# Patient Record
Sex: FEMALE | Race: WHITE | HISPANIC OR LATINO | Employment: UNEMPLOYED | URBAN - METROPOLITAN AREA
[De-identification: names, ages, dates, MRNs, and addresses within clinical notes are randomized per-mention and may not be internally consistent; named-entity substitution may affect disease eponyms.]

---

## 2017-01-04 ENCOUNTER — ALLSCRIPTS OFFICE VISIT (OUTPATIENT)
Dept: OTHER | Facility: OTHER | Age: 52
End: 2017-01-04

## 2017-01-04 DIAGNOSIS — M25.572 PAIN IN LEFT ANKLE: ICD-10-CM

## 2017-01-04 DIAGNOSIS — S52.601A CLOSED FRACTURE OF LOWER END OF RIGHT ULNA: ICD-10-CM

## 2017-01-04 DIAGNOSIS — S52.501A CLOSED FRACTURE OF LOWER END OF RIGHT RADIUS: ICD-10-CM

## 2017-01-09 ENCOUNTER — APPOINTMENT (OUTPATIENT)
Dept: OCCUPATIONAL THERAPY | Age: 52
End: 2017-01-09
Payer: OTHER MISCELLANEOUS

## 2017-01-09 ENCOUNTER — GENERIC CONVERSION - ENCOUNTER (OUTPATIENT)
Dept: OTHER | Facility: OTHER | Age: 52
End: 2017-01-09

## 2017-01-09 PROCEDURE — 97165 OT EVAL LOW COMPLEX 30 MIN: CPT

## 2017-01-12 ENCOUNTER — APPOINTMENT (OUTPATIENT)
Dept: OCCUPATIONAL THERAPY | Age: 52
End: 2017-01-12
Payer: OTHER MISCELLANEOUS

## 2017-01-12 PROCEDURE — 97110 THERAPEUTIC EXERCISES: CPT

## 2017-01-12 PROCEDURE — 97010 HOT OR COLD PACKS THERAPY: CPT

## 2017-01-12 PROCEDURE — 97140 MANUAL THERAPY 1/> REGIONS: CPT

## 2017-01-17 ENCOUNTER — APPOINTMENT (OUTPATIENT)
Dept: OCCUPATIONAL THERAPY | Age: 52
End: 2017-01-17
Payer: OTHER MISCELLANEOUS

## 2017-01-17 PROCEDURE — 97010 HOT OR COLD PACKS THERAPY: CPT

## 2017-01-17 PROCEDURE — 97140 MANUAL THERAPY 1/> REGIONS: CPT

## 2017-01-17 PROCEDURE — 97110 THERAPEUTIC EXERCISES: CPT

## 2017-01-20 ENCOUNTER — APPOINTMENT (OUTPATIENT)
Dept: OCCUPATIONAL THERAPY | Age: 52
End: 2017-01-20
Payer: OTHER MISCELLANEOUS

## 2017-01-20 PROCEDURE — 97018 PARAFFIN BATH THERAPY: CPT

## 2017-01-20 PROCEDURE — 97140 MANUAL THERAPY 1/> REGIONS: CPT

## 2017-01-24 ENCOUNTER — APPOINTMENT (OUTPATIENT)
Dept: OCCUPATIONAL THERAPY | Age: 52
End: 2017-01-24
Payer: OTHER MISCELLANEOUS

## 2017-01-24 PROCEDURE — 97010 HOT OR COLD PACKS THERAPY: CPT

## 2017-01-24 PROCEDURE — 97110 THERAPEUTIC EXERCISES: CPT

## 2017-01-26 ENCOUNTER — ALLSCRIPTS OFFICE VISIT (OUTPATIENT)
Dept: OTHER | Facility: OTHER | Age: 52
End: 2017-01-26

## 2017-01-27 ENCOUNTER — APPOINTMENT (OUTPATIENT)
Dept: OCCUPATIONAL THERAPY | Age: 52
End: 2017-01-27
Payer: OTHER MISCELLANEOUS

## 2017-01-31 ENCOUNTER — ALLSCRIPTS OFFICE VISIT (OUTPATIENT)
Dept: OTHER | Facility: OTHER | Age: 52
End: 2017-01-31

## 2017-01-31 ENCOUNTER — APPOINTMENT (OUTPATIENT)
Dept: OCCUPATIONAL THERAPY | Age: 52
End: 2017-01-31
Payer: OTHER MISCELLANEOUS

## 2017-01-31 ENCOUNTER — HOSPITAL ENCOUNTER (OUTPATIENT)
Dept: RADIOLOGY | Facility: CLINIC | Age: 52
Discharge: HOME | End: 2017-01-31
Payer: COMMERCIAL

## 2017-01-31 DIAGNOSIS — M25.572 PAIN IN LEFT ANKLE: ICD-10-CM

## 2017-01-31 PROCEDURE — 97110 THERAPEUTIC EXERCISES: CPT

## 2017-01-31 PROCEDURE — 73610 X-RAY EXAM OF ANKLE: CPT

## 2017-01-31 PROCEDURE — 97010 HOT OR COLD PACKS THERAPY: CPT

## 2017-01-31 PROCEDURE — 97140 MANUAL THERAPY 1/> REGIONS: CPT

## 2017-02-01 ENCOUNTER — GENERIC CONVERSION - ENCOUNTER (OUTPATIENT)
Dept: OTHER | Facility: OTHER | Age: 52
End: 2017-02-01

## 2017-02-07 ENCOUNTER — ALLSCRIPTS OFFICE VISIT (OUTPATIENT)
Dept: OTHER | Facility: OTHER | Age: 52
End: 2017-02-07

## 2017-02-08 ENCOUNTER — LAB CONVERSION - ENCOUNTER (OUTPATIENT)
Dept: OTHER | Facility: OTHER | Age: 52
End: 2017-02-08

## 2017-02-08 LAB
A/G RATIO (HISTORICAL): 1.5 (CALC) (ref 1–2.5)
ALBUMIN SERPL BCP-MCNC: 4.3 G/DL (ref 3.6–5.1)
ALP SERPL-CCNC: 97 U/L (ref 33–130)
ALT SERPL W P-5'-P-CCNC: 13 U/L (ref 6–29)
AST SERPL W P-5'-P-CCNC: 18 U/L (ref 10–35)
BASOPHILS # BLD AUTO: 0.6 %
BASOPHILS # BLD AUTO: 44 CELLS/UL (ref 0–200)
BILIRUB SERPL-MCNC: 0.2 MG/DL (ref 0.2–1.2)
BUN SERPL-MCNC: 13 MG/DL (ref 7–25)
BUN/CREA RATIO (HISTORICAL): NORMAL (CALC) (ref 6–22)
C-REACTIVE PROTEIN (HISTORICAL): 0.24 MG/DL
CALCIUM SERPL-MCNC: 9.7 MG/DL (ref 8.6–10.4)
CHLORIDE SERPL-SCNC: 102 MMOL/L (ref 98–110)
CO2 SERPL-SCNC: 29 MMOL/L (ref 20–31)
CREAT SERPL-MCNC: 0.58 MG/DL (ref 0.5–1.05)
DEPRECATED RDW RBC AUTO: 14.9 % (ref 11–15)
EGFR AFRICAN AMERICAN (HISTORICAL): 122 ML/MIN/1.73M2
EGFR-AMERICAN CALC (HISTORICAL): 105 ML/MIN/1.73M2
EOSINOPHIL # BLD AUTO: 175 CELLS/UL (ref 15–500)
EOSINOPHIL # BLD AUTO: 2.4 %
ERYTHROCYTE SEDIMENTATION RATE (HISTORICAL): 6 MM/H
GAMMA GLOBULIN (HISTORICAL): 2.9 G/DL (CALC) (ref 1.9–3.7)
GLUCOSE (HISTORICAL): 91 MG/DL (ref 65–99)
HCT VFR BLD AUTO: 35.7 % (ref 35–45)
HGB BLD-MCNC: 11.4 G/DL (ref 11.7–15.5)
LYMPHOCYTES # BLD AUTO: 2978 CELLS/UL (ref 850–3900)
LYMPHOCYTES # BLD AUTO: 40.8 %
MCH RBC QN AUTO: 24.8 PG (ref 27–33)
MCHC RBC AUTO-ENTMCNC: 32.1 G/DL (ref 32–36)
MCV RBC AUTO: 77.4 FL (ref 80–100)
MONOCYTES # BLD AUTO: 540 CELLS/UL (ref 200–950)
MONOCYTES (HISTORICAL): 7.4 %
NEUTROPHILS # BLD AUTO: 3562 CELLS/UL (ref 1500–7800)
NEUTROPHILS # BLD AUTO: 48.8 %
PLATELET # BLD AUTO: 323 THOUSAND/UL (ref 140–400)
PMV BLD AUTO: 8.4 FL (ref 7.5–12.5)
POTASSIUM SERPL-SCNC: 4.4 MMOL/L (ref 3.5–5.3)
RBC # BLD AUTO: 4.61 MILLION/UL (ref 3.8–5.1)
SODIUM SERPL-SCNC: 138 MMOL/L (ref 135–146)
TOTAL PROTEIN (HISTORICAL): 7.2 G/DL (ref 6.1–8.1)
WBC # BLD AUTO: 7.3 THOUSAND/UL (ref 3.8–10.8)

## 2017-03-08 ENCOUNTER — ALLSCRIPTS OFFICE VISIT (OUTPATIENT)
Dept: OTHER | Facility: OTHER | Age: 52
End: 2017-03-08

## 2017-03-09 ENCOUNTER — GENERIC CONVERSION - ENCOUNTER (OUTPATIENT)
Dept: OTHER | Facility: OTHER | Age: 52
End: 2017-03-09

## 2017-04-12 ENCOUNTER — ALLSCRIPTS OFFICE VISIT (OUTPATIENT)
Dept: OTHER | Facility: OTHER | Age: 52
End: 2017-04-12

## 2017-04-12 DIAGNOSIS — M06.09 RHEUMATOID ARTHRITIS OF MULTIPLE SITES WITHOUT RHEUMATOID FACTOR (HCC): ICD-10-CM

## 2017-04-12 DIAGNOSIS — Z79.899 OTHER LONG TERM (CURRENT) DRUG THERAPY: ICD-10-CM

## 2017-04-12 DIAGNOSIS — M79.7 FIBROMYALGIA: ICD-10-CM

## 2017-05-02 ENCOUNTER — ALLSCRIPTS OFFICE VISIT (OUTPATIENT)
Dept: OTHER | Facility: OTHER | Age: 52
End: 2017-05-02

## 2017-06-07 ENCOUNTER — ALLSCRIPTS OFFICE VISIT (OUTPATIENT)
Dept: OTHER | Facility: OTHER | Age: 52
End: 2017-06-07

## 2017-06-08 ENCOUNTER — ALLSCRIPTS OFFICE VISIT (OUTPATIENT)
Dept: OTHER | Facility: OTHER | Age: 52
End: 2017-06-08

## 2017-06-08 DIAGNOSIS — Z79.899 OTHER LONG TERM (CURRENT) DRUG THERAPY: ICD-10-CM

## 2017-06-08 DIAGNOSIS — M06.09 RHEUMATOID ARTHRITIS OF MULTIPLE SITES WITHOUT RHEUMATOID FACTOR (HCC): ICD-10-CM

## 2017-06-14 ENCOUNTER — TRANSCRIBE ORDERS (OUTPATIENT)
Dept: LAB | Facility: HOSPITAL | Age: 52
End: 2017-06-14

## 2017-06-14 ENCOUNTER — APPOINTMENT (OUTPATIENT)
Dept: LAB | Facility: HOSPITAL | Age: 52
End: 2017-06-14
Attending: INTERNAL MEDICINE
Payer: COMMERCIAL

## 2017-06-14 DIAGNOSIS — E78.5 HYPERLIPIDEMIA, UNSPECIFIED HYPERLIPIDEMIA TYPE: ICD-10-CM

## 2017-06-14 DIAGNOSIS — Z79.899 OTHER LONG TERM (CURRENT) DRUG THERAPY: ICD-10-CM

## 2017-06-14 DIAGNOSIS — E78.5 HYPERLIPIDEMIA, UNSPECIFIED HYPERLIPIDEMIA TYPE: Primary | ICD-10-CM

## 2017-06-14 DIAGNOSIS — M06.09 RHEUMATOID ARTHRITIS OF MULTIPLE SITES WITHOUT RHEUMATOID FACTOR (HCC): ICD-10-CM

## 2017-06-14 LAB
25(OH)D3 SERPL-MCNC: 19.8 NG/ML (ref 30–100)
ALBUMIN SERPL BCP-MCNC: 3.8 G/DL (ref 3.5–5)
ALP SERPL-CCNC: 89 U/L (ref 46–116)
ALT SERPL W P-5'-P-CCNC: 24 U/L (ref 12–78)
ANION GAP SERPL CALCULATED.3IONS-SCNC: 5 MMOL/L (ref 4–13)
AST SERPL W P-5'-P-CCNC: 21 U/L (ref 5–45)
BASOPHILS # BLD AUTO: 0.04 THOUSANDS/ΜL (ref 0–0.1)
BASOPHILS NFR BLD AUTO: 1 % (ref 0–1)
BILIRUB SERPL-MCNC: 0.4 MG/DL (ref 0.2–1)
BUN SERPL-MCNC: 11 MG/DL (ref 5–25)
CALCIUM SERPL-MCNC: 8.9 MG/DL (ref 8.3–10.1)
CHLORIDE SERPL-SCNC: 101 MMOL/L (ref 100–108)
CHOLEST SERPL-MCNC: 288 MG/DL (ref 50–200)
CO2 SERPL-SCNC: 30 MMOL/L (ref 21–32)
CREAT SERPL-MCNC: 0.55 MG/DL (ref 0.6–1.3)
CRP SERPL QL: <3 MG/L
EOSINOPHIL # BLD AUTO: 0.12 THOUSAND/ΜL (ref 0–0.61)
EOSINOPHIL NFR BLD AUTO: 2 % (ref 0–6)
ERYTHROCYTE [DISTWIDTH] IN BLOOD BY AUTOMATED COUNT: 14.3 % (ref 11.6–15.1)
ERYTHROCYTE [SEDIMENTATION RATE] IN BLOOD: 11 MM/HOUR (ref 0–20)
GFR SERPL CREATININE-BSD FRML MDRD: >60 ML/MIN/1.73SQ M
GLUCOSE P FAST SERPL-MCNC: 88 MG/DL (ref 65–99)
HCT VFR BLD AUTO: 38.1 % (ref 34.8–46.1)
HDLC SERPL-MCNC: 91 MG/DL (ref 40–60)
HGB BLD-MCNC: 12.2 G/DL (ref 11.5–15.4)
LDLC SERPL CALC-MCNC: 172 MG/DL (ref 0–100)
LYMPHOCYTES # BLD AUTO: 2 THOUSANDS/ΜL (ref 0.6–4.47)
LYMPHOCYTES NFR BLD AUTO: 41 % (ref 14–44)
MCH RBC QN AUTO: 25.2 PG (ref 26.8–34.3)
MCHC RBC AUTO-ENTMCNC: 32 G/DL (ref 31.4–37.4)
MCV RBC AUTO: 79 FL (ref 82–98)
MONOCYTES # BLD AUTO: 0.36 THOUSAND/ΜL (ref 0.17–1.22)
MONOCYTES NFR BLD AUTO: 7 % (ref 4–12)
NEUTROPHILS # BLD AUTO: 2.39 THOUSANDS/ΜL (ref 1.85–7.62)
NEUTS SEG NFR BLD AUTO: 49 % (ref 43–75)
NRBC BLD AUTO-RTO: 0 /100 WBCS
PLATELET # BLD AUTO: 292 THOUSANDS/UL (ref 149–390)
PMV BLD AUTO: 9.3 FL (ref 8.9–12.7)
POTASSIUM SERPL-SCNC: 4.1 MMOL/L (ref 3.5–5.3)
PROT SERPL-MCNC: 7.3 G/DL (ref 6.4–8.2)
RBC # BLD AUTO: 4.84 MILLION/UL (ref 3.81–5.12)
SODIUM SERPL-SCNC: 136 MMOL/L (ref 136–145)
TRIGL SERPL-MCNC: 127 MG/DL
TSH SERPL DL<=0.05 MIU/L-ACNC: 1.25 UIU/ML (ref 0.36–3.74)
WBC # BLD AUTO: 4.93 THOUSAND/UL (ref 4.31–10.16)

## 2017-06-14 PROCEDURE — 84443 ASSAY THYROID STIM HORMONE: CPT

## 2017-06-14 PROCEDURE — 80061 LIPID PANEL: CPT

## 2017-06-14 PROCEDURE — 86140 C-REACTIVE PROTEIN: CPT

## 2017-06-14 PROCEDURE — 85025 COMPLETE CBC W/AUTO DIFF WBC: CPT

## 2017-06-14 PROCEDURE — 36415 COLL VENOUS BLD VENIPUNCTURE: CPT

## 2017-06-14 PROCEDURE — 82306 VITAMIN D 25 HYDROXY: CPT

## 2017-06-14 PROCEDURE — 85652 RBC SED RATE AUTOMATED: CPT

## 2017-06-14 PROCEDURE — 80053 COMPREHEN METABOLIC PANEL: CPT

## 2017-06-16 ENCOUNTER — GENERIC CONVERSION - ENCOUNTER (OUTPATIENT)
Dept: OTHER | Facility: OTHER | Age: 52
End: 2017-06-16

## 2017-08-09 ENCOUNTER — GENERIC CONVERSION - ENCOUNTER (OUTPATIENT)
Dept: OTHER | Facility: OTHER | Age: 52
End: 2017-08-09

## 2017-08-09 ENCOUNTER — ALLSCRIPTS OFFICE VISIT (OUTPATIENT)
Dept: OTHER | Facility: OTHER | Age: 52
End: 2017-08-09

## 2017-08-09 DIAGNOSIS — Z12.31 ENCOUNTER FOR SCREENING MAMMOGRAM FOR MALIGNANT NEOPLASM OF BREAST: ICD-10-CM

## 2017-08-09 DIAGNOSIS — R92.2 INCONCLUSIVE MAMMOGRAM: ICD-10-CM

## 2017-10-01 DIAGNOSIS — E78.5 HYPERLIPIDEMIA: ICD-10-CM

## 2017-10-16 ENCOUNTER — ALLSCRIPTS OFFICE VISIT (OUTPATIENT)
Dept: OTHER | Facility: OTHER | Age: 52
End: 2017-10-16

## 2017-10-17 NOTE — PROGRESS NOTES
Assessment  1  Chronic constipation (564 00) (K59 09)   2  Fibromyalgia (729 1) (M79 7)   3  Hyperlipidemia (272 4) (E78 5)   4  Rheumatoid arthritis of multiple sites with negative rheumatoid factor (714 0) (M06 09)    Plan  Chronic constipation    · Senna 8 6 MG Oral Capsule; 2 caps daily  Hyperlipidemia    · (1) LIPID PANEL FASTING W DIRECT LDL REFLEX; Status:Active; Requested for:16Oct2017;   PMH: Acute left ankle pain    · Naproxen 500 MG Oral Tablet; TAKE 1 TABLET EVERY 12 HOURS AS NEEDED FOR PAIN  WITH FOOD    Discussion/Summary  Discussion Summary:   Try Sennacholesterol dietstatin therapypanel with next set of labswith [id number]  Counseling Documentation With Imm: The patient was counseled regarding diagnostic results,-- impressions  Medication SE Review and Pt Understands Tx: Possible side effects of new medications were reviewed with the patient/guardian today  The treatment plan was reviewed with the patient/guardian  The patient/guardian understands and agrees with the treatment plan      Chief Complaint  Chief Complaint Free Text Note Form: Patient presents to office today for a follow-up   Chief Complaint Chronic Condition [facility]: Patient is here today for follow up of chronic conditions described in HPI  History of Present Illness  HPI: since last visit, she has seen GI for chronic constipationdid not helpis eating a high fiber diethas also seen ortho for the right hand stiffnessdid not cover Pennsaid      Review of Systems  Complete-Female:   Constitutional: recent weight gain, but-- no fever-- and-- no chills  Cardiovascular: no chest pain-- and-- no palpitations  Respiratory: no shortness of breath-- and-- no cough  Gastrointestinal: constipation-- and-- occ heartburn  Musculoskeletal: arthralgias-- and-- joint swelling, but-- as noted in HPI  The patient presents with complaints of occasional episodes of headache  Active Problems  1   Acid reflux disease (530 81) (K21 9)   2  Candidal vulvovaginitis (112 1) (B37 3)   3  Chronic constipation (564 00) (K59 09)   4  Closed fracture of distal ends of right radius and ulna (813 44) (S52 501A,S52 601A)   5  Dense breasts (793 82) (R92 2)   6  Depression (311) (F32 9)   7  Encounter for screening mammogram for malignant neoplasm of breast (V76 12) (Z12 31)   8  Fibromyalgia (729 1) (M79 7)   9  Flu vaccine need (V04 81) (Z23)   10  History of rheumatoid arthritis (V13 4) (Z87 39)   11  Hyperlipidemia (272 4) (E78 5)   12  Long-term use of hydroxychloroquine (V58 69) (Z79 899)   13  Need for influenza vaccination (V04 81) (Z23)   14  Pain of left knee after injury (719 46) (M25 562)   15  Rheumatoid arthritis of multiple sites with negative rheumatoid factor (714 0) (M06 09)   16  Screening, lipid (V77 91) (Z13 220)   17  Stiffness of finger joint of right hand (719 54) (M25 641)   18  Well female exam with routine gynecological exam (V72 31) (Z01 419)    Past Medical History  1  History of Acute left ankle pain (719 47) (M25 572)   2  History of Acute upper respiratory infection (465 9) (J06 9)   3  History of Acute upper respiratory infection (465 9) (J06 9)   4  History of Cough (786 2) (R05)   5  Depression (311) (F32 9)   6  Fibromyalgia (729 1) (M79 7)   7  History of anemia (V12 3) (Z86 2)   8  History of headache (V13 89) (Z87 898)   9  History of rheumatoid arthritis (V13 4) (Z87 39)   10  History of Need for tetanus booster (V03 7) (Z23)   11  History of Need for vaccination with 13-polyvalent pneumococcal conjugate vaccine (V03 82) (Z23)   12  History of Pap smear for cervical cancer screening (V76 2) (Z12 4)   13  History of Screen for colon cancer (V76 51) (Z12 11)   14  History of Screening for hyperlipidemia (V77 91) (Z13 220)  Active Problems And Past Medical History Reviewed: The active problems and past medical history were reviewed and updated today  Surgical History  1  History of Hysterectomy   2   History of Hysterectomy   3  History of Tonsillectomy   4  History of Wrist Surgery Right  Surgical History Reviewed: The surgical history was reviewed and updated today  Family History  Mother    1  Denied: Family history of Colon cancer   2  Denied: Family history of Crohn's disease   3  Denied: Family history of liver disease  Father    4  Denied: Family history of Colon cancer   5  Denied: Family history of Crohn's disease   6  Denied: Family history of liver disease  Family History    7  Denied: Family history of Crohn's disease   8  Denied: Family history of psoriasis   9  Denied: Family history of systemic lupus erythematosus   10  Denied: Family history of ulcerative colitis  Family History Reviewed: The family history was reviewed and updated today  Social History   · Employed   · Never a smoker   · History of Never a smoker   · No alcohol use   · History of No alcohol use   · No drug use  Social History Reviewed: The social history was reviewed and updated today  Current Meds   1  DULoxetine HCl - 60 MG Oral Capsule Delayed Release Particles; TAKE 2 CAPSULE Bedtime; Therapy: 61SCF1001 to (Zoraida Puckett)  Requested for: 19Kuu1306; Last Rx:05Sep2017   Ordered   2  Hydroxychloroquine Sulfate 200 MG Oral Tablet; TAKE 2 TABLET Daily With Food; Therapy: 27Apr2015 to (Evaluate:15Cdt8318)  Requested for: 21Zqh9462; Last Rx:05Sep2017   Ordered   3  Lidocaine-Prilocaine 2 5-2 5 % External Cream; Apply to affected areas up to 3 times a day as   needed; Therapy: 40VVR6025 to (Evaluate:27Mar2017)  Requested for: 61POZ9476; Last Rx:26Jan2017   Ordered   4  Naproxen 500 MG Oral Tablet; take 1 tablet every 12 hours as needed for pain; Therapy: 00WBW4498 to (Evaluate:11Jun2017)  Requested for: 12Apr2017; Last Rx:12Apr2017   Ordered   5  Nystatin-Triamcinolone 046551-6 1 UNIT/GM-% External Ointment; APPLY SPARINGLY TO AFFECTED   AREA TWICE A DAY FOR 7 TO 14 DAYS;    Therapy: 57HLN4786 to (Last Susan Hammad)  Requested for: 78Zrk4685 Ordered   6  Vitamin C 500 MG Oral Capsule; TAKE 1 CAPSULE DAILY; Therapy: (Recorded:56Epl7326) to Recorded   7  Vitamin D CAPS; TAKE 1 CAPSULE ONCE DAILY; Therapy: (Recorded:90Jlm0983) to Recorded  Medication List Reviewed: The medication list was reviewed and updated today  Allergies  1  No Known Drug Allergies  2  Seasonal    Vitals  Vital Signs    Recorded: 63ARW3346 03:12PM   Heart Rate 82   Systolic 320   Diastolic 72   Height 5 ft 5 in   Weight 158 lb    BMI Calculated 26 29   BSA Calculated 1 79   O2 Saturation 98     Physical Exam    Constitutional   General appearance: No acute distress, well appearing and well nourished  Eyes   Conjunctiva and lids: No swelling, erythema or discharge  Ears, Nose, Mouth, and Throat   Otoscopic examination: Tympanic membranes translucent with normal light reflex  Canals patent without erythema  Oropharynx: Normal with no erythema, edema, exudate or lesions  Pulmonary   Respiratory effort: No increased work of breathing or signs of respiratory distress  Auscultation of lungs: Clear to auscultation  Cardiovascular   Auscultation of heart: Normal rate and rhythm, normal S1 and S2, without murmurs  Abdomen   Abdomen: Non-tender, no masses  Liver and spleen: No hepatomegaly or splenomegaly  Lymphatic   Palpation of lymph nodes in neck: No lymphadenopathy      Musculoskeletal   Gait and station: Normal     Psychiatric   Orientation to person, place, and time: Normal     Mood and affect: Normal          Future Appointments    Date/Time Provider Specialty Site   11/29/2017 08:20 AM Livia Dewey DO Gastroenterology Adult [facility] [address] [place] [address]   11/28/2017 03:00 PM [facility] Rhe[facility]ology [facility]     Signatures   Electronically signed by : CLEMENTINE Chamorro ; Oct 16 2017  3:42PM EST                       (Author)

## 2017-11-28 ENCOUNTER — ALLSCRIPTS OFFICE VISIT (OUTPATIENT)
Dept: OTHER | Facility: OTHER | Age: 52
End: 2017-11-28

## 2017-11-28 DIAGNOSIS — M06.09 RHEUMATOID ARTHRITIS OF MULTIPLE SITES WITHOUT RHEUMATOID FACTOR (HCC): ICD-10-CM

## 2017-11-28 DIAGNOSIS — M79.7 FIBROMYALGIA: ICD-10-CM

## 2017-11-29 ENCOUNTER — ALLSCRIPTS OFFICE VISIT (OUTPATIENT)
Dept: OTHER | Facility: OTHER | Age: 52
End: 2017-11-29

## 2017-11-30 NOTE — PROGRESS NOTES
Assessment  1  Chronic constipation (564 00) (K59 09)    Plan  Chronic constipation    · Senna 8 6 MG Oral Capsule; TAKE AS DIRECTED   Rx By: Georgette Nova; Dispense: 90 Days ; #:270 Capsule; Refill: 3;Chronic constipation; RIKI = N; Verified Transmission to [place] [id number]; Msg to Pharmacy: take 3 tablets daily; Last Updated By: [employer]; 11/29/2017 8:54:08 AM   · Follow-up PRN Evaluation and Treatment  Follow-up  Status: Complete  Done:29Nov2017   Ordered;Chronic constipation; Ordered By: Georgette Nova Performed:  Due: 35RNS0635    Discussion/Summary  Discussion Summary:   45 year old with chronic idiopathic constipation, recent colonoscopy was normal  TSH was normal  She is currently on senna and is happy on this regimen  colonoscopy in 10 years  can follow up as needed in the future  Chief Complaint  Chief Complaint Free Text Note Form: Patient is here for followup for constipation  History of Present Illness  HPI: 57-year-old female here for follow-up of chronic constipation  She is currently on senna and is happy on this regimen  has no family history of colon cancer  Review of Systems  Complete-Female GI Adult:  Constitutional: No fever, no chills, feels well, no tiredness, no recent weight gain or weight loss  Eyes: No complaints of eye pain, no red eyes, no eyesight problems, no discharge, no dry eyes, no itching of eyes  ENT: no complaints of earache, no loss of hearing, no nose bleeds, no nasal discharge, no sore throat, no hoarseness  Cardiovascular: No complaints of slow heart rate, no fast heart rate, no chest pain, no palpitations, no leg claudication, no lower extremity edema  Respiratory: No complaints of shortness of breath, no wheezing, no cough, no SOB on exertion, no orthopnea, no PND  Gastrointestinal: constipation, but-- as noted in HPI    Genitourinary: No complaints of dysuria, no incontinence, no pelvic pain, no dysmenorrhea, no vaginal discharge or bleeding  Musculoskeletal: No complaints of arthralgias, no myalgias, no joint swelling or stiffness, no limb pain or swelling  Integumentary: No complaints of skin rash or lesions, no itching, no skin wounds, no breast pain or lump  Neurological: No complaints of headache, no confusion, no convulsions, no numbness, no dizziness or fainting, no tingling, no limb weakness, no difficulty walking  Psychiatric: Not suicidal, no sleep disturbance, no anxiety or depression, no change in personality, no emotional problems  Endocrine: No complaints of proptosis, no hot flashes, no muscle weakness, no deepening of the voice, no feelings of weakness  Hematologic/Lymphatic: No complaints of swollen glands, no swollen glands in the neck, does not bleed easily, does not bruise easily  ROS Reviewed:   ROS reviewed  Active Problems    1  Acid reflux disease (530 81) (K21 9)   2  Candidal vulvovaginitis (112 1) (B37 3)   3  Chronic constipation (564 00) (K59 09)   4  Closed fracture of distal ends of right radius and ulna (813 44) (S52 501A,S52 601A)   5  Dense breasts (793 82) (R92 2)   6  Depression (311) (F32 9)   7  Encounter for screening mammogram for malignant neoplasm of breast (V76 12) (Z12 31)   8  Fibromyalgia (729 1) (M79 7)   9  Flu vaccine need (V04 81) (Z23)   10  History of rheumatoid arthritis (V13 4) (Z87 39)   11  Hyperlipidemia (272 4) (E78 5)   12  Long-term use of hydroxychloroquine (V58 69) (Z79 899)   13  Need for influenza vaccination (V04 81) (Z23)   14  Pain of left knee after injury (719 46) (M25 562)   15  Rheumatoid arthritis of multiple sites with negative rheumatoid factor (714 0) (M06 09)   16  Screening, lipid (V77 91) (Z13 220)   17  Stiffness of finger joint of right hand (719 54) (M25 641)   18  Well female exam with routine gynecological exam (V72 31) (Z01 419)    Past Medical History    1  History of Acute left ankle pain (719 47) (M25 572)   2   History of Acute upper respiratory infection (465 9) (J06 9)   3  History of Acute upper respiratory infection (465 9) (J06 9)   4  History of Cough (786 2) (R05)   5  Depression (311) (F32 9)   6  Fibromyalgia (729 1) (M79 7)   7  History of anemia (V12 3) (Z86 2)   8  History of headache (V13 89) (Z87 898)   9  History of rheumatoid arthritis (V13 4) (Z87 39)   10  History of Need for tetanus booster (V03 7) (Z23)   11  History of Need for vaccination with 13-polyvalent pneumococcal conjugate vaccine  (V03 82) (Z23)   12  History of Pap smear for cervical cancer screening (V76 2) (Z12 4)   13  History of Screen for colon cancer (V76 51) (Z12 11)   14  History of Screening for hyperlipidemia (V77 91) (Z13 220)  Active Problems And Past Medical History Reviewed: The active problems and past medical history were reviewed and updated today  Surgical History  1  History of Hysterectomy   2  History of Hysterectomy   3  History of Tonsillectomy   4  History of Wrist Surgery Right  Surgical History Reviewed: The surgical history was reviewed and updated today  Family History  Mother    1  Denied: Family history of Colon cancer   2  Denied: Family history of Crohn's disease   3  Denied: Family history of liver disease  Father    4  Denied: Family history of Colon cancer   5  Denied: Family history of Crohn's disease   6  Denied: Family history of liver disease  Family History    7  Denied: Family history of Crohn's disease   8  Denied: Family history of psoriasis   9  Denied: Family history of systemic lupus erythematosus   10  Denied: Family history of ulcerative colitis  Family History Reviewed: The family history was reviewed and updated today  Social History     · Employed   · Never a smoker   · History of Never a smoker   · No alcohol use   · History of No alcohol use   · No drug use  Social History Reviewed: The social history was reviewed and updated today  Current Meds   1   DULoxetine HCl - 60 MG Oral Capsule Delayed Release Particles; TAKE 2 CAPSULE Bedtime; Therapy: 09MRN5932 to (Loly Bustillo)  Requested for: 41WTB5698; Last Rx:28Nov2017 Ordered   2  Hydroxychloroquine Sulfate 200 MG Oral Tablet; TAKE 2 TABLET Daily With Food; Therapy: 75Xoy8877 to (Sarahy Erick)  Requested for: 00TSI8901; Last Rx:28Nov2017 Ordered   3  Lidocaine-Prilocaine 2 5-2 5 % External Cream; Apply to affected areas up to 3 times a day as needed; Therapy: 99YHA5388 to (Evaluate:27Mar2017)  Requested for: 60OPD6234; Last Rx:26Jan2017 Ordered   4  Naproxen 500 MG Oral Tablet; TAKE 1 TABLET BY MOUTH EVERY 12 HOURS WITH FOOD AS NEEDED FOR PAIN; Therapy: 04EMD8991 to (Evaluate:39Fcq3022)  Requested for: 74XZC4547; Last Rx:28Nov2017 Ordered   5  Nystatin-Triamcinolone 237777-6 1 UNIT/GM-% External Ointment; APPLY SPARINGLY TO AFFECTED AREA TWICE A DAY FOR 7 TO 14 DAYS; Therapy: 82MVD1293 to (Last Rx:00Wsa3978)  Requested for: 72Cmj2945 Ordered   6  Senna 8 6 MG Oral Capsule; 2 caps daily; Therapy: 98GEM8975 to (Last Rx:16Oct2017)  Requested for: 45OYS5323 Ordered   7  Vitamin C 500 MG Oral Capsule; TAKE 1 CAPSULE DAILY; Therapy: (Recorded:08Jun2015) to Recorded   8  Vitamin D CAPS; TAKE 1 CAPSULE ONCE DAILY; Therapy: (Recorded:08Jun2015) to Recorded  Medication List Reviewed: The medication list was reviewed and updated today  Allergies  1  No Known Drug Allergies  2  Seasonal    Vitals  Vital Signs    Recorded: 62AEG9609 08:36AM   Temperature 98 1 F, Tympanic   Heart Rate 91   Respiration 18   Systolic 938, LUE, Sitting   Diastolic 71, LUE, Sitting   Height 5 ft 4 in   Weight 159 lb    BMI Calculated 27 29   BSA Calculated 1 77       Physical Exam   Constitutional  General appearance: No acute distress, well appearing and well nourished  Eyes  Conjunctiva and lids: No swelling, erythema or discharge  Pupils and irises: Equal, round and reactive to light     Ears, Nose, Mouth, and Throat  External inspection of ears and nose: Normal    Nasal mucosa, septum, and turbinates: Normal without edema or erythema  Oropharynx: Normal with no erythema, edema, exudate or lesions  Pulmonary  Respiratory effort: No increased work of breathing or signs of respiratory distress  Auscultation of lungs: Clear to auscultation  Cardiovascular  Auscultation of heart: Normal rate and rhythm, normal S1 and S2, without murmurs  Examination of extremities for edema and/or varicosities: Normal    Abdomen  Abdomen: Non-tender, no masses  Liver and spleen: No hepatomegaly or splenomegaly  Lymphatic  Palpation of lymph nodes in neck: No lymphadenopathy  Musculoskeletal  Gait and station: Normal    Digits and nails: Normal without clubbing or cyanosis  Inspection/palpation of joints, bones, and muscles: Normal    Skin  Skin and subcutaneous tissue: Normal without rashes or lesions  Psychiatric  Orientation to person, place, and time: Normal    Mood and affect: Normal          Future Appointments    Date/Time Provider Specialty Site   04/18/2018 03:00 PM CLEMENTINE Brar   Internal Medicine [facility]       Signatures   Electronically signed by : Sandra Orozco DO; Nov 29 2017  8:55AM EST                       (Author)

## 2017-12-22 ENCOUNTER — GENERIC CONVERSION - ENCOUNTER (OUTPATIENT)
Dept: OTHER | Facility: OTHER | Age: 52
End: 2017-12-22

## 2017-12-22 ENCOUNTER — TRANSCRIBE ORDERS (OUTPATIENT)
Dept: LAB | Facility: HOSPITAL | Age: 52
End: 2017-12-22

## 2017-12-22 ENCOUNTER — APPOINTMENT (OUTPATIENT)
Dept: LAB | Facility: HOSPITAL | Age: 52
End: 2017-12-22
Payer: COMMERCIAL

## 2017-12-22 DIAGNOSIS — E78.5 HYPERLIPIDEMIA: ICD-10-CM

## 2017-12-22 DIAGNOSIS — M79.7 FIBROMYALGIA: ICD-10-CM

## 2017-12-22 DIAGNOSIS — M06.09 RHEUMATOID ARTHRITIS OF MULTIPLE SITES WITHOUT RHEUMATOID FACTOR (HCC): ICD-10-CM

## 2017-12-22 LAB
ALBUMIN SERPL BCP-MCNC: 3.8 G/DL (ref 3.5–5)
ALP SERPL-CCNC: 81 U/L (ref 46–116)
ALT SERPL W P-5'-P-CCNC: 22 U/L (ref 12–78)
ANION GAP SERPL CALCULATED.3IONS-SCNC: 6 MMOL/L (ref 4–13)
AST SERPL W P-5'-P-CCNC: 21 U/L (ref 5–45)
BASOPHILS # BLD AUTO: 0.05 THOUSANDS/ΜL (ref 0–0.1)
BASOPHILS NFR BLD AUTO: 1 % (ref 0–1)
BILIRUB SERPL-MCNC: 0.41 MG/DL (ref 0.2–1)
BUN SERPL-MCNC: 14 MG/DL (ref 5–25)
CALCIUM SERPL-MCNC: 9 MG/DL (ref 8.3–10.1)
CHLORIDE SERPL-SCNC: 104 MMOL/L (ref 100–108)
CHOLEST SERPL-MCNC: 246 MG/DL (ref 50–200)
CO2 SERPL-SCNC: 29 MMOL/L (ref 21–32)
CREAT SERPL-MCNC: 0.51 MG/DL (ref 0.6–1.3)
CRP SERPL QL: <3 MG/L
EOSINOPHIL # BLD AUTO: 0.12 THOUSAND/ΜL (ref 0–0.61)
EOSINOPHIL NFR BLD AUTO: 3 % (ref 0–6)
ERYTHROCYTE [DISTWIDTH] IN BLOOD BY AUTOMATED COUNT: 14.7 % (ref 11.6–15.1)
ERYTHROCYTE [SEDIMENTATION RATE] IN BLOOD: 8 MM/HOUR (ref 0–20)
GFR SERPL CREATININE-BSD FRML MDRD: 109 ML/MIN/1.73SQ M
GLUCOSE P FAST SERPL-MCNC: 92 MG/DL (ref 65–99)
HCT VFR BLD AUTO: 36.7 % (ref 34.8–46.1)
HDLC SERPL-MCNC: 93 MG/DL (ref 40–60)
HGB BLD-MCNC: 11.5 G/DL (ref 11.5–15.4)
LDLC SERPL CALC-MCNC: 128 MG/DL (ref 0–100)
LYMPHOCYTES # BLD AUTO: 1.96 THOUSANDS/ΜL (ref 0.6–4.47)
LYMPHOCYTES NFR BLD AUTO: 46 % (ref 14–44)
MCH RBC QN AUTO: 24.5 PG (ref 26.8–34.3)
MCHC RBC AUTO-ENTMCNC: 31.3 G/DL (ref 31.4–37.4)
MCV RBC AUTO: 78 FL (ref 82–98)
MONOCYTES # BLD AUTO: 0.31 THOUSAND/ΜL (ref 0.17–1.22)
MONOCYTES NFR BLD AUTO: 7 % (ref 4–12)
NEUTROPHILS # BLD AUTO: 1.88 THOUSANDS/ΜL (ref 1.85–7.62)
NEUTS SEG NFR BLD AUTO: 43 % (ref 43–75)
NRBC BLD AUTO-RTO: 0 /100 WBCS
PLATELET # BLD AUTO: 336 THOUSANDS/UL (ref 149–390)
PMV BLD AUTO: 9.4 FL (ref 8.9–12.7)
POTASSIUM SERPL-SCNC: 4.1 MMOL/L (ref 3.5–5.3)
PROT SERPL-MCNC: 7.3 G/DL (ref 6.4–8.2)
RBC # BLD AUTO: 4.69 MILLION/UL (ref 3.81–5.12)
SODIUM SERPL-SCNC: 139 MMOL/L (ref 136–145)
TRIGL SERPL-MCNC: 125 MG/DL
WBC # BLD AUTO: 4.33 THOUSAND/UL (ref 4.31–10.16)

## 2017-12-22 PROCEDURE — 86140 C-REACTIVE PROTEIN: CPT

## 2017-12-22 PROCEDURE — 36415 COLL VENOUS BLD VENIPUNCTURE: CPT

## 2017-12-22 PROCEDURE — 85025 COMPLETE CBC W/AUTO DIFF WBC: CPT

## 2017-12-22 PROCEDURE — 85652 RBC SED RATE AUTOMATED: CPT

## 2017-12-22 PROCEDURE — 80061 LIPID PANEL: CPT

## 2017-12-22 PROCEDURE — 80053 COMPREHEN METABOLIC PANEL: CPT

## 2017-12-29 ENCOUNTER — ALLSCRIPTS OFFICE VISIT (OUTPATIENT)
Dept: OTHER | Facility: OTHER | Age: 52
End: 2017-12-29

## 2017-12-29 ENCOUNTER — GENERIC CONVERSION - ENCOUNTER (OUTPATIENT)
Dept: OTHER | Facility: OTHER | Age: 52
End: 2017-12-29

## 2017-12-29 ENCOUNTER — TRANSCRIBE ORDERS (OUTPATIENT)
Dept: RADIOLOGY | Facility: CLINIC | Age: 52
End: 2017-12-29

## 2017-12-29 ENCOUNTER — APPOINTMENT (OUTPATIENT)
Dept: RADIOLOGY | Facility: CLINIC | Age: 52
End: 2017-12-29
Payer: COMMERCIAL

## 2017-12-29 DIAGNOSIS — M25.532 PAIN IN LEFT WRIST: ICD-10-CM

## 2017-12-29 PROCEDURE — 73110 X-RAY EXAM OF WRIST: CPT

## 2017-12-30 NOTE — PROGRESS NOTES
Assessment   1  Left wrist pain (719 43) (M25 532)   2  Rheumatoid arthritis of multiple sites with negative rheumatoid factor (714 0) (M06 09)    Plan   Left wrist pain    · * XR WRIST 3+ VIEW LEFT; Status:Active; Requested for:93Wpr3178;   Rheumatoid arthritis of multiple sites with negative rheumatoid factor    · Naproxen 500 MG Oral Tablet; TAKE 1 TABLET BY MOUTH EVERY 12 HOURS    WITH FOOD AS NEEDED FOR PAIN    Discussion/Summary      Ice, NSAID, ACE wrap r/o fracture will go to [facility]      The patient was counseled regarding impressions  Possible side effects of new medications were reviewed with the patient/guardian today  The treatment plan was reviewed with the patient/guardian  The patient/guardian understands and agrees with the treatment plan      Chief Complaint   The patient presents today for fall yesterday, left wrist swollen, applied ice for swelling  History of Present Illness   HPI: Milagro Gunning last night at home, lost her balance fixing her closet landed on her left hand is swollen and she has been icing it pain with pronation and supination of the wrist          Review of Systems        Musculoskeletal: as noted in HPI  Active Problems   1  Acid reflux disease (530 81) (K21 9)   2  Candidal vulvovaginitis (112 1) (B37 3)   3  Chronic constipation (564 00) (K59 09)   4  Closed fracture of distal ends of right radius and ulna (813 44) (S52 501A,S52 601A)   5  Dense breasts (793 82) (R92 2)   6  Depression (311) (F32 9)   7  Encounter for screening mammogram for malignant neoplasm of breast (V76 12)     (Z12 31)   8  Fibromyalgia (729 1) (M79 7)   9  Flu vaccine need (V04 81) (Z23)   10  History of rheumatoid arthritis (V13 4) (Z87 39)   11  Hyperlipidemia (272 4) (E78 5)   12  Long-term use of hydroxychloroquine (V58 69) (Z79 899)   13  Need for influenza vaccination (V04 81) (Z23)   14  Pain of left knee after injury (719 46) (M25 562)   15   Rheumatoid arthritis of multiple sites with negative rheumatoid factor (714 0) (M06 09)   16  Screening, lipid (V77 91) (Z13 220)   17  Stiffness of finger joint of right hand (719 54) (M25 641)   18  Well female exam with routine gynecological exam (V72 31) (Z01 419)    Past Medical History   Active Problems And Past Medical History Reviewed: The active problems and past medical history were reviewed and updated today  Surgical History   Surgical History Reviewed: The surgical history was reviewed and updated today  Social History    · Employed   · Janitorial services   · Never a smoker   · History of Never a smoker   · No alcohol use   · History of No alcohol use   · No drug use  The social history was reviewed and updated today  Family History   Family History Reviewed: The family history was reviewed and updated today  Current Meds    1  DULoxetine HCl - 60 MG Oral Capsule Delayed Release Particles; TAKE 2 CAPSULE     Bedtime; Therapy: 92OKT3686 to (Loly Bustillo)  Requested for: 39ZEA5240; Last     Rx:28Nov2017 Ordered   2  Hydroxychloroquine Sulfate 200 MG Oral Tablet; TAKE 2 TABLET Daily With Food; Therapy: 27Apr2015 to (Sarahy Suarez)  Requested for: 37QCV5409; Last     Rx:28Nov2017 Ordered   3  Lidocaine-Prilocaine 2 5-2 5 % External Cream; Apply to affected areas up to 3 times a     day as needed; Therapy: 84FYU8247 to (Evaluate:27Mar2017)  Requested for: 39SGF3307; Last     Rx:26Jan2017 Ordered   4  Naproxen 500 MG Oral Tablet; TAKE 1 TABLET BY MOUTH EVERY 12 HOURS WITH     FOOD AS NEEDED FOR PAIN;     Therapy: 89JAO7675 to (Evaluate:90Shf6957)  Requested for: 70SAH1466; Last     Rx:28Nov2017 Ordered   5  Nystatin-Triamcinolone 711625-9 1 UNIT/GM-% External Ointment; APPLY SPARINGLY     TO AFFECTED AREA TWICE A DAY FOR 7     TO 14 DAYS; Therapy: 35OAV4249 to (Last Rx:17Xer9512)  Requested for: 10Pxu7925 Ordered   6  Senna 8 6 MG Oral Capsule; TAKE AS DIRECTED;      Therapy: 64GGV2247 to (Manolo Clemens)  Requested for: 61JKU6276; Last     Rx:29Nov2017 Ordered   7  Vitamin C 500 MG Oral Capsule; TAKE 1 CAPSULE DAILY; Therapy: (Recorded:08Jun2015) to Recorded   8  Vitamin D CAPS; TAKE 1 CAPSULE ONCE DAILY; Therapy: (Recorded:08Jun2015) to Recorded     The medication list was reviewed and updated today  Allergies   1  No Known Drug Allergies  2  Seasonal    Vitals    Recorded: 68QJE3248 12:26PM   Temperature 98 F, Oral   Heart Rate 99   Respiration 18   Systolic 98, Sitting   Diastolic 60, Sitting   Height 5 ft 4 in   Weight 158 lb    BMI Calculated 27 12   BSA Calculated 1 77   O2 Saturation 98     Physical Exam        Constitutional      General appearance: No acute distress, well appearing and well nourished  Pulmonary      Respiratory effort: No increased work of breathing or signs of respiratory distress  Musculoskeletal      Gait and station: Normal  -- swelling and tenderness of the left wristl good radial pulse; good hand   Future Appointments      Date/Time Provider Specialty Site   01/24/2018 08:15 AM CLEMENTINE Joel  Orthopedic Surgery [facility] [facility]   04/18/2018 03:00 PM CLEMENTINE Ramires   Internal Medicine [facility]     Signatures    Electronically signed by : CLEMENTINE Layton ; Dec 29 2017 12:55PM EST                       (Author)

## 2018-01-10 NOTE — RESULT NOTES
Message   Her cholesterol is higher than what it was last year  She has to watch her diet, eat a low fat diet  Verified Results  (1) LIPID PANEL, FASTING 26Zsw3888 10:12AM Juan Jay   REPORT COMMENT:  YOUR OFFICE HAS PT NAME AS Christos Lomeli  FASTING:YES     Test Name Result Flag Reference   CHOLESTEROL, TOTAL 252 mg/dL H 125-200   HDL CHOLESTEROL 78 mg/dL  > OR = 46   TRIGLICERIDES 636 mg/dL  <150   LDL-CHOLESTEROL 151 mg/dL (calc) H <130   Desirable range <100 mg/dL for patients with CHD or  diabetes and <70 mg/dL for diabetic patients with  known heart disease  CHOL/HDLC RATIO 3 2 (calc)  < OR = 5 0   NON HDL CHOLESTEROL 174 mg/dL (calc) H    Target for non-HDL cholesterol is 30 mg/dL higher than   LDL cholesterol target         Signatures   Electronically signed by : CLEMENTINE Castillo ; Sep 26 2016 10:23PM EST                       (Author)

## 2018-01-10 NOTE — RESULT NOTES
Message   Call pt   Xray is normal, no fractures   Dr Lynn Farmer suggested giving the Plaquenil more time to work since she just started it   Continue the Naproxen I prescribed for her        Verified Results  * XR ANKLE 3+ VIEW LEFT 31Jan2017 03:49PM Stanley Taylor Order Number: [id number]     Test Name Result Flag Reference   XR ANKLE 3+ VW LEFT (Report)     LEFT ANKLE     INDICATION: Pain and swelling for 5 days     COMPARISON: None     VIEWS: 3; 3 images     FINDINGS:     There is no acute fracture or dislocation  Tibiotalar joint is unremarkable  Small calcaneal heel spur is seen     No lytic or blastic lesions are seen  Soft tissues are unremarkable  IMPRESSION:     No acute osseous abnormality         Workstation performed: [id number]     Signed by:   Sridevi Clarke MD   2/1/17       Signatures   Electronically signed by : CLEMENTINE Castillo ; Feb 1 2017  5:14PM EST                       (Author)

## 2018-01-10 NOTE — PROGRESS NOTES
Assessment    1  Rheumatoid arthritis of multiple sites with negative rheumatoid factor (714 0) (M06 09)   2  Fibromyalgia (729 1) (M79 7)   3  Long-term use of hydroxychloroquine (V58 69) (Z79 899)   4  Acid reflux disease (530 81) (K21 9)   5  Depression (311) (F32 9)    Plan    1  DULoxetine HCl - 60 MG Oral Capsule Delayed Release Particles; TAKE 2   CAPSULE Bedtime    2  (1) CBC/PLT/DIFF; Status:Active; Requested for:28Nov2017;    3  (1) COMPREHENSIVE METABOLIC PANEL; Status:Active; Requested for:28Nov2017;    4  (1) C-REACTIVE PROTEIN; Status:Active; Requested for:28Nov2017;    5  (1) SED RATE; Status:Active; Requested for:28Nov2017;    6  Follow-up visit in 4 Months Evaluation and Treatment  Follow-up  Status: Hold For -   Scheduling  Requested for: 00YWE2582   7  Call [phone] if: The pain seems worse ; Status:Complete;   Done: 30GSX5468   8  Call [phone] if: The symptoms seem worse ; Status:Complete;   Done:   65RVE6364   9  Call [phone] if: You have questions or concerns about your problem ;   Status:Complete;   Done: 08WFY3576    10  Hydroxychloroquine Sulfate 200 MG Oral Tablet; TAKE 2 TABLET Daily With Food   11  Naproxen 500 MG Oral Tablet; TAKE 1 TABLET BY MOUTH EVERY 12 HOURS    WITH FOOD AS NEEDED FOR PAIN    Discussion/Summary    This is a 13-year-old female presenting today for follow-up for rheumatoid arthritis as well as fibromyalgia  Patient states that she's had increased left elbow pain as well as bilateral foot pain  She does note swelling in the legs as well as the knees in the hands  She denies any further obvious joint swelling  She has not found relief with naproxen however states that she is out of the medication at this time  She does have morning stiffness as well as stiffness with prolonged sitting lasting a few minutes  She describes nonrestorative sleep as well as fatigue  She is no difficulty with sleep   She currently utilizes hydroxychloroquine 400 mg daily and her last eye exam was June 2017  She also utilizes Cymbalta  On physical examination, there is no active synovitis  She has tenderness of the MCPs, PIPs, and DIPs bilaterally as well as the risks and the elbows  She has multiple myofascial tender points of the spine as well as tenderness of the hips, knees, ankles, and feet  There is crepitus of the knees  At this time patient's history and physical examination is most consistent with rheumatoid arthritis which appears to be stable at this time with the use of hydroxychloroquine  She is up-to-date on her eye exams area in addition it does appear that the majority patient's current symptoms are result of her fibromyalgia  We will continue with duloxetine and she was provided with a refill of naproxen as this has provided relief of her elbow pain  Patient will plan to return to the office in 4 months time however we will obtain a CBC, CMP, CRP, and ESR at this time  She'll contact the office in the interim she has any further questions or concerns  The patient has the current Goals: Improve joint pain  The patent has the current Barriers: None at this time  Patient is able to Self-Care  Counseling  Rheumatology Counseling Documentation: The patient was counseled regarding instructions for management, prognosis, patient and family education, risks and benefits of treatment options and importance of compliance with treatment  Chief Complaint  F/U RA/FMS   Patient is here today for follow up of chronic conditions described in HPI  History of Present Illness  Patient is in the office today for follow up for RA/FMS  Pain in the left elbow and feet  +Swelling in the legs as well  Taking Naprosyn with some relief, but out of medication  +Swelling in the knees and hands  No obvious joint swelling  +Am stiffness x and with prolonged sitting x few minutes  No difficulty with sleep  +non restorative sleep  +fatigue  Taking HCQ  Last eye exam: June, 2017  Review of Systems    Constitutional: fatigue, but no fever, no recent weight gain, no chills, no recent weight loss and no anorexia  HEENT: no blurred vision, no double vision, no dryness of the eyes, no dryness mouth, not feeling congested and no sore throat  Cardiovascular: swelling in the arms or legs, but no chest pain or pressure and no dyspnea on exertion  Respiratory: no unusual or persistent cough, no shortness of breath and no pleurisy  Gastrointestinal: abdominal pain and constipation, but no nausea, no vomiting, no diarrhea, no melena and no BRBPR  Genitourinary: no dysuria and no hematuria  Musculoskeletal: as noted in HPI  Integumentary nail changes, but no rash and no alopecia  Endocrine no polyuria and no polydipsia  Hematologic/Lymphatic: no unusual bleeding and no tendency for easy bruising  Neurological: headache, but no vertigo or dizziness, no tingling and no weakness  Psychiatric: insomnia and non-restorative sleep  Active Problems    1  Acid reflux disease (530 81) (K21 9)   2  Candidal vulvovaginitis (112 1) (B37 3)   3  Chronic constipation (564 00) (K59 09)   4  Closed fracture of distal ends of right radius and ulna (813 44) (S52 501A,S52 601A)   5  Dense breasts (793 82) (R92 2)   6  Depression (311) (F32 9)   7  Encounter for screening mammogram for malignant neoplasm of breast (V76 12)   (Z12 31)   8  Fibromyalgia (729 1) (M79 7)   9  Flu vaccine need (V04 81) (Z23)   10  History of rheumatoid arthritis (V13 4) (Z87 39)   11  Hyperlipidemia (272 4) (E78 5)   12  Long-term use of hydroxychloroquine (V58 69) (Z79 899)   13  Need for influenza vaccination (V04 81) (Z23)   14  Pain of left knee after injury (719 46) (M25 562)   15  Rheumatoid arthritis of multiple sites with negative rheumatoid factor (714 0) (M06 09)   16  Screening, lipid (V77 91) (Z13 220)   17  Stiffness of finger joint of right hand (719 54) (M25 641)   18   Well female exam with routine gynecological exam (V72 31) (Z01 419)    Past Medical History    1  History of Acute left ankle pain (719 47) (M25 572)   2  History of Acute upper respiratory infection (465 9) (J06 9)   3  History of Acute upper respiratory infection (465 9) (J06 9)   4  History of Cough (786 2) (R05)   5  Depression (311) (F32 9)   6  Fibromyalgia (729 1) (M79 7)   7  History of anemia (V12 3) (Z86 2)   8  History of headache (V13 89) (Z87 898)   9  History of rheumatoid arthritis (V13 4) (Z87 39)   10  History of Need for tetanus booster (V03 7) (Z23)   11  History of Need for vaccination with 13-polyvalent pneumococcal conjugate vaccine    (V03 82) (Z23)   12  History of Pap smear for cervical cancer screening (V76 2) (Z12 4)   13  History of Screen for colon cancer (V76 51) (Z12 11)   14  History of Screening for hyperlipidemia (V77 91) (Z13 220)    The active problems and past medical history were reviewed and updated today  Surgical History    1  History of Hysterectomy   2  History of Hysterectomy   3  History of Tonsillectomy   4  History of Wrist Surgery Right    The surgical history was reviewed and updated today  Family History  Mother    1  Denied: Family history of Colon cancer   2  Denied: Family history of Crohn's disease   3  Denied: Family history of liver disease  Father    4  Denied: Family history of Colon cancer   5  Denied: Family history of Crohn's disease   6  Denied: Family history of liver disease  Family History    7  Denied: Family history of Crohn's disease   8  Denied: Family history of psoriasis   9  Denied: Family history of systemic lupus erythematosus   10  Denied: Family history of ulcerative colitis    The family history was reviewed and updated today  Social History    · Employed   · Never a smoker   · History of Never a smoker   · No alcohol use   · History of No alcohol use   · No drug use  The social history was reviewed and updated today   The social history was reviewed and is unchanged  Current Meds   1  DULoxetine HCl - 60 MG Oral Capsule Delayed Release Particles; TAKE 2 CAPSULE   Bedtime; Therapy: 64XPT9817 to [phone])  Requested for: 43Ser6175; Last   Rx:10Vbc2684 Ordered   2  Hydroxychloroquine Sulfate 200 MG Oral Tablet; TAKE 2 TABLET Daily With Food; Therapy: 94Pkf5311 to (Evaluate:21Gpm8479)  Requested for: 90Ogq8712; Last   Rx:78Cmf6787 Ordered   3  Lidocaine-Prilocaine 2 5-2 5 % External Cream; Apply to affected areas up to 3 times a   day as needed; Therapy: 91PXB0831 to (Evaluate:27Mar2017)  Requested for: 77DDM3636; Last   Rx:26Jan2017 Ordered   4  Naproxen 500 MG Oral Tablet; TAKE 1 TABLET BY MOUTH EVERY 12 HOURS WITH   FOOD AS NEEDED FOR PAIN;   Therapy: 84RDU1816 to (Evaluate:81Ldb2727)  Requested for: 12TVH4202; Last   Rx:23Nov2017 Ordered   5  Nystatin-Triamcinolone 806916-8 1 UNIT/GM-% External Ointment; APPLY SPARINGLY   TO AFFECTED AREA TWICE A DAY FOR 7   TO 14 DAYS; Therapy: 27QLX8122 to (Last Rx:24Rtf0519)  Requested for: 72Ctc8763 Ordered   6  Senna 8 6 MG Oral Capsule; 2 caps daily; Therapy: 38SPN3325 to (Last Rx:16Oct2017)  Requested for: 33NYM0948 Ordered   7  Vitamin C 500 MG Oral Capsule; TAKE 1 CAPSULE DAILY; Therapy: (Recorded:08Jun2015) to Recorded   8  Vitamin D CAPS; TAKE 1 CAPSULE ONCE DAILY; Therapy: (Recorded:08Jun2015) to Recorded    The medication list was reviewed and updated today  Immunizations  Influenza --- Lizette Pickett: [id number] [place]: 23-Sep-2016; Maria Luisa Allen: 16-Oct-2017   PPSV --- Daryna Fries: 08-Jun-2015   Tdap --- Series1: 08-Jun-2015     Allergies    1  No Known Drug Allergies    2   Seasonal    Vitals  Signs   Recorded: 18BOG2568 03:17PM   Temperature: 97 7 F  Heart Rate: 80  Systolic: 632  Diastolic: 74  Height: 5 ft 4 in  Weight: 158 lb 4 62 oz  BMI Calculated: 27 17  BSA Calculated: 1 77    Physical Exam    Constitutional   General appearance: No acute distress, well appearing and well nourished  Eyes   Conjunctiva and lids: No swelling, erythema or discharge  Pupils and irises: Equal, round and reactive to light  Ears, Nose, Mouth, and Throat   External inspection of ears and nose: Normal     Oropharynx: Normal with no erythema, edema, exudate lesions, or ulcers  Pulmonary   Respiratory effort: No increased work of breathing or signs of respiratory distress  Auscultation of lungs: Clear to auscultation  Cardiovascular   Auscultation of heart: Normal rate and rhythm, normal S1 and S2, without murmurs  Examination of extremities for edema and/or varicosities: Normal     Lymphatic   Palpation of lymph nodes in neck: No lymphadenopathy  Psychiatric   Orientation to person, place, and time: Normal     Mood and affect: Normal         Right wrist tenderness  Right elbow tenderness  Left wrist tenderness  Left Elbow tenderness  Right ankle tenderness  Right knee tenderness  Right hip tenderness  Left ankle tenderness  Left knee tenderness  Left hip tenderness  Musculoskeletal - Joints, bones, and muscles: Abnormal  Palpation - tenderness (+multiple myofascial tender points ) and bilateral knee crepitus  The patient has tenderness in all MCP, PIP and DIP joints of the right hand  The patient has tenderness in all MCP, PIP and DIP joints of the left hand  The patient has tenderness in all MTP joints of the right foot  The patient has tenderness in all MTP joints of the left foot  Future Appointments    Date/Time Provider Specialty Site   11/29/2017 08:20 AM Matthew Grande DO Gastroenterology Adult [facility] [place]   04/18/2018 03:00 PM CLEMENTINE Flaherty  Internal Medicine [facility] [place]     Signatures   Electronically signed by : [place], [facility]; Nov 28 2017  3:43PM EST                       (Author)    Electronically signed by :  CLEMENTINE Henao ; Nov 28 2017  4:00PM EST (Author)

## 2018-01-11 NOTE — PROGRESS NOTES
Assessment    1  Rheumatoid arthritis of multiple sites with negative rheumatoid factor (714 0) (M06 09)   2  Fibromyalgia (729 1) (M79 7)   3  Long-term use of hydroxychloroquine (V58 69) (Z79 899)   4  Acid reflux disease (530 81) (K21 9)   5  Chronic constipation (564 00) (K59 09)   6  Depression (311) (F32 9)    Plan    1  Gabapentin 300 MG Oral Capsule; TAKE 1 CAPSULE Bedtime   2  (1) CBC/PLT/DIFF; Status:Active; Requested for:52Upz4781;    3  (1) COMPREHENSIVE METABOLIC PANEL; Status:Active; Requested for:92Oip9204;    4  (1) C-REACTIVE PROTEIN; Status:Active; Requested for:56Zrx2756;    5  (1) SED RATE; Status:Active; Requested for:62Mha8472;    6  (1) TSH WITH FT4 REFLEX; Status:Active; Requested for:71Sgw9779;     7  DULoxetine HCl - 60 MG Oral Capsule Delayed Release Particles; TAKE 1   CAPSULE DAILY   8  Hydroxychloroquine Sulfate 200 MG Oral Tablet; take 1 tablet by mouth daily   9  Call [phone] if: The pain seems worse ; Status:Complete;   Done: 75Sbd7385   10  Call [phone] if: The symptoms seem worse ; Status:Complete;   Done:    81Nga9327   11  Call [phone] if: You have questions or concerns about your problem ;    Status:Complete;   Done: 79KYB9901    Discussion/Summary    Ms Serafin Lubin continues to have pain in her right thumb, wrist, and forearm  She also reports pain in her bilateral hands and feet especially at night  She is also had some lower back pain  She has noted swelling in her legs  She was unable to obtain the lidocaine ointment because of insurance issues  She has noted swelling in her hands as well  She does report morning stiffness, but she is unable to quantify the time  She will occasionally take some Aleve for her discomfort with good relief  She has also been wearing a copper bracelet to see if this will help with her discomfort  She does report difficulty sleeping because of pain, nonrestorative sleep, and fatigue  On exam, there is no active synovitis   She does have multiple myofascial tender points throughout the spine, as well as the bilateral upper and lower extremities  No recent rheumatologic labs were available for review today  At this time, her seronegative rheumatoid arthritis does appear well controlled with the use of Plaquenil, and she is up-to-date with her eye exam  I believe the majority of her symptoms at this time are due to her fibromyalgia  We did discuss several treatment options, and we have opted to add gabapentin 300 mg at bedtime  I did explain to her that this may have some drowsiness  We will continue the Cymbalta and the Plaquenil at their current doses  I would like to recheck a CBC, CMP, ESR, CRP, TSH, and vitamin D before her follow-up  I will reevaluate her in 3 months  She will call in interim if there are any questions or concerns  Counseling  Rheumatology Counseling Documentation: The patient was counseled regarding instructions for management, impressions and risks and benefits of treatment options  Chief Complaint  F/U RA and FMS   Patient is here today for follow up of chronic conditions described in HPI  History of Present Illness  Pt  returns for F/U for RA and FMS  Still with pain in R thumb, wrist, and forearm  Also with pain in hands and feet especially at night  c/o low back pain as well  + swelling in legs  Insurance would not cover lidocaine ointment  + swelling in hands  + AM stiffness -> unable to quantify time  Occasionally takes Aleve for pain with some relief  Wearing copper bracelet to help with pain  + difficulty sleeping due to pain  + non-restorative sleep  + fatigue  Last eye exam in 3/2016  RAPID3: 17 5/30      Review of Systems    Constitutional: fatigue, but no fever, no recent weight gain, no chills, no recent weight loss and no anorexia     HEENT: dryness of the eyes, dryness mouth and feeling congested, but no blurred vision, no double vision, no amaurosis fugax, no eye pain, no erythema eye(s), no mouth sores and no sore throat  Cardiovascular: swelling in the arms or legs, but no chest pain or pressure and no dyspnea on exertion  Respiratory: no unusual or persistent cough, no shortness of breath and no pleurisy  Gastrointestinal: heartburn and constipation, but no abdominal pain, no nausea, no vomiting, no diarrhea, no melena and no BRBPR  Genitourinary: no dysuria and no hematuria  Musculoskeletal: as noted in HPI  Integumentary rash and nail changes, but no Raynaud's, no alopecia and no photosensitivity  Endocrine no polyuria and no polydipsia  Hematologic/Lymphatic: no unusual bleeding and no tendency for easy bruising  Neurological: headache, but no vertigo or dizziness, no tingling and no weakness  Psychiatric: insomnia and non-restorative sleep  Active Problems    1  Acid reflux disease (530 81) (K21 9)   2  Anemia (285 9) (D64 9)   3  Chronic constipation (564 00) (K59 09)   4  Dense breasts (793 82) (R92 2)   5  Depression (311) (F32 9)   6  Fibromyalgia (729 1) (M79 7)   7  Headache (784 0) (R51)   8  History of rheumatoid arthritis (V13 4) (Z87 39)   9  Long-term use of hydroxychloroquine (V58 69) (Z79 899)   10  Rheumatoid arthritis of multiple sites with negative rheumatoid factor (714 0) (M06 09)    Past Medical History    1  History of Acute upper respiratory infection (465 9) (J06 9)   2  Depression (311) (F32 9)   3  History of Encounter for screening mammogram for malignant neoplasm of breast   (V76 12) (Z12 31)   4  Fibromyalgia (729 1) (M79 7)   5  History of Flu vaccine need (V04 81) (Z23)   6  History of rheumatoid arthritis (V13 4) (Z87 39)   7  History of Need for tetanus booster (V03 7) (Z23)   8  History of Need for vaccination with 13-polyvalent pneumococcal conjugate vaccine   (V03 82) (Z23)   9  History of Pap smear for cervical cancer screening (V76 2) (Z12 4)   10  History of Screen for colon cancer (V76 51) (Z12 11)   11   History of Screening for hyperlipidemia (V77 91) (Z13 546)   12  History of Well female exam with routine gynecological exam (V72 31) (Z01 419)    The active problems and past medical history were reviewed and updated today  Surgical History    1  History of Hysterectomy   2  History of Hysterectomy   3  History of Tonsillectomy    The surgical history was reviewed and updated today  Family History    1  Denied: Family history of Colon cancer   2  Denied: Family history of Crohn's disease   3  Denied: Family history of liver disease    4  Denied: Family history of Colon cancer   5  Denied: Family history of Crohn's disease   6  Denied: Family history of liver disease    7  Denied: Family history of Crohn's disease   8  Denied: Family history of psoriasis   9  Denied: Family history of systemic lupus erythematosus   10  Denied: Family history of ulcerative colitis    The family history was reviewed and updated today  Social History    · Employed   · Never a smoker   · Never a smoker   · No alcohol use   · No alcohol use   · No drug use  The social history was reviewed and updated today  The social history was reviewed and is unchanged  Current Meds   1  Daily Multiple Vitamins Oral Tablet; TAKE 1 TABLET DAILY; Therapy: (Recorded:08Jun2015) to Recorded   2  DULoxetine HCl - 60 MG Oral Capsule Delayed Release Particles; TAKE 1 CAPSULE   DAILY; Therapy: 90QXW3372 to (Lizette Corbett)  Requested for: 03Apr2016; Last   Rx:03Apr2016 Ordered   3  Hydroxychloroquine Sulfate 200 MG Oral Tablet; take 1 tablet by mouth daily; Therapy: 22UYW5715 to (Evaluate:01Mfd7734)  Requested for: 32MOJ1383; Last   Rx:37Lpi2349 Ordered   4  Polyethylene Glycol 3350 Oral Powder; Take 17 grams of powder mixed in 8 ounces of   water daily as needed; Therapy: 14NQB1019 to (Evaluate:01Vue9404)  Requested for: 46EID4941; Last   Rx:23Mar2016 Ordered   5  Vitamin C 500 MG Oral Capsule; TAKE 1 CAPSULE DAILY;    Therapy: (Recorded:08Jun2015) to Recorded   6  Vitamin D CAPS; TAKE 1 CAPSULE ONCE DAILY; Therapy: (Recorded:08Jun2015) to Recorded    The medication list was reviewed and updated today  Immunizations  Influenza --- Margarita Gerri: [id number]   Pneumococcal --- Margarita Gerri: 34KFY5348   Tdap --- Series1: 93UZD4465     Allergies    1  No Known Drug Allergies    2  Seasonal    Vitals  Signs [Data Includes: Current Encounter]   Recorded: 18Apr2016 03:26PM   Heart Rate: 80  Systolic: 92  Diastolic: 62  Weight: 707 lb   BMI Calculated: 25 06  BSA Calculated: 1 71    Physical Exam    Constitutional   General appearance: No acute distress, well appearing and well nourished  Eyes   Conjunctiva and lids: No swelling, erythema or discharge  Pupils and irises: Equal, round and reactive to light  Ears, Nose, Mouth, and Throat   External inspection of ears and nose: Normal     Oropharynx: Normal with no erythema, edema, exudate lesions, or ulcers  Pulmonary   Respiratory effort: No increased work of breathing or signs of respiratory distress  Auscultation of lungs: Clear to auscultation  Cardiovascular   Auscultation of heart: Normal rate and rhythm, normal S1 and S2, without murmurs  Examination of extremities for edema and/or varicosities: Normal     Lymphatic   Palpation of lymph nodes in neck: No lymphadenopathy  Psychiatric   Orientation to person, place, and time: Normal     Mood and affect: Normal       Right thumb IP tenderness  Right thumb MCP tenderness  Right thumb CMC tenderness  Right elbow tenderness  Right glenohumeral joint tenderness  Left thumb IP tenderness  Left thumb MCP tenderness  Left thumb CMC tenderness  Left Elbow tenderness  Left glenohumeral joint tenderness  Right ankle tenderness  Right knee tenderness  Right hip tenderness  Left ankle tenderness  Left knee tenderness  Left hip tenderness     Musculoskeletal - Joints, bones, and muscles: Abnormal  Palpation - bilateral knee crepitus  Skin - Skin and subcutaneous tissue: Normal    Neurologic - Sensation: Normal    Additional Findings - + multiple myofascial tender points  The patient has tenderness in all MCP, PIP and DIP joints of the right hand  The patient has tenderness in all MCP, PIP and DIP joints of the left hand  The patient has tenderness in all MTP joints of the right foot  The patient has tenderness in all MTP joints of the left foot  Future Appointments    Date/Time Provider Specialty Site   05/02/2016 03:00 PM Sonali Garcia DO Gastroenterology Adult [place] [facility]   07/18/2016 08:20 AM Reymundo Mccoy DO [facility]umatology [facility]   06/02/2016 01:00 PM CLEMENTINE Reyna  Internal Medicine [facility]     Signatures   Electronically signed by : Manda Jackson DO;  Apr 18 2016  5:51PM EST                       (Author)

## 2018-01-11 NOTE — RESULT NOTES
Message   Please attach slip for lipid panel to be done in October        Verified Results  (1) LIPID PANEL FASTING W DIRECT LDL REFLEX [id number] 10:14AM Keisha Angulo     Test Name Result Flag Reference   CHOLESTEROL 288 mg/dL H    LDL CHOLESTEROL CALCULATED 172 mg/dL H 0-100   This is a fasting blood test  Water,black tea or black  coffee only after 9:00pm the night before test  Drink 2 glasses of water the morning of test         Triglyceride:         Normal              <150 mg/dl       Borderline High    150-199 mg/dl       High               200-499 mg/dl       Very High          >499 mg/dl  Cholesterol:         Desirable        <200 mg/dl      Borderline High  200-239 mg/dl      High             >239 mg/dl  HDL Cholesterol:        High    >59 mg/dL      Low     <41 mg/dL  LDL Cholesterol:        Optimal          <100 mg/dl        Near Optimal     100-129 mg/dl        Above Optimal          Borderline High   130-159 mg/dl          High              160-189 mg/dl          Very High        >189 mg/dl  LDL CALCULATED:    This screening LDL is a calculated result  It does not have the accuracy of the Direct Measured LDL in the monitoring of patients with hyperlipidemia and/or statin therapy  Direct Measure LDL ([id number]) must be ordered separately in these patients  TRIGLYCERIDES 127 mg/dL  <=150   Specimen collection should occur prior to N-Acetylcysteine or Metamizole administration due to the potential for falsely depressed results  HDL,DIRECT 91 mg/dL H 40-60   Specimen collection should occur prior to Metamizole administration due to the potential for falsely depressed results  Plan  Hyperlipidemia    · (1) LIPID PANEL FASTING W DIRECT LDL REFLEX; Status:Active; Requested  for:01Oct2017;     Discussion/Summary   Mary Beth, Your cholesterol continues to go up  Let's check it again right before I see you in October       Signatures   Electronically signed by : CLEMENTINE Abraham ; Jun 16 2017 12:42PM EST                       (Author)

## 2018-01-12 VITALS
DIASTOLIC BLOOD PRESSURE: 72 MMHG | HEIGHT: 64 IN | WEIGHT: 154 LBS | HEART RATE: 87 BPM | BODY MASS INDEX: 26.29 KG/M2 | SYSTOLIC BLOOD PRESSURE: 112 MMHG

## 2018-01-12 VITALS
BODY MASS INDEX: 25.43 KG/M2 | WEIGHT: 148.13 LBS | HEART RATE: 97 BPM | SYSTOLIC BLOOD PRESSURE: 95 MMHG | DIASTOLIC BLOOD PRESSURE: 64 MMHG

## 2018-01-12 NOTE — MISCELLANEOUS
Message  GI Reminder Recall [address] [place] [address] [address]:   Date: 08/14/2017   Dear Vicente Brown:     Review of our records shows you are due for the following: Follow Up Visit  Please call the following office to schedule your appointment:   [address], [address], [place], [place], [address] [facility] [phone]  We look forward to hearing from you!      Sincerely,     [facility]      Signatures   Electronically signed by : Nataliya Lobo, ; Aug 14 2017 10:54AM EST                       (Author)

## 2018-01-13 VITALS
HEIGHT: 65 IN | OXYGEN SATURATION: 99 % | DIASTOLIC BLOOD PRESSURE: 64 MMHG | WEIGHT: 153.5 LBS | BODY MASS INDEX: 25.58 KG/M2 | HEART RATE: 117 BPM | SYSTOLIC BLOOD PRESSURE: 108 MMHG

## 2018-01-13 VITALS
WEIGHT: 150.13 LBS | TEMPERATURE: 97.9 F | HEIGHT: 64 IN | OXYGEN SATURATION: 98 % | DIASTOLIC BLOOD PRESSURE: 80 MMHG | BODY MASS INDEX: 25.63 KG/M2 | HEART RATE: 75 BPM | SYSTOLIC BLOOD PRESSURE: 116 MMHG

## 2018-01-13 VITALS
TEMPERATURE: 98.2 F | WEIGHT: 148.13 LBS | SYSTOLIC BLOOD PRESSURE: 112 MMHG | HEART RATE: 86 BPM | DIASTOLIC BLOOD PRESSURE: 64 MMHG | BODY MASS INDEX: 25.29 KG/M2 | HEIGHT: 64 IN

## 2018-01-13 NOTE — PROGRESS NOTES
Assessment    1  Rheumatoid arthritis of multiple sites with negative rheumatoid factor (714 0) (M06 09)   2  Fibromyalgia (729 1) (M79 7)   3  Long-term use of hydroxychloroquine (V58 69) (Z79 899)   4  Closed fracture of distal ends of right radius and ulna (813 44) (S52 501A,S52 601A)   5  Depression (311) (F32 9)   6  Acid reflux disease (530 81) (K21 9)   7  Chronic constipation (564 00) (K59 09)    Plan    1  (1) CBC/PLT/DIFF; Status:Active; Requested for:12Oct2016;    2  (1) COMPREHENSIVE METABOLIC PANEL; Status:Active; Requested for:12Oct2016;    3  (1) C-REACTIVE PROTEIN; Status:Active; Requested for:12Oct2016;    4  (1) SED RATE; Status:Active; Requested for:12Oct2016;    5  Follow-up visit in 3 months Evaluation and Treatment  Follow-up  Status: Hold For -   Scheduling  Requested for: 12Oct2016    6  TraMADol HCl - 50 MG Oral Tablet; TAKE 1 TABLET 3 TIMES DAILY AS   NEEDED   7  Gabapentin 300 MG Oral Capsule; TAKE 1 CAPSULE Bedtime    8  DULoxetine HCl - 60 MG Oral Capsule Delayed Release Particles; TAKE 1   CAPSULE DAILY   9  Hydroxychloroquine Sulfate 200 MG Oral Tablet; take 1 tablet by mouth every day   10  Call [phone] if: The pain seems worse ; Status:Complete;   Done: 12Oct2016   11  Call [phone] if: The symptoms seem worse ; Status:Complete;   Done:    12Oct2016   12  Call [phone] if: You have questions or concerns about your problem ;    Status:Complete;   Done: 78MLZ8248    Discussion/Summary    This is a 26-year-old female presenting today for follow-up for rheumatoid arthritis  Patient states that she has not been doing well since her last appointment as she did have surgery performed with Dr Sina Ordaz on the right wrist and she has had increased pain since the surgery  She states that she is following up with Dr Rudi Roberts for further evaluation and has completed physical therapy at this time   She states she does have widespread joint pain as well but denies any obvious joint swelling  She states that she has noticed increased warmth of her right wrist and hand  She does have morning stiffness that lasts throughout the day as well as difficulty with sleep  She is currently utilizing tramadol with gabapentin with some relief  She also utilizes hydroxychloroquine 200 mg daily  On physical examination, patient does have some swelling of the right wrist and right hand as well as increased warmth  Patient has no synovitis at this time however does have tenderness of the MCPs, PIPs, and DIPs bilaterally as well as bilateral wrists, elbows, shoulders, hips, and knees  She also has tenderness of the cervical, thoracic, and lumbar spine  Patient has crepitus of bilateral knees  She has not had lab work since her last appointment  At this time patient's history and physical examination most consistent with rheumatoid arthritis which appears to be mildly active at this time  Patient would not like to make any changes with her medications and she would like to continue with hydroxychloroquine 200 mg daily  In addition she is requesting refills of tramadol and gabapentin as well as Cymbalta  We will plan to see patient back in 3 months time and we will have patient complete a CBC, CMP, CRP, and ESR at this time  She will call in the interim if she has any further questions or concerns however she will follow-up with Dr Geoff Jameson on October 26  We will consider increasing patient's dose of hydroxychloroquine if she continues to have increased joint pain  The patient was counseled regarding instructions for management, prognosis, patient and family education, risks and benefits of treatment options, importance of compliance with treatment  Chief Complaint  F/U RA   Patient is here today for follow up of chronic conditions described in HPI  History of Present Illness  Patient is in the office today for follow up for RA  Feeling bad at this time   Pain in the right wrist and hand since surgery  Had a fracture of the right arm  Surgery with Dr Mario Eric and currently seeing Dr Ileana Tello for evaluation  October 26 next follow up for PT  No other joint pain  No obvious joint swelling however the right arm and hand is warm to the touch  +Am stiffness x never goes away  +Difficulty with sleep  Taking Tramadol with some relief  Also taking Plaquenil with Gabapentin  RAPID3: 17 7 /30      Review of Systems    Constitutional: no fever, no recent weight gain, fatigue, no chills, no recent weight loss and no anorexia  HEENT: dryness of the eyes, eye pain and erythema eye(s), but no blurred vision, no double vision, no amaurosis fugax, no dryness mouth, not feeling congested and no sore throat  Cardiovascular: no chest pain or pressure, no dyspnea on exertion and no swelling in the arms or legs  Respiratory: no unusual or persistent cough, no shortness of breath and no pleurisy  Gastrointestinal: heartburn and constipation, but no abdominal pain, no nausea, no vomiting, no diarrhea, no melena and no BRBPR  Genitourinary: No foamy urine and increased frequency, but no dysuria and no hematuria  Musculoskeletal: as noted in HPI  Integumentary alopecia and nail changes, but no rash, no Raynaud's and no photosensitivity  Endocrine polydipsia, but no polyuria  Hematologic/Lymphatic: no unusual bleeding and no tendency for easy bruising  Neurological: headache, tingling and weakness, but no vertigo or dizziness  Psychiatric: insomnia and non-restorative sleep  Active Problems    1  Acid reflux disease (530 81) (K21 9)   2  Anemia (285 9) (D64 9)   3  Chronic constipation (564 00) (K59 09)   4  Closed fracture of distal ends of right radius and ulna (813 44) (S52 501A,S52 601A)   5  Dense breasts (793 82) (R92 2)   6  Depression (311) (F32 9)   7  Encounter for screening mammogram for malignant neoplasm of breast (V76 12)   (Z12 31)   8  Fibromyalgia (729 1) (M79 7)   9   Flu vaccine need (V04 81) (Z23)   10  Headache (784 0) (R51)   11  History of rheumatoid arthritis (V13 4) (Z87 39)   12  Long-term use of hydroxychloroquine (V58 69) (Z79 899)   13  Need for influenza vaccination (V04 81) (Z23)   14  Pain of left knee after injury (719 46) (M25 562)   15  Rheumatoid arthritis of multiple sites with negative rheumatoid factor (714 0) (M06 09)   16  Screening, lipid (V77 91) (Z13 220)   17  Stiffness of finger joint of right hand (719 54) (M25 641)   18  Well female exam with routine gynecological exam (V72 31) (Z01 419)    Past Medical History    1  History of Acute upper respiratory infection (465 9) (J06 9)   2  History of Cough (786 2) (R05)   3  Depression (311) (F32 9)   4  Fibromyalgia (729 1) (M79 7)   5  History of rheumatoid arthritis (V13 4) (Z87 39)   6  History of Need for tetanus booster (V03 7) (Z23)   7  History of Need for vaccination with 13-polyvalent pneumococcal conjugate vaccine   (V03 82) (Z23)   8  History of Pap smear for cervical cancer screening (V76 2) (Z12 4)   9  History of Screen for colon cancer (V76 51) (Z12 11)   10  History of Screening for hyperlipidemia (V77 91) (Z13 220)    The active problems and past medical history were reviewed and updated today  Surgical History    1  History of Hysterectomy   2  History of Hysterectomy   3  History of Tonsillectomy   4  History of Wrist Surgery Right    The surgical history was reviewed and updated today  Family History  Mother    1  Denied: Family history of Colon cancer   2  Denied: Family history of Crohn's disease   3  Denied: Family history of liver disease  Father    4  Denied: Family history of Colon cancer   5  Denied: Family history of Crohn's disease   6  Denied: Family history of liver disease  Family History    7  Denied: Family history of Crohn's disease   8  Denied: Family history of psoriasis   9  Denied: Family history of systemic lupus erythematosus   10   Denied: Family history of ulcerative colitis    The family history was reviewed and updated today  Social History    · Employed   · Never a smoker   · History of Never a smoker   · No alcohol use   · History of No alcohol use   · No drug use  The social history was reviewed and updated today  The social history was reviewed and is unchanged  Current Meds   1  Daily Multiple Vitamins Oral Tablet; TAKE 1 TABLET DAILY; Therapy: (Recorded:08Jun2015) to Recorded   2  DULoxetine HCl - 60 MG Oral Capsule Delayed Release Particles; TAKE 1 CAPSULE   DAILY; Therapy: 26UOB9952 to (Evaluate:14Jan2017)  Requested for: 92ADD7650; Last   Rx:[id number] Ordered   3  Gabapentin 300 MG Oral Capsule; TAKE 1 CAPSULE Bedtime; Therapy: 27Ufa0735 to (Evaluate:14Jan2017)  Requested for: 02EIK9654; Last   Rx:[id number] Ordered   4  Hydroxychloroquine Sulfate 200 MG Oral Tablet; take 1 tablet by mouth every day; Therapy: 17Kva6653 to (Evaluate:03Amw5149)  Requested for: 43SLB4329; Last   Rx:[id number] Ordered   5  TraMADol HCl - 50 MG Oral Tablet; TAKE 1 TABLET 3 TIMES DAILY AS NEEDED; Therapy: 18IPE2988 to (Evaluate:15Nov2016); Last Rx:[id number] Ordered   6  Vitamin C 500 MG Oral Capsule; TAKE 1 CAPSULE DAILY; Therapy: (Recorded:08Jun2015) to Recorded   7  Vitamin D CAPS; TAKE 1 CAPSULE ONCE DAILY; Therapy: (Recorded:08Jun2015) to Recorded    The medication list was reviewed and updated today  Immunizations  Influenza --- [place] Lower: [id number] Yennifer: 23-Sep-2016   PPSV --- [place] Lower: 08-Jun-2015   Tdap --- Series1: 08-Jun-2015     Allergies    1  No Known Drug Allergies    2  Seasonal    Vitals  Signs   Recorded: 61IXH4831 01:34KW   Systolic: 96  Diastolic: 60  Heart Rate: 98  Weight: 150 lb   BMI Calculated: 25 75  BSA Calculated: 1 73    Physical Exam    Constitutional   General appearance: No acute distress, well appearing and well nourished  Eyes   Conjunctiva and lids: No swelling, erythema or discharge      Pupils and irises: Equal, round and reactive to light  Ears, Nose, Mouth, and Throat   External inspection of ears and nose: Normal     Oropharynx: Normal with no erythema, edema, exudate lesions, or ulcers  Pulmonary   Respiratory effort: No increased work of breathing or signs of respiratory distress  Auscultation of lungs: Clear to auscultation  Cardiovascular   Auscultation of heart: Normal rate and rhythm, normal S1 and S2, without murmurs  Examination of extremities for edema and/or varicosities: Normal     Lymphatic   Palpation of lymph nodes in neck: No lymphadenopathy  Psychiatric   Orientation to person, place, and time: Normal     Mood and affect: Normal         Right wrist tenderness  Right elbow tenderness  Right glenohumeral joint tenderness  Left wrist tenderness  Left Elbow tenderness  Left glenohumeral joint tenderness  Right ankle tenderness  Right knee tenderness  Right hip tenderness  Left ankle tenderness  Left knee tenderness  Left hip tenderness  Musculoskeletal - Joints, bones, and muscles: Abnormal  Palpation - mid-thoracic, lower mid-lumbar, right lower lumbar, left lower lumbar, C6 and C7 tenderness, right wrist increased warmth and bilateral knee crepitus  The patient has tenderness in all MCP, PIP and DIP joints of the right hand  The patient has tenderness in all MCP, PIP and DIP joints of the left hand  The patient has tenderness in all MTP joints of the right foot  The patient has tenderness in all MTP joints of the left foot  Attending Note  Collaborating Physician: I did not interview and examine the patient, I discussed the case with the Advanced Practitioner and reviewed the note and I agree with the Advanced Practitioner note  Future Appointments    Date/Time Provider Specialty Site   10/26/2016 09:45 AM CLEMENTINE Bonilla   Orthopedic Surgery [facility]   01/11/2017 11:00 AM Jesika Bae DO Rheumatology [facility]   03/14/2017 10:30 AM CLEMENTINE Godinez   Internal Medicine [facility]     Signatures   Electronically signed by : YAMILE Smith; Oct 12 2016 11:26AM EST                       (Author)    Electronically signed by : Jason Grimm DO; Oct 12 2016 11:29AM EST                       (Author)

## 2018-01-13 NOTE — PROGRESS NOTES
Assessment    1  Rheumatoid arthritis of multiple sites with negative rheumatoid factor (714 0) (M06 09)   2  Fibromyalgia (729 1) (M79 7)   3  Long-term use of hydroxychloroquine (V58 69) (Z79 899)   4  Acid reflux disease (530 81) (K21 9)   5  Depression (311) (F32 9)    Plan    1  Follow-up visit in 4 Months Evaluation and Treatment  Follow-up  Status: Hold For -   Scheduling  Requested for: 45DJO7489    2  DULoxetine HCl - 60 MG Oral Capsule Delayed Release Particles; TAKE 2   CAPSULE Bedtime    3  (1) TSH; Status:Active; Requested PCW:04YNT9209;    4  (1) VITAMIN D 25-HYDROXY; Status:Active; Requested CCC:04GGS4194;     5  Hydroxychloroquine Sulfate 200 MG Oral Tablet; TAKE 2 TABLET Daily With Food   6  (1) CBC/PLT/DIFF; Status:Active; Requested NGP:33BDU3299;    7  (1) COMPREHENSIVE METABOLIC PANEL; Status:Active; Requested KOZ:73GOD3932;    8  (1) C-REACTIVE PROTEIN; Status:Active; Requested EDJ:60XCB2372;    9  (1) SED RATE; Status:Active; Requested DND:67OLT2892;     Discussion/Summary    1  fibromyalgia: Doing well overall she does have symptoms but they are tolerated well and not disrupting her life  She is currently on 60mg of Duloxetine and was planned to titrate to 120mg daily but will need a prior auth   -Cont Duloxetine with increase to 120mg daily rx refilled may need prior auth  -cont regular physical activity    2  Seronegative RA: has been on HCQ for many years and was titrated up to 200mg BID now  She is doing much better with this dose  Labs are unremarkable negative inflammatory markers    -cont HCQ 200mg 2 tabs (400mg total)  -repeat labs: CBC, CMP, TSH, VIT D, SED, CRP  -RTC for f/u in 4 months  The patient was counseled regarding diagnostic results, instructions for management, risk factor reductions, prognosis, risks and benefits of treatment options, importance of compliance with treatment  Patient is able to Self-Care     Possible side effects of new medications were reviewed with the patient/guardian today  The treatment plan was reviewed with the patient/guardian  The patient/guardian understands and agrees with the treatment plan      Counseling  Rheumatology Counseling Documentation: The patient was counseled regarding instructions for management, impressions and risks and benefits of treatment options  Chief Complaint  F/U RA and FMS   Patient is here today for follow up of chronic conditions described in HPI  History of Present Illness  Pt presents for f/u for presumed seronegative RA, and FM  She was titrated up on Plaquenil and is doing much better  Her main areas of pain include right foot, knees, LBP, shoulders, left arm, b/l wrist, she never has more than 10 mins of morning stiffness  pain is worse in the evening always vs the morning  N/T hands, (+) depression, no /GI sx's, has swelling in her legs, hot flashes since menopause  Remainder of ROS negative  She is tolerating meds well  She has seen the Ophth recently without changes  Review of Systems    Constitutional: no fever, no chills, no recent weight gain, no recent weight loss, no complaints of feeling tired, no anorexia  HEENT:  no blurred vision, no double vision, no amaurosis fugax, no dryness of eyes, no eye pain, no eye erythema, no hearing loss, no mouth dryness, no mouth sores, no nasal congestion, no epistaxis, no sore throat, no hoarseness  Cardiovascular:  no chest pain or pressure, no FABIAN, no palpitations, no syncope, no orthopnea, no edema  Respiratory:  no cough, no sputum, no shortness of breath, no pleurisy, no hemoptysis, no wheezing  Gastrointestinal:  no abdominal pain, no nausea, no vomiting,no hematemesis, no dysphagia, no odynophagia, no heartburn, no diarrhea, no constipation, no melena, no BRBPR     Genitourinary:  no vaginal discharge, no dysuria, no hematuria, no genital ulcers, no urinary frequency, no urinary urgency, no urinary incontinence, no irregular menses, no menopausal symptoms  Musculoskeletal: arthralgias, joint swelling, myalgias, pain while walking and head/neck/back pain  Integumentary no rash, no Raynaud's, no nodules, no alopecia, no nail changes, no skin thickening and no photosensitivity  Endocrine  no heat or cold intolerance, no excessive sweating, no polyuria, no polydipsia, no polyphagia  Hematologic/Lymphatic:  no unusual bleeding, no tendency for easy bruising, no clotting skin or lumps  Neurological: tingling, but no headache and no weakness  Psychiatric: insomnia, non-restorative sleep and depression, but no anxiety  ROS reviewed  Active Problems    1  Acid reflux disease (530 81) (K21 9)   2  Anemia (285 9) (D64 9)   3  Chronic constipation (564 00) (K59 09)   4  Closed fracture of distal ends of right radius and ulna (813 44) (S52 501A,S52 601A)   5  Dense breasts (793 82) (R92 2)   6  Depression (311) (F32 9)   7  Encounter for screening mammogram for malignant neoplasm of breast (V76 12)   (Z12 31)   8  Fibromyalgia (729 1) (M79 7)   9  Flu vaccine need (V04 81) (Z23)   10  Headache (784 0) (R51)   11  History of rheumatoid arthritis (V13 4) (Z87 39)   12  Hyperlipidemia (272 4) (E78 5)   13  Long-term use of hydroxychloroquine (V58 69) (Z79 899)   14  Need for influenza vaccination (V04 81) (Z23)   15  Pain of left knee after injury (719 46) (M25 562)   16  Rheumatoid arthritis of multiple sites with negative rheumatoid factor (714 0) (M06 09)   17  Screening, lipid (V77 91) (Z13 220)   18  Stiffness of finger joint of right hand (719 54) (M25 641)   19  Well female exam with routine gynecological exam (V72 31) (Z01 419)    Past Medical History    1  History of Acute left ankle pain (719 47) (M25 572)   2  History of Acute upper respiratory infection (465 9) (J06 9)   3  History of Acute upper respiratory infection (465 9) (J06 9)   4  History of Cough (786 2) (R05)   5  Depression (311) (F32 9)   6   Fibromyalgia (729 1) (M79 7)   7  History of rheumatoid arthritis (V13 4) (Z87 39)   8  History of Need for tetanus booster (V03 7) (Z23)   9  History of Need for vaccination with 13-polyvalent pneumococcal conjugate vaccine   (V03 82) (Z23)   10  History of Pap smear for cervical cancer screening (V76 2) (Z12 4)   11  History of Screen for colon cancer (V76 51) (Z12 11)   12  History of Screening for hyperlipidemia (V77 91) (Z13 220)    The active problems and past medical history were reviewed and updated today  Surgical History    1  History of Hysterectomy   2  History of Hysterectomy   3  History of Tonsillectomy   4  History of Wrist Surgery Right    The surgical history was reviewed and updated today  Family History  Mother    1  Denied: Family history of Colon cancer   2  Denied: Family history of Crohn's disease   3  Denied: Family history of liver disease  Father    4  Denied: Family history of Colon cancer   5  Denied: Family history of Crohn's disease   6  Denied: Family history of liver disease  Family History    7  Denied: Family history of Crohn's disease   8  Denied: Family history of psoriasis   9  Denied: Family history of systemic lupus erythematosus   10  Denied: Family history of ulcerative colitis    The family history was reviewed and updated today  Social History    · Employed   · Never a smoker   · History of Never a smoker   · No alcohol use   · History of No alcohol use   · No drug use  The social history was reviewed and updated today  Current Meds   1  Colace 100 MG Oral Capsule; TAKE 1 CAPSULE TWICE DAILY AS NEEDED; Therapy: 67HWQ3398 to (Evaluate:27Apr2018)  Requested for: 45WZF7523; Last   Rx:45Cmi1005 Ordered   2  Daily Multiple Vitamins Oral Tablet; TAKE 1 TABLET DAILY; Therapy: (Recorded:86Gkf0742) to Recorded   3  DULoxetine HCl - 60 MG Oral Capsule Delayed Release Particles; TAKE 2 CAPSULE   Bedtime;    Therapy: 81CZP0705 to ([facility])  Requested for: 26Jan2017; Last   Rx:26Jan2017 Ordered   4  Hydroxychloroquine Sulfate 200 MG Oral Tablet; TAKE 2 TABLET Daily With Food; Therapy: 52AQA4470 to (YXKBTPCY:34PEL6809)  Requested for: 87NMH2548; Last   Rx:26Jan2017 Ordered   5  Lidocaine-Prilocaine 2 5-2 5 % External Cream; Apply to affected areas up to 3 times a   day as needed; Therapy: 29ZFN2737 to [phone])  Requested for: 04LXS6798; Last   Rx:26Jan2017 Ordered   6  Naproxen 500 MG Oral Tablet; take 1 tablet every 12 hours as needed for pain; Therapy: 10APD1658 to (Evaluate:11Jun2017)  Requested for: 12Apr2017; Last   Rx:12Apr2017 Ordered   7  Pennsaid 2 % Transdermal Solution; Applied to finger 2-3 times per day as needed for   pain; Therapy: 70SRB5062 to (Last Rx:07Jun2017)  Requested for: 07Jun2017 Ordered   8  Vitamin C 500 MG Oral Capsule; TAKE 1 CAPSULE DAILY; Therapy: (Recorded:08Jun2015) to Recorded   9  Vitamin D CAPS; TAKE 1 CAPSULE ONCE DAILY; Therapy: (Recorded:08Jun2015) to Recorded    The medication list was reviewed and updated today  Immunizations  Influenza --- Cornelius Shown: [id number] Jacobsen: 23-Sep-2016   PPSV --- Cornelius Shown: 08-Jun-2015   Tdap --- Series1: 08-Jun-2015     Allergies    1  No Known Drug Allergies    2  Seasonal    Vitals  Signs   Recorded: 03ZKJ1035 03:12PM   Temperature: 97 9 F  Heart Rate: 88  Systolic: 98  Diastolic: 60  Height: 5 ft 5 in  Weight: 155 lb   BMI Calculated: 25 79  BSA Calculated: 1 78    Physical Exam    Constitutional   General appearance: No acute distress, well appearing and well nourished  Eyes   Conjunctiva and lids: No swelling, erythema or discharge  Pupils and irises: Equal, round and reactive to light  Ears, Nose, Mouth, and Throat   External inspection of ears and nose: Normal     Otoscopic examination: Tympanic membranes translucent with normal light reflex  Canals patent without erythema  Oropharynx: Normal with no erythema, edema, exudate lesions, or ulcers      Pulmonary Respiratory effort: No increased work of breathing or signs of respiratory distress  Auscultation of lungs: Clear to auscultation  Cardiovascular   Auscultation of heart: Normal rate and rhythm, normal S1 and S2, without murmurs  Examination of extremities for edema and/or varicosities: Normal     Lymphatic   Palpation of lymph nodes in neck: No lymphadenopathy  Psychiatric   Orientation to person, place, and time: Normal     Mood and affect: Normal     Additional Exam:  MSK/neuro exam: neuro grossly intact  She has TTP in all major muscle groups in her extremities, her traps, and her back  No synovitis in her joints  Her ROM is preserved throughout  Right thumb IP tenderness  Right thumb MCP tenderness  Right thumb CMC tenderness  Right elbow tenderness  Right glenohumeral joint tenderness  Left thumb IP tenderness  Left thumb MCP tenderness  Left thumb CMC tenderness  Left Elbow tenderness  Left glenohumeral joint tenderness  Right ankle tenderness  Right knee tenderness  Right hip tenderness  Left ankle tenderness  Left knee tenderness  Left hip tenderness  Musculoskeletal - Joints, bones, and muscles: Abnormal  Palpation - bilateral knee crepitus  Skin - Skin and subcutaneous tissue: Normal    Neurologic - Sensation: Normal    Additional Findings - + multiple myofascial tender points  The patient has tenderness in all MCP, PIP and DIP joints of the right hand  The patient has tenderness in all MCP, PIP and DIP joints of the left hand  The patient has tenderness in all MTP joints of the right foot  The patient has tenderness in all MTP joints of the left foot        Results/Data  (1) CBC/PLT/DIFF 70QTE0926 05:06PM Brandon Cotto     Test Name Result Flag Reference   WHITE BLOOD CELL COUNT 7 3 Thousand/uL  3 8-10 8   RED BLOOD CELL COUNT 4 61 Million/uL  3 80-5 10   HEMOGLOBIN 11 4 g/dL L 11 7-15 5   HEMATOCRIT 35 7 %  35 0-45 0   MCV 77 4 fL L 80 0-100 0 MCH 24 8 pg L 27 0-33 0   MCHC 32 1 g/dL  32 0-36 0   RDW 14 9 %  11 0-15 0   PLATELET COUNT 711 Thousand/uL  140-400   ABSOLUTE NEUTROPHILS 3562 cells/uL  4846-4736   ABSOLUTE LYMPHOCYTES 2978 cells/uL  850-3900   ABSOLUTE MONOCYTES 540 cells/uL  200-950   ABSOLUTE EOSINOPHILS 175 cells/uL     ABSOLUTE BASOPHILS 44 cells/uL  0-200   NEUTROPHILS 48 8 %     LYMPHOCYTES 40 8 %     MONOCYTES 7 4 %     EOSINOPHILS 2 4 %     BASOPHILS 0 6 %     MPV 8 4 fL  7 5-12 5     (1) COMPREHENSIVE METABOLIC PANEL 30NMD7202 00:86VW [facility]     Test Name Result Flag Reference   GLUCOSE 91 mg/dL  65-99   Fasting reference interval   UREA NITROGEN (BUN) 13 mg/dL  7-25   CREATININE 0 58 mg/dL  0 50-1 05   For patients >52years of age, the reference limit  for Creatinine is approximately 13% higher for people  identified as -American  eGFR NON-AFR  AMERICAN 105 mL/min/1 73m2  > OR = 60   eGFR AFRICAN AMERICAN 122 mL/min/1 73m2  > OR = 60   BUN/CREATININE RATIO   6-35   NOT APPLICABLE (calc)   SODIUM 138 mmol/L  135-146   POTASSIUM 4 4 mmol/L  3 5-5 3   CHLORIDE 102 mmol/L     CARBON DIOXIDE 29 mmol/L  20-31   CALCIUM 9 7 mg/dL  8 6-10 4   PROTEIN, TOTAL 7 2 g/dL  6 1-8 1   ALBUMIN 4 3 g/dL  3 6-5 1   GLOBULIN 2 9 g/dL (calc)  1 9-3 7   ALBUMIN/GLOBULIN RATIO 1 5 (calc)  1 0-2 5   BILIRUBIN, TOTAL 0 2 mg/dL  0 2-1 2   ALKALINE PHOSPHATASE 97 U/L     AST 18 U/L  10-35   ALT 13 U/L  6-29     (1) C-REACTIVE PROTEIN 03YZW5136 05:06PM [facility]   REPORT COMMENT:  PATIENT LASTS NAME APPEARS ON INS AS 'STEPHANIE'  FASTING:NO     Test Name Result Flag Reference   C-REACTIVE PROTEIN 0 24 mg/dL  <0 80   Please be advised that patients taking Carboxypenicillins  may exhibit falsely decreased C-Reactive Protein levels  due to an analytical interference in this assay       (Q) SED RATE BY ANDRA Maddox [id number] 05:06PM [facility]     Test Name Result Flag Reference   SED RATE BY MODIFIED$WESTERGREN 6 mm/h  < OR = 30     * XR ANKLE 3+ VIEW LEFT 31Jan2017 03:49PM Cornelio Conway Order Number: [id number]     Test Name Result Flag Reference   XR ANKLE 3+ VW LEFT (Report)     LEFT ANKLE     INDICATION: Pain and swelling for 5 days     COMPARISON: None     VIEWS: 3; 3 images     FINDINGS:     There is no acute fracture or dislocation  Tibiotalar joint is unremarkable  Small calcaneal heel spur is seen     No lytic or blastic lesions are seen  Soft tissues are unremarkable  IMPRESSION:     No acute osseous abnormality  Workstation performed: [id number]     Signed by:   Grant Angeles MD   2/1/17       Attending Note  Attending Note: I interviewed and examined the patient, I discussed the case with the Resident and reviewed the Resident's note, I supervised the Resident and I agree with the Resident management plan as it was presented to me  Level of Participation: I was present in clinic and examined the patient  Patient's History: Ms Santos Serrato has been feeling much better since increasing her Plaquenil at her last evaluation  She continues to have pain in her right foot, knees, lower back, shoulders, left arm, and wrists  She does report morning stiffness typically only lasting 10 minutes before improvement  She does state that her pain is worse in the evening, as opposed to the morning  She continues to have some depressive symptoms  She does note some swelling in her legs  She did see ophthalmology in January for evaluation for her Plaquenil use, and was told that her exam was normal  Key Parts of the Exam: On exam, there is no active synovitis  She does have osteoarthritic changes of the hands, as well as crepitus of the bilateral knees  There are multiple myofascial tender points throughout the spine, as well as the bilateral upper and lower extremities  There is decreased range of motion of all the fingers on the right hand, secondary to her right wrist fracture  Review of laboratory studies revealed an essentially normal CBC, CMP, ESR, and CRP  Diagnosis and Plan: At this time, her rheumatoid arthritis does appear well controlled with the use of Plaquenil 400 mg daily  I believe the majority of her symptoms at this time are due to osteoarthritis and fibromyalgia  She is currently only utilizing Cymbalta 60 mg daily, as she was unable to increase the dose, as it does require a prior authorization  We will pursue this for her  I would like to recheck a CBC, CMP, ESR, CRP, TSH, and vitamin D before her follow-up  I will reevaluate her in 4 months  She will call in interim if there are any questions or concerns  I agree with the Resident's note  Future Appointments    Date/Time Provider Specialty Site   10/26/2017 03:00 PM Telma Monaco, [facility] Rheumatology ST [address] [place] [address]   10/16/2017 03:00 PM CLEMENTINE Pretty   Internal [facility]   76/09/5135 51:44 PM Destiny Maxwell DO Obstetrics/Gynecology  [facility]     Signatures   Electronically signed by : Michael Sharma DO; Jun 8 2017  4:05PM EST                       (Author)    Electronically signed by : Hubert Robison DO; Jun 8 2017  4:10PM EST                       (Author)

## 2018-01-14 VITALS
TEMPERATURE: 98.1 F | HEART RATE: 91 BPM | DIASTOLIC BLOOD PRESSURE: 71 MMHG | BODY MASS INDEX: 27.14 KG/M2 | WEIGHT: 159 LBS | SYSTOLIC BLOOD PRESSURE: 101 MMHG | HEIGHT: 64 IN | RESPIRATION RATE: 18 BRPM

## 2018-01-14 VITALS
DIASTOLIC BLOOD PRESSURE: 64 MMHG | WEIGHT: 149.25 LBS | OXYGEN SATURATION: 97 % | SYSTOLIC BLOOD PRESSURE: 104 MMHG | HEART RATE: 66 BPM | HEIGHT: 64 IN | BODY MASS INDEX: 25.48 KG/M2

## 2018-01-14 VITALS
BODY MASS INDEX: 25.48 KG/M2 | OXYGEN SATURATION: 98 % | HEART RATE: 87 BPM | WEIGHT: 149.25 LBS | DIASTOLIC BLOOD PRESSURE: 69 MMHG | HEIGHT: 64 IN | SYSTOLIC BLOOD PRESSURE: 104 MMHG | TEMPERATURE: 98.2 F

## 2018-01-14 VITALS
DIASTOLIC BLOOD PRESSURE: 74 MMHG | TEMPERATURE: 97.7 F | BODY MASS INDEX: 27.02 KG/M2 | SYSTOLIC BLOOD PRESSURE: 100 MMHG | HEART RATE: 80 BPM | HEIGHT: 64 IN | WEIGHT: 158.29 LBS

## 2018-01-14 VITALS
SYSTOLIC BLOOD PRESSURE: 110 MMHG | DIASTOLIC BLOOD PRESSURE: 72 MMHG | HEART RATE: 82 BPM | HEIGHT: 65 IN | BODY MASS INDEX: 26.33 KG/M2 | WEIGHT: 158 LBS | OXYGEN SATURATION: 98 %

## 2018-01-14 NOTE — PROGRESS NOTES
Assessment    1  Closed fracture of distal ends of right radius and ulna (333 44) (S47 501A,C28 601A)    Plan  Closed fracture of distal ends of right radius and ulna    · Follow-up visit in 3 months Evaluation and Treatment  Follow-up  Status: Hold For -  Scheduling  Requested for: 47KTO1946    Discussion/Summary    Patient is 10 months status post a right distal radius ORIF in May 2016  Patient has rheumatoid arthritis and is seeing Dr buck  She complains of chronic stiffness in her right hand  The patient has reached a plateau and occupational therapy  She states she is trying to do home exercises  She is able to warm her joints up with home exercises, however they later become stiff  Dr Francisca Garner recommends to continue with home exercises  Explained that there is not much else to be done besides continuation of home exercises  We can check on her again in 3 months  Chief Complaint    1  Wrist Pain    History of Present Illness  HPI: 23-year-old female is now 10 months status post a right distal radius ORIF  History is positive for rheumatoid arthritis  She continues to complain of finger stiffness and difficulty making a fist as well as using her hand for lifting objects and opening bottles  The patient has been in occupational therapy for an extended time  She reaches a plateau in therapy, and her therapist discharged her about a month ago  Patient has [facility] exercises at home, but is frustrated as she still has stiffness  Once she gets warmed up with therapy, her motion improved however he does become stiff again  Review of Systems    Constitutional: No fever, no chills, feels well, no tiredness, no recent weight gain or loss  Eyes: No complaints of eyesight problems, no red eyes  ENT: no loss of hearing, no nosebleeds, no sore throat  Cardiovascular: No complaints of chest pain, no palpitations, no leg claudication or lower extremity edema     Respiratory: no compliants of shortness of breath, no wheezing, no cough  Gastrointestinal: no complaints of abdominal pain, no constipation, no nausea or diarrhea, no vomiting, no bloody stools  Genitourinary: no complaints of dysuria, no incontinence  Musculoskeletal: as noted in HPI  Integumentary: no complaints of skin rash or lesion, no itching or dry skin, no skin wounds  Neurological: no complaints of headache, no confusion, no numbness or tingling, no dizziness  Endocrine: No complaints of muscle weakness, no feelings of weakness, no frequent urination, no excessive thirst    Psychiatric: No suicidal thoughts, no anxiety, no feelings of depression  ROS reviewed  Active Problems    1  Acid reflux disease (530 81) (K21 9)   2  Acute left ankle pain (719 47) (M25 572)   3  Acute upper respiratory infection (465 9) (J06 9)   4  Anemia (285 9) (D64 9)   5  Chronic constipation (564 00) (K59 09)   6  Closed fracture of distal ends of right radius and ulna (813 44) (S52 501A,S52 601A)   7  Dense breasts (793 82) (R92 2)   8  Depression (311) (F32 9)   9  Encounter for screening mammogram for malignant neoplasm of breast (V76 12)   (Z12 31)   10  Fibromyalgia (729 1) (M79 7)   11  Flu vaccine need (V04 81) (Z23)   12  Headache (784 0) (R51)   13  History of rheumatoid arthritis (V13 4) (Z87 39)   14  Long-term use of hydroxychloroquine (V58 69) (Z79 899)   15  Need for influenza vaccination (V04 81) (Z23)   16  Pain of left knee after injury (719 46) (M25 562)   17  Rheumatoid arthritis of multiple sites with negative rheumatoid factor (714 0) (M06 09)   18  Screening, lipid (V77 91) (Z13 220)   19  Stiffness of finger joint of right hand (719 54) (M25 641)   20   Well female exam with routine gynecological exam (V72 31) (Z01 419)    Past Medical History    · History of Acute upper respiratory infection (465 9) (J06 9)   · History of Cough (786 2) (R05)   · Depression (311) (F32 9)   · Fibromyalgia (729 1) (M79 7)   · History of rheumatoid arthritis (V13 4) (Z87 39)   · History of Need for tetanus booster (V03 7) (Z23)   · History of Need for vaccination with 13-polyvalent pneumococcal conjugate vaccine  (V03 82) (Z23)   · History of Pap smear for cervical cancer screening (V76 2) (Z12 4)   · History of Screen for colon cancer (V76 51) (Z12 11)   · History of Screening for hyperlipidemia (V77 91) (Z13 220)    The active problems and past medical history were reviewed and updated today  Surgical History    · History of Hysterectomy   · History of Hysterectomy   · History of Tonsillectomy   · History of Wrist Surgery Right    The surgical history was reviewed and updated today  Family History  Mother    · Denied: Family history of Colon cancer   · Denied: Family history of Crohn's disease   · Denied: Family history of liver disease  Father    · Denied: Family history of Colon cancer   · Denied: Family history of Crohn's disease   · Denied: Family history of liver disease  Family History    · Denied: Family history of Crohn's disease   · Denied: Family history of psoriasis   · Denied: Family history of systemic lupus erythematosus   · Denied: Family history of ulcerative colitis    The family history was reviewed and updated today  Social History    · Employed   · [employer]   · Never a smoker   · History of Never a smoker   · No alcohol use   · History of No alcohol use   · No drug use  The social history was reviewed and updated today  The social history was reviewed and is unchanged  Current Meds   1  Daily Multiple Vitamins Oral Tablet; TAKE 1 TABLET DAILY; Therapy: (Recorded:18Vqk0146) to Recorded   2  DULoxetine HCl - 60 MG Oral Capsule Delayed Release Particles; TAKE 2 CAPSULE   Bedtime; Therapy: 76OKD6977 to (Shy Ryder)  Requested for: 26Jan2017; Last   Rx:26Jan2017 Ordered   3  Hydroxychloroquine Sulfate 200 MG Oral Tablet; TAKE 2 TABLET Daily With Food;    Therapy: 15HPM0401 to (FOOWJPLV:56ZUF4528)  Requested for: 52ORD1890; Last   Rx:26Jan2017 Ordered   4  Lidocaine-Prilocaine 2 5-2 5 % External Cream; Apply to affected areas up to 3 times a   day as needed; Therapy: 04WEZ2981 to (Evaluate:27Mar2017)  Requested for: 09NYS3095; Last   Rx:26Jan2017 Ordered   5  Naproxen 500 MG Oral Tablet; take 1 tablet every 12 hours as needed for pain; Therapy: 25HUF5238 to ([phone])  Requested for: 95BRV9596; Last   Rx:57Wrl9505 Ordered   6  Vitamin C 500 MG Oral Capsule; TAKE 1 CAPSULE DAILY; Therapy: (Recorded:08Jun2015) to Recorded   7  Vitamin D CAPS; TAKE 1 CAPSULE ONCE DAILY; Therapy: (Recorded:08Jun2015) to Recorded    The medication list was reviewed and updated today  Allergies    1  No Known Drug Allergies    2  Seasonal    Vitals   Recorded: 83FBF1386 08:23AM   Heart Rate 91   Systolic 390, Sitting   Diastolic 96, Sitting   Weight 148 lb 6 oz   BMI Calculated 25 47   BSA Calculated 1 72     Physical Exam  Left hand: Normal inspection  There is no swelling or erythema  Patient is able to extend over fingers, but she has decreased full composite fist  Passively, we are able to achieve more flexion of her fingers  The patient is grossly intact light touch along the radial, ulnar, median nerve distributions  Constitutional - General appearance: Normal    Musculoskeletal - Gait and station: Normal  Digits and nails: Normal  Muscle strength/tone: Normal    Cardiovascular - Pulses: Normal  Examination of extremities for edema and/or varicosities: Normal    Skin - Skin and subcutaneous tissue: Normal    Neurologic - Sensation: Normal    Psychiatric - Orientation to person, place, and time: Normal  Mood and affect: Normal    Eyes   Conjunctiva and lids: Normal     Pupils and irises: Normal        Future Appointments    Date/Time Provider Specialty Site   06/07/2017 08:10 AM CLEMENTINE Ratliff   Orthopedic Surgery [facility]   06/08/2017 03:20 PM Tianna Delcid DO [facility] Rheumatology [facility]   04/12/2017 03:00 PM CLEMENTINE Sinha   Internal Medicine [facility]     Signatures   Electronically signed by : Jim Smalls [facility]; Mar  8 2017  8:53AM EST                       (Author)    Electronically signed by : CLEMENTINE Muñoz ; Mar  8 2017  3:24PM EST                       (Author)

## 2018-01-15 VITALS
BODY MASS INDEX: 25.47 KG/M2 | WEIGHT: 148.38 LBS | SYSTOLIC BLOOD PRESSURE: 128 MMHG | HEART RATE: 91 BPM | DIASTOLIC BLOOD PRESSURE: 96 MMHG

## 2018-01-16 NOTE — PROGRESS NOTES
Assessment    1  Rheumatoid arthritis of multiple sites with negative rheumatoid factor (714 0) (M06 09)   2  Fibromyalgia (729 1) (M79 7)   3  Long-term use of hydroxychloroquine (V58 69) (Z79 899)   4  Closed fracture of distal ends of right radius and ulna (813 44) (S52 501A,S52 601A)   5  Acid reflux disease (530 81) (K21 9)   6  Depression (311) (F32 9)    Plan    1  Call [phone] if: The pain seems worse ; Status:Complete;   Done: 14GXU0889   2  Call [phone] if: You have questions or concerns about your problem ;   Status:Complete;   Done: 51LBQ5259    3  DULoxetine HCl - 60 MG Oral Capsule Delayed Release Particles; TAKE 2   CAPSULE Bedtime   4  Lidocaine-Prilocaine 2 5-2 5 % External Cream; Apply to affected areas up to 3   times a day as needed   5  (1) CBC/PLT/DIFF; Status:Active; Requested for:12Apr2017;    6  (1) COMPREHENSIVE METABOLIC PANEL; Status:Active; Requested for:12Apr2017;    7  (1) C-REACTIVE PROTEIN; Status:Active; Requested for:12Apr2017;    8  (1) SED RATE; Status:Active; Requested for:12Apr2017;    9  Follow-up visit in 3 months Evaluation and Treatment  Follow-up  Status: Hold For -   Scheduling  Requested for: 26Jan2017    10  Hydroxychloroquine Sulfate 200 MG Oral Tablet; TAKE 2 TABLET Daily With Food    Discussion/Summary    Ms Sb Reyes continues to have widespread pain since her last evaluation  She complains of pain in her back, neck, hands, and feet  She does state that her back pain seems to last all day  She is interested in trying a topical lidocaine for her hand pain  She does continue to have issues with hand stiffness on the right following her wrist fracture  She is currently undergoing therapy which is not improving her symptoms dramatically  She does occasionally take Advil as needed for her pain with some relief  She reports swelling in her hands and feet  She denies any morning stiffness   She does report occasional difficulty sleeping due to pain as well as nonrestorative sleep and fatigue  On exam, there is synovitis of the MCPs and PIPs of the right hand  She has tenderness to palpation of the bilateral MCPs and PIPs of the hands, the bilateral elbows and shoulders, the bilateral knees, as well as the bilateral ankles and MTPs  There are multiple myofascial tender points throughout the spine  She does have mild crepitus of the bilateral knees  Review of laboratory studies from October 2016 revealed an essentially normal CBC, CMP, ESR, and CRP  At this time, her rheumatoid arthritis does appear mildly active despite Plaquenil 200 mg daily  Given that she has ongoing synovitis, I will increase the dose of the Plaquenil to 400 mg daily  She is due for an updated eye exam with visual fields  I will also increase her Cymbalta to 120 mg daily at bedtime to see if this will help with her pain  I would like to recheck a CBC, CMP, ESR, CRP, TSH, and vitamin D before her follow-up  I will reevaluate her in 3 months  She will call in the interim if there are any questions or concerns  Patient is able to Self-Care  Counseling  Rheumatology Counseling Documentation: The patient was counseled regarding diagnostic results, instructions for management, impressions and risks and benefits of treatment options  Chief Complaint  F/U RA/FMS   Patient is here today for follow up of chronic conditions described in HPI  History of Present Illness  Pt  returns for F/U for RA/FMS  Feeling OK since last visit  c/o pain in back, neck, hands, and feet  Back pain can last all day  Interested in trying topical lidocaine for hand pain  Occasionally takes Advil as needed for pain with some relief  + swelling in hands and feet  No AM stiffness  + occasional difficulty sleeping due to pain  + non-restorative sleep  + fatigue  Last eye exam about 1 year ago  Pain Assessment   the patient states they have pain  The pain is located in the mid back and neck   The patient describes the pain as aching and burning  (on a scale of 0 to 10, the patient rates the pain at 5 )    Prefered [facility]  [facility] [place]  Primary Language is  Japanese  Review of Systems    Constitutional: fatigue, but no fever, no recent weight gain, no chills, no recent weight loss and no anorexia  HEENT: feeling congested, but no blurred vision, no double vision, no amaurosis fugax, no eye pain, no erythema eye(s), no dryness mouth, no mouth sores and no sore throat    The patient presents with complaints of occasional episodes of dryness of the eyes  Symptoms are improved by prescription eye drops  Cardiovascular: swelling in the arms or legs, but no chest pain or pressure and no dyspnea on exertion  Respiratory: no unusual or persistent cough, no shortness of breath and no pleurisy  Gastrointestinal: heartburn and constipation, but no abdominal pain, no nausea, no vomiting, no diarrhea, no melena and no BRBPR  Genitourinary: no dysuria and no hematuria  Musculoskeletal: as noted in HPI  Integumentary no rash, no Raynaud's, no alopecia and no photosensitivity  Endocrine no polyuria and no polydipsia  Hematologic/Lymphatic: no unusual bleeding and no tendency for easy bruising  Neurological: headache and weakness, but no vertigo or dizziness and no tingling  Psychiatric: insomnia and non-restorative sleep  Active Problems    1  Acid reflux disease (530 81) (K21 9)   2  Anemia (285 9) (D64 9)   3  Chronic constipation (564 00) (K59 09)   4  Closed fracture of distal ends of right radius and ulna (813 44) (S52 501A,S52 601A)   5  Dense breasts (793 82) (R92 2)   6  Depression (311) (F32 9)   7  Encounter for screening mammogram for malignant neoplasm of breast (V76 12)   (Z12 31)   8  Fibromyalgia (729 1) (M79 7)   9  Flu vaccine need (V04 81) (Z23)   10  Headache (784 0) (R51)   11  History of rheumatoid arthritis (V13 4) (Z87 39)   12   Long-term use of hydroxychloroquine (V58 69) (Z79 899)   13  Need for influenza vaccination (V04 81) (Z23)   14  Pain of left knee after injury (719 46) (M25 562)   15  Rheumatoid arthritis of multiple sites with negative rheumatoid factor (714 0) (M06 09)   16  Screening, lipid (V77 91) (Z13 220)   17  Stiffness of finger joint of right hand (719 54) (M25 641)   18  Well female exam with routine gynecological exam (V72 31) (Z01 419)    Past Medical History    1  History of Acute upper respiratory infection (465 9) (J06 9)   2  History of Cough (786 2) (R05)   3  Depression (311) (F32 9)   4  Fibromyalgia (729 1) (M79 7)   5  History of rheumatoid arthritis (V13 4) (Z87 39)   6  History of Need for tetanus booster (V03 7) (Z23)   7  History of Need for vaccination with 13-polyvalent pneumococcal conjugate vaccine   (V03 82) (Z23)   8  History of Pap smear for cervical cancer screening (V76 2) (Z12 4)   9  History of Screen for colon cancer (V76 51) (Z12 11)   10  History of Screening for hyperlipidemia (V77 91) (Z13 220)    The active problems and past medical history were reviewed and updated today  Surgical History    1  History of Hysterectomy   2  History of Hysterectomy   3  History of Tonsillectomy   4  History of Wrist Surgery Right    The surgical history was reviewed and updated today  Family History  Mother    1  Denied: Family history of Colon cancer   2  Denied: Family history of Crohn's disease   3  Denied: Family history of liver disease  Father    4  Denied: Family history of Colon cancer   5  Denied: Family history of Crohn's disease   6  Denied: Family history of liver disease  Family History    7  Denied: Family history of Crohn's disease   8  Denied: Family history of psoriasis   9  Denied: Family history of systemic lupus erythematosus   10  Denied: Family history of ulcerative colitis    The family history was reviewed and updated today         Social History    · Employed   · Never a smoker   · History of Never a smoker   · No alcohol use   · History of No alcohol use   · No drug use  The social history was reviewed and updated today  The social history was reviewed and is unchanged  Current Meds   1  Daily Multiple Vitamins Oral Tablet; TAKE 1 TABLET DAILY; Therapy: (Recorded:08Jun2015) to Recorded   2  Hydroxychloroquine Sulfate 200 MG Oral Tablet; take 1 tablet by mouth every day; Therapy: 77AKI6855 to (Jordan Araujo)  Requested for: 43DAQ4336; Last   Rx:12Oct2016 Ordered   3  Vitamin C 500 MG Oral Capsule; TAKE 1 CAPSULE DAILY; Therapy: (Recorded:08Jun2015) to Recorded   4  Vitamin D CAPS; TAKE 1 CAPSULE ONCE DAILY; Therapy: (Recorded:08Jun2015) to Recorded    The medication list was reviewed and updated today  Immunizations  Influenza --- [place]: [id number] Maroon: 23-Sep-2016   PPSV --- [place]: 08-Jun-2015   Tdap --- Series1: 08-Jun-2015     Allergies    1  No Known Drug Allergies    2  Seasonal    Vitals  Signs   Recorded: 68YFT2851 03:25PM   Temperature: 97 9 F  Heart Rate: 76  Systolic: 92  Diastolic: 64  Height: 5 ft 4 in  Weight: 147 lb 11 31 oz  BMI Calculated: 25 35  BSA Calculated: 1 72    Physical Exam    Constitutional   General appearance: No acute distress, well appearing and well nourished  Eyes   Conjunctiva and lids: No swelling, erythema or discharge  Pupils and irises: Equal, round and reactive to light  Ears, Nose, Mouth, and Throat   External inspection of ears and nose: Normal     Oropharynx: Normal with no erythema, edema, exudate lesions, or ulcers  Pulmonary   Respiratory effort: No increased work of breathing or signs of respiratory distress  Auscultation of lungs: Clear to auscultation  Cardiovascular   Auscultation of heart: Normal rate and rhythm, normal S1 and S2, without murmurs  Examination of extremities for edema and/or varicosities: Normal     Lymphatic   Palpation of lymph nodes in neck: No lymphadenopathy      Psychiatric   Orientation to person, place, and time: Normal     Mood and affect: Normal       Right thumb IP tenderness  Right thumb MCP tenderness  Right thumb CMC tenderness  Right elbow tenderness  Right glenohumeral joint tenderness  Left thumb IP tenderness  Left thumb MCP tenderness  Left thumb CMC tenderness  Left Elbow tenderness  Left glenohumeral joint tenderness  Right ankle tenderness  Right knee tenderness  Right hip tenderness  Left ankle tenderness  Left knee tenderness  Left hip tenderness  Musculoskeletal - Joints, bones, and muscles: Abnormal  Palpation - bilateral knee crepitus  Skin - Skin and subcutaneous tissue: Normal    Neurologic - Sensation: Normal    Additional Findings - + multiple myofascial tender points  The patient has tenderness in all MCP, PIP and DIP joints of the right hand  The patient has tenderness in all MCP, PIP and DIP joints of the left hand  The patient has swelling of all MCP and PIP joints of the right hand  The patient has tenderness in all MTP joints of the right foot  The patient has tenderness in all MTP joints of the left foot  Results/Data  (1) CBC/PLT/DIFF 12Oct2016 12:01PM Ozzy Welsh    Order Number: [id number]     Test Name Result Flag Reference   WBC COUNT 4 71 Thousand/uL  4 31-10 16   RBC COUNT 4 77 Million/uL  3 81-5 12   HEMOGLOBIN 11 9 g/dL  11 5-15 4   HEMATOCRIT 37 1 %  34 8-46  1   MCV 78 fL L 82-98   MCH 24 9 pg L 26 8-34 3   MCHC 32 1 g/dL  31 4-37 4   RDW 14 2 %  11 6-15 1   MPV 9 5 fL  8 9-12 7   PLATELET COUNT 958 Thousands/uL  149-390   nRBC AUTOMATED 0 /100 WBCs     NEUTROPHILS RELATIVE PERCENT 46 %  43-75   LYMPHOCYTES RELATIVE PERCENT 44 %  14-44   MONOCYTES RELATIVE PERCENT 7 %  4-12   EOSINOPHILS RELATIVE PERCENT 2 %  0-6   BASOPHILS RELATIVE PERCENT 1 %  0-1   NEUTROPHILS ABSOLUTE COUNT 2 17 Thousands/?L  1 85-7 62   LYMPHOCYTES ABSOLUTE COUNT 2 08 Thousands/?L  0 60-4 47   MONOCYTES ABSOLUTE COUNT 0 34 Thousand/?L 0  17-1 22   EOSINOPHILS ABSOLUTE COUNT 0 09 Thousand/?L  0 00-0 61   BASOPHILS ABSOLUTE COUNT 0 03 Thousands/?L  0 00-0 10   - Patient Instructions: This bloodwork is non-fasting  Please drink two glasses of water morning of bloodwork  - Patient Instructions: This bloodwork is non-fasting  Please drink two glasses of water morning of bloodwork  (1) COMPREHENSIVE METABOLIC PANEL 31TPW4055 44:19FY Sudie Pain   TW Order Number: [id number]     Test Name Result Flag Reference   GLUCOSE,RANDM 91 mg/dL     If the patient is fasting, the ADA then defines impaired fasting glucose as > 100 mg/dL and diabetes as > or equal to 123 mg/dL  SODIUM 139 mmol/L  136-145   POTASSIUM 4 0 mmol/L  3 5-5 3   CHLORIDE 104 mmol/L  100-108   CARBON DIOXIDE 30 mmol/L  21-32   ANION GAP (CALC) 5 mmol/L  4-13   BLOOD UREA NITROGEN 9 mg/dL  5-25   CREATININE 0 64 mg/dL  0 60-1 30   Standardized to IDMS reference method   CALCIUM 8 8 mg/dL  8 3-10 1   BILI, TOTAL 0 44 mg/dL  0 20-1 00   ALK PHOSPHATAS 87 U/L     ALT (SGPT) 18 U/L  12-78   AST(SGOT) 18 U/L  5-45   ALBUMIN 3 9 g/dL  3 5-5 0   TOTAL PROTEIN 7 4 g/dL  6 4-8 2   eGFR Non-African American      >60 0 ml/min/1 73sq [facility] Disease Education Program recommendations are as follows:  GFR calculation is accurate only with a steady state creatinine  Chronic Kidney disease less than 60 ml/min/1 73 sq  meters  Kidney failure less than 15 ml/min/1 73 sq  meters  (1) C-REACTIVE PROTEIN 12Oct2016 12:01PM Sudie Pain   TW Order Number: [id number]     Test Name Result Flag Reference   C-REACT PROTEIN <3 0 mg/L  <3 0     (1) SED RATE 27OKT8704 12:01PM Sudie Pain   TW Order Number: [id number]     Test Name Result Flag Reference   SED RATE 10 mm/hour  0-20       Future Appointments    Date/Time Provider Specialty Site   03/01/2017 08:10 AM CLEMENTINE Carter   Orthopedic Surgery [facility] INPATIENT REHABIL[facility]ATION [facility]   04/27/2017 03:20 PM Chrystal Augustin DO Rheumatology [facility] SPECIAL[facility]I   03/14/2017 10:30 AM CLEMENTINE Chaparro   [facility]     Signatures   Electronically signed by : Liat Santana DO; Jan 26 2017  4:26PM EST                       (Author)

## 2018-01-17 NOTE — MISCELLANEOUS
Message  Return to work or school:   Delphine Jaramillo is under my professional care  She was seen in my office on 3/8/17     She is able to work with limitations (No lifting more than 20lbs)  Lucian Lozoya PA-C        Signatures   Electronically signed by : Binu Valiente [facility]; Mar  9 2017  1:02PM EST                       (Author)    Electronically signed by : Binu Valiente, [facility]; Mar  9 2017  3:41PM EST                       (Author)

## 2018-01-18 NOTE — PROGRESS NOTES
Assessment    1  Rheumatoid arthritis of multiple sites with negative rheumatoid factor (714 0) (M06 09)   2  Fibromyalgia (729 1) (M79 7)   3  Long-term use of hydroxychloroquine (V58 69) (Z79 899)   4  Closed fracture of distal ends of right radius and ulna (813 44) (S52 501A,S52 601A)   5  Depression (311) (F32 9)   6  Acid reflux disease (530 81) (K21 9)   7  Chronic constipation (564 00) (K59 09)    Plan    1  TraMADol HCl - 50 MG Oral Tablet; TAKE 1 TABLET 3 TIMES DAILY AS NEEDED   2  Gabapentin 300 MG Oral Capsule; TAKE 1 CAPSULE Bedtime   3  Follow-up visit in 3 months Evaluation and Treatment  Follow-up  Status: Complete    Done: 74APG8899    4  DULoxetine HCl - 60 MG Oral Capsule Delayed Release Particles; TAKE 1   CAPSULE DAILY   5  Hydroxychloroquine Sulfate 200 MG Oral Tablet; take 1 tablet by mouth every day   6  Call [phone] if: The pain seems worse ; Status:Complete;   Done: 91RKJ9820   7  Call [phone] if: The symptoms seem worse ; Status:Complete;   Done:   15ESH5183   8  Call [phone] if: You have questions or concerns about your problem ;   Status:Complete;   Done: 93LGV6037    Discussion/Summary    Ms Verona Powers unfortunately fractured her right wrist since her last evaluation and underwent an ORIF in May of this year  She continues to have significant pain and swelling in her right wrist on the hand, and arm, as well as decreased range of motion  She is currently going to occupational therapy for her wrist  She also sustained a right knee injury in this fall, and is currently attending physical therapy for that  She denies any other significant joint pain  She has not recently been taking any medications for her discomfort  She denies any other obvious joint swelling  She reports some morning stiffness typically lasting 10-15 minutes before improvement  She also reports difficulty sleeping because of pain, but she denies nonrestorative sleep  She continues to report fatigue  On exam, there is diffuse swelling of the right hand and wrist with tenderness to palpation of the MCPs, PIPs, and DIPs, as well as the right wrist  There is no other active synovitis  She has decreased  strength of the right hand  There is mild tenderness to palpation of the bilateral shoulders, right knee, and bilateral ankles  Review of laboratory studies revealed an essentially normal CBC, CMP, TSH, ESR, and CRP  At this time, her rheumatoid arthritis does appear relatively stable on Plaquenil 200 mg daily  I believe the majority of her symptoms and the right hand and wrist are due to her recent surgery for the fracture repair  She also continues to have some mild fibromyalgia symptoms, but she does feel that her pain is relatively well controlled  I will not make any changes to her chronic medications at this time, but I will add tramadol 50 mg which she can take up to 3 times a day as needed for her hand discomfort  I will reevaluate her in 3 months  She will call in the interim if there are any questions or concerns  Counseling  Rheumatology Counseling Documentation: The patient was counseled regarding diagnostic results, instructions for management, impressions and risks and benefits of treatment options  Chief Complaint  F/U RA and FMS   Patient is here today for follow up of chronic conditions described in HPI  History of Present Illness  Pt  returns for F/U for RA and FMS  Fractured R wrist since last visit  Had ORIF in May  Still with decreased ROM  Going to therapy  c/o pain in R wrist, hand, and arm  No other significant joint pain  No pain meds taken  + swelling in R hand and wrist  No other obvious joint swelling  + AM stiffness x 10-15 minutes  + difficulty sleeping  No non-restorative sleep  + fatigue  RAPID3: not completed      Review of Systems    Constitutional: fatigue, but no fever, no recent weight gain, no chills, no recent weight loss and no anorexia     HEENT: feeling congested, but no sore throat  Cardiovascular: no chest pain or pressure, no dyspnea on exertion and no swelling in the arms or legs  Respiratory: no unusual or persistent cough, no shortness of breath and no pleurisy  Gastrointestinal: heartburn and constipation, but no nausea, no vomiting, no diarrhea, no melena and no BRBPR    The patient presents with complaints of occasional episodes of abdominal pain  Genitourinary: no dysuria and no hematuria  Musculoskeletal: as noted in HPI  Integumentary alopecia, but no rash, no Raynaud's and no photosensitivity  Endocrine no polyuria and no polydipsia  Hematologic/Lymphatic: no unusual bleeding and no tendency for easy bruising  Neurological: headache, but no tingling    The patient presents with complaints of occasional episodes of vertigo or dizziness  Psychiatric: insomnia, but no non-restorative sleep  Active Problems    1  Acid reflux disease (530 81) (K21 9)   2  Anemia (285 9) (D64 9)   3  Chronic constipation (564 00) (K59 09)   4  Closed fracture of distal ends of right radius and ulna (813 44) (S52 501A,S52 601A)   5  Cough (786 2) (R05)   6  Dense breasts (793 82) (R92 2)   7  Depression (311) (F32 9)   8  Fibromyalgia (729 1) (M79 7)   9  Headache (784 0) (R51)   10  History of rheumatoid arthritis (V13 4) (Z87 39)   11  Long-term use of hydroxychloroquine (V58 69) (Z79 899)   12  Pain of left knee after injury (719 46) (M25 562)   13  Rheumatoid arthritis of multiple sites with negative rheumatoid factor (714 0) (M06 09)    Past Medical History    1  History of Acute upper respiratory infection (465 9) (J06 9)   2  Depression (311) (F32 9)   3  History of Encounter for screening mammogram for malignant neoplasm of breast   (V76 12) (Z12 31)   4  Fibromyalgia (729 1) (M79 7)   5  History of Flu vaccine need (V04 81) (Z23)   6  History of rheumatoid arthritis (V13 4) (Z87 39)   7  History of Need for tetanus booster (V03 7) (Z23)   8  History of Need for vaccination with 13-polyvalent pneumococcal conjugate vaccine   (V03 82) (Z23)   9  History of Pap smear for cervical cancer screening (V76 2) (Z12 4)   10  History of Screen for colon cancer (V76 51) (Z12 11)   11  History of Screening for hyperlipidemia (V77 91) (Z13 220)   12  History of Well female exam with routine gynecological exam (V72 31) (Z01 419)    The active problems and past medical history were reviewed and updated today  Surgical History    1  History of Hysterectomy   2  History of Hysterectomy   3  History of Tonsillectomy   4  History of Wrist Surgery Right    The surgical history was reviewed and updated today  Family History  Mother    1  Denied: Family history of Colon cancer   2  Denied: Family history of Crohn's disease   3  Denied: Family history of liver disease  Father    4  Denied: Family history of Colon cancer   5  Denied: Family history of Crohn's disease   6  Denied: Family history of liver disease  Family History    7  Denied: Family history of Crohn's disease   8  Denied: Family history of psoriasis   9  Denied: Family history of systemic lupus erythematosus   10  Denied: Family history of ulcerative colitis    The family history was reviewed and updated today  Social History    · Employed   · Never a smoker   · History of Never a smoker   · No alcohol use   · History of No alcohol use   · No drug use  The social history was reviewed and updated today  The social history was reviewed and is unchanged  Current Meds   1  Daily Multiple Vitamins Oral Tablet; TAKE 1 TABLET DAILY; Therapy: (Recorded:08Jun2015) to Recorded   2  DULoxetine HCl - 60 MG Oral Capsule Delayed Release Particles; TAKE 1 CAPSULE   DAILY; Therapy: 52ZWR5208 to (Evaluate:15Oct2016)  Requested for: 18Apr2016; Last   Rx:18Apr2016 Ordered   3  Gabapentin 300 MG Oral Capsule; TAKE 1 CAPSULE Bedtime;    Therapy: 18Apr2016 to (Evaluate:15Oct2016)  Requested for: 18Apr2016; Last   Rx:18Apr2016 Ordered   4  Hydroxychloroquine Sulfate 200 MG Oral Tablet; take 1 tablet by mouth every day; Therapy: 97NVB5338 to (Evaluate:09Jun2017)  Requested for: 27NTV5824; Last   Rx:14Jun2016 Ordered   5  Polyethylene Glycol 3350 Oral Powder; Take 17 grams of powder mixed in 8 ounces of   water daily as needed; Therapy: 33MXV5280 to (Evaluate:28Gtt5483)  Requested for: 09GLU8426; Last   Rx:23Mar2016 Ordered   6  Vitamin C 500 MG Oral Capsule; TAKE 1 CAPSULE DAILY; Therapy: (Recorded:08Jun2015) to Recorded   7  Vitamin D CAPS; TAKE 1 CAPSULE ONCE DAILY; Therapy: (Recorded:08Jun2015) to Recorded    The medication list was reviewed and updated today  Immunizations  Influenza --- Cornelius Shown: 29-Oct-2015   PPSV --- Cornelius Shown: 08-Jun-2015   Tdap --- Series1: 08-Jun-2015     Allergies    1  No Known Drug Allergies    2  Seasonal    Vitals  Signs   Recorded: 91JJQ4340 58:15YV   Systolic: 708  Diastolic: 62  Heart Rate: 100  Weight: 149 lb 4 oz  BMI Calculated: 25 62  BSA Calculated: 1 73    Physical Exam    Constitutional   General appearance: No acute distress, well appearing and well nourished  Eyes   Conjunctiva and lids: No swelling, erythema or discharge  Pupils and irises: Equal, round and reactive to light  Ears, Nose, Mouth, and Throat   External inspection of ears and nose: Normal     Oropharynx: Normal with no erythema, edema, exudate lesions, or ulcers  Pulmonary   Respiratory effort: No increased work of breathing or signs of respiratory distress  Auscultation of lungs: Clear to auscultation  Cardiovascular   Auscultation of heart: Normal rate and rhythm, normal S1 and S2, without murmurs  Examination of extremities for edema and/or varicosities: Normal     Lymphatic   Palpation of lymph nodes in neck: No lymphadenopathy  Psychiatric   Orientation to person, place, and time: Normal     Mood and affect: Normal       Right thumb IP tenderness  Right thumb MCP tenderness  Right thumb CMC tenderness  Right wrist tenderness and swelling  Right elbow tenderness  Right glenohumeral joint tenderness  Left glenohumeral joint tenderness  Right ankle tenderness  Right knee tenderness  Left ankle tenderness  Left hip tenderness  Musculoskeletal - Joints, bones, and muscles: Abnormal  Palpation - bilateral knee crepitus  Skin - Skin and subcutaneous tissue: Normal    Neurologic - Sensation: Normal    Additional Findings - + multiple myofascial tender points  The patient has tenderness in all MCP, PIP and DIP joints of the right hand  The patient has swelling of all MCP, PIP and DIP joints of the right hand  The patient has tenderness in all MTP joints of the right foot  The patient has tenderness in all MTP joints of the left foot  Results/Data  (1) CBC/PLT/DIFF 89UZC4417 12:57PM [place]     Test Name Result Flag Reference   WBC COUNT 5 48 Thousand/uL  4 31-10 16   RBC COUNT 4 53 Million/uL  3 81-5 12   HEMOGLOBIN 11 2 g/dL L 11 5-15 4   HEMATOCRIT 35 6 %  34 8-46  1   MCV 79 fL L 82-98   MCH 24 7 pg L 26 8-34 3   MCHC 31 5 g/dL  31 4-37 4   RDW 14 7 %  11 6-15 1   MPV 9 5 fL  8 9-12 7   PLATELET COUNT 842 Thousands/uL  149-390   NEUTROPHILS RELATIVE PERCENT 52 %  43-75   LYMPHOCYTES RELATIVE PERCENT 36 %  14-44   MONOCYTES RELATIVE PERCENT 8 %  4-12   EOSINOPHILS RELATIVE PERCENT 3 %  0-6   BASOPHILS RELATIVE PERCENT 1 %  0-1   NEUTROPHILS ABSOLUTE COUNT 2 88 Thousands/?L  1 85-7 62   LYMPHOCYTES ABSOLUTE COUNT 1 96 Thousands/?L  0 60-4 47   MONOCYTES ABSOLUTE COUNT 0 44 Thousand/?L  0 17-1 22   EOSINOPHILS ABSOLUTE COUNT 0 15 Thousand/?L  0 00-0 61   BASOPHILS ABSOLUTE COUNT 0 05 Thousands/?L  0 00-0 10     (1) COMPREHENSIVE METABOLIC PANEL 59HZI2470 89:69PC [place]   TW Order Number: [id number]     Test Name Result Flag Reference   GLUCOSE,RANDM 105 mg/dL     If the patient is fasting, the ADA then defines impaired fasting glucose as > 100 mg/dL and diabetes as > or equal to 123 mg/dL  SODIUM 141 mmol/L  136-145   POTASSIUM 4 0 mmol/L  3 5-5 3   CHLORIDE 103 mmol/L  100-108   CARBON DIOXIDE 33 mmol/L H 21-32   ANION GAP (CALC) 5 mmol/L  4-13   BLOOD UREA NITROGEN 11 mg/dL  5-25   CREATININE 0 70 mg/dL  0 60-1 30   Standardized to IDMS reference method   CALCIUM 8 8 mg/dL  8 3-10 1   BILI, TOTAL 0 20 mg/dL  0 20-1 00   ALK PHOSPHATAS 86 U/L     ALT (SGPT) 17 U/L  12-78   AST(SGOT) 17 U/L  5-45   ALBUMIN 3 8 g/dL  3 5-5 0   TOTAL PROTEIN 7 0 g/dL  6 4-8 2   eGFR Non-African American      >60 0 ml/min/1 73sq [facility] Disease Education Program recommendations are as follows:  GFR calculation is accurate only with a steady state creatinine  Chronic Kidney disease less than 60 ml/min/1 73 sq  meters  Kidney failure less than 15 ml/min/1 73 sq  meters  (1) C-REACTIVE PROTEIN 17XMZ5276 12:57PM [place]   TW Order Number: [id number]     Test Name Result Flag Reference   C-REACT PROTEIN <3 0 mg/L  <3 0     (1) SED RATE 61WKP3063 12:57PM [place]     Test Name Result Flag Reference   SED RATE 6 mm/hour  0-20     (1) TSH WITH FT4 REFLEX 38OQZ9563 12:57PM [place]   TW Order Number: [id number]  TW Order Number: [id number]     Test Name Result Flag Reference   TSH 1 003 uIU/mL  0 358-3 740   The recommended reference ranges for TSH during pregnancy are as follows:  First trimester 0 1 to 2 5 uIU/mL  Second trimester  0 2 to 3 0 uIU/mL  Third trimester 0 3 to 3 0 uIU/m       Future Appointments    Date/Time Provider Specialty Site   10/06/2016 02:00 PM Vic Rios DO Gastroenterology Adult [address]   07/18/2016 01:20 PM CLEMENTINE Edwards  Orthopedic Surgery [facility]   10/18/2016 09:40 AM Kayla Frausto DO Rheumatology [facility]   09/12/2016 01:00 PM CLEMENTINE Neri   Internal Medicine [facility]     Signatures   Electronically signed by : Jamir Fu DO; Jul 18 2016  9:48AM EST                       (Author)

## 2018-01-18 NOTE — MISCELLANEOUS
Provider Comments  Provider Comments:   Pt  was a no show for her Rheumatology re-evaluation today, 3/15/16  This is the second occurrence        Signatures   Electronically signed by : Lamin Farah DO; Mar 15 2016  4:34PM EST                       (Author)

## 2018-01-22 VITALS
HEIGHT: 64 IN | DIASTOLIC BLOOD PRESSURE: 64 MMHG | SYSTOLIC BLOOD PRESSURE: 92 MMHG | TEMPERATURE: 97.9 F | WEIGHT: 147.71 LBS | BODY MASS INDEX: 25.22 KG/M2 | HEART RATE: 76 BPM

## 2018-01-22 VITALS
WEIGHT: 155 LBS | SYSTOLIC BLOOD PRESSURE: 98 MMHG | HEART RATE: 88 BPM | HEIGHT: 65 IN | TEMPERATURE: 97.9 F | BODY MASS INDEX: 25.83 KG/M2 | DIASTOLIC BLOOD PRESSURE: 60 MMHG

## 2018-01-22 VITALS
WEIGHT: 158 LBS | HEIGHT: 64 IN | SYSTOLIC BLOOD PRESSURE: 98 MMHG | RESPIRATION RATE: 18 BRPM | DIASTOLIC BLOOD PRESSURE: 60 MMHG | TEMPERATURE: 98 F | BODY MASS INDEX: 26.98 KG/M2 | HEART RATE: 99 BPM | OXYGEN SATURATION: 98 %

## 2018-01-23 NOTE — RESULT NOTES
Message   Her xray is normal, no fracture   Please tell her to take the Naproxen , ice the wrist and ACE wrap it   Call if pain and swelling do not improve next week        Verified Results  * XR WRIST 3+ VIEW LEFT 71Gak9296 12:56PM Amisha Interiano Order Number: [id number]   Performing Comments: stat   fall last night on outstretched hand   r/o fracture     Test Name Result Flag Reference   XR WRIST 3+ VW LEFT (Report)     LEFT WRIST     INDICATION: M25 532: Pain in left wrist  History taken directly from the electronic ordering system  Injury      COMPARISON: None     VIEWS: PA, lateral, and oblique     IMAGES: 4     FINDINGS:     There is no acute fracture or dislocation  No degenerative changes  No lytic or blastic lesions are seen  Soft tissues are unremarkable  IMPRESSION:     No acute osseous abnormality         Workstation performed: [id number]     Signed by:   Adrienne Negron MD   12/29/17       Signatures   Electronically signed by : CLEMENTINE Jones ; Dec 29 2017  3:19PM EST                       (Author)

## 2018-01-23 NOTE — RESULT NOTES
Verified Results  (1) LIPID PANEL FASTING W DIRECT LDL REFLEX 35Mbs2534 09:22AM Shauna Rod Order Number: [id number]     Test Name Result Flag Reference   CHOLESTEROL 246 mg/dL H    LDL CHOLESTEROL CALCULATED 128 mg/dL H 0-100   Triglyceride:        Normal <150 mg/dl   Borderline High 150-199 mg/dl   High 200-499 mg/dl   Very High >499 mg/dl      Cholesterol:       Desirable <200 mg/dl    Borderline High 200-239 mg/dl    High >239 mg/dl      HDL Cholesterol:       High>59 mg/dL    Low <41 mg/dL      HDL Cholesterol:       High>59 mg/dL    Low <41 mg/dL      This screening LDL is a calculated result  It does not have the accuracy of the Direct Measured LDL in the monitoring of patients with hyperlipidemia and/or statin therapy  Direct Measure LDL (IRN951) must be ordered separately in these patients  TRIGLYCERIDES 125 mg/dL  <=150   Specimen collection should occur prior to N-Acetylcysteine or Metamizole administration due to the potential for falsely depressed results  HDL,DIRECT 93 mg/dL H 40-60   Specimen collection should occur prior to Metamizole administration due to the potential for falsley depressed results  Discussion/Summary   Your cholesterol looks much better than how it did in June  We will see you in April       Signatures   Electronically signed by : CLEMENTINE Romero ; Dec 22 2017  1:27PM EST                       (Author)

## 2018-01-24 ENCOUNTER — OFFICE VISIT (OUTPATIENT)
Dept: RHEUMATOLOGY | Facility: CLINIC | Age: 53
End: 2018-01-24
Payer: COMMERCIAL

## 2018-01-24 VITALS
BODY MASS INDEX: 27.04 KG/M2 | DIASTOLIC BLOOD PRESSURE: 74 MMHG | HEIGHT: 64 IN | WEIGHT: 158.4 LBS | SYSTOLIC BLOOD PRESSURE: 118 MMHG

## 2018-01-24 DIAGNOSIS — Z79.899 OTHER LONG TERM (CURRENT) DRUG THERAPY: ICD-10-CM

## 2018-01-24 DIAGNOSIS — F32.A DEPRESSION: ICD-10-CM

## 2018-01-24 DIAGNOSIS — M79.7 FIBROMYALGIA: ICD-10-CM

## 2018-01-24 DIAGNOSIS — K21.9 GASTROESOPHAGEAL REFLUX DISEASE: ICD-10-CM

## 2018-01-24 DIAGNOSIS — M06.9 RHEUMATOID ARTHRITIS INVOLVING MULTIPLE JOINTS (HCC): Primary | ICD-10-CM

## 2018-01-24 DIAGNOSIS — R51.9 HEADACHE: ICD-10-CM

## 2018-01-24 PROCEDURE — 99214 OFFICE O/P EST MOD 30 MIN: CPT | Performed by: PHYSICIAN ASSISTANT

## 2018-01-24 RX ORDER — NAPROXEN 500 MG/1
500 TABLET ORAL 2 TIMES DAILY
Refills: 2 | COMMUNITY
Start: 2017-12-28 | End: 2018-01-24 | Stop reason: SDUPTHER

## 2018-01-24 RX ORDER — HYDROXYCHLOROQUINE SULFATE 200 MG/1
200 TABLET, FILM COATED ORAL 2 TIMES DAILY WITH MEALS
Qty: 180 TABLET | Refills: 2 | Status: SHIPPED | OUTPATIENT
Start: 2018-01-24 | End: 2018-07-19 | Stop reason: SDUPTHER

## 2018-01-24 RX ORDER — NAPROXEN 500 MG/1
500 TABLET ORAL 2 TIMES DAILY
Qty: 180 TABLET | Refills: 2 | Status: SHIPPED | OUTPATIENT
Start: 2018-01-24 | End: 2018-04-18 | Stop reason: SDUPTHER

## 2018-01-24 RX ORDER — DULOXETIN HYDROCHLORIDE 60 MG/1
60 CAPSULE, DELAYED RELEASE ORAL 2 TIMES DAILY
Qty: 180 CAPSULE | Refills: 2 | Status: SHIPPED | OUTPATIENT
Start: 2018-01-24 | End: 2018-07-19 | Stop reason: SDUPTHER

## 2018-01-24 NOTE — H&P
H&P Exam - Philip Lino 45 y o  female MRN: [id number]    Unit/Bed#:  Encounter: [id number]      Assessment/Plan     Assessment:  Problem List     Closed fracture of right distal radius    Rheumatoid arthritis involving multiple joints (HCC)    Fibromyalgia    Other long term (current) drug therapy    Depression    Gastroesophageal reflux disease    Headache          Plan: This is a 78-year-old female presenting today for follow-up for rheumatoid arthritis as well as fibromyalgia  The patient states that she has been experiencing pain and swelling of the left hand and wrist as well as bilateral feet  She also describes pain in the left knee  She denies any further obvious joint swelling but does have morning stiffness lasting 2-3 minutes  In addition she does describe difficulty with sleep due to pain as well as non restorative sleep and fatigue  She is utilizing Cymbalta as well as hydroxychloroquine and naproxen  Her last eye exam was June of 2017  On physical examination, there is no active synovitis  She has tenderness of the MCPs, PIP, and DIP is bilaterally as well as the wrists and shoulders bilaterally  She has multiple myofascial tender points of the spine as well as tenderness of the right knee, bilateral ankles, and feet  Patient's most recent labs reveal a CBC, CMP, CRP, and ESR to be within normal range  At this time patient's history and physical examination is most consistent with rheumatoid arthritis which appears to be stable at this time with the use of hydroxychloroquine  She is up-to-date on her eye exam   I do believe the majority of patient's pain at this time as a result of her fibromyalgia  I did recommend a course of physical therapy  Patient was provided with an order for physical therapy at this time and will not make any further medication changes as she does feel that Cymbalta as well as naproxen are effective for her current pain    She will return to the office in 6 months time we will obtain a CBC, CMP, CRP, ESR before that follow-up  She will contact the office in the interim if she has any further questions or concerns  History of Present Illness     HPI:Patient is in the office today for follow up for RA  Pain and swelling in the left hand and wrist and the feet  Pain in the left knee  No other obvious joint swelling  +Am stiffness x 2-3 minutes  +Difficulty with sleep due to pain  +Non restorative sleep   +fatigue  Taking Cymbalta and HCQ with Naprosyn  Last eye exam: June, 2017  Review of Systems   Constitutional: Positive for chills, fever and unexpected weight change (10 lbs gained )  Negative for appetite change and fatigue  HENT: Negative for congestion, mouth sores and sore throat  Eyes: Negative for pain, redness and visual disturbance  Respiratory: Negative for cough, chest tightness and shortness of breath  Cardiovascular: Negative for chest pain and leg swelling  Gastrointestinal: Positive for constipation  Negative for abdominal pain, blood in stool, diarrhea, nausea and vomiting  Endocrine: Negative for polydipsia and polyuria  Genitourinary: Negative for frequency and hematuria  Skin: Positive for rash (intermittently)  Negative for color change  Neurological: Positive for weakness, numbness and headaches  Negative for light-headedness  Hematological: Negative for adenopathy  Psychiatric/Behavioral: Negative for behavioral problems  The patient is not nervous/anxious  Historical Information   Past Medical History:   Diagnosis Date    Acid reflux     Arthritis     rheumatoid    Depression     Fibromyalgia      Past Surgical History:   Procedure Laterality Date    HYSTERECTOMY      NE COLONOSCOPY FLX DX W/COLLJ SPEC WHEN PFRMD N/A 5/5/2016    Procedure: COLONOSCOPY;  Surgeon: Zak Stewart DO;  Location: [facility];   Service: Gastroenterology    NE OPEN RX DISTAL RADIUS FX, EXTRA-ARTICULAR Right 5/12/2016    Procedure: OPEN REDUCTION INTERNAL FIXATION RIGHT DISTAL RADIUS (EXTRA-ARTICULAR); Surgeon: Mortimer Mathieu, MD;  Location[facility];  Service: Orthopedics    TONSILLECTOMY       Social History   History   Alcohol Use No     History   Drug Use No     History   Smoking Status    Never Smoker   Smokeless Tobacco    Not on file     Family History: non-contributory    Meds/Allergies   all medications and allergies reviewed  Allergies   Allergen Reactions    Other      seasonal       Objective   Vitals: There were no vitals taken for this visit  No intake or output data in the 24 hours ending 01/24/18 0935    Invasive Devices          No matching active lines, drains, or airways          Physical Exam   Constitutional: She appears well-developed and well-nourished  HENT:   Head: Normocephalic  Mouth/Throat: Oropharynx is clear and moist    Eyes: Conjunctivae and EOM are normal  Pupils are equal, round, and reactive to light  Neck: Normal range of motion  Neck supple  Cardiovascular: Normal rate, regular rhythm and normal heart sounds  Pulmonary/Chest: Effort normal and breath sounds normal    Musculoskeletal:   +Multiple myofascial tender points of the spine    Crepitus of the knee B/L     Physical Exam     Tenderness:   RUE: glenohumeral, wrist and carpometacarpal  LUE: glenohumeral and wrist  Right hand: 2nd MCP, 3rd MCP, 4th MCP, 5th MCP, 2nd PIP, 3rd PIP, 4th PIP, 5th PIP, 2nd DIP, 3rd DIP, 4th DIP and 5th DIP  Left hand: 2nd MCP, 3rd MCP, 4th MCP, 5th MCP, 2nd PIP, 3rd PIP, 4th PIP, 5th PIP, 2nd DIP, 3rd DIP, 4th DIP and 5th DIP  RLE: acetabulofemoral, tibiofemoral and tibiotalar  LLE: acetabulofemoral and tibiotalar  Right foot: 1st MTP, 2nd MTP, 3rd MTP, 4th MTP and 5th MTP  Left foot: 1st MTP, 2nd MTP, 3rd MTP, 4th MTP and 5th MTP    LINCOLN-28 tender joint count: 21  LINCOLN-28 swollen joint count: 0  LINCOLN-28 score: 2 57 (Remission)    Lab Results: I have personally reviewed pertinent reports  Imaging: I have personally reviewed pertinent reports  EKG, Pathology, and Other Studies: I have personally reviewed pertinent reports  Code Status: No Order  Advance Directive and Living Will:      Power of :    POLST:      Counseling / Coordination of Care  Total floor / unit time spent today 20 minutes  Greater than 50% of total time was spent with the patient and / or family counseling and / or coordination of care

## 2018-02-08 ENCOUNTER — HOSPITAL ENCOUNTER (OUTPATIENT)
Dept: RADIOLOGY | Facility: HOSPITAL | Age: 53
Discharge: HOME | End: 2018-02-08
Attending: ORTHOPAEDIC SURGERY
Payer: COMMERCIAL

## 2018-02-08 ENCOUNTER — OFFICE VISIT (OUTPATIENT)
Dept: OBGYN CLINIC | Facility: HOSPITAL | Age: 53
End: 2018-02-08
Payer: OTHER MISCELLANEOUS

## 2018-02-08 VITALS
HEIGHT: 64 IN | SYSTOLIC BLOOD PRESSURE: 111 MMHG | DIASTOLIC BLOOD PRESSURE: 75 MMHG | HEART RATE: 87 BPM | WEIGHT: 157 LBS | BODY MASS INDEX: 26.8 KG/M2

## 2018-02-08 DIAGNOSIS — M25.531 PAIN IN RIGHT WRIST: Primary | ICD-10-CM

## 2018-02-08 PROCEDURE — 99213 OFFICE O/P EST LOW 20 MIN: CPT | Performed by: ORTHOPAEDIC SURGERY

## 2018-02-08 PROCEDURE — 73110 X-RAY EXAM OF WRIST: CPT

## 2018-02-08 NOTE — PATIENT INSTRUCTIONS
Weight bearing as tolerated right upper extremity    Continue naproxen as previously prescribed    Begin outpatient occupational therapy     F/u 3 months

## 2018-02-08 NOTE — PROGRESS NOTES
45 y o female approximately 2 years status post ORIF right distal radius fracture presenting with persistent stiffness in her right hand  Patient has a history of fibromyalgia and rheumatoid arthritis  Patient's may complaint is pain at terminal hand flexion  Pain is made worse with activities such as opening jars as well as sweeping in using a vacuum   Pain improved somewhat at rest and with administration of naproxen  Patient denies any numbness or tingling  Patient is employed as a cleaning lady  Patient follows with Rheumatology for treatment rheumatoid arthritis  Review of Systems  Review of systems negative unless otherwise specified in HPI    Past Medical History  Past Medical History:   Diagnosis Date    Acid reflux     Arthritis     rheumatoid    Depression     Fibromyalgia        Past Surgical History  Past Surgical History:   Procedure Laterality Date    HYSTERECTOMY      DC COLONOSCOPY FLX DX W/COLLJ SPEC WHEN PFRMD N/A 5/5/2016    Procedure: COLONOSCOPY;  Surgeon: Daisha Suero DO;  Location: [facility]; Service: Gastroenterology    DC OPEN RX DISTAL RADIUS FX, EXTRA-ARTICULAR Right 5/12/2016    Procedure: OPEN REDUCTION INTERNAL FIXATION RIGHT DISTAL RADIUS (EXTRA-ARTICULAR); Surgeon: Yousuf Stratton MD;  Location[facility];  Service: Orthopedics    TONSILLECTOMY         Current Medications  Current Outpatient Prescriptions on File Prior to Visit   Medication Sig Dispense Refill    DULoxetine (CYMBALTA) 60 mg delayed release capsule Take 1 capsule by mouth 2 (two) times a day 180 capsule 2    hydroxychloroquine (PLAQUENIL) 200 mg tablet Take 1 tablet by mouth 2 (two) times a day with meals 180 tablet 2    naproxen (NAPROSYN) 500 mg tablet Take 1 tablet by mouth 2 (two) times a day 180 tablet 2     No current facility-administered medications on file prior to visit            Physical exam  · General: Awake, Alert, Oriented  · Eyes: Pupils equal, round and reactive to light  · Heart: regular rate and rhythm  · Lungs: No audible wheezing  · Abdomen: soft  Musculoskeletal  · Right upper extremity  · Skin intact, well-healed volar wrist incision,  no erythema, no ecchymosis, mild tenderness palpation over DIP and PIP joints, no tenderness palpation over MCP joints  · No significant ulnar deviation of MCP joints  · Able to make composite fist without any rotational deformities  · Mild loss of active active terminal finger flexion in index long ring and small fingers compared to contralateral hand, but can achieve full flexion with coaxing  · Plus two radial pulse    Imaging  X-rays of right wrist taken and reviewed at today's visit  They reveal stable alignment of right distal radius ORIF without evidence of subsidence or hardware failure        Assessment/Plan:   45 y  o female 2 years status post ORIF right wrist with persistent right hand stiffness  Plan will be as follows:  Patient was instructed to continue taking previously prescribed naproxen for pain relief  With respect to her perceived stiffness and loss of finger flexion, patient would be provided with prescription for occupational therapy to focus on right hand strengthening and range of motion  Patient will be seen back in office in 3 months time for repeat evaluation

## 2018-02-14 ENCOUNTER — APPOINTMENT (OUTPATIENT)
Dept: OCCUPATIONAL THERAPY | Age: 53
End: 2018-02-14
Payer: COMMERCIAL

## 2018-02-16 ENCOUNTER — EVALUATION (OUTPATIENT)
Dept: PHYSICAL THERAPY | Age: 53
End: 2018-02-16
Payer: COMMERCIAL

## 2018-02-16 DIAGNOSIS — M79.641 RIGHT HAND PAIN: Primary | ICD-10-CM

## 2018-02-16 PROCEDURE — 97161 PT EVAL LOW COMPLEX 20 MIN: CPT | Performed by: PHYSICAL THERAPIST

## 2018-02-16 PROCEDURE — 97140 MANUAL THERAPY 1/> REGIONS: CPT | Performed by: PHYSICAL THERAPIST

## 2018-02-16 PROCEDURE — 97110 THERAPEUTIC EXERCISES: CPT | Performed by: PHYSICAL THERAPIST

## 2018-02-16 NOTE — PROGRESS NOTES
PT Evaluation     Today's date: 2018  Patient name: Raudel Singh  : 1965  MRN: [id number]  Referring provider: Maxwell Mason MD  Dx:   Encounter Diagnosis   Name Primary?  Right hand pain Yes                  Assessment  Impairments: abnormal muscle firing, abnormal movement and pain with function    Assessment details: Patient with stiff hand - decreased MCP, PIP, and DIP flexion and  weakness  Understanding of Dx/Px/POC: good   Prognosis: good    Goals  Short Term goals - 4 weeks  1  Patient will be independent HEP  2   Patient will report a 50% decrease in pain complaints  3   Increase strength 1/2 grade  4   Increase ROM 5-10 degrees  Long Term goals - 8 weeks  1  Patient will report elimination of pain complaints  2   Patient will return to all work related activities without restriction  3   Patient will return to all recreational activities without restriction  4   ROM WFL  5   Strength 5/5  Plan  Planned therapy interventions: manual therapy, strengthening and stretching  Frequency: 2x week  Duration in visits: 4        Subjective Evaluation    History of Present Illness  Mechanism of injury: Pt  Approximately 2 years post ORIF right distal radial fracture  Patient reporting an increasing issue with finger pain     Quality of life: good    Pain  Current pain rating: 3  At best pain ratin  At worst pain ratin  Quality: sharp and tight      Diagnostic Tests  X-ray: normal  Patient Goals  Patient goals for therapy: decreased pain, increased strength, independence with ADLs/IADLs and increased motion          Objective     Active Range of Motion     Left Wrist   Wrist flexion: 8 degrees   Wrist extension: 84 degrees     Right Wrist   Wrist flexion: 58 degrees   Wrist extension: 64 degrees   Radial deviation: 31 degrees   Ulnar deviation: 40 degrees     Left Digits    Flexion   Index     MCP: 75  Middle     MCP: 90  Ring     MCP: 87  Little     MCP: 94    Right Digits   Flexion   Index     MCP: 68    PIP: 95    DIP: 45  Middle     MCP: 68    PIP: 95    DIP: 65  Ring     MCP: 80    PIP: 95    DIP: 65  Little     MCP: 75    PIP: 87    DIP: 65    Strength/Myotome Testing     Left Wrist/Hand      (2nd hand position)     Trial 1: 16    Right Wrist/Hand      (2nd hand position)     Trial 1: 8      Precautions: None    Daily Treatment Diary     Manual  2/16            Paraffin coban into flexion  10 minutes            PROM/stretching 15 minutes            Place and hold                                           Exercise Diary                           Digiflex             Therabar             gripper                                                                                                                                                                                                                                 Modalities

## 2018-02-19 ENCOUNTER — OFFICE VISIT (OUTPATIENT)
Dept: PHYSICAL THERAPY | Age: 53
End: 2018-02-19
Payer: COMMERCIAL

## 2018-02-19 DIAGNOSIS — M79.641 RIGHT HAND PAIN: Primary | ICD-10-CM

## 2018-02-19 PROCEDURE — 97110 THERAPEUTIC EXERCISES: CPT | Performed by: PHYSICAL THERAPIST

## 2018-02-19 PROCEDURE — 97018 PARAFFIN BATH THERAPY: CPT | Performed by: PHYSICAL THERAPIST

## 2018-02-19 PROCEDURE — 97140 MANUAL THERAPY 1/> REGIONS: CPT | Performed by: PHYSICAL THERAPIST

## 2018-02-19 NOTE — PROGRESS NOTES
Precautions: None    Daily Treatment Diary     Manual  2/19            Paraffin coban'd into flexion 10 minutes            PROM/stretching - MCP's and PIP/DIP's 30 minutes                                                       Exercise Diary              Therabar Red - wrist flexion and ext - 15 reps            Digiflex Red - 2 sets of 10 reps each finger            Gripper with pegs 3 sets                                                                                                                                                                                                                                             Modalities

## 2018-02-23 ENCOUNTER — OFFICE VISIT (OUTPATIENT)
Dept: PHYSICAL THERAPY | Age: 53
End: 2018-02-23
Payer: COMMERCIAL

## 2018-02-23 DIAGNOSIS — M79.641 RIGHT HAND PAIN: Primary | ICD-10-CM

## 2018-02-23 PROCEDURE — 97140 MANUAL THERAPY 1/> REGIONS: CPT | Performed by: PHYSICAL THERAPIST

## 2018-02-23 PROCEDURE — 97018 PARAFFIN BATH THERAPY: CPT | Performed by: PHYSICAL THERAPIST

## 2018-02-23 PROCEDURE — 97110 THERAPEUTIC EXERCISES: CPT | Performed by: PHYSICAL THERAPIST

## 2018-02-23 NOTE — PROGRESS NOTES
Precautions: None    Daily Treatment Diary     Manual  2/19 2/23           Paraffin coban'd into flexion 10 minutes 10 minutes           PROM/stretching - MCP's and PIP/DIP's 30 minutes 30 minutes                                                      Exercise Diary              Therabar Red - wrist flexion and ext - 15 reps 2 sets of 10 reps           Digiflex Red - 2 sets of 10 reps each finger 2 sets of 10 reps           Gripper with pegs 3 sets 3 sets             Rubber bands - 3 sets                                                                                                                                                                                                                               Modalities

## 2018-02-26 ENCOUNTER — OFFICE VISIT (OUTPATIENT)
Dept: PHYSICAL THERAPY | Age: 53
End: 2018-02-26
Payer: COMMERCIAL

## 2018-02-26 DIAGNOSIS — M79.641 RIGHT HAND PAIN: Primary | ICD-10-CM

## 2018-02-26 PROCEDURE — 97018 PARAFFIN BATH THERAPY: CPT | Performed by: PHYSICAL THERAPIST

## 2018-02-26 PROCEDURE — 97140 MANUAL THERAPY 1/> REGIONS: CPT | Performed by: PHYSICAL THERAPIST

## 2018-02-26 PROCEDURE — 97110 THERAPEUTIC EXERCISES: CPT | Performed by: PHYSICAL THERAPIST

## 2018-02-26 NOTE — PROGRESS NOTES
Precautions: None    Daily Treatment Diary     Manual  2/19 2/23 2/26          Paraffin coban'd into flexion 10 minutes 10 minutes 10 minutes          PROM/stretching - MCP's and PIP/DIP's 30 minutes 30 minutes 30 minutes                                                     Exercise Diary              Therabar Red - wrist flexion and ext - 15 reps 2 sets of 10 reps 2 sets of 10 reps          Digiflex Red - 2 sets of 10 reps each finger 2 sets of 10 reps 2 sets of 10 reps          Gripper with pegs 3 sets 3 sets 3 sets            Rubber bands - 3 sets 3 sets             Gripper - 4 lengths                                                                                                                                                                                                                 Modalities                                                        Increased PROM and function noted

## 2018-03-02 ENCOUNTER — OFFICE VISIT (OUTPATIENT)
Dept: PHYSICAL THERAPY | Age: 53
End: 2018-03-02
Payer: COMMERCIAL

## 2018-03-02 DIAGNOSIS — M79.641 RIGHT HAND PAIN: Primary | ICD-10-CM

## 2018-03-02 PROCEDURE — 97018 PARAFFIN BATH THERAPY: CPT | Performed by: PHYSICAL THERAPIST

## 2018-03-02 PROCEDURE — 97110 THERAPEUTIC EXERCISES: CPT | Performed by: PHYSICAL THERAPIST

## 2018-03-02 PROCEDURE — 97140 MANUAL THERAPY 1/> REGIONS: CPT | Performed by: PHYSICAL THERAPIST

## 2018-03-02 NOTE — PROGRESS NOTES
Precautions: None    Daily Treatment Diary     Manual  2/19 2/23 2/26 3/1         Paraffin coban'd into flexion 10 minutes 10 minutes 10 minutes 10 minutes         PROM/stretching - MCP's and PIP/DIP's 30 minutes 30 minutes 30 minutes 30 minutes                                                    Exercise Diary              Therabar Red - wrist flexion and ext - 15 reps 2 sets of 10 reps 2 sets of 10 reps 2 sets of 10 reps         Digiflex Red - 2 sets of 10 reps each finger 2 sets of 10 reps 2 sets of 10 reps 2 sets of 10 reps         Gripper with pegs 3 sets 3 sets 3 sets 3 sets           Rubber bands - 3 sets 3 sets 3 sets            Gripper - 4 lengths 4 lengths                                                                                                                                                                                                                Modalities                                                       Close to full AROM and PROM noted at end of treatment

## 2018-03-05 ENCOUNTER — OFFICE VISIT (OUTPATIENT)
Dept: PHYSICAL THERAPY | Age: 53
End: 2018-03-05
Payer: COMMERCIAL

## 2018-03-05 DIAGNOSIS — M79.641 RIGHT HAND PAIN: Primary | ICD-10-CM

## 2018-03-05 PROCEDURE — 97018 PARAFFIN BATH THERAPY: CPT | Performed by: PHYSICAL THERAPIST

## 2018-03-05 PROCEDURE — 97110 THERAPEUTIC EXERCISES: CPT | Performed by: PHYSICAL THERAPIST

## 2018-03-05 PROCEDURE — 97140 MANUAL THERAPY 1/> REGIONS: CPT | Performed by: PHYSICAL THERAPIST

## 2018-03-05 NOTE — PROGRESS NOTES
Precautions: None    Daily Treatment Diary     Manual  2/19 2/23 2/26 3/1 3/5        Paraffin coban'd into flexion 10 minutes 10 minutes 10 minutes 10 minutes 15 minutes        PROM/stretching - MCP's and PIP/DIP's 30 minutes 30 minutes 30 minutes 30 minutes 30 minutes                                                   Exercise Diary              Therabar Red - wrist flexion and ext - 15 reps 2 sets of 10 reps 2 sets of 10 reps 2 sets of 10 reps Red - 2 sets of 10 reps        Digiflex Red - 2 sets of 10 reps each finger 2 sets of 10 reps 2 sets of 10 reps 2 sets of 10 reps Green - 2 sets of 10 reps        Gripper with pegs 3 sets 3 sets 3 sets 3 sets 4 reps          Rubber bands - 3 sets 3 sets 3 sets 3 sets           Gripper - 4 lengths 4 lengths 4 lengths                                                                                                                                                                                                               Modalities                                                       Pain persists but function and AROM/PROM is improving

## 2018-03-08 ENCOUNTER — EVALUATION (OUTPATIENT)
Dept: PHYSICAL THERAPY | Facility: OTHER | Age: 53
End: 2018-03-08
Payer: COMMERCIAL

## 2018-03-08 DIAGNOSIS — M79.7 FIBROMYALGIA: Primary | ICD-10-CM

## 2018-03-08 PROCEDURE — 97112 NEUROMUSCULAR REEDUCATION: CPT | Performed by: PHYSICAL THERAPIST

## 2018-03-08 PROCEDURE — G8979 MOBILITY GOAL STATUS: HCPCS | Performed by: PHYSICAL THERAPIST

## 2018-03-08 PROCEDURE — G8978 MOBILITY CURRENT STATUS: HCPCS | Performed by: PHYSICAL THERAPIST

## 2018-03-08 PROCEDURE — 97140 MANUAL THERAPY 1/> REGIONS: CPT | Performed by: PHYSICAL THERAPIST

## 2018-03-08 PROCEDURE — 97163 PT EVAL HIGH COMPLEX 45 MIN: CPT | Performed by: PHYSICAL THERAPIST
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
## 2018-03-09 ENCOUNTER — OFFICE VISIT (OUTPATIENT)
Dept: PHYSICAL THERAPY | Age: 53
End: 2018-03-09
Payer: COMMERCIAL

## 2018-03-09 DIAGNOSIS — M79.641 RIGHT HAND PAIN: Primary | ICD-10-CM

## 2018-03-09 PROCEDURE — 97110 THERAPEUTIC EXERCISES: CPT | Performed by: PHYSICAL THERAPIST

## 2018-03-09 PROCEDURE — 97018 PARAFFIN BATH THERAPY: CPT | Performed by: PHYSICAL THERAPIST

## 2018-03-09 PROCEDURE — 97140 MANUAL THERAPY 1/> REGIONS: CPT | Performed by: PHYSICAL THERAPIST

## 2018-03-09 NOTE — PROGRESS NOTES
Precautions: None    Daily Treatment Diary     Manual  2/19 2/23 2/26 3/1 3/5 3/8       Paraffin coban'd into flexion 10 minutes 10 minutes 10 minutes 10 minutes 15 minutes 15 minutes       PROM/stretching - MCP's and PIP/DIP's 30 minutes 30 minutes 30 minutes 30 minutes 30 minutes 30 minutes                                                  Exercise Diary              Therabar Red - wrist flexion and ext - 15 reps 2 sets of 10 reps 2 sets of 10 reps 2 sets of 10 reps Red - 2 sets of 10 reps Red/green - 2 sets of 10 reps       Digiflex Red - 2 sets of 10 reps each finger 2 sets of 10 reps 2 sets of 10 reps 2 sets of 10 reps Green - 2 sets of 10 reps Green       Gripper with pegs 3 sets 3 sets 3 sets 3 sets 4 reps 4 lengths         Rubber bands - 3 sets 3 sets 3 sets 3 sets 3 sets          Gripper - 4 lengths 4 lengths 4 lengths 4 lengths                                                                                                                                                                                                              Modalities                                                        equal b/l after treatment

## 2018-03-12 ENCOUNTER — OFFICE VISIT (OUTPATIENT)
Dept: PHYSICAL THERAPY | Age: 53
End: 2018-03-12
Payer: COMMERCIAL

## 2018-03-12 DIAGNOSIS — M79.641 RIGHT HAND PAIN: Primary | ICD-10-CM

## 2018-03-12 PROCEDURE — 97110 THERAPEUTIC EXERCISES: CPT | Performed by: PHYSICAL THERAPIST

## 2018-03-12 PROCEDURE — 97018 PARAFFIN BATH THERAPY: CPT | Performed by: PHYSICAL THERAPIST

## 2018-03-12 PROCEDURE — 97140 MANUAL THERAPY 1/> REGIONS: CPT | Performed by: PHYSICAL THERAPIST

## 2018-03-12 NOTE — PROGRESS NOTES
Precautions: None    Daily Treatment Diary     Manual  2/19 2/23 2/26 3/1 3/5 3/8 3/12      Paraffin coban'd into flexion 10 minutes 10 minutes 10 minutes 10 minutes 15 minutes 15 minutes 15 minutes      PROM/stretching - MCP's and PIP/DIP's 30 minutes 30 minutes 30 minutes 30 minutes 30 minutes 30 minutes 30 minutes                                                 Exercise Diary              Therabar Red - wrist flexion and ext - 15 reps 2 sets of 10 reps 2 sets of 10 reps 2 sets of 10 reps Red - 2 sets of 10 reps Red/green - 2 sets of 10 reps Green - 2 sets of 10 reps      Digiflex Red - 2 sets of 10 reps each finger 2 sets of 10 reps 2 sets of 10 reps 2 sets of 10 reps Green - 2 sets of 10 reps Green Green      Gripper with pegs 3 sets 3 sets 3 sets 3 sets 4 reps 4 lengths 3 lengths        Rubber bands - 3 sets 3 sets 3 sets 3 sets 3 sets 3 sets         Gripper - 4 lengths 4 lengths 4 lengths 4 lengths 4 lengths                                                                                                                                                                                                             Modalities                                                       Continued improvement - better AROM, PROM, and subjective sx's are down

## 2018-03-13 ENCOUNTER — APPOINTMENT (OUTPATIENT)
Dept: PHYSICAL THERAPY | Facility: OTHER | Age: 53
End: 2018-03-13
Payer: COMMERCIAL

## 2018-03-15 ENCOUNTER — OFFICE VISIT (OUTPATIENT)
Dept: PHYSICAL THERAPY | Facility: OTHER | Age: 53
End: 2018-03-15
Payer: COMMERCIAL

## 2018-03-15 DIAGNOSIS — M79.7 FIBROMYALGIA: Primary | ICD-10-CM

## 2018-03-15 PROCEDURE — 97110 THERAPEUTIC EXERCISES: CPT | Performed by: PHYSICAL THERAPIST

## 2018-03-15 PROCEDURE — 97140 MANUAL THERAPY 1/> REGIONS: CPT | Performed by: PHYSICAL THERAPIST

## 2018-03-15 PROCEDURE — 97112 NEUROMUSCULAR REEDUCATION: CPT | Performed by: PHYSICAL THERAPIST

## 2018-03-15 NOTE — PROGRESS NOTES
Daily Note     Today's date: 3/15/2018  Patient name: Eduardo Robison  : 1965  MRN: [id number]  Referring provider: King Jared MD  Dx:   Encounter Diagnosis     ICD-10-CM    1  Fibromyalgia M79 7        Start Time: 1400  Stop Time: 1430  Total time in clinic (min): 30 minutes    Subjective: Patient reports a lot of pain in the L elbow  She said overall she has a lot of pain  This is to be expected from a patient with fibromyalgia  Objective: See treatment diary below      Assessment: Tolerated treatment well  Patient demonstrated fatigue post treatment, exhibited good technique with therapeutic exercises and would benefit from continued PT      Plan: Continue per plan of care  Progress treatment as tolerated        Precautions: Protocol    Daily Treatment Diary     Manual  3/15         PA [facility]         FR to [facility]                                           Exercise Diary  3/15         TA contraction 15 x 5"         p-ball crushers 15 x 5"         TA BKFO          TA marches          TA kicks          Prone hip ext          Prone bent knee hip ext          p-ball prayer stretch 3 way          Prone lumbar extension          Standing lumbar extension          DKC 15 x 5"         SKC 15 x 5"         LTR 15 x 5"         Hamstring strap stretch 15 x 5"         Calf strap stretch 15 x 5"         Hip Abduction Iso 15 x 5"         Hip Adduction Iso 15 x 5"         bike 10'                                            Modalities  3/15          PRN deferred

## 2018-03-16 ENCOUNTER — OFFICE VISIT (OUTPATIENT)
Dept: PHYSICAL THERAPY | Age: 53
End: 2018-03-16
Payer: COMMERCIAL

## 2018-03-16 DIAGNOSIS — S52.531D CLOSED COLLES' FRACTURE OF RIGHT RADIUS WITH ROUTINE HEALING, SUBSEQUENT ENCOUNTER: ICD-10-CM

## 2018-03-16 PROCEDURE — 97110 THERAPEUTIC EXERCISES: CPT | Performed by: PHYSICAL THERAPIST

## 2018-03-16 PROCEDURE — 97140 MANUAL THERAPY 1/> REGIONS: CPT | Performed by: PHYSICAL THERAPIST

## 2018-03-16 PROCEDURE — 97018 PARAFFIN BATH THERAPY: CPT | Performed by: PHYSICAL THERAPIST

## 2018-03-16 NOTE — PROGRESS NOTES
Daily Note     Today's date: 3/16/2018  Patient name: Caitlin Orourke  : 1965  MRN: [id number]  Referring provider: Heaven Wheeler MD  Dx: No diagnosis found  Subjective: Continued progress      Objective: See treatment diary below  Precautions: None     Daily Treatment Diary      Manual  2/19 2/23 2/26 3/1 3/5 3/8 3/12  3/16       Paraffin coban'd into flexion 10 minutes 10 minutes 10 minutes 10 minutes 15 minutes 15 minutes 15 minutes  15 min       PROM/stretching - MCP's and PIP/DIP's 30 minutes 30 minutes 30 minutes 30 minutes 30 minutes 30 minutes 30 minutes  15 minutes                                                                                     Exercise Diary                        Therabar Red - wrist flexion and ext - 15 reps 2 sets of 10 reps 2 sets of 10 reps 2 sets of 10 reps Red - 2 sets of 10 reps Red/green - 2 sets of 10 reps Green - 2 sets of 10 reps  x       Digiflex Red - 2 sets of 10 reps each finger 2 sets of 10 reps 2 sets of 10 reps 2 sets of 10 reps Green - 2 sets of 10 reps Green Green  x       Gripper with pegs 3 sets 3 sets 3 sets 3 sets 4 reps 4 lengths 3 lengths  x           Rubber bands - 3 sets 3 sets 3 sets 3 sets 3 sets 3 sets  x             Gripper - 4 lengths 4 lengths 4 lengths 4 lengths 4 lengths  x                                                                                                                                                                                                                                                                                                                                                                                     Modalities                                                                                                   Continued improvement - better AROM, PROM, and subjective sx's are down            Assessment: Tolerated treatment well   Patient would benefit from continued PT      Plan: Continue per plan of care

## 2018-03-16 NOTE — PROGRESS NOTES
PT Evaluation     Today's date: 3/16/2018   Patient name: Lakeisha Dunaway  : 1965  MRN: [id number]  Referring provider: Rogena Collet, MD  Dx:   Encounter Diagnosis     ICD-10-CM    1  Right hand pain M79 641                   Assessment  Impairments: abnormal or restricted ROM, activity intolerance, difficulty understanding, impaired physical strength, lacks appropriate home exercise program and pain with function    Assessment details: Lakeisha Dunaway is a 45 y o  female who presents with complaints of pain associated with fibromyalgia  (primary encounter diagnosis)  No further referral appears necessary at this time based upon examination results  Patient presents to PT with impaired strength, impaired ROM, decreased flexibility and impaired ability to complete IADLs  Prognosis is good given HEP compliance and PT 2-3x/wk  Positive prognostic indicators include positive attitude toward recovery  Please contact me if you have any questions or recommendations  Thank you for the opportunity to share in [facility] care       Barriers to therapy: language, comorbidities, depression, long history of pain, history of falls   Understanding of Dx/Px/POC: fair   Prognosis: fair    Goals  Short Term:  Pt will report decreased levels of pain by at least 2 subjective ratings in 4 weeks  Pt will demonstrate improved ROM by at least 10 degrees in 4 weeks  Pt will demonstrate improved strength by 1/2 grade  Long Term:   Pt will be independent in their HEP in 8 weeks  Pt will be be pain free with IADL's  Pt will demonstrate improved FOTO score    Patient's Goals:   "I want to be pain free"     Plan  Planned modality interventions: cryotherapy, TENS and thermotherapy: hydrocollator packs  Planned therapy interventions: abdominal trunk stabilization, balance, home exercise program, gait training, functional ROM exercises, flexibility, joint mobilization, manual therapy, neuromuscular re-education, patient education, therapeutic activities, therapeutic exercise, stretching and strengthening  Frequency: 2x week  Duration in weeks: 12  Treatment plan discussed with: patient        Subjective Evaluation    History of Present Illness  Onset date: 10+ years ago  Mechanism of injury: Patient reports she has a long history of pain  She said that her pain has worsened recently  She told PT she has pain in her neck, shoulders, elbows, knees, toes and fingers  Patient had an accident involving his RUE in the not so distant past   She told PT that her RUE hurts more than her LUE and she believes this has to do with her previous injury      Quality of life: fair    Pain  Current pain ratin  At best pain ratin  At worst pain rating: 10  Location: Throughout the entire body   Quality: dull ache  Relieving factors: medications  Aggravating factors: standing and walking      Diagnostic Tests  X-ray: normal  MRI studies: abnormal  Treatments  Previous treatment: medication and physical therapy  Current treatment: physical therapy  Patient Goals  Patient goals for therapy: decreased pain, return to work, return to [employer] activities, independence with ADLs/IADLs, increased strength and increased motion  Patient goal: "I want to be pain free"         Objective     Palpation     Additional Palpation Details  PA mobs elicited pain  Lumbar spine presents with normal mobility     Cervical/Thoracic Screen   Cervical range of motion within normal limits with the following exceptions: Pain with all movements   ROM appears WNL but patient was guarding during PROM evaluation    Thoracic range of motion within normal limits with the following exceptions: Hypomobility noted in the Thoracic spine       Neurological Testing     Sensation   Cervical/Thoracic   Left   Intact: light touch    Right   Intact: light touch    Lumbar   Left   Intact: light touch    Right   Intact: light touch    Reflexes   Left   Deltoid (C5): normal (2+)  Biceps (C5/C6): normal (2+)  Brachioradialis (C6): normal (2+)  Triceps (C7): normal (2+)  Patellar (L4): normal (2+)  Achilles (S1): normal (2+)    Right   Deltoid (C5): normal (2+)  Biceps (C5/C6): normal (2+)  Brachioradialis (C6): normal (2+)  Triceps (C7): normal (2+)  Patellar (L4): normal (2+)  Achilles (S1): normal (2+)    Active Range of Motion   Left Shoulder   Normal active range of motion  Flexion: WFL  Extension: WFL  Abduction: WFL  External rotation BTH: T1   Internal rotation BTB: L1     Right Shoulder   Normal active range of motion  Flexion: WFL  Extension: WFL  Adduction: WFL  External rotation BTH: T1   Internal rotation BTB: L1     Additional Active Range of Motion Details  Pain with all movements bilaterally     Joint Play   Comments: Hypomobility and pain noted with PA mobs throughout the thoracic spine         Strength/Myotome Testing   Cervical Spine   Neck flexion: 3+    Lumbar   Left   Normal strength    Right   Normal strength    Left Ankle/Foot   Dorsiflexion: 5  Plantar flexion: 5  Inversion: 5  Eversion: 5  Great toe flexion: 5  Great toe extension: 5    Right Ankle/Foot   Dorsiflexion: 4+  Plantar flexion: 5  Inversion: 5  Eversion: 5  Great toe flexion: 5  Great toe extension: 5    Additional Strength Details  3+ throughout unless otherwised noted  Pain noted with all movement          Precautions: arthritis, back pain, depression    Daily Treatment Diary     Manual           PROM           Inf/post glides                                             Exercise Diary           TA contraction          p-ball crushers          TA BKFO          TA marches          TA kicks          Prone hip ext          Prone bent knee hip ext          p-ball prayer stretch 3 way          Prone lumbar extension          Standing lumbar extension          [facility]          SK          LTR          Hamstring strap stretch          Calf strap stretch          Slump Sliders          p-ball TB rows p-ball TB low rows          p-ball TB chops                                  Modalities            PRN

## 2018-03-19 ENCOUNTER — OFFICE VISIT (OUTPATIENT)
Dept: PHYSICAL THERAPY | Age: 53
End: 2018-03-19
Payer: COMMERCIAL

## 2018-03-19 DIAGNOSIS — S52.531D CLOSED COLLES' FRACTURE OF RIGHT RADIUS WITH ROUTINE HEALING, SUBSEQUENT ENCOUNTER: Primary | ICD-10-CM

## 2018-03-19 PROCEDURE — 97140 MANUAL THERAPY 1/> REGIONS: CPT | Performed by: PHYSICAL THERAPIST

## 2018-03-19 PROCEDURE — 97110 THERAPEUTIC EXERCISES: CPT | Performed by: PHYSICAL THERAPIST

## 2018-03-19 NOTE — PROGRESS NOTES
Precautions: None    Daily Treatment Diary     Manual  2/19 2/23 2/26 3/1 3/5 3/8 3/12 3/19     Paraffin coban'd into flexion 10 minutes 10 minutes 10 minutes 10 minutes 15 minutes 15 minutes 15 minutes Into flexion stretch     PROM/stretching - MCP's and PIP/DIP's 30 minutes 30 minutes 30 minutes 30 minutes 30 minutes 30 minutes 30 minutes 30 minutes                                                Exercise Diary              Therabar Red - wrist flexion and ext - 15 reps 2 sets of 10 reps 2 sets of 10 reps 2 sets of 10 reps Red - 2 sets of 10 reps Red/green - 2 sets of 10 reps Green - 2 sets of 10 reps Green - 2 sets of 10 reps     Digiflex Red - 2 sets of 10 reps each finger 2 sets of 10 reps 2 sets of 10 reps 2 sets of 10 reps Green - 2 sets of 10 reps [place] Pe[place]     Gripper with pegs 3 sets 3 sets 3 sets 3 sets 4 reps 4 lengths 3 lengths 3 lengths       Rubber bands - 3 sets 3 sets 3 sets 3 sets 3 sets 3 sets 3 sets        Gripper - 4 lengths 4 lengths 4 lengths 4 lengths 4 lengths 4 lengths                                                                                                                                                                                                            Modalities                                                       D/C to HEP soon

## 2018-03-20 ENCOUNTER — OFFICE VISIT (OUTPATIENT)
Dept: PHYSICAL THERAPY | Facility: OTHER | Age: 53
End: 2018-03-20
Payer: COMMERCIAL

## 2018-03-20 DIAGNOSIS — M79.7 FIBROMYALGIA: Primary | ICD-10-CM

## 2018-03-20 PROCEDURE — 97530 THERAPEUTIC ACTIVITIES: CPT | Performed by: PHYSICAL THERAPIST

## 2018-03-20 PROCEDURE — 97112 NEUROMUSCULAR REEDUCATION: CPT | Performed by: PHYSICAL THERAPIST

## 2018-03-20 PROCEDURE — 97140 MANUAL THERAPY 1/> REGIONS: CPT | Performed by: PHYSICAL THERAPIST

## 2018-03-20 PROCEDURE — 97110 THERAPEUTIC EXERCISES: CPT | Performed by: PHYSICAL THERAPIST

## 2018-03-20 NOTE — PROGRESS NOTES
Daily Note     Today's date: 3/20/2018  Patient name: Ninfa Givens  : 1965  MRN: [id number]  Referring provider: Maranda Dakin, MD  Dx:   Encounter Diagnosis     ICD-10-CM    1  Fibromyalgia M79 7        Start Time: 1400  Stop Time: 1500  Total time in clinic (min): 60 minutes    Subjective: Patient reports increased low back pain today  She appeared to respond well to manuals performed  She told PT "I feel pretty good" after manuals were performed  Objective: See treatment diary below      Assessment: Tolerated treatment well  Patient demonstrated fatigue post treatment, exhibited good technique with therapeutic exercises and would benefit from continued PT      Plan: Continue per plan of care  Progress treatment as tolerated        Precautions: Protocol    Daily Treatment Diary     Manual  3/15 3/20        [facility]        [facility]        Sacral [facility]        Hip [facility]                      Exercise Diary  3/15 3/20        PPT  15 x 5"        TA contraction 15 x 5" 15 x 5"        p-ball crushers 15 x 5" 20 x 5"        TA BKFO          TA marches          TA kicks          Prone hip ext          Prone bent knee hip ext          p-ball prayer stretch 3 way          Prone lumbar extension          Standing lumbar extension          DKC 15 x 5" 10 x 10"        SKC 15 x 5" 10 x 10"        LTR 15 x 5" 10 x 10"         Hamstring strap stretch 15 x 5" 10 x 10"        Calf strap stretch 15 x 5" 10 x 10"        Hip Abduction Iso 15 x 5"         Hip Adduction Iso 15 x 5"         bike 10'  Not Performed                                          Modalities  3/15 3/20        MH PRN deferred 10'   To begin

## 2018-03-22 ENCOUNTER — OFFICE VISIT (OUTPATIENT)
Dept: PHYSICAL THERAPY | Facility: OTHER | Age: 53
End: 2018-03-22
Payer: COMMERCIAL

## 2018-03-22 DIAGNOSIS — M79.7 FIBROMYALGIA: Primary | ICD-10-CM

## 2018-03-22 PROCEDURE — 97110 THERAPEUTIC EXERCISES: CPT | Performed by: PEDIATRICS

## 2018-03-22 PROCEDURE — 97112 NEUROMUSCULAR REEDUCATION: CPT | Performed by: PEDIATRICS

## 2018-03-22 PROCEDURE — 97140 MANUAL THERAPY 1/> REGIONS: CPT | Performed by: PEDIATRICS

## 2018-03-22 NOTE — PROGRESS NOTES
Daily Note     Today's date: 3/22/2018  Patient name: Suzanna Velez  : 1965  MRN: [id number]  Referring provider: Sourav Frank MD  Dx:   Encounter Diagnosis     ICD-10-CM    1  Fibromyalgia M79 7            1:1 for duration of treatment session  Subjective: Pt  Reports low back pain 8/10 at start of treatment session  Rates pain 5/10 post treatment session  Objective: See treatment diary below  Precautions: Protocol    Daily Treatment Diary     Manual  3/15 3/20 3/22       PA [facility]       FR to [facility]        Sacral [facility]       Hip [facility]                     Exercise Diary  3/15 3/20 3/22       PPT  15 x 5" 15 x 5"       TA contraction 15 x 5" 15 x 5" 15 x 5"       p-ball crushers 15 x 5" 20 x 5" 20 x 5"       TA BKFO          TA marches          TA kicks          Prone hip ext          Prone bent knee hip ext          p-ball prayer stretch 3 way          Prone lumbar extension          Standing lumbar extension          DKC 15 x 5" 10 x 10" 10 x 10"       SKC 15 x 5" 10 x 10" 10" x 10       LTR 15 x 5" 10 x 10"  10" x 10       Hamstring strap stretch 15 x 5" 10 x 10" 10" x 10       Calf strap stretch 15 x 5" 10 x 10" 10" x 10       Hip Abduction Iso 15 x 5"         Hip Adduction Iso 15 x 5"         bike 10'  Not Performed NP                                         Modalities  3/15 3/20 3/22        PRN deferred 10'   To begin 10' to begin                                 Assessment: Tolerated treatment well  Responds well to manual interventions  Patient exhibited good technique with therapeutic exercises and would benefit from continued PT  Reduced pain post treatment session  Plan: Continue per plan of care

## 2018-03-23 ENCOUNTER — OFFICE VISIT (OUTPATIENT)
Dept: PHYSICAL THERAPY | Age: 53
End: 2018-03-23
Payer: COMMERCIAL

## 2018-03-23 DIAGNOSIS — M79.641 RIGHT HAND PAIN: Primary | ICD-10-CM

## 2018-03-23 PROCEDURE — 97140 MANUAL THERAPY 1/> REGIONS: CPT | Performed by: PHYSICAL THERAPIST

## 2018-03-23 PROCEDURE — 97110 THERAPEUTIC EXERCISES: CPT | Performed by: PHYSICAL THERAPIST

## 2018-03-23 NOTE — PROGRESS NOTES
Precautions: None    Daily Treatment Diary     Manual  2/19 2/23 2/26 3/1 3/5 3/8 3/12 3/19 3/23    Paraffin coban'd into flexion 10 minutes 10 minutes 10 minutes 10 minutes 15 minutes 15 minutes 15 minutes Into flexion stretch 10 minutes    PROM/stretching - MCP's and PIP/DIP's 30 minutes 30 minutes 30 minutes 30 minutes 30 minutes 30 minutes 30 minutes 30 minutes 30 minutes                                               Exercise Diary              Therabar Red - wrist flexion and ext - 15 reps 2 sets of 10 reps 2 sets of 10 reps 2 sets of 10 reps Red - 2 sets of 10 reps Red/green - 2 sets of 10 reps Green - 2 sets of 10 reps Green - 2 sets of 10 reps 2 sets o f10 reps    Digiflex Red - 2 sets of 10 reps each finger 2 sets of 10 reps 2 sets of 10 reps 2 sets of 10 reps Green - 2 sets of 10 reps Ida Mandes Green 2 sets of 10 reps    Gripper with pegs 3 sets 3 sets 3 sets 3 sets 4 reps 4 lengths 3 lengths 3 lengths 3 lengths3 sets      Rubber bands - 3 sets 3 sets 3 sets 3 sets 3 sets 3 sets 3 sets 3 sets       Gripper - 4 lengths 4 lengths 4 lengths 4 lengths 4 lengths 4 lengths 4 lengths                                                                                                                                                                                                           Modalities                                                       D/C to HEP next week

## 2018-03-26 ENCOUNTER — OFFICE VISIT (OUTPATIENT)
Dept: PHYSICAL THERAPY | Age: 53
End: 2018-03-26
Payer: COMMERCIAL

## 2018-03-26 DIAGNOSIS — M79.641 RIGHT HAND PAIN: Primary | ICD-10-CM

## 2018-03-26 PROCEDURE — 97110 THERAPEUTIC EXERCISES: CPT | Performed by: PHYSICAL THERAPIST

## 2018-03-26 PROCEDURE — 97140 MANUAL THERAPY 1/> REGIONS: CPT | Performed by: PHYSICAL THERAPIST

## 2018-03-27 ENCOUNTER — OFFICE VISIT (OUTPATIENT)
Dept: PHYSICAL THERAPY | Facility: OTHER | Age: 53
End: 2018-03-27
Payer: COMMERCIAL

## 2018-03-27 DIAGNOSIS — M79.7 FIBROMYALGIA: Primary | ICD-10-CM

## 2018-03-27 PROCEDURE — 97140 MANUAL THERAPY 1/> REGIONS: CPT | Performed by: PHYSICAL THERAPIST

## 2018-03-27 PROCEDURE — 97110 THERAPEUTIC EXERCISES: CPT | Performed by: PHYSICAL THERAPIST

## 2018-03-27 PROCEDURE — 97112 NEUROMUSCULAR REEDUCATION: CPT | Performed by: PHYSICAL THERAPIST

## 2018-03-27 NOTE — PROGRESS NOTES
Precautions: None    Daily Treatment Diary     Manual  2/19 2/23 2/26 3/1 3/5 3/8 3/12 3/19 3/23 3/26   Paraffin coban'd into flexion 10 minutes 10 minutes 10 minutes 10 minutes 15 minutes 15 minutes 15 minutes Into flexion stretch 10 minutes 10 minutes   PROM/stretching - MCP's and PIP/DIP's 30 minutes 30 minutes 30 minutes 30 minutes 30 minutes 30 minutes 30 minutes 30 minutes 30 minutes 30 minutes                                              Exercise Diary              Therabar Red - wrist flexion and ext - 15 reps 2 sets of 10 reps 2 sets of 10 reps 2 sets of 10 reps Red - 2 sets of 10 reps Red/green - 2 sets of 10 reps Green - 2 sets of 10 reps Green - 2 sets of 10 reps 2 sets o f10 reps 2 sets of 10 reps   Digiflex Red - 2 sets of 10 reps each finger 2 sets of 10 reps 2 sets of 10 reps 2 sets of 10 reps Green - 2 sets of 10 reps Dayna Randolph Green 2 sets of 10 reps 2 sets of 10 reps   Gripper with pegs 3 sets 3 sets 3 sets 3 sets 4 reps 4 lengths 3 lengths 3 lengths 3 lengths3 sets 3 lengths     Rubber bands - 3 sets 3 sets 3 sets 3 sets 3 sets 3 sets 3 sets 3 sets 3 sets      Gripper - 4 lengths 4 lengths 4 lengths 4 lengths 4 lengths 4 lengths 4 lengths 4 lengths                                                                                                                                                                                                          Modalities                                                       D/C next visit - goals achieved

## 2018-03-27 NOTE — PROGRESS NOTES
Daily Note     Today's date: 3/27/2018  Patient name: Suzanna Velez  : 1965  MRN: [id number]  Referring provider: Sourav Frank MD  Dx:   Encounter Diagnosis     ICD-10-CM    1  Fibromyalgia M79 7      IEP 2:00PM-2:30PM  Start Time: 1430  Stop Time: 1500  Total time in clinic (min): 30 minutes    Subjective: Pt  Reports low back pain is a 3/10 at this time  However yesterday she said her pain increased to a 10/10  She took medicine and it helped decreased her pain to an 8/10  Overall she told PT she is feeling much better but she continues to have pain at this time  Objective: See treatment diary below  Precautions: Protocol    Daily Treatment Diary     Manual  3/15 3/20 3/22 3/27      PA [facility]      FR to [facility]      Sacral [facility]      Hip [facility]      Nerve Glides    [facility]          Exercise Diary  3/15 3/20 3/22 3/27      PPT  15 x 5" 15 x 5" 20 x 5"      TA contraction 15 x 5" 15 x 5" 15 x 5" 20 x 5"      p-ball crushers 15 x 5" 20 x 5" 20 x 5" 20 x 5"       TA BKFO    1 x 15      TA marches    1 x 15      TA kicks    1 x 15      Prone hip ext          Prone bent knee hip ext          p-ball prayer stretch 3 way          Prone lumbar extension          Standing lumbar extension          DKC 15 x 5" 10 x 10" 10 x 10" 10 x 10"      SKC 15 x 5" 10 x 10" 10" x 10 10 x 10"      LTR 15 x 5" 10 x 10"  10" x 10 10 x 10"      Hamstring strap stretch 15 x 5" 10 x 10" 10" x 10 10 x 10"      Calf strap stretch 15 x 5" 10 x 10" 10" x 10 10 x 10"       Hip Abduction Iso 15 x 5"   20 x 5"      Hip Adduction Iso 15 x 5"   20 x 5"      Bike/[place] 10'  Not Performed NP Curve 10'                                        Modalities  3/15 3/20 3/22 3/27       PRN deferred 10'   To begin 10' to begin NP                                Assessment: Tolerated treatment well  Responds well to manual interventions   Patient exhibited good technique with therapeutic exercises and would benefit from continued PT  Reduced pain post treatment session  Plan: Continue per plan of care

## 2018-03-29 ENCOUNTER — OFFICE VISIT (OUTPATIENT)
Dept: PHYSICAL THERAPY | Facility: OTHER | Age: 53
End: 2018-03-29
Payer: COMMERCIAL

## 2018-03-29 DIAGNOSIS — M79.7 FIBROMYALGIA: Primary | ICD-10-CM

## 2018-03-29 PROCEDURE — 97112 NEUROMUSCULAR REEDUCATION: CPT | Performed by: PHYSICAL THERAPIST

## 2018-03-29 PROCEDURE — 97140 MANUAL THERAPY 1/> REGIONS: CPT | Performed by: PHYSICAL THERAPIST

## 2018-03-29 PROCEDURE — 97110 THERAPEUTIC EXERCISES: CPT | Performed by: PHYSICAL THERAPIST

## 2018-03-29 PROCEDURE — 97530 THERAPEUTIC ACTIVITIES: CPT | Performed by: PHYSICAL THERAPIST

## 2018-03-29 NOTE — PROGRESS NOTES
Daily Note     Today's date: 3/29/2018  Patient name: Jessica Berger  : 1965  MRN: [id number]  Referring provider: Rianna Krishnan MD  Dx:   Encounter Diagnosis     ICD-10-CM    1  Fibromyalgia M79 7      1 on 1 entire duration  Start Time: 1400  Stop Time: 1500  Total time in clinic (min): 60 minutes    Subjective: Patient reports low back pain  She did not give a grade  Patient reports overall pain has diminished since starting physical therapy  Objective: See treatment diary below  Precautions: Protocol    Daily Treatment Diary     Manual  3/15 3/20 3/22 3/27 3/29     PA [facility]     FR to LUE  [facility]     Sacral [facility]     Hip [facility]     Nerve Glides    [facility]         Exercise Diary  3/15 3/20 3/22 3/27 3/29     PPT  15 x 5" 15 x 5" 20 x 5" 20 x 5"     TA contraction 15 x 5" 15 x 5" 15 x 5" 20 x 5" 20 x 5"     p-ball crushers 15 x 5" 20 x 5" 20 x 5" 20 x 5"  20 x 5"     TA BKFO    1 x 15 1 x 15      TA marches    1 x 15 1 x 15      TA kicks    1 x 15 1 x 15     Prone hip ext          Prone bent knee hip ext          p-ball prayer stretch 3 way          Prone lumbar extension          Standing lumbar extension          DKC 15 x 5" 10 x 10" 10 x 10" 10 x 10" 10 x 10"     SKC 15 x 5" 10 x 10" 10" x 10 10 x 10" 10 x 10"     LTR 15 x 5" 10 x 10"  10" x 10 10 x 10" 10 x 10"     Hamstring strap stretch 15 x 5" 10 x 10" 10" x 10 10 x 10" 10 x 10"     Calf strap stretch 15 x 5" 10 x 10" 10" x 10 10 x 10"  10 x 10"     Hip Abduction Iso 15 x 5"   20 x 5" 20 x 5"     Hip Adduction Iso 15 x 5"   20 x 5" 20 x 5"     Bike/[place] 10'  Not Performed NP Curve 10' Curve 10'                                       Modalities  3/15 3/20 3/22 3/27 3/29      PRN deferred 10'   To begin 10' to begin NP NP                               Assessment: Tolerated treatment well   Patient exhibited good technique with therapeutic exercises and would benefit from continued PT  Progress patient to standing exercises next visit  Plan: Continue per plan of care

## 2018-03-30 ENCOUNTER — OFFICE VISIT (OUTPATIENT)
Dept: PHYSICAL THERAPY | Age: 53
End: 2018-03-30
Payer: COMMERCIAL

## 2018-03-30 DIAGNOSIS — M79.641 RIGHT HAND PAIN: Primary | ICD-10-CM

## 2018-03-30 PROCEDURE — 97110 THERAPEUTIC EXERCISES: CPT | Performed by: PHYSICAL THERAPIST

## 2018-03-30 PROCEDURE — G8984 CARRY CURRENT STATUS: HCPCS | Performed by: PHYSICAL THERAPIST

## 2018-03-30 PROCEDURE — 97140 MANUAL THERAPY 1/> REGIONS: CPT | Performed by: PHYSICAL THERAPIST

## 2018-03-30 PROCEDURE — G8985 CARRY GOAL STATUS: HCPCS | Performed by: PHYSICAL THERAPIST

## 2018-03-30 NOTE — PROGRESS NOTES
Precautions: None    Daily Treatment Diary     Manual  2/19 2/23 2/26 3/1 3/5 3/8 3/12 3/19 3/23 3/28   Paraffin coban'd into flexion 10 minutes 10 minutes 10 minutes 10 minutes 15 minutes 15 minutes 15 minutes Into flexion stretch 10 minutes 10 minutes   PROM/stretching - MCP's and PIP/DIP's 30 minutes 30 minutes 30 minutes 30 minutes 30 minutes 30 minutes 30 minutes 30 minutes 30 minutes 30 minutes                                              Exercise Diary              Therabar Red - wrist flexion and ext - 15 reps 2 sets of 10 reps 2 sets of 10 reps 2 sets of 10 reps Red - 2 sets of 10 reps Red/green - 2 sets of 10 reps Green - 2 sets of 10 reps Green - 2 sets of 10 reps 2 sets o f10 reps 2 sets of 10 reps   Digiflex Red - 2 sets of 10 reps each finger 2 sets of 10 reps 2 sets of 10 reps 2 sets of 10 reps Green - 2 sets of 10 reps [employer]ine Green 2 sets of 10 reps 2 sets of 10 reps   Gripper with pegs 3 sets 3 sets 3 sets 3 sets 4 reps 4 lengths 3 lengths 3 lengths 3 lengths3 sets 3 lengths     Rubber bands - 3 sets 3 sets 3 sets 3 sets 3 sets 3 sets 3 sets 3 sets 3 sets      Gripper - 4 lengths 4 lengths 4 lengths 4 lengths 4 lengths 4 lengths 4 lengths 4 lengths                                                                                                                                                                                                          Modalities                                                       D/C to HEP - goals achieved

## 2018-04-03 ENCOUNTER — OFFICE VISIT (OUTPATIENT)
Dept: PHYSICAL THERAPY | Facility: OTHER | Age: 53
End: 2018-04-03
Payer: COMMERCIAL

## 2018-04-03 DIAGNOSIS — M79.7 FIBROMYALGIA: Primary | ICD-10-CM

## 2018-04-03 PROCEDURE — 97140 MANUAL THERAPY 1/> REGIONS: CPT | Performed by: PEDIATRICS

## 2018-04-03 PROCEDURE — 97112 NEUROMUSCULAR REEDUCATION: CPT | Performed by: PEDIATRICS

## 2018-04-03 PROCEDURE — 97110 THERAPEUTIC EXERCISES: CPT | Performed by: PEDIATRICS

## 2018-04-03 PROCEDURE — 97530 THERAPEUTIC ACTIVITIES: CPT | Performed by: PEDIATRICS

## 2018-04-03 NOTE — PROGRESS NOTES
Daily Note     Today's date: 4/3/2018  Patient name: Leopoldo Byars  : 1965  MRN: [id number]  Referring provider: Davide Ovalles MD  Dx:   Encounter Diagnosis     ICD-10-CM    1  Fibromyalgia M79 7         IEP 1:55 - 2:15  1:1 2:15 - 2:50 with PTA EW          Subjective: Pt  Rates pain 6/10 at start of treatment session  Reports low back pain is 4/10 post treatment session  Objective: See treatment diary below  Precautions: Protocol    Daily Treatment Diary     Manual  3/15 3/20 3/22 3/27 3/29 4/3    PA mobs [address] [facility] [address] [facility]    FR to LUE  [facility]     Sacral Mobs  [facility]    Hip Mobs  [facility]     Nerve Glides    [facility]         Exercise Diary  3/15 3/20 3/22 3/27 3/29 4/3    PPT  15 x 5" 15 x 5" 20 x 5" 20 x 5" 20 x 5"    TA contraction 15 x 5" 15 x 5" 15 x 5" 20 x 5" 20 x 5" 20 x 5"    p-ball crushers 15 x 5" 20 x 5" 20 x 5" 20 x 5"  20 x 5" 20 x 5"    TA BKFO    1 x 15 1 x 15  1 x 15    TA marches    1 x 15 1 x 15  1 x 15    TA kicks    1 x 15 1 x 15 1 x 15    Prone hip ext          Prone bent knee hip ext          p-ball prayer stretch 3 way          Prone lumbar extension          Standing lumbar extension          DKC 15 x 5" 10 x 10" 10 x 10" 10 x 10" 10 x 10" 10" x 10    SKC 15 x 5" 10 x 10" 10" x 10 10 x 10" 10 x 10" 10" x 10    LTR 15 x 5" 10 x 10"  10" x 10 10 x 10" 10 x 10" 10" x 10    Hamstring strap stretch 15 x 5" 10 x 10" 10" x 10 10 x 10" 10 x 10" 10" x 10    Calf strap stretch 15 x 5" 10 x 10" 10" x 10 10 x 10"  10 x 10"     Hip Abduction Iso 15 x 5"   20 x 5" 20 x 5" 20 x 5"    Hip Adduction Iso 15 x 5"   20 x 5" 20 x 5" 20 x 5"    Bike/[place] 10'  Not Performed NP Curve 10' Curve 10' Curve   10'                                      Modalities  3/15 3/20 3/22 3/27 3/29 4/3    MH PRN deferred 10'   To begin 8' to begin NP NP NP                              Assessment: Tolerated treatment well   Patient would benefit from continued PT Demonstrates good knowledge of current TE program  Decreased pain post treatment session  Good response to manual interventions  Plan: Continue per plan of care

## 2018-04-05 ENCOUNTER — OFFICE VISIT (OUTPATIENT)
Dept: PHYSICAL THERAPY | Facility: OTHER | Age: 53
End: 2018-04-05
Payer: COMMERCIAL

## 2018-04-05 DIAGNOSIS — M79.7 FIBROMYALGIA: Primary | ICD-10-CM

## 2018-04-05 PROCEDURE — 97110 THERAPEUTIC EXERCISES: CPT | Performed by: PHYSICAL THERAPIST

## 2018-04-05 PROCEDURE — 97140 MANUAL THERAPY 1/> REGIONS: CPT | Performed by: PHYSICAL THERAPIST

## 2018-04-05 PROCEDURE — 97112 NEUROMUSCULAR REEDUCATION: CPT | Performed by: PHYSICAL THERAPIST

## 2018-04-05 NOTE — PROGRESS NOTES
Daily Note     Today's date: 2018  Patient name: Jose Crowell  : 1965  MRN: [id number]  Referring provider: Michelle Andres MD  Dx:   Encounter Diagnosis     ICD-10-CM    1  Fibromyalgia M79 7      1 on 1 [id number]  IEP [id number]  Start Time: 1400  Stop Time: 1500  Total time in clinic (min): 60 minutes    Subjective: Pt told PT her knees were bothering her today  PT focused on knee pain  No complaints of low back pain at this time        Objective: See treatment diary below  Precautions: Protocol    Daily Treatment Diary     Manual  3/15 3/20 3/22 3/27 3/29 4/3 4/5   PA mobs 1111 [facility] 1111 [facility] AS    FR to LUE  [facility]   Sacral Mobs  [facility] AS    Hip Mobs  [facility]     Nerve Glides    [facility]     Cervical Side [facility]       Exercise Diary  3/15 3/20 3/22 3/27 3/29 4/3 4   PPT  15 x 5" 15 x 5" 20 x 5" 20 x 5" 20 x 5"    TA contraction 15 x 5" 15 x 5" 15 x 5" 20 x 5" 20 x 5" 20 x 5"    p-ball crushers 15 x 5" 20 x 5" 20 x 5" 20 x 5"  20 x 5" 20 x 5"    TA BKFO    1 x 15 1 x 15  1 x 15    TA marches    1 x 15 1 x 15  1 x 15    TA kicks    1 x 15 1 x 15 1 x 15    Prone hip ext          Prone bent knee hip ext          p-ball prayer stretch 3 way          Prone lumbar extension          Standing lumbar extension          DKC 15 x 5" 10 x 10" 10 x 10" 10 x 10" 10 x 10" 10" x 10    SKC 15 x 5" 10 x 10" 10" x 10 10 x 10" 10 x 10" 10" x 10    LTR 15 x 5" 10 x 10"  10" x 10 10 x 10" 10 x 10" 10" x 10    Hamstring strap stretch 15 x 5" 10 x 10" 10" x 10 10 x 10" 10 x 10" 10" x 10 10 x 10"    Calf strap stretch 15 x 5" 10 x 10" 10" x 10 10 x 10"  10 x 10"  10 x 10"    Hip Abduction Iso 15 x 5"   20 x 5" 20 x 5" 20 x 5"    Hip Adduction Iso 15 x 5"   20 x 5" 20 x 5" 20 x 5"    Bike/[place] 10'  Not Performed NP Curve 10' Curve 10' Curve   10' Bike 10'    SLR x 4        1 x 15    Clamshells        10 x 10"    Quad Strap Stretch        10 x 10" Butterflies        10 x 5"    Thoracic Ext        15 x 5"    UT Stretch       15 x 5"                          Modalities  3/15 3/20 3/22 3/27 3/29 4/3 4/5   MH PRN deferred 10'   To begin 10' to begin NP NP NP NP                         Assessment: Tolerated treatment well  Patient would benefit from continued PT  Patient responded favorably to manuals performed on cervical region  Plan: Continue per plan of care

## 2018-04-10 ENCOUNTER — OFFICE VISIT (OUTPATIENT)
Dept: PHYSICAL THERAPY | Facility: OTHER | Age: 53
End: 2018-04-10
Payer: COMMERCIAL

## 2018-04-10 DIAGNOSIS — M79.7 FIBROMYALGIA: Primary | ICD-10-CM

## 2018-04-10 PROCEDURE — 97110 THERAPEUTIC EXERCISES: CPT | Performed by: PHYSICAL THERAPIST

## 2018-04-10 PROCEDURE — 97140 MANUAL THERAPY 1/> REGIONS: CPT | Performed by: PHYSICAL THERAPIST

## 2018-04-10 PROCEDURE — 97112 NEUROMUSCULAR REEDUCATION: CPT | Performed by: PHYSICAL THERAPIST

## 2018-04-10 PROCEDURE — 97530 THERAPEUTIC ACTIVITIES: CPT | Performed by: PHYSICAL THERAPIST

## 2018-04-10 NOTE — PROGRESS NOTES
Daily Note     Today's date: 4/10/2018  Patient name: Ruddy Perry  : 1965  MRN: [id number]  Referring provider: Maria De Jesus Zayas MD  Dx:   Encounter Diagnosis     ICD-10-CM    1  Fibromyalgia M79 7      1 on 1 [id number]  IEP [id number]  Start Time: 1400  Stop Time: 1500  Total time in clinic (min): 60 minutes    Subjective: Patient rated her pain as a 10/10 prior to the start of today's session  Patient reported pain decreased significantly at the conclusion of today's session          Objective: See treatment diary below    Precautions: Protocol    Daily Treatment Diary     Manual  3/15 3/20 3/22 3/27 3/29 4/3 4/5 4/10   PA mobs 1111 [facility] 1111 [facility] AS  [facility]   FR to LUE  [facility]   Sacral Mobs  [facility] AS  [facility]   Hip Mobs  [facility]      Nerve Glides    [facility]      Cervical Side [facility]   SOR       [facility]       Exercise Diary  3/15 3/20 3/22 3/27 3/29 4/3 4/5 4/10   PPT  15 x 5" 15 x 5" 20 x 5" 20 x 5" 20 x 5"  20 x 5"   TA contraction 15 x 5" 15 x 5" 15 x 5" 20 x 5" 20 x 5" 20 x 5"  20 x 5"    p-ball crushers 15 x 5" 20 x 5" 20 x 5" 20 x 5"  20 x 5" 20 x 5"  20 x 5"   TA BKFO    1 x 15 1 x 15  1 x 15  1 x 15   TA marches    1 x 15 1 x 15  1 x 15  1 x 15   TA kicks    1 x 15 1 x 15 1 x 15  1 x 15   Prone hip ext           Prone bent knee hip ext           p-ball prayer stretch 3 way           Prone lumbar extension           Standing lumbar extension           DKC 15 x 5" 10 x 10" 10 x 10" 10 x 10" 10 x 10" 10" x 10  10 x 10"    SKC 15 x 5" 10 x 10" 10" x 10 10 x 10" 10 x 10" 10" x 10  10 x 10"    LTR 15 x 5" 10 x 10"  10" x 10 10 x 10" 10 x 10" 10" x 10  10 x 10"    Hamstring strap stretch 15 x 5" 10 x 10" 10" x 10 10 x 10" 10 x 10" 10" x 10 10 x 10"     Calf strap stretch 15 x 5" 10 x 10" 10" x 10 10 x 10"  10 x 10"  10 x 10"     Hip Abduction Iso 15 x 5"   20 x 5" 20 x 5" 20 x 5"     Hip Adduction Iso 15 x 5"   20 x 5" 20 x 5" 20 x 5" Bike/[place] 10'  Not Performed NP Curve 10' Curve 10' Curve   10' Bike 10'     SLR x 4        1 x 15     Clamshells        10 x 10"     Quad Strap Stretch        10 x 10"     Butterflies        10 x 5"     Thoracic Ext        15 x 5"  15 x 5"    UT Stretch       15 x 5"  10 x 10"    DNF         15 x 5"   CT 3 Way        15 x 5" ea     Modalities  3/15 3/20 3/22 3/27 3/29 4/3 4/5   MH PRN deferred 10'   To begin 10' to begin NP NP NP NP                         Assessment: Tolerated treatment well  Patient would benefit from continued PT  Patient responded favorably to all exercises prescribed stating "I feel much better after physical therapy "  Patient was crying due to the pain prior to the start of today's session  Plan: Continue per plan of care

## 2018-04-12 ENCOUNTER — OFFICE VISIT (OUTPATIENT)
Dept: PHYSICAL THERAPY | Facility: OTHER | Age: 53
End: 2018-04-12
Payer: COMMERCIAL

## 2018-04-12 DIAGNOSIS — M79.7 FIBROMYALGIA: Primary | ICD-10-CM

## 2018-04-12 PROCEDURE — 97110 THERAPEUTIC EXERCISES: CPT

## 2018-04-12 PROCEDURE — 97112 NEUROMUSCULAR REEDUCATION: CPT

## 2018-04-12 PROCEDURE — 97530 THERAPEUTIC ACTIVITIES: CPT

## 2018-04-12 PROCEDURE — 97140 MANUAL THERAPY 1/> REGIONS: CPT

## 2018-04-12 NOTE — PROGRESS NOTES
Daily Note     Today's date: 2018  Patient name: Maxwell Arizmendi  : 1965  MRN: [id number]  Referring provider: Goyo Fung MD  Dx:   Encounter Diagnosis     ICD-10-CM    1  Fibromyalgia M79 7      1:1 with PT MJ 1:50-2:00  1:1 with PTA CR 2:00- 2:45  MH 2:45-2:55  Subjective: Patient reports feeling better today rating LBP 5/10; denies C-S pain but is complaining of B/L elbow pain  Objective: See treatment diary below    Precautions: Protocol    Daily Treatment Diary     Manual  3/15 3/20 3/22 3/27 3/29 4/3 4/5 4/10 4/12   PA mobs 1111 [facility] 1111 [facility] AS  [facility]    FR to LUE  [facility]    Sacral Mobs  [facility] AS  [facility]    Hip Mobs  [facility]       Nerve Glides    [facility]       Cervical Side [facility]    SOR       [facility] CR       Exercise Diary  3/15 3/20 3/22 3/27 3/29 4/3 4/5 4/10 4/12   PPT  15 x 5" 15 x 5" 20 x 5" 20 x 5" 20 x 5"  20 x 5" 5"x20   TA contraction 15 x 5" 15 x 5" 15 x 5" 20 x 5" 20 x 5" 20 x 5"  20 x 5"  5"x20   p-ball crushers 15 x 5" 20 x 5" 20 x 5" 20 x 5"  20 x 5" 20 x 5"  20 x 5" 5"x20   TA BKFO    1 x 15 1 x 15  1 x 15  1 x 15 15 ea  TA marches    1 x 15 1 x 15  1 x 15  1 x 15 15 ea  TA kicks    1 x 15 1 x 15 1 x 15  1 x 15 15 ea     Prone hip ext            Prone bent knee hip ext            p-ball prayer stretch 3 way            Prone lumbar extension            Standing lumbar extension            DKC 15 x 5" 10 x 10" 10 x 10" 10 x 10" 10 x 10" 10" x 10  10 x 10"  10"x10   SKC 15 x 5" 10 x 10" 10" x 10 10 x 10" 10 x 10" 10" x 10  10 x 10"  10"x10   LTR 15 x 5" 10 x 10"  10" x 10 10 x 10" 10 x 10" 10" x 10  10 x 10"  10"x10   Hamstring strap stretch 15 x 5" 10 x 10" 10" x 10 10 x 10" 10 x 10" 10" x 10 10 x 10"      Calf strap stretch 15 x 5" 10 x 10" 10" x 10 10 x 10"  10 x 10"  10 x 10"      Hip Abduction Iso 15 x 5"   20 x 5" 20 x 5" 20 x 5"      Hip Adduction Iso 15 x 5"   20 x 5" 20 x 5" 20 x 5"      Bike/[place] 10'  Not Performed NP Curve 10' Curve 10' Curve   10' Bike 10'   Bike  10'    SLR x 4        1 x 15      Clamshells        10 x 10"      Quad Strap Stretch        10 x 10"      Butterflies        10 x 5"      Thoracic Ext        15 x 5"  15 x 5"     UT Stretch       15 x 5"  10 x 10"     DNF         15 x 5"    CT 3 Way        15 x 5" ea      Modalities 3/20 3/22 3/27 3/29 4/3 4/5 4/12      10'   To begin 8' to begin NP NP NP NP 10 mins  post                 Assessment: Tolerated treatment well  Patient would benefit from continued PT  Focused on L-S today  Increased tone palpated along B/L paraspinals- good relief with STM  Concluded with  in prone  Pain free following treatment  Plan: Continue per plan of care

## 2018-04-17 ENCOUNTER — OFFICE VISIT (OUTPATIENT)
Dept: PHYSICAL THERAPY | Facility: OTHER | Age: 53
End: 2018-04-17
Payer: COMMERCIAL

## 2018-04-17 DIAGNOSIS — M79.7 FIBROMYALGIA: Primary | ICD-10-CM

## 2018-04-17 PROCEDURE — 97112 NEUROMUSCULAR REEDUCATION: CPT | Performed by: PHYSICAL THERAPIST

## 2018-04-17 PROCEDURE — 97140 MANUAL THERAPY 1/> REGIONS: CPT | Performed by: PHYSICAL THERAPIST

## 2018-04-17 PROCEDURE — 97110 THERAPEUTIC EXERCISES: CPT | Performed by: PHYSICAL THERAPIST

## 2018-04-17 NOTE — PROGRESS NOTES
Daily Note     Today's date: 2018  Patient name: Nam Lam  : 1965  MRN: [id number]  Referring provider: Alia Montalvo MD  Dx:   Encounter Diagnosis     ICD-10-CM    1  Fibromyalgia M79 7      IEP from [id number]  1 on 1 from [id number]  Start Time: 1400  Stop Time: 1500  Total time in clinic (min): 60 minutes    Subjective: Patient reports increased pain in the neck and bilateral knees  Continue to monitor  Objective: See treatment diary below    Precautions: Protocol    Daily Treatment Diary     Manual             PA mobs             FR to LUE              Sacral Mobs             Hip Mobs             Nerve Glides             Cervical Side Glides             SOR             STM CR [facility]               Exercise Diary            PPT 5"x20           TA contraction 5"x20           p-ball crushers 5"x20           TA BKFO 15 ea  TA marches 15 ea  TA kicks 15 ea  Prone hip ext            Prone bent knee hip ext            p-ball prayer stretch 3 way            Prone lumbar extension            Standing lumbar extension            DKC 10"x10           SKC 10"x10           LTR 10"x10           Hamstring strap stretch  10 x 10"           IT band strap stretch  10 x 10"          Quad Strap Stretch   10 x 10"           Piriformis Stretch  10 x 10"          Calf strap stretch  10 x 10"           Hip Abduction Iso            Hip Adduction Iso            Bike/[place] Bike  10'            SLR x 4             Clamshells             Quad Strap Stretch             Butterflies   15 x 5"          Thoracic Ext   15 x 5"          UT Stretch  10  x10"          LS Stretch  10 x 10"          CT 3 Way              Modalities            MH  10 mins  post 10 mins post                            Assessment: Tolerated treatment well  Patient would benefit from continued PT  PT performed STM to the bilateral upper trapezius  Patient responded favorably    Continue to monitor  Plan: Continue per plan of care

## 2018-04-18 ENCOUNTER — OFFICE VISIT (OUTPATIENT)
Dept: INTERNAL MEDICINE CLINIC | Facility: CLINIC | Age: 53
End: 2018-04-18
Payer: COMMERCIAL

## 2018-04-18 VITALS
DIASTOLIC BLOOD PRESSURE: 78 MMHG | HEIGHT: 64 IN | BODY MASS INDEX: 26.91 KG/M2 | SYSTOLIC BLOOD PRESSURE: 118 MMHG | HEART RATE: 94 BPM | WEIGHT: 157.6 LBS | OXYGEN SATURATION: 98 %

## 2018-04-18 DIAGNOSIS — K59.09 CHRONIC CONSTIPATION: Primary | ICD-10-CM

## 2018-04-18 DIAGNOSIS — M06.9 RHEUMATOID ARTHRITIS INVOLVING MULTIPLE JOINTS (HCC): ICD-10-CM

## 2018-04-18 DIAGNOSIS — M79.7 FIBROMYALGIA: ICD-10-CM

## 2018-04-18 PROCEDURE — 99214 OFFICE O/P EST MOD 30 MIN: CPT | Performed by: INTERNAL MEDICINE

## 2018-04-18 RX ORDER — SENNOSIDES 8.6 MG
2 TABLET ORAL
Qty: 180 EACH | Refills: 1 | Status: SHIPPED | OUTPATIENT
Start: 2018-04-18 | End: 2019-05-14 | Stop reason: SDUPTHER

## 2018-04-18 RX ORDER — NAPROXEN 500 MG/1
500 TABLET ORAL 2 TIMES DAILY
Qty: 180 TABLET | Refills: 0 | Status: SHIPPED | OUTPATIENT
Start: 2018-04-18 | End: 2018-07-19 | Stop reason: ALTCHOICE

## 2018-04-18 RX ORDER — SENNOSIDES 8.6 MG
TABLET ORAL
Refills: 3 | COMMUNITY
Start: 2018-02-16 | End: 2018-04-18 | Stop reason: SDUPTHER

## 2018-04-18 NOTE — PROGRESS NOTES
Assessment/Plan:  Cont current meds  Watch use of Naproxen  F/U with rheum, cont PT  Schedule mammogram  Lose 5-10 lbs     Problem List Items Addressed This Visit     Rheumatoid arthritis involving multiple joints (HCC)    Relevant Medications    naproxen (NAPROSYN) 500 mg tablet    Fibromyalgia    Relevant Medications    naproxen (NAPROSYN) 500 mg tablet    Chronic constipation - Primary    Relevant Medications    senna (SENOKOT) 8 6 mg            Subjective:      Patient ID: Matthew Rowan is a 45 y o  female  HPI  RA managed by rheum   Takes Plaquenil, up to date with eye exams  Runny nose for a long time , watery  Had itching in the left eye a while ago resolved with bendaryl  Undergoing PT for fibromyalgia which has been helping her  Left wrist is not painful which she sprained in January after a fall  Fracture of right wrist in 2016 and had ORIF  She has very mild  pain in this wrist but +stiffness in the first and 5th fingers  She takes 1-2 Naproxen a day  Chronic constipation takes Senna    The following portions of the patient's history were reviewed and updated as appropriate: allergies, current medications, past family history, past medical history, past social history, past surgical history and problem list     Review of Systems   Constitutional: Negative for fatigue, fever and unexpected weight change  HENT: Positive for rhinorrhea  Negative for ear pain, hearing loss, sinus pain, sinus pressure and sore throat  Respiratory: Negative for cough, shortness of breath and wheezing  Cardiovascular: Negative for chest pain, palpitations and leg swelling  Gastrointestinal: Negative for abdominal pain, constipation, diarrhea, nausea and vomiting  Musculoskeletal: Positive for arthralgias and myalgias           Objective:      /78   Pulse 94   Ht 5' 4" (1 626 m)   Wt 71 5 kg (157 lb 9 6 oz)   SpO2 98%   BMI 27 05 kg/m²          Physical Exam   Constitutional: She is oriented to person, place, and time  She appears well-developed and well-nourished  HENT:   Head: Normocephalic and atraumatic  Right Ear: External ear normal    Left Ear: External ear normal    Mouth/Throat: Oropharynx is clear and moist    Eyes: Conjunctivae are normal    Neck: Neck supple  Cardiovascular: Normal rate, regular rhythm and normal heart sounds  No murmur heard  Pulmonary/Chest: Effort normal and breath sounds normal  No respiratory distress  She has no wheezes  She has no rales  Abdominal: Soft  Bowel sounds are normal  She exhibits no distension and no mass  There is no tenderness  There is no rebound and no guarding  Musculoskeletal: Normal range of motion  Neurological: She is alert and oriented to person, place, and time  Skin: Skin is warm and dry  Psychiatric: She has a normal mood and affect   Her behavior is normal  Judgment and thought content normal

## 2018-04-19 ENCOUNTER — OFFICE VISIT (OUTPATIENT)
Dept: PHYSICAL THERAPY | Facility: OTHER | Age: 53
End: 2018-04-19
Payer: COMMERCIAL

## 2018-04-19 DIAGNOSIS — M79.7 FIBROMYALGIA: Primary | ICD-10-CM

## 2018-04-19 PROCEDURE — G8978 MOBILITY CURRENT STATUS: HCPCS | Performed by: PEDIATRICS

## 2018-04-19 PROCEDURE — 97140 MANUAL THERAPY 1/> REGIONS: CPT | Performed by: PEDIATRICS

## 2018-04-19 PROCEDURE — G8979 MOBILITY GOAL STATUS: HCPCS | Performed by: PEDIATRICS

## 2018-04-19 PROCEDURE — 97112 NEUROMUSCULAR REEDUCATION: CPT | Performed by: PEDIATRICS

## 2018-04-19 PROCEDURE — 97110 THERAPEUTIC EXERCISES: CPT | Performed by: PEDIATRICS

## 2018-04-19 NOTE — PROGRESS NOTES
Daily Note     Today's date: 2018  Patient name: Marlin Dakins  : 1965  MRN: [id number]  Referring provider: Joey Galvan MD  Dx:   Encounter Diagnosis     ICD-10-CM    1  Fibromyalgia M79 7                   Subjective: Pt  reports pain is 4/10 today and that she is feeling "much better "      Objective: See treatment diary below  Precautions: Protocol    Daily Treatment Diary     Manual            PA mobs             FR to LUE              Sacral Mobs             Hip Mobs             Nerve Glides             Cervical Side Glides             SOR             STM CR [facility]          Cupping L UT   [facility]              Exercise Diary           PPT 5"x20           TA contraction 5"x20           p-ball crushers 5"x20           TA BKFO 15 ea  TA marches 15 ea  TA kicks 15 ea  Prone hip ext            Prone bent knee hip ext            p-ball prayer stretch 3 way   FWD  10" x 10         Prone lumbar extension            Standing lumbar extension            DKC 10"x10           SKC 10"x10           LTR 10"x10           Hamstring strap stretch  10 x 10"  10" x 10         IT band strap stretch  10 x 10" 10" x 10         Quad Strap Stretch   10 x 10"  10" x 10         Piriformis Stretch  10 x 10" 10" x 10         Calf strap stretch  10 x 10"           Hip Abduction Iso            Hip Adduction Iso            Bike/[employer] Bike  10'   Bike  10'         SLR x 4             Clamshells             Quad Strap Stretch             Butterflies   15 x 5" 15 x 5"         Thoracic Ext   15 x 5" 15 x 5"         UT Stretch  10  x10" 10" x 10         LS Stretch  10 x 10" 10" x 10         CT 3 Way            TB LPD   OTB  1 x 15         TB row   OTB  1 x 15           Modalities           MH  10 mins  post 10 mins post 10 min post                             Assessment: Tolerated treatment well  Pt  Responded well to cupping   Added TB pull downs and rows with good tolerance  Patient would benefit from continued PT      Plan: Continue per plan of care

## 2018-04-24 ENCOUNTER — OFFICE VISIT (OUTPATIENT)
Dept: PHYSICAL THERAPY | Facility: OTHER | Age: 53
End: 2018-04-24
Payer: COMMERCIAL

## 2018-04-24 DIAGNOSIS — M79.7 FIBROMYALGIA: Primary | ICD-10-CM

## 2018-04-24 PROCEDURE — 97140 MANUAL THERAPY 1/> REGIONS: CPT | Performed by: PEDIATRICS

## 2018-04-24 PROCEDURE — 97112 NEUROMUSCULAR REEDUCATION: CPT | Performed by: PEDIATRICS

## 2018-04-24 NOTE — PROGRESS NOTES
Daily Note     Today's date: 2018  Patient name: Maxwell Arizmendi  : 1965  MRN: [id number]  Referring provider: Goyo Fung MD  Dx:   Encounter Diagnosis     ICD-10-CM    1  Fibromyalgia M79 7         IEP 2:00 - 2:15  1:1 with PTA EW 2:15 - 2:50  MHP 2:50 - 3:00          Subjective: Pt  Reports 4/10 low back pain  States she has no upper trap discomfort today  Pt  Also complains of left wrist pain  Objective: See treatment diary below  Precautions: Protocol    Daily Treatment Diary     Manual           PA mobs    [facility]         FR to LUE              Sacral Mobs             Hip Mobs             Nerve Glides             Cervical Side Glides             SOR             STM CR [facility]          Cupping L UT   [facility]              Exercise Diary          PPT 5"x20   5" x 20        TA contraction 5"x20   5" x 20        p-ball crushers 5"x20   5" x 20        TA BKFO 15 ea  15        TA marches 15 ea  15        TA kicks 15 ea     15        Prone hip ext            Prone bent knee hip ext            p-ball prayer stretch 3 way   FWD  10" x 10         Prone lumbar extension            Standing lumbar extension            DKC 10"x10           SKC 10"x10           LTR 10"x10           Hamstring strap stretch  10 x 10"  10" x 10 10" x 10        IT band strap stretch  10 x 10" 10" x 10 10"x 10        Quad Strap Stretch   10 x 10"  10" x 10 10" x 10        Piriformis Stretch  10 x 10" 10" x 10 10" x 10        Calf strap stretch  10 x 10"   10" x 10        Hip Abduction Iso            Hip Adduction Iso            Bike/[place] Bike  10'   Bike  10'         SLR x 4             Clamshells             Quad Strap Stretch             Butterflies   15 x 5" 15 x 5"         Thoracic Ext   15 x 5" 15 x 5"         UT Stretch  10  x10" 10" x 10         LS Stretch  10 x 10" 10" x 10         CT 3 Way            TB LPD   OTB  1 x 15 OTB  1 x 15        TB row   OTB  1 x 15 OTB  1 x 15 Modalities 4/12 4/17 4/19 4/24           10 mins  post 10 mins post 10 min post 10 min post                            Assessment: Tolerated treatment fair  Patient demonstrated fatigue post treatment and would benefit from continued PT  PT to assess left wrist pain next visit  Possible IASTM to left wrist  Decreased low back pain post MHP  Plan: Continue per plan of care

## 2018-04-26 ENCOUNTER — OFFICE VISIT (OUTPATIENT)
Dept: PHYSICAL THERAPY | Facility: OTHER | Age: 53
End: 2018-04-26
Payer: COMMERCIAL

## 2018-04-26 DIAGNOSIS — M79.7 FIBROMYALGIA: Primary | ICD-10-CM

## 2018-04-26 PROCEDURE — 97140 MANUAL THERAPY 1/> REGIONS: CPT | Performed by: PEDIATRICS

## 2018-04-26 PROCEDURE — 97112 NEUROMUSCULAR REEDUCATION: CPT | Performed by: PEDIATRICS

## 2018-04-26 PROCEDURE — 97110 THERAPEUTIC EXERCISES: CPT | Performed by: PEDIATRICS

## 2018-04-26 NOTE — PROGRESS NOTES
Daily Note     Today's date: 2018  Patient name: Maco Barraza  : 1965  MRN: [id number]  Referring provider: Marizol Harrison MD  Dx:   Encounter Diagnosis     ICD-10-CM    1  Fibromyalgia M79 7                   Subjective: Pt  States her back feels better but reports the same numeric pain level on the pain scale  Objective: See treatment diary below  Manual          PA mobs    [facility]         FR to LUE              Sacral Mobs             Hip Mobs             IASTM wrist     EW        Wrist mobs     [facility]        SOR             STM CR [facility]          Cupping L UT   [facility]              Exercise Diary         PPT 5"x20   5" x 20 5" x 20       TA contraction 5"x20   5" x 20 5" x 20       p-ball crushers 5"x20   5" x 20 5" x 20       TA BKFO 15 ea  15 15       TA marches 15 ea  15 15       TA kicks 15 ea     15 15       Prone hip ext            Prone bent knee hip ext            p-ball prayer stretch 3 way   FWD  10" x 10         Prone lumbar extension            Standing lumbar extension            DKC 10"x10           SKC 10"x10           LTR 10"x10           Hamstring strap stretch  10 x 10"  10" x 10 10" x 10 10" x 10       IT band strap stretch  10 x 10" 10" x 10 10"x 10 10" x 10       Quad Strap Stretch   10 x 10"  10" x 10 10" x 10 10" x 10       Piriformis Stretch  10 x 10" 10" x 10 10" x 10 10" x 10       Calf strap stretch  10 x 10"   10" x 10 10" x 10       Hip Abduction Iso            Hip Adduction Iso            Bike/[place] Bike  10'   Bike  10'  Bike  10'       SLR x 4             Clamshells             Quad Strap Stretch             Butterflies   15 x 5" 15 x 5"  NP       Thoracic Ext   15 x 5" 15 x 5"  NP       UT Stretch  10  x10" 10" x 10  NP       LS Stretch  10 x 10" 10" x 10  NP       CT 3 Way            TB LPD   OTB  1 x 15 OTB  1 x 15 OTB  2 x 10       TB row   OTB  1 x 15 OTB  1 x 15 OTB  2 x 10         Modalities  4/19 4/24 4/26          10 mins  post 10 mins post 10 min post 10 min post 10' post                           Assessment: Tolerated treatment well  Added IASTM to L wrist and wrist mobs performed by PT with slight relief noted  Overall improved back pain  No complaints of neck pain at this time  Patient would benefit from continued PT      Plan: Continue per plan of care

## 2018-05-01 ENCOUNTER — TRANSCRIBE ORDERS (OUTPATIENT)
Dept: LAB | Facility: CLINIC | Age: 53
End: 2018-05-01

## 2018-05-01 ENCOUNTER — APPOINTMENT (OUTPATIENT)
Dept: PHYSICAL THERAPY | Facility: OTHER | Age: 53
End: 2018-05-01
Payer: COMMERCIAL

## 2018-05-03 ENCOUNTER — HOSPITAL ENCOUNTER (OUTPATIENT)
Dept: RADIOLOGY | Age: 53
Discharge: HOME | End: 2018-05-03
Payer: COMMERCIAL

## 2018-05-03 DIAGNOSIS — Z12.31 ENCOUNTER FOR SCREENING MAMMOGRAM FOR MALIGNANT NEOPLASM OF BREAST: ICD-10-CM

## 2018-05-03 DIAGNOSIS — R92.2 INCONCLUSIVE MAMMOGRAM: ICD-10-CM

## 2018-05-03 PROCEDURE — 77063 BREAST TOMOSYNTHESIS BI: CPT

## 2018-05-03 PROCEDURE — 77067 SCR MAMMO BI INCL CAD: CPT

## 2018-05-08 ENCOUNTER — OFFICE VISIT (OUTPATIENT)
Dept: PHYSICAL THERAPY | Facility: OTHER | Age: 53
End: 2018-05-08
Payer: COMMERCIAL

## 2018-05-08 DIAGNOSIS — M79.7 FIBROMYALGIA: Primary | ICD-10-CM

## 2018-05-08 PROCEDURE — 97110 THERAPEUTIC EXERCISES: CPT | Performed by: PEDIATRICS

## 2018-05-08 PROCEDURE — 97140 MANUAL THERAPY 1/> REGIONS: CPT | Performed by: PEDIATRICS

## 2018-05-08 PROCEDURE — 97112 NEUROMUSCULAR REEDUCATION: CPT | Performed by: PEDIATRICS

## 2018-05-08 NOTE — PROGRESS NOTES
Daily Note     Today's date: 2018  Patient name: Madeline Banda  : 1965  MRN: [id number]  Referring provider: Nadine Ortega MD  Dx:   Encounter Diagnosis     ICD-10-CM    1  Fibromyalgia M79 7         1:1 with PT [facility] 2:00 - 2:10 and PTA EW 2:25 - 2:50  IEP 2:10 - 2:25          Subjective: Pt  Reports her neck has been bothering her again but her back is feeling much better  Objective: See treatment diary below  Manual   5/8       PA mobs    [facility]         FR to LUE              Sacral Mobs             Hip Mobs             IASTM wrist     EW        Wrist mobs     [facility]        SOR             STM CR [facility]       Cupping L UT   [facility]              Exercise Diary   5/8      PPT 5"x20   5" x 20 5" x 20 5" x 20      TA contraction 5"x20   5" x 20 5" x 20 5" x 02      p-ball crushers 5"x20   5" x 20 5" x 20 5" x 20      TA BKFO 15 ea  15 15 15      TA marches 15 ea  15 15 15      TA kicks 15 ea     15 15 15      Prone hip ext            Prone bent knee hip ext            p-ball prayer stretch 3 way   FWD  10" x 10         Prone lumbar extension            Standing lumbar extension            DKC 10"x10           SKC 10"x10           LTR 10"x10           Hamstring strap stretch  10 x 10"  10" x 10 10" x 10 10" x 10 10" x 10      IT band strap stretch  10 x 10" 10" x 10 10"x 10 10" x 10 10" x 10      Quad Strap Stretch   10 x 10"  10" x 10 10" x 10 10" x 10 10" x 10      Piriformis Stretch  10 x 10" 10" x 10 10" x 10 10" x 10 10" x 10      Calf strap stretch  10 x 10"   10" x 10 10" x 10 10" x 10      Hip Abduction Iso            Hip Adduction Iso            Bike/[place] Bike  10'   Bike  10'  Bike  10' Bike 10'      SLR x 4             Clamshells             Quad Strap Stretch             Butterflies   15 x 5" 15 x 5"  NP       Thoracic Ext   15 x 5" 15 x 5"  NP       UT Stretch  10  x10" 10" x 10  NP       LS Stretch  10 x 10" 10" x 10  NP       CT 3 Way            TB LPD   OTB  1 x 15 OTB  1 x 15 OTB  2 x 10 OTB  2 x 10      TB row   OTB  1 x 15 OTB  1 x 15 OTB  2 x 10 OTB  2 x 10      Chin tucks      10" x 15        Modalities 4/12 4/17 4/19 4/24 4/26 5/8         10 mins  post 10 mins post 10 min post 10 min post 10' post 10' post                          Assessment: Tolerated treatment well  Added chin tucks  Relief with MHP post treatment session  Patient would benefit from continued PT      Plan: Continue per plan of care

## 2018-05-10 ENCOUNTER — OFFICE VISIT (OUTPATIENT)
Dept: PHYSICAL THERAPY | Facility: OTHER | Age: 53
End: 2018-05-10
Payer: COMMERCIAL

## 2018-05-10 DIAGNOSIS — M79.7 FIBROMYALGIA: Primary | ICD-10-CM

## 2018-05-10 PROCEDURE — 97110 THERAPEUTIC EXERCISES: CPT | Performed by: PHYSICAL THERAPIST

## 2018-05-10 PROCEDURE — 97140 MANUAL THERAPY 1/> REGIONS: CPT | Performed by: PHYSICAL THERAPIST

## 2018-05-10 NOTE — PROGRESS NOTES
Daily Note     Today's date: 5/10/2018  Patient name: Apollo Callejas  : 1965  MRN: [id number]  Referring provider: Mai Goodell, MD  Dx:   Encounter Diagnosis     ICD-10-CM    1  Fibromyalgia M79 7         1:1 with PT [facility] 2:00 - 2:10 and PTA EW 2:25 - 2:50  IEP 2:10 - 2:25          Subjective: Pt  Comes to therapy reporting wrist discomfort primary concern today  Objective: See treatment diary below  Manual   5/8 5/10      PA mobs    GRC         FR to LUE              Sacral Mobs             Hip Mobs             IASTM wrist     EW        Wrist mobs     [facility]  JTB      SOR             STM CR [address] [facility]       Cupping L UT   [facility]              Exercise Diary   58 5/10     PPT 5"x20   5" x 20 5" x 20 5" x 20 5"x20     TA contraction 5"x20   5" x 20 5" x 20 5" x 02 5"x20     p-ball crushers 5"x20   5" x 20 5" x 20 5" x 20 5" x20     TA BKFO 15 ea  15 15 15 15     TA marches 15 ea  15 15 15 15     TA kicks 15 ea     15 15 15 15     Prone hip ext            Prone bent knee hip ext            p-ball prayer stretch 3 way   FWD  10" x 10         Prone lumbar extension            Standing lumbar extension            DKC 10"x10           SKC 10"x10           LTR 10"x10           Hamstring strap stretch  10 x 10"  10" x 10 10" x 10 10" x 10 10" x 10 10"  x 10     IT band strap stretch  10 x 10" 10" x 10 10"x 10 10" x 10 10" x 10 10" x 10     Quad Strap Stretch   10 x 10"  10" x 10 10" x 10 10" x 10 10" x 10 10" x 10     Piriformis Stretch  10 x 10" 10" x 10 10" x 10 10" x 10 10" x 10 10" x 10     Calf strap stretch  10 x 10"   10" x 10 10" x 10 10" x 10 10" x 10     Hip Abduction Iso            Hip Adduction Iso            Bike/[place] Bike  10'   Bike  10'  Bike  10' Bike 10' Bike 10'     SLR x 4             Clamshells             Quad Strap Stretch             Butterflies   15 x 5" 15 x 5"  NP       Thoracic Ext   15 x 5" 15 x 5"  NP       UT Stretch  10 x10" 10" x 10  NP       LS Stretch  10 x 10" 10" x 10  NP       CT 3 Way            TB LPD   OTB  1 x 15 OTB  1 x 15 OTB  2 x 10 OTB  2 x 10 GTB 2 x 10     TB row   OTB  1 x 15 OTB  1 x 15 OTB  2 x 10 OTB  2 x 10 GTB 2 x 10     Chin tucks      10" x 15 10" x 15       Modalities 4/12 4/17 4/19 4/24 4/26 5/8 5/10      MH  10 mins  post 10 mins post 10 min post 10 min post 10' post 10' post 10' post                         Assessment: Pt tolerated program well and without complaints  Noted discomfort on dorsum and palmar aspect of left wrist with active and passive flex & ext  Denies pain with strength testing  Responded well to left wrist mobs, with decrease in wrist pain reported  Concluded with MHP to low back and wrist  Patient would benefit from continued PT      Plan: Continue per plan of care

## 2018-05-15 ENCOUNTER — OFFICE VISIT (OUTPATIENT)
Dept: PHYSICAL THERAPY | Facility: OTHER | Age: 53
End: 2018-05-15
Payer: COMMERCIAL

## 2018-05-15 DIAGNOSIS — M79.7 FIBROMYALGIA: Primary | ICD-10-CM

## 2018-05-15 PROCEDURE — 97140 MANUAL THERAPY 1/> REGIONS: CPT | Performed by: PEDIATRICS

## 2018-05-15 PROCEDURE — 97112 NEUROMUSCULAR REEDUCATION: CPT | Performed by: PEDIATRICS

## 2018-05-15 PROCEDURE — 97110 THERAPEUTIC EXERCISES: CPT | Performed by: PEDIATRICS

## 2018-05-15 NOTE — PROGRESS NOTES
Daily Note     Today's date: 5/15/2018  Patient name: Darling Gonzalez  : 1965  MRN: [id number]  Referring provider: Flaquito Patrick MD  Dx:   Encounter Diagnosis     ICD-10-CM    1  Fibromyalgia M79 7         1:1 with PT [facility] 2:00 - 2:20 and with PTA EW 2:20 - 2:55          Subjective: Pt  Reports tightness in left low back at start of treatment session  Objective: See treatment diary below  Manual   58 5/10 5/15     PA mobs    [facility]         FR to low back        [facility]     Sacral Mobs             Hip Mobs             IASTM wrist     EW        [facility]  J[facility]     SOR             STM CR [facility]     Cupping L UT   [facility]              Exercise Diary   58 5/10 5/15    PPT 5"x20   5" x 20 5" x 20 5" x 20 5"x20 5" x 20    TA contraction 5"x20   5" x 20 5" x 20 5" x 02 5"x20 5" x 20    p-ball crushers 5"x20   5" x 20 5" x 20 5" x 20 5" x20 5" x 20    TA BKFO 15 ea  15 15 15 15 10 ea    TA marches 15 ea  15 15 15 15 20    TA kicks 15 ea     15 15 15 15 20    Prone hip ext            Prone bent knee hip ext            p-ball prayer stretch 3 way   FWD  10" x 10         Prone lumbar extension            Standing lumbar extension            DKC 10"x10           SKC 10"x10           LTR 10"x10           Hamstring strap stretch  10 x 10"  10" x 10 10" x 10 10" x 10 10" x 10 10"  x 10 10" x 10    IT band strap stretch  10 x 10" 10" x 10 10"x 10 10" x 10 10" x 10 10" x 10 10" x 10    Quad Strap Stretch   10 x 10"  10" x 10 10" x 10 10" x 10 10" x 10 10" x 10 10" x 10    Piriformis Stretch  10 x 10" 10" x 10 10" x 10 10" x 10 10" x 10 10" x 10 10" x 10    Calf strap stretch  10 x 10"   10" x 10 10" x 10 10" x 10 10" x 10 10" x 10    Hip Abduction Iso            Hip Adduction Iso            Bike/[place] Bike  10'   Bike  10'  Bike  10' Bike 10' Bike 10' Bike  10'    SLR x 4             Clamshells             Quad Strap Stretch Butterflies   15 x 5" 15 x 5"  NP       Thoracic Ext   15 x 5" 15 x 5"  NP       UT Stretch  10  x10" 10" x 10  NP       LS Stretch  10 x 10" 10" x 10  NP       CT 3 Way            TB LPD   OTB  1 x 15 OTB  1 x 15 OTB  2 x 10 OTB  2 x 10 GTB 2 x 10 GTB  2 x 10    TB row   OTB  1 x 15 OTB  1 x 15 OTB  2 x 10 OTB  2 x 10 GTB 2 x 10 GTB   2 x 10    Chin tucks      10" x 15 10" x 15 NP      Modalities 4/12 4/17 4/19 4/24 4/26 5/8 5/10 5/15     MH  10 mins  post 10 mins post 10 min post 10 min post 10' post 10' post 10' post 10' post                      Assessment: Tolerated treatment well  Cuing to maintain core engaged during TA contractions  Patient would benefit from continued PT      Plan: Continue per plan of care

## 2018-05-17 ENCOUNTER — OFFICE VISIT (OUTPATIENT)
Dept: OBGYN CLINIC | Facility: CLINIC | Age: 53
End: 2018-05-17
Payer: OTHER MISCELLANEOUS

## 2018-05-17 ENCOUNTER — OFFICE VISIT (OUTPATIENT)
Dept: PHYSICAL THERAPY | Facility: OTHER | Age: 53
End: 2018-05-17
Payer: COMMERCIAL

## 2018-05-17 VITALS
WEIGHT: 161 LBS | BODY MASS INDEX: 27.49 KG/M2 | SYSTOLIC BLOOD PRESSURE: 114 MMHG | HEART RATE: 91 BPM | HEIGHT: 64 IN | DIASTOLIC BLOOD PRESSURE: 75 MMHG

## 2018-05-17 DIAGNOSIS — M79.7 FIBROMYALGIA: Primary | ICD-10-CM

## 2018-05-17 DIAGNOSIS — R29.898 WRIST WEAKNESS: Primary | ICD-10-CM

## 2018-05-17 PROCEDURE — 97140 MANUAL THERAPY 1/> REGIONS: CPT | Performed by: PHYSICAL THERAPIST

## 2018-05-17 PROCEDURE — 99213 OFFICE O/P EST LOW 20 MIN: CPT | Performed by: ORTHOPAEDIC SURGERY

## 2018-05-17 PROCEDURE — 97110 THERAPEUTIC EXERCISES: CPT | Performed by: PHYSICAL THERAPIST

## 2018-05-17 PROCEDURE — 97112 NEUROMUSCULAR REEDUCATION: CPT | Performed by: PHYSICAL THERAPIST

## 2018-05-17 NOTE — PROGRESS NOTES
Daily Note     Today's date: 2018  Patient name: Andrena Kawasaki  : 1965  MRN: [id number]  Referring provider: Patti Rodriguez MD  Dx:   Encounter Diagnosis     ICD-10-CM    1  Fibromyalgia M79 7      1 on 1 [id number]  IEP 8933-9993   [id number]  Start Time: 1400  Stop Time: 1500  Total time in clinic (min): 60 minutes    Subjective: Patient reports no new issues at this time  Continue to progress patient per tolerance  Objective: See treatment diary below    Manual  5/15 5/17    PA mobs      FR to low back [address] [facility]    Sacral Mobs      Hip Mobs      IASTM wrist      Wrist mobs [facility]     SOR      STM GRC     Cupping L UT      STM MTI  GRC    Lumbar Gapping  [facility]        Exercise Diary  5/15 5/17   PPT 5" x 20 5 x 20   TA contraction 5" x 20 5 x 20   p-ball crushers 5" x 20    TA BKFO 10 ea 20 ea   TA marches 20 20   TA kicks 20 20    Prone hip ext     Prone bent knee hip ext     p-ball prayer stretch 3 way     Prone lumbar extension     Standing lumbar extension     [facility]     LTR     Hamstring strap stretch 10" x 10 10 x 10   IT band strap stretch 10" x 10 10 x 10   Quad Strap Stretch  10" x 10 10 x 10   Piriformis Stretch 10" x 10 10 x 10   Calf strap stretch 10" x 10 10 x 10   Hip Abduction Iso     Hip Adduction Iso     Bike/ [place] Bike  10' Bike   10   SLR x 4      Clamshells      Butterflies      Thoracic Ext      UT Stretch     LS Stretch     TB LPD GTB  2 x 10 #10  2 x 10   TB row GTB   2 x 10 #10  2 x 10   Chin tucks NP    Step Ups   8 15 ea   Lateral Step Ups  8 2 x 10   HR/TR  30 ea     Modalities 5/15 5/17    MH  10' post 10 post                Assessment: Tolerated treatment well  Patient tolerated today's session very well  She especially like the STM MTI technique  Continue to progress her per tolerance  Remind PT do RE in near future and find out when next MD visit it  Patient would benefit from continued PT      Plan: Continue per plan of care

## 2018-05-17 NOTE — PROGRESS NOTES
45 y o female presenting to the office for follow up on wrist pain 2+ years s/p right wrist ORIF  Patient has been working with physical therapy and making progress  She denies any significant pain at this time  She is able to do most of her activities, however, she does have some limitations with her work activities due to strength in the wrist  She denies any numbness/tingling or any other acute or associated complaints  Review of Systems  Review of systems negative unless otherwise specified in HPI    Past Medical History  Past Medical History:   Diagnosis Date    Acid reflux     Anemia     last assessed 03/23/2016    Arthritis     rheumatoid    Depression     Fibromyalgia     Rheumatoid arthritis ([address] )     last assessed 10/29/2015       Past Surgical History  Past Surgical History:   Procedure Laterality Date    HYSTERECTOMY      SC COLONOSCOPY FLX DX W/COLLJ SPEC WHEN PFRMD N/A 5/5/2016    Procedure: COLONOSCOPY;  Surgeon: Jayden Gary DO;  Location: [facility]; Service: Gastroenterology    SC OPEN RX DISTAL RADIUS FX, EXTRA-ARTICULAR Right 5/12/2016    Procedure: OPEN REDUCTION INTERNAL FIXATION RIGHT DISTAL RADIUS (EXTRA-ARTICULAR); Surgeon: Jayshree Isaac MD;  Location: [facility];  Service: Orthopedics    TONSILLECTOMY         Current Medications  Current Outpatient Prescriptions on File Prior to Visit   Medication Sig Dispense Refill    DULoxetine (CYMBALTA) 60 mg delayed release capsule Take 1 capsule by mouth 2 (two) times a day 180 capsule 2    hydroxychloroquine (PLAQUENIL) 200 mg tablet Take 1 tablet by mouth 2 (two) times a day with meals 180 tablet 2    naproxen (NAPROSYN) 500 mg tablet Take 1 tablet (500 mg total) by mouth 2 (two) times a day 180 tablet 0    senna (SENOKOT) 8 6 mg Take 2 tablets (17 2 mg total) by mouth daily at bedtime 180 each 1     No current facility-administered medications on file prior to visit          Recent Labs (HCT,HGB,PT,INR,ESR,CRP,GLU,HgA1C)    0  Lab Value Date/Time   HCT 36 7 12/22/2017 0922   HCT 35 7 02/07/2017 1706   HGB 11 5 12/22/2017 0922   HGB 11 4 (L) 02/07/2017 1706   WBC 4 33 12/22/2017 0922   WBC 7 3 02/07/2017 1706   ESR 8 12/22/2017 0922   ESR 6 02/07/2017 1706   CRP <3 0 12/22/2017 0922   CRP 0 24 02/07/2017 1706   GLUCOSE 91 10/12/2016 1201   GLUCOSE 98 04/29/2015 0931         Physical exam  · General: Awake, Alert, Oriented  · Eyes: Pupils equal, round and reactive to light  · Heart: regular rate and rhythm  · Lungs: No audible wheezing  · Abdomen: soft  right wrist  · Skin/incision/alignment/Palpation: nontender to palption  · Range of motion: full ROM with some stiffness  · Sensation: intact  · Motor: intact  · Good capillary refill    Imaging  None needed    Procedure  None     Assessment/Plan:   45 y  o female with right wrist weakness  Patient to continue with OT activities to help improve strength in the wrist  Patient to continue with full activities as tolerated  Patient to follow up in 3 months if there is no improvement

## 2018-05-17 NOTE — LETTER
May 17, 2018     Patient: Tien Dover   YOB: 1965   Date of Visit: 5/17/2018       To Whom it May Concern:    Tien Dover is under my professional care  She was seen in my office on 5/17/2018  She may return to work on 5/17/18 without restrictions  If you have any questions or concerns, please don't hesitate to call           Sincerely,          Garret Crum MD        CC: Tien Jazzmine

## 2018-05-22 ENCOUNTER — OFFICE VISIT (OUTPATIENT)
Dept: PHYSICAL THERAPY | Facility: OTHER | Age: 53
End: 2018-05-22
Payer: COMMERCIAL

## 2018-05-22 DIAGNOSIS — M79.7 FIBROMYALGIA: Primary | ICD-10-CM

## 2018-05-22 PROCEDURE — 97110 THERAPEUTIC EXERCISES: CPT

## 2018-05-22 PROCEDURE — G8991 OTHER PT/OT GOAL STATUS: HCPCS

## 2018-05-22 PROCEDURE — G8990 OTHER PT/OT CURRENT STATUS: HCPCS

## 2018-05-22 PROCEDURE — 97140 MANUAL THERAPY 1/> REGIONS: CPT

## 2018-05-22 NOTE — PROGRESS NOTES
Daily Note     Today's date: 2018  Patient name: Ruddy Perry  : 1965  MRN: [id number]  Referring provider: Maria De Jesus Zayas MD  Dx:   Encounter Diagnosis     ICD-10-CM    1  Fibromyalgia M79 7      Stop Time: 1505  Total time in clinic (min): 65 minutes    Subjective: Patient notes increased L forearm and wrist pain this afternoon  Objective: See treatment diary below    Manual  5/15 5/17 5/22   PA mobs      FR to low back [facility] AS   Sacral Mobs      Hip Mobs      IASTM wrist      Wrist mobs [facility]     SOR      STM [facility]     Cupping L UT      STM MTI  [facility] AS   Lumbar Gapping  [facility] AS       Exercise Diary  5/15 5/17 5/22   PPT 5" x 20 5 x 20 20x5''   TA contraction 5" x 20 5 x 20 20x5''   p-ball crushers 5" x 20  20x5''   TA BKFO 10 ea 20 ea 20x   TA marches 20 20 20x   TA kicks 20 20  20x   Prone hip ext      Prone bent knee hip ext      p-ball prayer stretch 3 way      Prone lumbar extension      Standing lumbar extension      DKC      SKC      LTR      Hamstring strap stretch 10" x 10 10 x 10 10x10''   IT band strap stretch 10" x 10 10 x 10 10x10''   Quad Strap Stretch  10" x 10 10 x 10 10x10''   Piriformis Stretch 10" x 10 10 x 10 10x10''   Calf strap stretch 10" x 10 10 x 10 10x10''   Hip Abduction Iso      Hip Adduction Iso      Bike/ [place] Bike  10' Bike   10 10'   SLR x 4       Clamshells       Butterflies       Thoracic Ext       UT Stretch      LS Stretch      TB LPD GTB  2 x 10 #10  2 x 10 2x10  #10   TB row GTB   2 x 10 #10  2 x 10 2x10  #10   Chin tucks NP     Step Ups   8 15 ea x15   Lateral Step Ups  8 2 x 10 x20   HR/TR  30 ea 30/30     Modalities 5/15 5/17 5/22   MH  10' post 10 post 10'               Assessment: Patient exhibited fair tolerance for today's treatment session noting muscular fatigue post session as well as selective tissue tension during ROM  Noted sx reduction post manual intervention    Patient would benefit from continued skilled PT intervention to address her remaining deficits  Plan: Continue per plan of care

## 2018-05-24 ENCOUNTER — APPOINTMENT (OUTPATIENT)
Dept: PHYSICAL THERAPY | Facility: OTHER | Age: 53
End: 2018-05-24
Payer: COMMERCIAL

## 2018-05-29 ENCOUNTER — OFFICE VISIT (OUTPATIENT)
Dept: PHYSICAL THERAPY | Facility: OTHER | Age: 53
End: 2018-05-29
Payer: COMMERCIAL

## 2018-05-29 DIAGNOSIS — M79.7 FIBROMYALGIA: Primary | ICD-10-CM

## 2018-05-29 PROCEDURE — 97140 MANUAL THERAPY 1/> REGIONS: CPT | Performed by: PEDIATRICS

## 2018-05-29 PROCEDURE — 97110 THERAPEUTIC EXERCISES: CPT | Performed by: PEDIATRICS

## 2018-05-29 PROCEDURE — 97530 THERAPEUTIC ACTIVITIES: CPT | Performed by: PEDIATRICS

## 2018-05-29 PROCEDURE — 97112 NEUROMUSCULAR REEDUCATION: CPT | Performed by: PEDIATRICS

## 2018-05-29 NOTE — PROGRESS NOTES
Daily Note     Today's date: 2018  Patient name: Bryan Harris  : 1965  MRN: [id number]  Referring provider: Kory Wu MD  Dx:   Encounter Diagnosis     ICD-10-CM    1  Fibromyalgia M79 7         IEP 2:00 - 2:15  1:1 with PTA EW 2:15 - 2:50          Subjective: Pt  Reports 6/10 pain in low back at start of treatment session  Objective: See treatment diary below  Manual  5/15 5/17 5/22 5/29   PA mobs       FR to low back [facility] AS EW   Sacral Mobs       Hip Mobs       IASTM wrist       Wrist mobs GRC      SOR       STM GRC      Cupping L UT       STM MTI  GRC AS EW   Lumbar Gapping  [facility] AS        Exercise Diary  5/15 5/17 5/22 5/29   PPT 5" x 20 5 x 20 20x5'' 20 x 5"   TA contraction 5" x 20 5 x 20 20x5'' 20 x 5"   p-ball crushers 5" x 20  20x5'' 20 x 5"   TA BKFO 10 ea 20 ea 20x 20 x   TA marches 20 20 20x 20x   TA kicks 20 20  20x 20 x   Prone hip ext       Prone bent knee hip ext       p-ball prayer stretch 3 way       Prone lumbar extension       Standing lumbar extension       [facility]       LTR       Hamstring strap stretch 10" x 10 10 x 10 10x10'' 10" x 10   IT band strap stretch 10" x 10 10 x 10 10x10'' 10" x 10   Quad Strap Stretch  10" x 10 10 x 10 10x10'' 10" x 10   Piriformis Stretch 10" x 10 10 x 10 10x10'' 10" x 10   Calf strap stretch 10" x 10 10 x 10 10x10'' 10" x 10   Hip Abduction Iso       Hip Adduction Iso       Bike/ [place] Bike  10' Bike   10 10' 10'   SLR x 4        Clamshells        Butterflies        Thoracic Ext        UT Stretch       LS Stretch       TB LPD GTB  2 x 10 #10  2 x 10 2x10  #10 2 x 10  10#   TB row GTB   2 x 10 #10  2 x 10 2x10  #10 2 x 10  10#   Chin tucks NP      Step Ups   8 15 ea x15 x15 ea   Lateral Step Ups  8 2 x 10 x20 X 15 ea   HR/TR  30 ea 30/30 30/30     Modalities 5/15 5/17 5/22 5/29   MH  10' post 10 post 10' 10' post                  Assessment: Tolerated treatment well   Decreased pain noted post treatment session  Demonstrates improved tolerance to exercises  Patient exhibited good technique with therapeutic exercises and would benefit from continued PT      Plan: Continue per plan of care

## 2018-05-30 ENCOUNTER — EVALUATION (OUTPATIENT)
Dept: PHYSICAL THERAPY | Age: 53
End: 2018-05-30
Payer: COMMERCIAL

## 2018-05-30 DIAGNOSIS — R29.898 WRIST WEAKNESS: ICD-10-CM

## 2018-05-30 PROCEDURE — G8984 CARRY CURRENT STATUS: HCPCS | Performed by: PHYSICAL THERAPIST

## 2018-05-30 PROCEDURE — 97110 THERAPEUTIC EXERCISES: CPT | Performed by: PHYSICAL THERAPIST

## 2018-05-30 PROCEDURE — 97161 PT EVAL LOW COMPLEX 20 MIN: CPT | Performed by: PHYSICAL THERAPIST

## 2018-05-30 PROCEDURE — G8985 CARRY GOAL STATUS: HCPCS | Performed by: PHYSICAL THERAPIST

## 2018-05-30 PROCEDURE — 97140 MANUAL THERAPY 1/> REGIONS: CPT | Performed by: PHYSICAL THERAPIST

## 2018-05-30 NOTE — PROGRESS NOTES
PT Evaluation     Today's date: 2018  Patient name: Naun Jarrell  : 1965  MRN: [id number]  Referring provider: Emilia Manjarrez MD  Dx:   Encounter Diagnosis     ICD-10-CM    1  Wrist weakness R29 898 Ambulatory referral to Occupational Therapy                  Assessment  Impairments: impaired physical strength and pain with function    Assessment details: Patient presents with R hand weakness and decreased function  Understanding of Dx/Px/POC: excellent  Goals  Short Term goals - 4 weeks  1  Patient will be independent HEP  2   Patient will report a 50% decrease in pain complaints  3   Increase strength 1/2 grade  4   Increase ROM 5-10 degrees  Long Term goals - 8 weeks  1  Patient will report elimination of pain complaints  2   Patient will return to all work related activities without restriction  3   Patient will return to all recreational activities without restriction  4   ROM WFL  5   Strength 5/5  Plan  Planned therapy interventions: strengthening, stretching and therapeutic exercise  Frequency: 2x week  Duration in weeks: 4        Subjective Evaluation    History of Present Illness  Mechanism of injury: Patient known from previous PT stay for similar issues - patient s/p R distal radial ORIF approx 2 years ago  Patient mostly having strength and functional issues at this point  Previous PT work was mostly on ROM and stretching  Subjective sx's are with 5th digit only  Patient is employed and reports that functional difficulty is related to lifting, gripping, and twisting activities    Quality of life: excellent    Pain  Current pain ratin  At best pain ratin  At worst pain ratin  Quality: dull ache  Aggravating factors: lifting  Progression: improved    Patient Goals  Patient goals for therapy: decreased pain, independence with ADLs/IADLs and increased strength          Objective     Active Range of Motion     Right Digits   Flexion   Index     MCP: 70    PIP: 94    DIP: 40  Middle     MCP: 78    PIP: 90    DIP: 62  Ring     MCP: 71    PIP: 63    DIP: 88  Little     MCP: 73    PIP: 50    DIP: 80    Strength/Myotome Testing     Left Wrist/Hand      (2nd hand position)     Trial 1: 20    Thumb Strength  Palmar/Three-Point Pinch     Trial 1: 13    Right Wrist/Hand      (2nd hand position)     Trial 1: 12    Thumb Strength   Palmar/Three-Point Pinch     Trial 1: 7      Flowsheet Rows      Most Recent Value   PT/OT G-Codes   Assessment Type  Evaluation   G code set  Carrying, Moving & Handling Objects   Carrying, Moving and Handling Objects Current Status ()  CJ   Carrying, Moving and Handling Objects Goal Status ()  CI          Precautions: None    Daily Treatment Diary     Manual              Paraffin into flexion stretch                          PROM/stretching - fist                                           Exercise Diary                           gripper             Push-pull             carrying             lifting             Wrist PRE's - flexion, ext               Sup/pron                                                                                                                                                                                          Modalities

## 2018-05-31 ENCOUNTER — OFFICE VISIT (OUTPATIENT)
Dept: PHYSICAL THERAPY | Facility: OTHER | Age: 53
End: 2018-05-31
Payer: COMMERCIAL

## 2018-05-31 DIAGNOSIS — R29.898 WRIST WEAKNESS: Primary | ICD-10-CM

## 2018-05-31 DIAGNOSIS — M79.7 FIBROMYALGIA: ICD-10-CM

## 2018-05-31 PROCEDURE — 97112 NEUROMUSCULAR REEDUCATION: CPT | Performed by: PEDIATRICS

## 2018-05-31 PROCEDURE — 97110 THERAPEUTIC EXERCISES: CPT | Performed by: PEDIATRICS

## 2018-05-31 PROCEDURE — 97140 MANUAL THERAPY 1/> REGIONS: CPT | Performed by: PEDIATRICS

## 2018-05-31 NOTE — PROGRESS NOTES
Daily Note     Today's date: 2018  Patient name: Savanah Joseph  : 1965  MRN: [id number]  Referring provider: Katya Sandhu MD  Dx:   Encounter Diagnosis     ICD-10-CM    1  Wrist weakness R29 898    2  Fibromyalgia M79 7         1:1 with PT [facility] 2:00 - 2:25 and PTA EW 2:25 - 2:50          Subjective: Pt  Rates pain 6/10 in low back at start of treatment session        Objective: See treatment diary below  Manual  5/15 5/17 5/22 5/29 5/31   PA mobs        FR to low back [facility] AS EW    Sacral Mobs        Hip Mobs        IASTM wrist        Wrist mobs GRC       SOR        STM GRC       Cupping L UT        STM MTI  [facility] AS EW    Lumbar Gapping  [facility] AS  [facility]       Exercise Diary  5/15 5/17 5/22 5/29 5/32   PPT 5" x 20 5 x 20 20x5'' 20 x 5" 20 x 5"   TA contraction 5" x 20 5 x 20 20x5'' 20 x 5" 20 x 5"   p-ball crushers 5" x 20  20x5'' 20 x 5" 20 x 5"   TA BKFO 10 ea 20 ea 20x 20 x 20 x    TA marches 20 20 20x 20x 20 x   TA kicks 20 20  20x 20 x 20 x    Prone hip ext        Prone bent knee hip ext        p-ball prayer stretch 3 way        Prone lumbar extension        Standing lumbar extension        [facility]        LTR        Hamstring strap stretch 10" x 10 10 x 10 10x10'' 10" x 10 10" x 10   IT band strap stretch 10" x 10 10 x 10 10x10'' 10" x 10 10 x 10"   Quad Strap Stretch  10" x 10 10 x 10 10x10'' 10" x 10 10" x 10   Piriformis Stretch 10" x 10 10 x 10 10x10'' 10" x 10 10" x 10   Calf strap stretch 10" x 10 10 x 10 10x10'' 10" x 10 10" x 10   Hip Abduction Iso        Hip Adduction Iso        Bike/ [place] Bike  10' Bike   10 10' 10' 10'   SLR x 4         Clamshells         Butterflies         Thoracic Ext         UT Stretch        LS Stretch        TB LPD GTB  2 x 10 #10  2 x 10 2x10  #10 2 x 10  10# 2 x 10  12#   TB row GTB   2 x 10 #10  2 x 10 2x10  #10 2 x 10  10# 2 x 10  12#     Chin tucks NP       Step Ups   8 15 ea x15 x15 ea X 15   Lateral Step Ups  8 2 x 10 x20 X 15 ea x15   HR/TR  30 ea 30/30 30/30 30/30     Modalities 5/15 5/17 5/22 5/29 5/31   MH  10' post 10 post 10' 10' post 10' post                 Assessment: Tolerated treatment well  Decreased low back pain most manual interventions and MHP post session  Cuing to maintain core engagement during LPD and rows  Also requires cuing to maintain pelvic neutral during BKFO  Patient would benefit from continued PT      Plan: Continue per plan of care

## 2018-06-01 ENCOUNTER — OFFICE VISIT (OUTPATIENT)
Dept: PHYSICAL THERAPY | Age: 53
End: 2018-06-01
Payer: OTHER MISCELLANEOUS

## 2018-06-01 DIAGNOSIS — R29.898 WRIST WEAKNESS: Primary | ICD-10-CM

## 2018-06-01 PROCEDURE — 97140 MANUAL THERAPY 1/> REGIONS: CPT | Performed by: PHYSICAL THERAPIST

## 2018-06-01 PROCEDURE — 97110 THERAPEUTIC EXERCISES: CPT | Performed by: PHYSICAL THERAPIST

## 2018-06-01 NOTE — PROGRESS NOTES
Daily Note     Today's date: 2018  Patient name: Dede Morales  : 1965  MRN: [id number]  Referring provider: Reese Garcia MD  Dx:   Encounter Diagnosis     ICD-10-CM    1  Wrist weakness R29 898                   Subjective: No complaints offered      Objective: See treatment diary below    Manual              Paraffin into flexion stretch 15 minutes                         PROM/stretching - fist 15 minutes                                          Exercise Diary                           gripper 5 lengths            Push-pull Sled - 0# - 5 lengths            carrying             lifting             Wrist PRE's - flexion, ext  3# - 2 sets            Sup/pron [place] - red/green - 3 sets                                                                                                                                                                                         Modalities                                                         Assessment: Tolerated treatment well  Patient would benefit from continued PT  Tolerated well      Plan: Continue per plan of care

## 2018-06-05 ENCOUNTER — OFFICE VISIT (OUTPATIENT)
Dept: PHYSICAL THERAPY | Facility: OTHER | Age: 53
End: 2018-06-05
Payer: COMMERCIAL

## 2018-06-05 DIAGNOSIS — M79.7 FIBROMYALGIA: ICD-10-CM

## 2018-06-05 DIAGNOSIS — R29.898 WRIST WEAKNESS: Primary | ICD-10-CM

## 2018-06-05 PROCEDURE — 97140 MANUAL THERAPY 1/> REGIONS: CPT | Performed by: PEDIATRICS

## 2018-06-05 PROCEDURE — 97110 THERAPEUTIC EXERCISES: CPT | Performed by: PEDIATRICS

## 2018-06-05 PROCEDURE — 97112 NEUROMUSCULAR REEDUCATION: CPT | Performed by: PEDIATRICS

## 2018-06-05 NOTE — PROGRESS NOTES
Daily Note     Today's date: 2018  Patient name: Sukumar Jacobs  : 1965  MRN: [id number]  Referring provider: Brittany Carlos MD  Dx:   Encounter Diagnosis     ICD-10-CM    1  Wrist weakness R29 898    2  Fibromyalgia M79 7         IEP 2:00 - 2:15  1:1 with PTA EW 2:15 - 3:00          Subjective: Pt  States she is having most discomfort in upper thoracic and lower cervical region  Pt  Reports she would like to focus on the area today during PT session        Objective: See treatment diary below  Manual     PA mobs        FR to low back [facility] AS EW     Sacral Mobs        Hip Mobs        IASTM wrist        Wrist mobs        SOR        STM     b/l UT and rhombiod  EW   Cupping L UT        STM MTI [facility] AS EW     Lumbar Gapping [facility] AS  [facility]        Exercise Diary   6   PPT 5 x 20 20x5'' 20 x 5" 20 x 5"    TA contraction 5 x 20 20x5'' 20 x 5" 20 x 5"    p-ball crushers  20x5'' 20 x 5" 20 x 5"    TA BKFO 20 ea 20x 20 x 20 x     TA marches 20 20x 20x 20 x    TA kicks 20  20x 20 x 20 x     Prone hip ext        Prone bent knee hip ext        p-ball prayer stretch 3 way        Prone lumbar extension        Standing lumbar extension        [facility]        SK[facility]        LTR        Hamstring strap stretch 10 x 10 10x10'' 10" x 10 10" x 10    IT band strap stretch 10 x 10 10x10'' 10" x 10 10 x 10"    Quad Strap Stretch  10 x 10 10x10'' 10" x 10 10" x 10    Piriformis Stretch 10 x 10 10x10'' 10" x 10 10" x 10    Calf strap stretch 10 x 10 10x10'' 10" x 10 10" x 10    Hip Abduction Iso        Hip Adduction Iso        Bike/ [place] Bike   10 10' 10' 10' 10'   SLR x 4         Clamshells         Butterflies      5" x 15   Thoracic Ext      5" x 15   UT Stretch     10" x 10   LS Stretch     10" x 10   TB LPD #10  2 x 10 2x10  #10 2 x 10  10# 2 x 10  12# 2 x 10  12#   TB row #10  2 x 10 2x10  #10 2 x 10  10# 2 x 10  12#   2 x 10  12#   Chin tucks     Supine  10" x 10   Step Ups  8 15 ea x15 x15 ea X 15    Lateral Step Ups 8 2 x 10 x20 X 15 ea x15    HR/TR 30 ea 30/30 30/30 30/30      Modalities 5/17 5/22 5/29 5/31 6/5   MH  10 post 10' 10' post 10' post 10' post   laser     6'         Assessment: Tolerated treatment well  Focused on thoracic and cervical pain today  Added several new TE fo address patient complaints  Pt  Reports significant decrease in pain post treatment session  Patient would benefit from continued PT      Plan: Continue per plan of care

## 2018-06-06 ENCOUNTER — OFFICE VISIT (OUTPATIENT)
Dept: PHYSICAL THERAPY | Age: 53
End: 2018-06-06
Payer: OTHER MISCELLANEOUS

## 2018-06-06 DIAGNOSIS — R29.898 WRIST WEAKNESS: Primary | ICD-10-CM

## 2018-06-06 PROCEDURE — 97140 MANUAL THERAPY 1/> REGIONS: CPT | Performed by: PHYSICAL THERAPIST

## 2018-06-06 PROCEDURE — 97110 THERAPEUTIC EXERCISES: CPT | Performed by: PHYSICAL THERAPIST

## 2018-06-07 ENCOUNTER — OFFICE VISIT (OUTPATIENT)
Dept: PHYSICAL THERAPY | Facility: OTHER | Age: 53
End: 2018-06-07
Payer: COMMERCIAL

## 2018-06-07 DIAGNOSIS — M79.7 FIBROMYALGIA: Primary | ICD-10-CM

## 2018-06-07 PROCEDURE — 97140 MANUAL THERAPY 1/> REGIONS: CPT | Performed by: PEDIATRICS

## 2018-06-07 PROCEDURE — 97112 NEUROMUSCULAR REEDUCATION: CPT | Performed by: PEDIATRICS

## 2018-06-07 PROCEDURE — 97110 THERAPEUTIC EXERCISES: CPT | Performed by: PEDIATRICS

## 2018-06-07 PROCEDURE — 97530 THERAPEUTIC ACTIVITIES: CPT | Performed by: PEDIATRICS

## 2018-06-07 NOTE — PROGRESS NOTES
Daily Note     Today's date: 2018  Patient name: Massiel Velazquez  : 1965  MRN: [id number]  Referring provider: Goyo Fung MD  Dx:   Encounter Diagnosis     ICD-10-CM    1  Wrist weakness R29 898                   Subjective: Better      Objective: See treatment diary below    Manual             Paraffin into flexion stretch 15 minutes 15 minutes                        PROM/stretching - fist 15 minutes 15 minutes                                         Exercise Diary                           gripper 5 lengths 5 lengths           Push-pull Sled - 0# - 5 lengths Sled - 0# - 5 lengths           carrying             lifting             Wrist PRE's - flexion, ext  3# - 2 sets 3# - 2 sets           Sup/pron Therabar - red/green - 3 sets Therabar - red/green - 3 sets                                                                                                                                                                                        Assessment: Tolerated treatment well  Patient would benefit from continued PT  Progressing  Plan: Continue per plan of care

## 2018-06-07 NOTE — PROGRESS NOTES
Daily Note     Today's date: 2018  Patient name: Keaton Baker  : 1965  MRN: [id number]  Referring provider: Alistair Ayon MD  Dx:   Encounter Diagnosis     ICD-10-CM    1  Fibromyalgia M79 7         Pt  1:1 with PT TM 1400 - 1415 and with PTA EW 1415 - 1455          Subjective: Pt  Reports she is still having most pain in her uppper back and L wrist  States she does feel better since last visit        Objective: See treatment diary below  Manual     PA mobs         FR to low back [facility] AS EW      Sacral Mobs         Hip Mobs         IASTM wrist         Wrist mobs         SOR         STM     b/l UT and rhombiod  EW EW   Cupping L UT         STM MTI [facility] AS EW      Lumbar Gapping [facility] AS  [facility]         Exercise Diary     PPT 5 x 20 20x5'' 20 x 5" 20 x 5"     TA contraction 5 x 20 20x5'' 20 x 5" 20 x 5"     p-ball crushers  20x5'' 20 x 5" 20 x 5"     TA BKFO 20 ea 20x 20 x 20 x      TA marches 20 20x 20x 20 x     TA kicks 20  20x 20 x 20 x      Prone hip ext         Prone bent knee hip ext         p-ball prayer stretch 3 way         Prone lumbar extension         Standing lumbar extension         [facility]         LTR         Hamstring strap stretch 10 x 10 10x10'' 10" x 10 10" x 10     IT band strap stretch 10 x 10 10x10'' 10" x 10 10 x 10"     Quad Strap Stretch  10 x 10 10x10'' 10" x 10 10" x 10     Piriformis Stretch 10 x 10 10x10'' 10" x 10 10" x 10     Calf strap stretch 10 x 10 10x10'' 10" x 10 10" x 10     Hip Abduction Iso         Hip Adduction Iso         Bike/ [place] Bike   10 10' 10' 10' 10' 10'   SLR x 4          Clamshells          Butterflies      5" x 15 5" x 15   Thoracic Ext      5" x 15 5" x 15   UT Stretch     10" x 10 10" x 10   LS Stretch     10" x 10 10" x 10   TB LPD #10  2 x 10 2x10  #10 2 x 10  10# 2 x 10  12# 2 x 10  12# 2 x 10  12#   TB row #10  2 x 10 2x10  #10 2 x 10  10# 2 x 10  12#   2 x 10  12# 2 x 10 12#   Chin tucks         Step Ups  8 15 ea x15 x15 ea X 15     Lateral Step Ups 8 2 x 10 x20 X 15 ea x15     HR/TR 30 ea 30/30 30/30 30/30       Modalities 5/17 5/22 5/29 5/31 6/5 6/7   MH  10 post 10' 10' post 10' post 10' post 10' post   laser     6'          Assessment: Tolerated treatment well  Per Pt  Request focused on upper traps and rhomboid region today  Continue to monitor symptoms moving forward  Patient would benefit from continued PT      Plan: Continue per plan of care

## 2018-06-08 ENCOUNTER — OFFICE VISIT (OUTPATIENT)
Dept: PHYSICAL THERAPY | Age: 53
End: 2018-06-08
Payer: OTHER MISCELLANEOUS

## 2018-06-08 DIAGNOSIS — R29.898 WRIST WEAKNESS: Primary | ICD-10-CM

## 2018-06-08 PROCEDURE — 97140 MANUAL THERAPY 1/> REGIONS: CPT | Performed by: PHYSICAL THERAPIST

## 2018-06-08 PROCEDURE — 97110 THERAPEUTIC EXERCISES: CPT | Performed by: PHYSICAL THERAPIST

## 2018-06-08 NOTE — PROGRESS NOTES
Daily Note     Today's date: 2018  Patient name: Mckinley Turcios  : 1965  MRN: [id number]  Referring provider: Sheryle Oakland, MD  Dx:   Encounter Diagnosis     ICD-10-CM    1  Wrist weakness R29 898                   Subjective: increased ROM noted  Objective: See treatment diary below    Manual            Paraffin into flexion stretch 15 minutes 15 minutes 15 minutes                       PROM/stretching - fist 15 minutes 15 minutes 15 minutes                                        Exercise Diary                           gripper 5 lengths 5 lengths 5 lengths          Push-pull Sled - 0# - 5 lengths Sled - 0# - 5 lengths Sled - 0# - 5 lengths          carrying             lifting             Wrist PRE's - flexion, ext  3# - 2 sets 3# - 2 sets 3# - 2 sets          Sup/pron Therabar - red/green - 3 sets Therabar - red/green - 3 sets Therabar - red/green - 3 sets                                                                                                                                                                                         Assessment: Tolerated treatment well  Patient would benefit from continued PT  Steady progression  Plan: Continue per plan of care

## 2018-06-12 ENCOUNTER — OFFICE VISIT (OUTPATIENT)
Dept: PHYSICAL THERAPY | Facility: OTHER | Age: 53
End: 2018-06-12
Payer: COMMERCIAL

## 2018-06-12 ENCOUNTER — OFFICE VISIT (OUTPATIENT)
Dept: PHYSICAL THERAPY | Age: 53
End: 2018-06-12
Payer: OTHER MISCELLANEOUS

## 2018-06-12 DIAGNOSIS — M79.7 FIBROMYALGIA: Primary | ICD-10-CM

## 2018-06-12 DIAGNOSIS — R29.898 WRIST WEAKNESS: Primary | ICD-10-CM

## 2018-06-12 PROCEDURE — 97140 MANUAL THERAPY 1/> REGIONS: CPT | Performed by: PHYSICAL THERAPIST

## 2018-06-12 PROCEDURE — 97110 THERAPEUTIC EXERCISES: CPT | Performed by: PEDIATRICS

## 2018-06-12 PROCEDURE — 97112 NEUROMUSCULAR REEDUCATION: CPT | Performed by: PEDIATRICS

## 2018-06-12 PROCEDURE — 97110 THERAPEUTIC EXERCISES: CPT | Performed by: PHYSICAL THERAPIST

## 2018-06-12 NOTE — PROGRESS NOTES
Daily Note     Today's date: 2018  Patient name: Louis Willson  : 1965  MRN: [id number]  Referring provider: Cheryln Cowden, MD  Dx:   Encounter Diagnosis     ICD-10-CM    1  Fibromyalgia M79 7            1:1 with PTA EW for duration        Subjective: Pt  Reports low back pain and knee pain today  She states she did a lot of manual labor at work today and is very tired         Objective: See treatment diary below  Manual     PA mobs          FR to low back [facility] AS EW       Sacral Mobs          Hip Mobs          IASTM wrist          Wrist mobs          SOR          STM     b/l UT and rhombiod  EW EW    Cupping L UT          STM MTI [facility] AS EW       Lumbar Gapping [facility] AS  [facility]          Exercise Diary     PPT 5 x 20 20x5'' 20 x 5" 20 x 5"   20 x 5"   TA contraction 5 x 20 20x5'' 20 x 5" 20 x 5"   20 x 5"   p-ball crushers  20x5'' 20 x 5" 20 x 5"   20 x 5"   TA BKFO 20 ea 20x 20 x 20 x    20x   TA marches 20 20x 20x 20 x   20x   TA kicks 20  20x 20 x 20 x    20x   Prone hip ext          Prone bent knee hip ext          p-ball prayer stretch 3 way          Prone lumbar extension          Standing lumbar extension          [facility]          LTR          Hamstring strap stretch 10 x 10 10x10'' 10" x 10 10" x 10   10" x 10   IT band strap stretch 10 x 10 10x10'' 10" x 10 10 x 10"   10" x 10   Quad Strap Stretch  10 x 10 10x10'' 10" x 10 10" x 10      Piriformis Stretch 10 x 10 10x10'' 10" x 10 10" x 10   10" x 10   Calf strap stretch 10 x 10 10x10'' 10" x 10 10" x 10      Hip Abduction Iso          Hip Adduction Iso          Bike/ [place] Bike   10 10' 10' 10' 10' 10' Curve 10'   SLR x 4           Clamshells           Butterflies      5" x 15 5" x 15 NP   Thoracic Ext      5" x 15 5" x 15 NP   UT Stretch     10" x 10 10" x 10 NP   LS Stretch     10" x 10 10" x 10 NP   TB LPD #10  2 x 10 2x10  #10 2 x 10  10# 2 x 10  12# 2 x 10  12# 2 x 10  12# 3 x 10  12#   TB row #10  2 x 10 2x10  #10 2 x 10  10# 2 x 10  12#   2 x 10  12# 2 x 10 12# 3 x 10  12#   Chin tucks          Step Ups  8 15 ea x15 x15 ea X 15      Lateral Step Ups 8 2 x 10 x20 X 15 ea x15      HR/TR 30 ea 30/30 30/30 30/30        Modalities 5/17 5/22 5/29 5/31 6/5 6/7 6/12   MH  10 post 10' 10' post 10' post 10' post 10' post 10' post   laser     6'           Assessment: Tolerated treatment well  Patient would benefit from continued PT  Patient was very fatigued from work today  Performed very light TE today  Plan: Progress treatment as tolerated

## 2018-06-12 NOTE — PROGRESS NOTES
Daily Note     Today's date: 2018  Patient name: Giorgio Jama  : 1965  MRN: [id number]  Referring provider: Mauricio Harrison MD  Dx:   Encounter Diagnosis     ICD-10-CM    1  Wrist weakness R29 898                   Subjective: Patient notes that hand is getting stronger  Objective: See treatment diary below    Manual           Paraffin into flexion stretch 15 minutes 15 minutes 15 minutes 15 minutes                      PROM/stretching - fist 15 minutes 15 minutes 15 minutes 15 minutes                                       Exercise Diary                           gripper 5 lengths 5 lengths 5 lengths 5 lengths - 3rd position         Push-pull Sled - 0# - 5 lengths Sled - 0# - 5 lengths Sled - 0# - 5 lengths completed         carrying    Box - 0# - 3 gym circles         lifting             Wrist PRE's - flexion, ext  3# - 2 sets 3# - 2 sets 3# - 2 sets 3# - completd         Sup/pron Therabar - red/green - 3 sets Therabar - red/green - 3 sets Therabar - red/green - 3 sets completed                                                                                                                                                                                      Assessment: Tolerated treatment well  Patient would benefit from continued PT  Progressing well  Plan: Continue per plan of care

## 2018-06-14 ENCOUNTER — EVALUATION (OUTPATIENT)
Dept: PHYSICAL THERAPY | Facility: OTHER | Age: 53
End: 2018-06-14
Payer: COMMERCIAL

## 2018-06-14 DIAGNOSIS — M79.7 FIBROMYALGIA: Primary | ICD-10-CM

## 2018-06-14 PROCEDURE — G8980 MOBILITY D/C STATUS: HCPCS | Performed by: PHYSICAL THERAPIST

## 2018-06-14 PROCEDURE — 97140 MANUAL THERAPY 1/> REGIONS: CPT | Performed by: PHYSICAL THERAPIST

## 2018-06-14 PROCEDURE — G8978 MOBILITY CURRENT STATUS: HCPCS | Performed by: PHYSICAL THERAPIST

## 2018-06-14 PROCEDURE — G8979 MOBILITY GOAL STATUS: HCPCS | Performed by: PHYSICAL THERAPIST

## 2018-06-14 NOTE — PROGRESS NOTES
PT Re-Evaluation  and PT Discharge    Today's date: 2018  Patient name: Daisy Walters  : 1965  MRN: [id number]  Referring provider: Davide Ovalles MD  Dx:   Encounter Diagnosis     ICD-10-CM    1  Fibromyalgia M79 7      1 on 1 0014-2548  IEP 9155-4677  Start Time: 1400  Stop Time: 1500  Total time in clinic (min): 60 minutes    Assessment  Impairments: abnormal or restricted ROM, activity intolerance, difficulty understanding, impaired physical strength, lacks appropriate home exercise program and pain with function    Assessment details: Leopoldo Byars is a 45 y o  female who presented with complaints of pain in her low back and bilateral knees as well as neck/shoulders  These areas give patient issues due to her diagnosis of fibromyalgia  Patient tolerated today's session very well  PTA provided her with updated HEP and new therabands  PT emphasized importance of strict adherence to HEP  Patient reports no pain at this time  Thank you for your referral     Barriers to therapy: language, comorbidities, depression, long history of pain, history of falls   Understanding of Dx/Px/POC: fair   Prognosis: fair    Goals  Short Term:  Pt will report decreased levels of pain by at least 2 subjective ratings in 4 weeks- MET  Pt will demonstrate improved ROM by at least 10 degrees in 4 weeks- PARTIALLY MET  Pt will demonstrate improved strength by 1/2 grade- PARTIALLY MET  Long Term:   Pt will be independent in their HEP in 8 weeks- MET  Pt will be be pain free with IADL's- MET  Pt will demonstrate improved FOTO score- MET  Patient's Goals:   "I want to be pain free"     Plan  Treatment plan discussed with: patient  Plan details: Patient has been discharged in [facility] and [address] [place] [place] [address]  She has also been discharged from PT  Subjective Evaluation    History of Present Illness  Onset date: 10+ years ago  Mechanism of injury: Patient reports she has a long history of pain    She said that her pain has worsened recently  She told PT she has pain in her neck, shoulders, elbows, knees, toes and fingers  Patient had an accident involving his RUE in the not so distant past   She told PT that her RUE hurts more than her LUE and she believes this has to do with her previous injury      Quality of life: fair    Pain  Current pain ratin  At best pain ratin  At worst pain rating: 10  Location: Throughout the entire body   Quality: dull ache  Relieving factors: medications  Aggravating factors: standing and walking      Diagnostic Tests  X-ray: normal  MRI studies: abnormal  Treatments  Previous treatment: medication and physical therapy  Current treatment: physical therapy  Patient Goals  Patient goals for therapy: decreased pain, return to work, return to [employer] activities, independence with ADLs/IADLs, increased strength and increased motion  Patient goal: "I want to be pain free"         Objective     Palpation     Additional Palpation Details  PA mobs elicited pain  Lumbar spine presents with normal mobility     Cervical/Thoracic Screen   Cervical range of motion within normal limits with the following exceptions: Pain with all movements   ROM appears WNL but patient was guarding during PROM evaluation    Thoracic range of motion within normal limits with the following exceptions: Hypomobility noted in the Thoracic spine       Neurological Testing     Sensation   Cervical/Thoracic   Left   Intact: light touch    Right   Intact: light touch    Lumbar   Left   Intact: light touch    Right   Intact: light touch    Reflexes   Left   Deltoid (C5): normal (2+)  Biceps (C5/C6): normal (2+)  Brachioradialis (C6): normal (2+)  Triceps (C7): normal (2+)  Patellar (L4): normal (2+)  Achilles (S1): normal (2+)    Right   Deltoid (C5): normal (2+)  Biceps (C5/C6): normal (2+)  Brachioradialis (C6): normal (2+)  Triceps (C7): normal (2+)  Patellar (L4): normal (2+)  Achilles (S1): normal (2+)    Active Range of Motion   Left Shoulder   Normal active range of motion  Flexion: WFL  Extension: WFL  Abduction: WFL  External rotation BTH: T1   Internal rotation BTB: L1     Right Shoulder   Normal active range of motion  Flexion: WFL  Extension: WFL  Adduction: WFL  External rotation BTH: T1   Internal rotation BTB: L1     Additional Active Range of Motion Details  Pain with all movements bilaterally     Joint Play   Comments: Hypomobility and pain noted with PA mobs throughout the thoracic spine         Strength/Myotome Testing   Cervical Spine   Neck flexion: 3+    Lumbar   Left   Normal strength    Right   Normal strength    Left Ankle/Foot   Dorsiflexion: 5  Plantar flexion: 5  Inversion: 5  Eversion: 5  Great toe flexion: 5  Great toe extension: 5    Right Ankle/Foot   Dorsiflexion: 4+  Plantar flexion: 5  Inversion: 5  Eversion: 5  Great toe flexion: 5  Great toe extension: 5    Additional Strength Details  3+ throughout unless otherwised noted  Pain noted with all movement      Flowsheet Rows      Most Recent Value   PT/OT G-Codes   Current Score  60   Projected Score  57   FOTO information reviewed  Yes   Assessment Type  Discharge   G code set  Mobility: Walking & Moving Around   Mobility: Walking and Moving Around Current Status ()  CK   Mobility: Walking and Moving Around Goal Status ()  CK   Mobility: Walking and Moving Around Discharge Status ()  CK          Precautions: arthritis, back pain, depression    Manual  6/12 6/14   PA mobs     FR to low back     Sacral Mobs     Hip Mobs     IASTM wrist     Wrist mobs     SOR     STM     Cupping L UT     STM MTI     Lumbar Gapping         Exercise Diary  6/12 6/14   PPT 20 x 5" 20 x 5"   TA contraction 20 x 5" 20 x 5"   p-ball crushers 20 x 5" 20 x 5"   TA BKFO 20x 20x   TA marches 20x 20x   TA kicks 20x 20x   Prone hip ext     Hamstring strap stretch 10" x 10 10" x 10   IT band strap stretch 10" x 10 10" x 10   Quad Strap Stretch      Piriformis Stretch 10" x 10 10" x 10   Bike/ [place] Curve 10' Curve 10'   TB LPD 3 x 10  12# 3 x 10  12#   TB row 3 x 10  12# 3 x 10  12#     Modalities 6/12 6/14     10' post 10   post   laser

## 2018-06-15 ENCOUNTER — OFFICE VISIT (OUTPATIENT)
Dept: PHYSICAL THERAPY | Age: 53
End: 2018-06-15
Payer: OTHER MISCELLANEOUS

## 2018-06-15 DIAGNOSIS — R29.898 WRIST WEAKNESS: Primary | ICD-10-CM

## 2018-06-15 PROCEDURE — 97140 MANUAL THERAPY 1/> REGIONS: CPT | Performed by: PHYSICAL THERAPIST

## 2018-06-15 PROCEDURE — 97018 PARAFFIN BATH THERAPY: CPT | Performed by: PHYSICAL THERAPIST

## 2018-06-15 PROCEDURE — 97110 THERAPEUTIC EXERCISES: CPT | Performed by: PHYSICAL THERAPIST

## 2018-06-15 NOTE — PROGRESS NOTES
Daily Note     Today's date: 6/15/2018  Patient name: Pily Rangel  : 1965  MRN: [id number]  Referring provider: Brando Mejia MD  Dx:   Encounter Diagnosis     ICD-10-CM    1  Wrist weakness R29 898                   Subjective: Patient notes that hand is getting stronger  Objective: See treatment diary below    Manual  6/1 6/6 6/8 6/11 6/15        Paraffin into flexion stretch 15 minutes 15 minutes 15 minutes 15 minutes 15 min                     PROM/stretching - fist 15 minutes 15 minutes 15 minutes 15 minutes 15 min                                      Exercise Diary                           gripper 5 lengths 5 lengths 5 lengths 5 lengths - 3rd position 5 lengths - 3rd position        Push-pull Sled - 0# - 5 lengths Sled - 0# - 5 lengths Sled - 0# - 5 lengths completed completed         carrying    Box - 0# - 3 gym circles Box - 0#- 3 gym circles        lifting             Wrist PRE's - flexion, ext  3# - 2 sets 3# - 2 sets 3# - 2 sets 3# - completd 3# 3x10        Sup/pron Therabar - red/green - 3 sets Therabar - red/green - 3 sets Therabar - red/green - 3 sets completed completed                                                                                                                                                                                      Assessment: Tolerated treatment well  Patient would benefit from continued PT  Progressing well  Plan: Continue per plan of care

## 2018-06-18 ENCOUNTER — OFFICE VISIT (OUTPATIENT)
Dept: PHYSICAL THERAPY | Age: 53
End: 2018-06-18
Payer: OTHER MISCELLANEOUS

## 2018-06-18 DIAGNOSIS — R29.898 WRIST WEAKNESS: Primary | ICD-10-CM

## 2018-06-18 PROCEDURE — 97110 THERAPEUTIC EXERCISES: CPT

## 2018-06-18 PROCEDURE — G8984 CARRY CURRENT STATUS: HCPCS | Performed by: PHYSICAL THERAPIST

## 2018-06-18 PROCEDURE — G8985 CARRY GOAL STATUS: HCPCS | Performed by: PHYSICAL THERAPIST

## 2018-06-18 PROCEDURE — 97140 MANUAL THERAPY 1/> REGIONS: CPT

## 2018-06-18 PROCEDURE — 97018 PARAFFIN BATH THERAPY: CPT

## 2018-06-18 NOTE — PROGRESS NOTES
Daily Note     Today's date: 2018  Patient name: Macrina Vega  : 1965  MRN: [id number]  Referring provider: Maria De Jesus Zayas MD  Dx:   Encounter Diagnosis     ICD-10-CM    1  Wrist weakness R29 898                   Subjective: Pleased with progress  Objective: See treatment diary below    Manual  6/1 6/6 6/8 6/11 6/15 6/18       Paraffin into flexion stretch 15 minutes 15 minutes 15 minutes 15 minutes 15 min 15 min                    PROM/stretching - fist 15 minutes 15 minutes 15 minutes 15 minutes 15 min 15 min                                     Exercise Diary                           gripper 5 lengths 5 lengths 5 lengths 5 lengths - 3rd position 5 lengths - 3rd position 5 rows, 3rd position       Push-pull Sled - 0# - 5 lengths Sled - 0# - 5 lengths Sled - 0# - 5 lengths completed completed  Sled - 0# 5x       carrying    Box - 0# - 3 gym circles Box - 0#- 3 gym circles Box - 0# x 3 laps       lifting             Wrist PRE's - flexion, ext  3# - 2 sets 3# - 2 sets 3# - 2 sets 3# - completd 3# 3x10 3x 10 3#       Sup/pron Therabar - red/green - 3 sets Therabar - red/green - 3 sets Therabar - red/green - 3 sets completed completed  3x 10 each, green                                                                                                                                                                         Assessment: Tolerated treatment well and making steady progress with strengthening exercises    Patient exhibited good technique with therapeutic exercises and would benefit from continued PT   Plan: Continue per plan of care

## 2018-06-19 ENCOUNTER — APPOINTMENT (OUTPATIENT)
Dept: PHYSICAL THERAPY | Facility: OTHER | Age: 53
End: 2018-06-19
Payer: COMMERCIAL

## 2018-06-21 ENCOUNTER — APPOINTMENT (OUTPATIENT)
Dept: PHYSICAL THERAPY | Facility: OTHER | Age: 53
End: 2018-06-21
Payer: COMMERCIAL

## 2018-06-22 ENCOUNTER — OFFICE VISIT (OUTPATIENT)
Dept: PHYSICAL THERAPY | Age: 53
End: 2018-06-22
Payer: OTHER MISCELLANEOUS

## 2018-06-22 DIAGNOSIS — R29.898 WRIST WEAKNESS: Primary | ICD-10-CM

## 2018-06-22 PROCEDURE — 97110 THERAPEUTIC EXERCISES: CPT

## 2018-06-22 PROCEDURE — 97140 MANUAL THERAPY 1/> REGIONS: CPT

## 2018-06-22 NOTE — PROGRESS NOTES
Daily Note     Today's date: 2018  Patient name: Rosario Oliveira  : 1965  MRN: [id number]  Referring provider: Sammie Zuniga MD  Dx:   Encounter Diagnosis     ICD-10-CM    1  Wrist weakness R29 898                   Subjective: Pt reported pain on R 5th digit  With flexion and /  No c/o wrist pain  Pt reported that her strength is improving  Objective: See treatment diary below    Manual  6/1 6/6 6/8 6/11 6/15 6/18 6/22      Paraffin into flexion stretch 15 minutes 15 minutes 15 minutes 15 minutes 15 min 15 min 15min                    PROM/stretching - fist 15 minutes 15 minutes 15 minutes 15 minutes 15 min 15 min 10min                                    Exercise Diary  6/1 6/6 6/8 6/11 6/15 6/18 6/22                   gripper 5 lengths 5 lengths 5 lengths 5 lengths - 3rd position 5 lengths - 3rd position 5 rows, 3rd position 5 ros 3rd postion      Push-pull Sled - 0# - 5 lengths Sled - 0# - 5 lengths Sled - 0# - 5 lengths completed completed  Sled - 0# 5x Sled- 0# 5x      carrying    Box - 0# - 3 gym circles Box - 0#- 3 gym circles Box - 0# x 3 laps Box with handle 0# x3Lap      lifting             Wrist PRE's - flexion, ext  3# - 2 sets 3# - 2 sets 3# - 2 sets 3# - completd 3# 3x10 3x 10 3# 3x10 3#      Sup/pron Therabar - red/green - 3 sets Therabar - red/green - 3 sets Therabar - red/green - 3 sets completed completed  3x 10 each, green 3x10 each green                                                                                                                                                                        Assessment: Tolerated treatment with proper sequencing/ technique with all TE's    Patient would benefit from continued PT   No C/o  Increase pain or difficulty with any TE's  Plan: Continue per plan of care

## 2018-06-26 ENCOUNTER — APPOINTMENT (OUTPATIENT)
Dept: PHYSICAL THERAPY | Facility: OTHER | Age: 53
End: 2018-06-26
Payer: COMMERCIAL

## 2018-06-28 ENCOUNTER — APPOINTMENT (OUTPATIENT)
Dept: PHYSICAL THERAPY | Facility: OTHER | Age: 53
End: 2018-06-28
Payer: COMMERCIAL

## 2018-07-17 NOTE — PROGRESS NOTES
No problem-specific Assessment & Plan notes found for this encounter  Plan:  Diagnoses and all orders for this visit:    Fibromyalgia    Rheumatoid arthritis involving multiple joints ([address] )        Assessment: This is a 49-year-old female presenting today for follow-up for rheumatoid arthritis as well as fibromyalgia  The patient states that she has been completing physical therapy with relief  She states she continues to have pain in her hands and knees however  She notes swelling in the hands as well as the feet but denies any further obvious joint swelling  She has morning stiffness lasting 10-15 minutes and does describe difficulty with sleep not related to pain  She notes non restorative sleep as well as fatigue  She is utilizing hydroxychloroquine as well as Cymbalta  Her last eye exam was August of 2017  On physical examination, there is no active synovitis  Patient does have tenderness of the MCPs, PIP, and wrists bilaterally as well as the shoulders, hips, knees, and ankles  She has multiple myofascial tender points of the spine with crepitus of the knees  Her most recent CBC, CMP, CRP, and sed rate are within normal range  At this time patient's history and physical examination is most consistent with rheumatoid arthritis which appears to be stable at this time with the use of hydroxychloroquine  In addition patient's fibromyalgia appears to be mildly active with the use of Cymbalta  Patient has opted to begin meloxicam 7 5 mg to be taken twice a day as needed for pain with food  Patient will plan to return to the office in 3 months time however contact the office in the interim if she has any further questions or concerns  Patient was asked to obtain a CBC, CMP, CRP, ESR before that follow-up  This patient will be discussed with Dr Deborah Tipton on a biweekly basis            Subjective:      Patient ID: Edwige Crespo is a 45 y o   female    Feeling better with PT but still with pain in the hands and knees  +Swelling in the hands and feet  No other obvious joint swelling  +AM stiffness x 10-15 minutes  +Difficulty with sleep not related to pain  +Non restorative sleep  +Fatigue  Taking hydroxychloroquine and Cymbalta  Last eye exam: August 2017  The following portions of the patient's history were reviewed and updated as appropriate: She  has a past medical history of Acid reflux; Anemia; Arthritis; Depression; Fibromyalgia; and Rheumatoid arthritis ([address] )     Review of Systems   Constitutional: Positive for chills and fatigue  Negative for appetite change, fever and unexpected weight change  HENT: Negative for congestion, mouth sores and sore throat  Eyes: Negative for pain, redness and visual disturbance  Respiratory: Negative for cough, chest tightness and shortness of breath  Cardiovascular: Negative for chest pain and leg swelling  Gastrointestinal: Positive for abdominal pain and constipation  Negative for blood in stool, diarrhea, nausea and vomiting  Endocrine: Negative for polydipsia and polyuria  Genitourinary: Negative for frequency and hematuria  Skin: Negative for color change and rash  Neurological: Positive for weakness (Legs ), light-headedness (Intermittently ) and headaches (intermittently )  Negative for numbness  Hematological: Negative for adenopathy  Psychiatric/Behavioral: Negative for behavioral problems  The patient is not nervous/anxious  Objective:    Physical Exam   Constitutional: She is oriented to person, place, and time  She appears well-developed and well-nourished  HENT:   Head: Normocephalic  Mouth/Throat: Oropharynx is clear and moist    Eyes: Conjunctivae are normal  Pupils are equal, round, and reactive to light  Neck: Normal range of motion  Neck supple  Cardiovascular: Normal rate, regular rhythm and normal heart sounds      Pulmonary/Chest: Effort normal and breath sounds normal  Musculoskeletal:   +Multiple myofascial tender points  Crepitus of the knees B/L  Neurological: She is alert and oriented to person, place, and time  Skin: Skin is warm and dry  Psychiatric: She has a normal mood and affect   Her behavior is normal        Physical Exam     Tenderness:   RUE: glenohumeral and wrist  LUE: glenohumeral and wrist  Right hand: 2nd MCP, 3rd MCP, 4th MCP, 5th MCP, 2nd PIP, 3rd PIP, 4th PIP and 5th PIP  Left hand: 2nd MCP, 3rd MCP, 4th MCP, 5th MCP, 2nd PIP, 3rd PIP, 4th PIP and 5th PIP  RLE: acetabulofemoral, tibiofemoral and tibiotalar  LLE: acetabulofemoral, tibiofemoral and tibiotalar    LINCONL-28 tender joint count: 22  LINCOLN-28 swollen joint count: 0      Results Reviewed     None

## 2018-07-19 ENCOUNTER — APPOINTMENT (OUTPATIENT)
Dept: LAB | Facility: CLINIC | Age: 53
End: 2018-07-19
Payer: COMMERCIAL

## 2018-07-19 ENCOUNTER — OFFICE VISIT (OUTPATIENT)
Dept: RHEUMATOLOGY | Facility: CLINIC | Age: 53
End: 2018-07-19
Payer: COMMERCIAL

## 2018-07-19 VITALS
BODY MASS INDEX: 27.49 KG/M2 | WEIGHT: 161 LBS | HEIGHT: 64 IN | DIASTOLIC BLOOD PRESSURE: 80 MMHG | SYSTOLIC BLOOD PRESSURE: 112 MMHG

## 2018-07-19 DIAGNOSIS — M79.7 FIBROMYALGIA: Primary | ICD-10-CM

## 2018-07-19 DIAGNOSIS — M06.9 RHEUMATOID ARTHRITIS INVOLVING MULTIPLE JOINTS (HCC): ICD-10-CM

## 2018-07-19 DIAGNOSIS — M79.7 FIBROMYALGIA: ICD-10-CM

## 2018-07-19 LAB
ALBUMIN SERPL BCP-MCNC: 3.9 G/DL (ref 3.5–5)
ALP SERPL-CCNC: 97 U/L (ref 46–116)
ALT SERPL W P-5'-P-CCNC: 29 U/L (ref 12–78)
ANION GAP SERPL CALCULATED.3IONS-SCNC: 4 MMOL/L (ref 4–13)
AST SERPL W P-5'-P-CCNC: 20 U/L (ref 5–45)
BASOPHILS # BLD AUTO: 0.05 THOUSANDS/ΜL (ref 0–0.1)
BASOPHILS NFR BLD AUTO: 1 % (ref 0–1)
BILIRUB SERPL-MCNC: 0.2 MG/DL (ref 0.2–1)
BUN SERPL-MCNC: 8 MG/DL (ref 5–25)
CALCIUM SERPL-MCNC: 9 MG/DL (ref 8.3–10.1)
CHLORIDE SERPL-SCNC: 103 MMOL/L (ref 100–108)
CO2 SERPL-SCNC: 34 MMOL/L (ref 21–32)
CREAT SERPL-MCNC: 0.78 MG/DL (ref 0.6–1.3)
CRP SERPL QL: <3 MG/L
EOSINOPHIL # BLD AUTO: 0.12 THOUSAND/ΜL (ref 0–0.61)
EOSINOPHIL NFR BLD AUTO: 2 % (ref 0–6)
ERYTHROCYTE [DISTWIDTH] IN BLOOD BY AUTOMATED COUNT: 14.9 % (ref 11.6–15.1)
ERYTHROCYTE [SEDIMENTATION RATE] IN BLOOD: 8 MM/HOUR (ref 0–20)
GFR SERPL CREATININE-BSD FRML MDRD: 86 ML/MIN/1.73SQ M
GLUCOSE SERPL-MCNC: 105 MG/DL (ref 65–140)
HCT VFR BLD AUTO: 39 % (ref 34.8–46.1)
HGB BLD-MCNC: 12.4 G/DL (ref 11.5–15.4)
LYMPHOCYTES # BLD AUTO: 2.76 THOUSANDS/ΜL (ref 0.6–4.47)
LYMPHOCYTES NFR BLD AUTO: 41 % (ref 14–44)
MCH RBC QN AUTO: 24.9 PG (ref 26.8–34.3)
MCHC RBC AUTO-ENTMCNC: 31.8 G/DL (ref 31.4–37.4)
MCV RBC AUTO: 79 FL (ref 82–98)
MONOCYTES # BLD AUTO: 0.55 THOUSAND/ΜL (ref 0.17–1.22)
MONOCYTES NFR BLD AUTO: 8 % (ref 4–12)
NEUTROPHILS # BLD AUTO: 3.23 THOUSANDS/ΜL (ref 1.85–7.62)
NEUTS SEG NFR BLD AUTO: 48 % (ref 43–75)
PLATELET # BLD AUTO: 328 THOUSANDS/UL (ref 149–390)
PMV BLD AUTO: 9.1 FL (ref 8.9–12.7)
POTASSIUM SERPL-SCNC: 4.5 MMOL/L (ref 3.5–5.3)
PROT SERPL-MCNC: 7.6 G/DL (ref 6.4–8.2)
RBC # BLD AUTO: 4.97 MILLION/UL (ref 3.81–5.12)
SODIUM SERPL-SCNC: 141 MMOL/L (ref 136–145)
WBC # BLD AUTO: 6.71 THOUSAND/UL (ref 4.31–10.16)

## 2018-07-19 PROCEDURE — 99214 OFFICE O/P EST MOD 30 MIN: CPT | Performed by: PHYSICIAN ASSISTANT

## 2018-07-19 PROCEDURE — 85652 RBC SED RATE AUTOMATED: CPT | Performed by: PHYSICIAN ASSISTANT

## 2018-07-19 PROCEDURE — 80053 COMPREHEN METABOLIC PANEL: CPT | Performed by: PHYSICIAN ASSISTANT

## 2018-07-19 PROCEDURE — 86140 C-REACTIVE PROTEIN: CPT | Performed by: PHYSICIAN ASSISTANT

## 2018-07-19 PROCEDURE — 85025 COMPLETE CBC W/AUTO DIFF WBC: CPT | Performed by: PHYSICIAN ASSISTANT

## 2018-07-19 PROCEDURE — 36415 COLL VENOUS BLD VENIPUNCTURE: CPT | Performed by: PHYSICIAN ASSISTANT

## 2018-07-19 RX ORDER — DULOXETIN HYDROCHLORIDE 60 MG/1
60 CAPSULE, DELAYED RELEASE ORAL 2 TIMES DAILY
Qty: 180 CAPSULE | Refills: 0 | Status: SHIPPED | OUTPATIENT
Start: 2018-07-19 | End: 2018-07-19 | Stop reason: SDUPTHER

## 2018-07-19 RX ORDER — HYDROXYCHLOROQUINE SULFATE 200 MG/1
200 TABLET, FILM COATED ORAL 2 TIMES DAILY WITH MEALS
Qty: 180 TABLET | Refills: 0 | Status: SHIPPED | OUTPATIENT
Start: 2018-07-19 | End: 2018-10-12 | Stop reason: ALTCHOICE

## 2018-07-19 RX ORDER — MELOXICAM 7.5 MG/1
7.5 TABLET ORAL 2 TIMES DAILY WITH MEALS
Qty: 60 TABLET | Refills: 2 | Status: SHIPPED | OUTPATIENT
Start: 2018-07-19 | End: 2018-10-12 | Stop reason: SDUPTHER

## 2018-07-19 RX ORDER — DULOXETIN HYDROCHLORIDE 60 MG/1
CAPSULE, DELAYED RELEASE ORAL
Qty: 180 CAPSULE | Refills: 0 | Status: SHIPPED | OUTPATIENT
Start: 2018-07-19 | End: 2018-10-12 | Stop reason: SDUPTHER

## 2018-08-28 ENCOUNTER — OFFICE VISIT (OUTPATIENT)
Dept: OBGYN CLINIC | Facility: HOSPITAL | Age: 53
End: 2018-08-28
Payer: COMMERCIAL

## 2018-08-28 VITALS — DIASTOLIC BLOOD PRESSURE: 70 MMHG | HEART RATE: 97 BPM | SYSTOLIC BLOOD PRESSURE: 123 MMHG

## 2018-08-28 DIAGNOSIS — M25.531 PAIN IN RIGHT WRIST: ICD-10-CM

## 2018-08-28 DIAGNOSIS — R29.898 WRIST WEAKNESS: Primary | ICD-10-CM

## 2018-08-28 PROCEDURE — 99213 OFFICE O/P EST LOW 20 MIN: CPT | Performed by: ORTHOPAEDIC SURGERY

## 2018-08-28 NOTE — PROGRESS NOTES
Assessment/Plan:  1  Wrist weakness  Ambulatory referral to PT/OT hand therapy   2  Pain in right wrist  Ambulatory referral to PT/OT hand therapy       Scribe Attestation    I,:   Sabiha Gutierrez MA am acting as a scribe while in the presence of the attending physician :        I,:   Arnel Oneil MD personally performed the services described in this documentation    as scribed in my presence :              Serg Luciano will continue to work with OT to continue to help her strengthening for two more weeks  She is aware after these additional 2 weeks of OT she is to transition to a HEP to help to improve her strengthening  She will follow up with me in 6 weeks for a repeat evaluation  Subjective:   Kojo Mayo is a 52 y o  female who presents who presents to the office today for a 3 month follow up regarding her right wrist pain and weakness 2+ years s/p right wrist ORIF  She states she has been doing OT since her last visit and does feel as though this is helping with her strength  She has no real complaints of pain today  She only complains of stiffness and mild weakness into the right small and index finger  She states she has difficulties with grasping things, counting money and washing dishes  She denies any numbness or tingling  Review of Systems   Constitutional: Negative for chills and fever  HENT: Negative for drooling and sneezing  Eyes: Negative for redness  Respiratory: Negative for cough and wheezing  Gastrointestinal: Negative for nausea and vomiting  Musculoskeletal: Negative for arthralgias, joint swelling and myalgias  Neurological: Negative for weakness and numbness  Psychiatric/Behavioral: Negative for behavioral problems  The patient is not nervous/anxious            Past Medical History:   Diagnosis Date    Acid reflux     Anemia     last assessed 03/23/2016    Arthritis     rheumatoid    Depression     Fibromyalgia     Rheumatoid [address] ([facility] [address] ) last assessed 10/29/2015       Past Surgical History:   Procedure Laterality Date    HYSTERECTOMY      MA COLONOSCOPY FLX DX W/COLLJ SPEC WHEN PFRMD N/A 5/5/2016    Procedure: COLONOSCOPY;  Surgeon: Jayden Gary DO;  [facility]; Service: Gastroenterology    MA OPEN RX DISTAL RADIUS FX, EXTRA-ARTICULAR Right 5/12/2016    Procedure: OPEN REDUCTION INTERNAL FIXATION RIGHT DISTAL RADIUS (EXTRA-ARTICULAR); Surgeon: Jayshree Isaac MD;  [facility];  Service: Orthopedics    TONSILLECTOMY         No family history on file  Social History     Occupational History    [employer]     Social History Main Topics    Smoking status: Never Smoker    Smokeless tobacco: Never Used    Alcohol use No    Drug use: No    Sexual activity: Not on file         Current Outpatient Prescriptions:     DULoxetine (CYMBALTA) 60 mg delayed release capsule, TAKE 1 CAPSULE BY MOUTH TWICE DAILY, Disp: 180 capsule, Rfl: 0    hydroxychloroquine (PLAQUENIL) 200 mg tablet, Take 1 tablet (200 mg total) by mouth 2 (two) times a day with meals, Disp: 180 tablet, Rfl: 0    meloxicam (MOBIC) 7 5 mg tablet, Take 1 tablet (7 5 mg total) by mouth 2 (two) times a day with meals, Disp: 60 tablet, Rfl: 2    senna (SENOKOT) 8 6 mg, Take 2 tablets (17 2 mg total) by mouth daily at bedtime, Disp: 180 each, Rfl: 1    Allergies   Allergen Reactions    Other      seasonal       Objective:  Vitals:    08/28/18 0953   BP: 123/70   Pulse: 97       Ortho Exam     Right wrist    Full ROM with pronation and supination  Full ROM with extension and flexion at the wrist  Full ulnar deviation  Full radial deviation  NVI      Physical Exam   Constitutional: She is oriented to person, place, and time  She appears well-developed and well-nourished  HENT:   Head: Atraumatic  Eyes: Conjunctivae are normal    Neck: Normal range of motion  Cardiovascular: Normal rate      Pulmonary/Chest: Effort normal    Musculoskeletal:   As noted in HPI   Neurological: She is alert and oriented to person, place, and time  Skin: Skin is warm and dry  Psychiatric: She has a normal mood and affect   Her behavior is normal  Judgment and thought content normal

## 2018-09-04 ENCOUNTER — EVALUATION (OUTPATIENT)
Dept: PHYSICAL THERAPY | Age: 53
End: 2018-09-04
Payer: COMMERCIAL

## 2018-09-04 DIAGNOSIS — M25.531 PAIN IN RIGHT WRIST: ICD-10-CM

## 2018-09-04 DIAGNOSIS — R29.898 WRIST WEAKNESS: Primary | ICD-10-CM

## 2018-09-04 PROCEDURE — 97161 PT EVAL LOW COMPLEX 20 MIN: CPT | Performed by: PHYSICAL MEDICINE & REHABILITATION

## 2018-09-04 PROCEDURE — G8991 OTHER PT/OT GOAL STATUS: HCPCS | Performed by: PHYSICAL MEDICINE & REHABILITATION

## 2018-09-04 PROCEDURE — G8990 OTHER PT/OT CURRENT STATUS: HCPCS | Performed by: PHYSICAL MEDICINE & REHABILITATION

## 2018-09-04 NOTE — PROGRESS NOTES
PT Evaluation     Today's date: 2018  Patient name: Keaton Baker  : 1965  MRN: [id number]  Referring provider: Alistair Ayon MD  Dx: No diagnosis found  Assessment  Impairments: abnormal or restricted ROM, impaired physical strength, lacks appropriate home exercise program and pain with function    Assessment details: Patient presents with persistent strength and mobility limitations in R hand and wrist as well as intermittent pain with function  Patient would benefit from skilled PT to address current deficits and maximize performance of functional activity  Plan to include manual therapy, stretching, strengthening, updated HEP  Thank you for the referral     Understanding of Dx/Px/POC: good   Prognosis: good    Goals  1  Patient will report decreased pain with activity by at least 2 points 4 weeks  2  Patient will perform R hand opposition without limitation within 4 weeks  3  Patient will be independent with HEP by discharge  4  Patient will return to opening tight jars and sweeping without limitation by discharge    Plan  Patient would benefit from: skilled physical therapy  Planned modality interventions: thermotherapy: hydrocollator packs and cryotherapy  Planned therapy interventions: manual therapy, joint mobilization, neuromuscular re-education, stretching, strengthening, therapeutic activities, therapeutic exercise and home exercise program  Frequency: 2x week  Duration in weeks: 4  Treatment plan discussed with: patient        Subjective Evaluation    History of Present Illness  Mechanism of injury: Patient presents with complaints of intermittent R wrist pain and difficulty with opposition  Patient has participated in therapy for this issue previously following surgical repair of fracture per patient report  Patient notes tightness with full hand closure, denies N/T and edema   Patient notes difficulty with opening jars, gripping broom to sweep, any twisting motion involving RUE  Patient is RHD    Pain  Current pain ratin  At best pain ratin  At worst pain ratin          Objective     Active Range of Motion     Right Wrist   Wrist flexion: 60 degrees   Wrist extension: 57 degrees   Radial deviation: 15 degrees   Ulnar deviation: 32 degrees     Additional Active Range of Motion Details  R Elbow and shoulder grossly WFL without pain  Slight limitation in opposition secondary to 5th digit mobility vs  Thumb CMC and MCP      Passive Range of Motion     Additional Passive Range of Motion Details  Firm end feel with all wrist and hand PROM  R 5th digit PIP lacks 10 degrees of extension  - Flexion: -10 to 80 degrees    Strength/Myotome Testing     Right Wrist/Hand   Wrist flexion: 4    Additional Strength Details  R supination 4+/5  Elbow and shoulder strength symmetrical and WFL    General Comments     Wrist/Hand Comments   strength: symmetrical between sides  Observation: incision site well healed and remodeled, no sgs of infection, no hypersensitivity          Precautions: Fibromyalgia    Daily Treatment Diary     Manual              Wrist PROM             5th MCP,PIP stretching                                                        Exercise Diary              Putty squeeze             Wrist flexion/ext stretch             Flex/ext twist with therabar             Sup/pronation with therabar                                                                                                                                                                                                                                 Modalities

## 2018-09-07 ENCOUNTER — OFFICE VISIT (OUTPATIENT)
Dept: PHYSICAL THERAPY | Age: 53
End: 2018-09-07
Payer: COMMERCIAL

## 2018-09-07 DIAGNOSIS — R29.898 WRIST WEAKNESS: Primary | ICD-10-CM

## 2018-09-07 PROCEDURE — 97140 MANUAL THERAPY 1/> REGIONS: CPT | Performed by: PHYSICAL THERAPIST

## 2018-09-07 PROCEDURE — 97110 THERAPEUTIC EXERCISES: CPT | Performed by: PHYSICAL THERAPIST

## 2018-09-07 NOTE — PROGRESS NOTES
Daily Note     Today's date: 2018  Patient name: Lea Amin  : 1965  MRN: [id number]  Referring provider: Lizzie Coon MD  Dx:   Encounter Diagnosis     ICD-10-CM    1  Wrist weakness R29 898                   Subjective: 1st and 5th pain noted  Objective: See treatment diary below    Manual              Wrist PROM nt            5th MCP,PIP stretching All fingers - 15 minutes                         UBE 8 minutes                             Exercise Diary              Putty squeeze nt            Wrist flexion/ext stretch nt            Flex/ext twist with therabar therabar            Sup/pronation with therabar therabar            Rubber bands  3 sets            Gripper 3 sets                                                                                                                                                                                                      Modalities                                                         Assessment: Tolerated treatment well  Patient would benefit from continued PT  Tight  Plan: Continue per plan of care

## 2018-09-10 ENCOUNTER — OFFICE VISIT (OUTPATIENT)
Dept: PHYSICAL THERAPY | Age: 53
End: 2018-09-10
Payer: COMMERCIAL

## 2018-09-10 DIAGNOSIS — M25.531 PAIN IN RIGHT WRIST: ICD-10-CM

## 2018-09-10 DIAGNOSIS — R29.898 WRIST WEAKNESS: Primary | ICD-10-CM

## 2018-09-10 PROCEDURE — 97140 MANUAL THERAPY 1/> REGIONS: CPT | Performed by: SPECIALIST/TECHNOLOGIST

## 2018-09-10 PROCEDURE — 97110 THERAPEUTIC EXERCISES: CPT | Performed by: SPECIALIST/TECHNOLOGIST

## 2018-09-10 NOTE — PROGRESS NOTES
Daily Note     Today's date: 9/10/2018  Patient name: Haseeb Bishop  : 1965  MRN: [id number]  Referring provider: Yohan Crews MD  Dx:   Encounter Diagnosis     ICD-10-CM    1  Wrist weakness R29 898    2  Pain in right wrist M25 531                   Subjective: Pt continues to report weakness in R hand  Objective: See treatment diary below    Manual  9/7 9/10           Wrist PROM nt            5th MCP,PIP stretching All fingers - 15 minutes 15'                        UBE 8 minutes 8 min                            Exercise Diary              Putty squeeze nt 5'           Wrist flexion/ext stretch nt 4x30"           Flex/ext twist with therabar therabar 20x each           Sup/pronation with therabar therabar 20x each           Rubber bands  3 sets 3 sets           Gripper 3 sets Red 3x10                                                                                                                                                                                                     Modalities                                                           Assessment: Tolerated treatment well  Patient demonstrated fatigue post treatment, exhibited good technique with therapeutic exercises and would benefit from continued PT  Pt required rest breaks with hand gripping exercises and wrist flexion/extension due to weakness- pt denies pain with exercises  Plan: Continue per plan of care  Progress treatment as tolerated

## 2018-09-12 ENCOUNTER — OFFICE VISIT (OUTPATIENT)
Dept: PHYSICAL THERAPY | Age: 53
End: 2018-09-12
Payer: COMMERCIAL

## 2018-09-12 DIAGNOSIS — R29.898 WRIST WEAKNESS: Primary | ICD-10-CM

## 2018-09-12 PROCEDURE — 97110 THERAPEUTIC EXERCISES: CPT | Performed by: PHYSICAL THERAPIST

## 2018-09-12 PROCEDURE — 97140 MANUAL THERAPY 1/> REGIONS: CPT | Performed by: PHYSICAL THERAPIST

## 2018-09-12 NOTE — PROGRESS NOTES
Daily Note     Today's date: 2018  Patient name: Bryan Harris  : 1965  MRN: [id number]  Referring provider: Kory Wu MD  Dx:   Encounter Diagnosis     ICD-10-CM    1  Wrist weakness R29 898                   Subjective: No complaints offered  Objective: See treatment diary below    Manual  9/7 9/10 9/12          Wrist PROM nt            5th MCP,PIP stretching All fingers - 15 minutes 15' 15 minutes                       UBE 8 minutes 8 min 10 minutes                           Exercise Diary              Putty squeeze nt 5'           Wrist flexion/ext stretch nt 4x30" completed          Flex/ext twist with therabar therabar 20x each completed          Sup/pronation with therabar therabar 20x each completed          Rubber bands  3 sets 3 sets completed          Gripper 3 sets Red 3x10 completed                                                                                                                                                                                                    Modalities                                                         Assessment: Tolerated treatment well  Patient would benefit from continued PT  Better PROM noted  Plan: Continue per plan of care

## 2018-09-17 ENCOUNTER — OFFICE VISIT (OUTPATIENT)
Dept: PHYSICAL THERAPY | Age: 53
End: 2018-09-17
Payer: COMMERCIAL

## 2018-09-17 DIAGNOSIS — R29.898 WRIST WEAKNESS: Primary | ICD-10-CM

## 2018-09-17 PROCEDURE — 97110 THERAPEUTIC EXERCISES: CPT

## 2018-09-17 PROCEDURE — 97140 MANUAL THERAPY 1/> REGIONS: CPT

## 2018-09-17 NOTE — PROGRESS NOTES
Daily Note     Today's date: 2018  Patient name: Georgia Martínez  : 1965  MRN: [id number]  Referring provider: Marizol Harrison MD  Dx:   Encounter Diagnosis     ICD-10-CM    1  Wrist weakness R29 898                   Subjective: pt  Denies wrist pain though does c/o finger discomfort      Objective: See treatment diary below      Assessment: Tolerated treatment well  Patient would benefit from continued PT      Plan: Continue per plan of care           Manual  9/7 9/10 9/12 9/17         Wrist PROM nt            5th MCP,PIP stretching All fingers - 15 minutes 15' 15 minutes 15 min                      UBE 8 minutes 8 min 10 minutes 10min alt                          Exercise Diary              Putty squeeze nt 5'           Wrist flexion/ext stretch nt 4x30" completed 30sec x 4         Flex/ext twist with therabar therabar 20x each completed 20         Sup/pronation with therabar therabar 20x each completed 20         Rubber bands  3 sets 3 sets completed yes         Gripper 3 sets Red 3x10 completed 30/red                                                                                                                                                                                                   Modalities

## 2018-09-19 ENCOUNTER — ANNUAL EXAM (OUTPATIENT)
Dept: OBGYN CLINIC | Facility: CLINIC | Age: 53
End: 2018-09-19
Payer: COMMERCIAL

## 2018-09-19 ENCOUNTER — OFFICE VISIT (OUTPATIENT)
Dept: PHYSICAL THERAPY | Age: 53
End: 2018-09-19
Payer: COMMERCIAL

## 2018-09-19 VITALS
DIASTOLIC BLOOD PRESSURE: 60 MMHG | BODY MASS INDEX: 27.14 KG/M2 | SYSTOLIC BLOOD PRESSURE: 104 MMHG | HEIGHT: 64 IN | WEIGHT: 159 LBS

## 2018-09-19 DIAGNOSIS — B37.2 CUTANEOUS CANDIDIASIS: ICD-10-CM

## 2018-09-19 DIAGNOSIS — Z12.31 ENCOUNTER FOR SCREENING MAMMOGRAM FOR MALIGNANT NEOPLASM OF BREAST: ICD-10-CM

## 2018-09-19 DIAGNOSIS — R29.898 WRIST WEAKNESS: Primary | ICD-10-CM

## 2018-09-19 DIAGNOSIS — M25.531 PAIN IN RIGHT WRIST: ICD-10-CM

## 2018-09-19 DIAGNOSIS — Z01.419 WOMEN'S ANNUAL ROUTINE GYNECOLOGICAL EXAMINATION: Primary | ICD-10-CM

## 2018-09-19 PROBLEM — E78.5 HYPERLIPIDEMIA: Status: ACTIVE | Noted: 2017-04-12

## 2018-09-19 PROCEDURE — 99396 PREV VISIT EST AGE 40-64: CPT | Performed by: OBSTETRICS & GYNECOLOGY

## 2018-09-19 PROCEDURE — 97140 MANUAL THERAPY 1/> REGIONS: CPT

## 2018-09-19 PROCEDURE — 97110 THERAPEUTIC EXERCISES: CPT

## 2018-09-19 RX ORDER — NYSTATIN AND TRIAMCINOLONE ACETONIDE 100000; 1 [USP'U]/G; MG/G
OINTMENT TOPICAL 2 TIMES DAILY
Qty: 30 G | Refills: 1 | Status: SHIPPED | OUTPATIENT
Start: 2018-09-19 | End: 2019-02-08

## 2018-09-19 NOTE — PROGRESS NOTES
Daily Note     Today's date: 2018  Patient name: Keaton Baker  : 1965  MRN: [id number]  Referring provider: Alistair Ayon MD  Dx:   Encounter Diagnosis     ICD-10-CM    1  Wrist weakness R29 898    2  Pain in right wrist M25 531                   Subjective: Patient reports wrist is good but her fingers still bother her  Overall is getting better  Objective: See treatment diary below      Assessment: Tolerated treatment well  Patient would benefit from continued PT  Cont improvement as patient is able to make more a fist with less discomfort  Plan: Continue per plan of care       Manual  9/7 9/10 9/12 9/17  9/19             Wrist PROM nt                     5th MCP,PIP stretching All fingers - 15 minutes 15' 15 minutes 15 min  15 min                                     UBE 8 minutes 8 min 10 minutes 10min alt  10 min alt                                           Exercise Diary                     Putty squeeze nt 5'                   Wrist flexion/ext stretch nt 4x30" completed 30sec x 4  30s x4             Flex/ext twist with therabar therabar 20x each completed 20  20x ea             Sup/pronation with therabar therabar 20x each completed 20  20x ea             Rubber bands  3 sets 3 sets completed yes  2 sets             Gripper 3 sets Red 3x10 completed 30/red  30x green                                                                                                                                                                                                                                                                                                                                                                   Modalities

## 2018-09-19 NOTE — PROGRESS NOTES
ASSESSMENT & PLAN: Cecilia Veronica is a 52 y o   with normal gynecologic exam     1   Routine well woman exam done today  2   Pap and HPV: not indicated, sp hysterectomy  3  Yearly mammography recommended  4   Colonoscopy up to date  5  The patient is not sexually active  6  The following were reviewed in today's visit: breast self exam, mammography screening ordered, exercise and healthy diet  7  Patient to return to office in 12 months for annual exam    8  Cutaneous candida - mycolog II sent to phamacy  All questions have been answered to her satisfaction  CC:  Annual Gynecologic Examination    HPI: Cecilia Veronica is a 52 y o  Irma Razo who presents for annual gynecologic examination  She has the following concerns: itching on the ride side of her in groin crease on right  Health Maintenance:    She exercises 3 days per week  She wears her seatbelt routinely  She does sometimes perform regular monthly self breast exams  She feels safe at home  Patients does try to follow a balanced diet  Last mammogram: 2018  Last colonoscopy:        Past Medical History:   Diagnosis Date    Acid reflux     Anemia     last assessed 2016    Arthritis     rheumatoid    Depression     Fibromyalgia     [facility]     last assessed 10/29/2015       Past Surgical History:   Procedure Laterality Date    HYSTERECTOMY      TX COLONOSCOPY FLX DX W/COLLJ SPEC WHEN PFRMD N/A 2016    Procedure: COLONOSCOPY;  Surgeon: Hina Holbrook DO;  [facility]; Service: Gastroenterology    TX OPEN RX DISTAL RADIUS FX, EXTRA-ARTICULAR Right 2016    Procedure: OPEN REDUCTION INTERNAL FIXATION RIGHT DISTAL RADIUS (EXTRA-ARTICULAR); Surgeon: Jaleesa Alberts MD;  [facility];  Service: Orthopedics    TONSILLECTOMY         Past OB/Gyn History:   No LMP recorded      Menstrual History:  OB History      Para Term  AB Living    2 2 2     1    SAB TAB Ectopic Multiple Live Births            1         Me  No LMP recorded  Menstrual history: Patient is post menopausal    Patient is not currently sexually active: heterosexual  Birth control: postmenopausal      History reviewed  No pertinent family history  Social History:  Social History     Social History    Marital status: Single     Spouse name: N/A    Number of children: N/A    Years of education: N/A     Occupational History    [employer]     Social History Main Topics    Smoking status: Never Smoker    Smokeless tobacco: Never Used    Alcohol use No    Drug use: No    Sexual activity: No     Other Topics Concern    Not on file     Social History Narrative    No narrative on file     Presently lives with daughter and son  Patient is currently employed  Allergies   Allergen Reactions    Other      seasonal         Current Outpatient Prescriptions:     DULoxetine (CYMBALTA) 60 mg delayed release capsule, TAKE 1 CAPSULE BY MOUTH TWICE DAILY, Disp: 180 capsule, Rfl: 0    hydroxychloroquine (PLAQUENIL) 200 mg tablet, Take 1 tablet (200 mg total) by mouth 2 (two) times a day with meals, Disp: 180 tablet, Rfl: 0    meloxicam (MOBIC) 7 5 mg tablet, Take 1 tablet (7 5 mg total) by mouth 2 (two) times a day with meals, Disp: 60 tablet, Rfl: 2    senna (SENOKOT) 8 6 mg, Take 2 tablets (17 2 mg total) by mouth daily at bedtime, Disp: 180 each, Rfl: 1    Review of Systems:  Review of Systems   Constitutional: Negative for unexpected weight change  Respiratory: Negative for shortness of breath  Cardiovascular: Negative for chest pain  Gastrointestinal: Negative for abdominal pain  Genitourinary: Negative for difficulty urinating, menstrual problem, vaginal bleeding and vaginal discharge           Physical Exam:  /60   Ht 5' 3 5" (1 613 m)   Wt 72 1 kg (159 lb)   BMI 27 72 kg/m²    Physical Exam   Constitutional: She is oriented to person, place, and time  She appears well-developed and well-nourished  Genitourinary: Pelvic exam was performed with patient supine  There is no rash, tenderness or lesion on the right labia  There is no rash, tenderness or lesion on the left labia  Vagina exhibits no lesion (cuff intact, absent cervix and uterus) and no rugosity  No tenderness or bleeding in the vagina  No vaginal discharge found  Right adnexum does not display mass and does not display tenderness  Left adnexum does not display mass and does not display tenderness  HENT:   Head: Normocephalic  Neck: Normal range of motion  No thyromegaly present  Cardiovascular: Normal rate and regular rhythm  Pulmonary/Chest: Effort normal and breath sounds normal  No respiratory distress  She has no wheezes  Abdominal: Soft  She exhibits no distension  There is no tenderness  There is no guarding  Neurological: She is alert and oriented to person, place, and time  Psychiatric: She has a normal mood and affect   Her behavior is normal

## 2018-09-24 ENCOUNTER — OFFICE VISIT (OUTPATIENT)
Dept: PHYSICAL THERAPY | Age: 53
End: 2018-09-24
Payer: COMMERCIAL

## 2018-09-24 DIAGNOSIS — R29.898 WRIST WEAKNESS: Primary | ICD-10-CM

## 2018-09-24 PROCEDURE — 97140 MANUAL THERAPY 1/> REGIONS: CPT | Performed by: PHYSICAL THERAPIST

## 2018-09-24 PROCEDURE — 97110 THERAPEUTIC EXERCISES: CPT | Performed by: PHYSICAL THERAPIST

## 2018-09-24 NOTE — PROGRESS NOTES
Daily Note     Today's date: 2018  Patient name: Massiel Velazquez  : 1965  MRN: [id number]  Referring provider: Goyo Fung MD  Dx:   Encounter Diagnosis     ICD-10-CM    1  Wrist weakness R29 898                   Subjective: Better PROM and strength      Objective: See treatment diary below    Manual  9/7 9/10 9/12 9/17  9/19  9/24           Wrist PROM nt                     5th MCP,PIP stretching All fingers - 15 minutes 15' 15 minutes 15 min  15 min  15 minutes                                   UBE 8 minutes 8 min 10 minutes 10min alt  10 min alt  10 minutes                                         Exercise Diary                     Putty squeeze nt 5'                   Wrist flexion/ext stretch nt 4x30" completed 30sec x 4  30s x4  completed           Flex/ext twist with therabar therabar 20x each completed 20  20x ea  completed           Sup/pronation with therabar therabar 20x each completed 20  20x ea  completed           Rubber bands  3 sets 3 sets completed yes  2 sets  x2           Gripper 3 sets Red 3x10 completed 30/red  30x green  x30                                                                                                                                                                                                                                                                                                                                                                 Modalities                                                                                                    Assessment: Tolerated treatment well  Patient would benefit from continued PT  Full PROM noted at end of the treatment  Plan: Continue per plan of care

## 2018-09-26 ENCOUNTER — EVALUATION (OUTPATIENT)
Dept: PHYSICAL THERAPY | Age: 53
End: 2018-09-26
Payer: COMMERCIAL

## 2018-09-26 DIAGNOSIS — R29.898 WRIST WEAKNESS: Primary | ICD-10-CM

## 2018-09-26 PROCEDURE — 97110 THERAPEUTIC EXERCISES: CPT | Performed by: PHYSICAL THERAPIST

## 2018-09-26 PROCEDURE — 97140 MANUAL THERAPY 1/> REGIONS: CPT | Performed by: PHYSICAL THERAPIST

## 2018-09-26 NOTE — PROGRESS NOTES
Daily Note     Today's date: 2018  Patient name: Giorgio Jama  : 1965  MRN: [id number]  Referring provider: Mauricio Harrison MD  Dx:   Encounter Diagnosis     ICD-10-CM    1  Wrist weakness R29 898                   Subjective: Feeling good  Objective: See treatment diary below    Manual  9/7 9/10 9/12 9/17  9/19  9/24  9/26         Wrist PROM nt                     5th MCP,PIP stretching All fingers - 15 minutes 15' 15 minutes 15 min  15 min  15 minutes  15 minutes                                 UBE 8 minutes 8 min 10 minutes 10min alt  10 min alt  10 minutes  10 minutes                                       Exercise Diary                     Putty squeeze nt 5'                   Wrist flexion/ext stretch nt 4x30" completed 30sec x 4  30s x4  completed  x4         Flex/ext twist with therabar therabar 20x each completed 20  20x ea  completed  x20         Sup/pronation with therabar therabar 20x each completed 20  20x ea  completed  x20         Rubber bands  3 sets 3 sets completed yes  2 sets  x2  x2         Gripper 3 sets Red 3x10 completed 30/red  30x green  x30  x3                                                                                                                                                                                                                                                                                                                                                               Modalities                                                                                                    Assessment: Tolerated treatment well  Patient would benefit from continued PT   D/C to HEP, goals achieved  Plan: Continue per plan of care

## 2018-10-09 ENCOUNTER — OFFICE VISIT (OUTPATIENT)
Dept: OBGYN CLINIC | Facility: HOSPITAL | Age: 53
End: 2018-10-09
Payer: OTHER MISCELLANEOUS

## 2018-10-09 VITALS
WEIGHT: 156 LBS | DIASTOLIC BLOOD PRESSURE: 70 MMHG | HEIGHT: 64 IN | BODY MASS INDEX: 26.63 KG/M2 | SYSTOLIC BLOOD PRESSURE: 114 MMHG | HEART RATE: 100 BPM

## 2018-10-09 DIAGNOSIS — M25.531 PAIN IN RIGHT WRIST: Primary | ICD-10-CM

## 2018-10-09 PROCEDURE — 99213 OFFICE O/P EST LOW 20 MIN: CPT | Performed by: ORTHOPAEDIC SURGERY

## 2018-10-09 NOTE — PROGRESS NOTES
52 y o female presents for continuing treatment of right wrist weakness/pain with distant history of right wrist open reduction/internal fixation of distal radius fracture  She has been working with physical therapy and occupational therapy and a states she has been improving and she has been graduated to home exercise program    She returns today with request for a work note in terms of payment of benefits  Review of Systems  Review of systems negative unless otherwise specified in HPI    Past Medical History  Past Medical History:   Diagnosis Date    Acid reflux     Anemia     last assessed 03/23/2016    Arthritis     rheumatoid    Depression     Fibromyalgia     Rheumatoid arthritis ([facility] [address] )     last assessed 10/29/2015       Past Surgical History  Past Surgical History:   Procedure Laterality Date    HYSTERECTOMY      AZ COLONOSCOPY FLX DX W/COLLJ SPEC WHEN PFRMD N/A 5/5/2016    Procedure: COLONOSCOPY;  Surgeon: Dayana Campbell DO;  [facility]: [facility]; Service: Gastroenterology    AZ OPEN RX DISTAL RADIUS FX, EXTRA-ARTICULAR Right 5/12/2016    Procedure: OPEN REDUCTION INTERNAL FIXATION RIGHT DISTAL RADIUS (EXTRA-ARTICULAR);   Surgeon: Jonnie Tsai MD;  [facility];  Service: Orthopedics    TONSILLECTOMY         Current Medications  Current Outpatient Prescriptions on File Prior to Visit   Medication Sig Dispense Refill    DULoxetine (CYMBALTA) 60 mg delayed release capsule TAKE 1 CAPSULE BY MOUTH TWICE DAILY 180 capsule 0    hydroxychloroquine (PLAQUENIL) 200 mg tablet Take 1 tablet (200 mg total) by mouth 2 (two) times a day with meals 180 tablet 0    meloxicam (MOBIC) 7 5 mg tablet Take 1 tablet (7 5 mg total) by mouth 2 (two) times a day with meals 60 tablet 2    nystatin-triamcinolone (MYCOLOG-II) ointment Apply topically 2 (two) times a day 30 g 1    senna (SENOKOT) 8 6 mg Take 2 tablets (17 2 mg total) by mouth daily at bedtime 180 each 1     No current facility-administered medications on file prior to visit  Recent Labs [facility])    0  Lab Value Date/Time   HCT 39 0 07/19/2018 1419   HCT 35 7 02/07/2017 1706   HGB 12 4 07/19/2018 1419   HGB 11 4 (L) 02/07/2017 1706   WBC 6 71 07/19/2018 1419   WBC 7 3 02/07/2017 1706   ESR 8 07/19/2018 1419   ESR 6 02/07/2017 1706   CRP <3 0 07/19/2018 1419   CRP 0 24 02/07/2017 1706   GLUCOSE 98 04/29/2015 0931         Physical exam  Right hand:  No swelling at wrist  Hand warm/well perfused    Procedure  None    Imaging  No new imaging obtained today    A/P: 53F s/p distant history of  R wrist ORIF with improved weakness after OT/transition to home exercise program    Plan:  WBAT R wrist  Note provided that will hopefully support her doctors' visits and therapy session bills given that this current treatment is a direct sequela of her initial injury that was covered under workers' compensation    Follow-up PREMA Pereira  10/09/18

## 2018-10-12 ENCOUNTER — OFFICE VISIT (OUTPATIENT)
Dept: RHEUMATOLOGY | Facility: CLINIC | Age: 53
End: 2018-10-12
Payer: COMMERCIAL

## 2018-10-12 VITALS
WEIGHT: 156 LBS | SYSTOLIC BLOOD PRESSURE: 120 MMHG | HEIGHT: 64 IN | DIASTOLIC BLOOD PRESSURE: 70 MMHG | HEART RATE: 88 BPM | BODY MASS INDEX: 26.63 KG/M2

## 2018-10-12 DIAGNOSIS — K21.9 GASTROESOPHAGEAL REFLUX DISEASE WITHOUT ESOPHAGITIS: Primary | ICD-10-CM

## 2018-10-12 DIAGNOSIS — M79.7 FIBROMYALGIA: ICD-10-CM

## 2018-10-12 DIAGNOSIS — M06.9 RHEUMATOID ARTHRITIS INVOLVING MULTIPLE JOINTS (HCC): ICD-10-CM

## 2018-10-12 PROCEDURE — 99214 OFFICE O/P EST MOD 30 MIN: CPT | Performed by: PHYSICIAN ASSISTANT

## 2018-10-12 RX ORDER — DULOXETIN HYDROCHLORIDE 60 MG/1
60 CAPSULE, DELAYED RELEASE ORAL 2 TIMES DAILY
Qty: 180 CAPSULE | Refills: 0 | Status: SHIPPED | OUTPATIENT
Start: 2018-10-12 | End: 2018-12-17 | Stop reason: SDUPTHER

## 2018-10-12 RX ORDER — MELOXICAM 7.5 MG/1
7.5 TABLET ORAL 2 TIMES DAILY WITH MEALS
Qty: 60 TABLET | Refills: 2 | Status: SHIPPED | OUTPATIENT
Start: 2018-10-12 | End: 2019-02-08 | Stop reason: SDUPTHER

## 2018-10-12 NOTE — PROGRESS NOTES
Assessment and Plan: This is a 67-year-old female presenting today for follow-up for a seronegative rheumatoid arthritis, as well as fibromyalgia  The patient states that she does continue to have widespread joint and muscle pain  She has found relief with Cymbalta, as well as meloxicam   She also had been treating with hydroxychloroquine however due to insurance issues, she has stop this medication  She has not noticed any change in her symptoms since stopping hydroxychlolquine  On exam today, there is no synovitis  Patient does have tenderness of multiple joints, as well as multiple myofascial tender points of the upper and lower extremities as well as along the spine  Patient's exam today does not reveal active inflammation  Patient will remain off of DMARD therapy at this time  I do believe that the majority of her current pain complaints are result of myofascial pain syndrome  Since patient has found relief with Cymbalta, we will continue on this medication at its current dose  She may utilize meloxicam as needed for joint pain  Patient was asked to obtain blood work before her next appointment  If patient does not have signs of inflammation at her next office visit, we will plan to follow up with the patient on as needed basis  If she does have active inflammation at her next follow up, we may consider restarting DMARD therapy with an alternative to hydroxychloroquine such as sulfasalazine  In addition patient was requesting that a disability form be completed for housing  I did complete this form for her however I did discuss with her that I do not believe she is disabled and I do believe she is able to work at this time  Patient did voice understanding she will return to the office in 3 months time however was asked to contact the office in the interim if she has any further questions or concerns        Plan:  Diagnoses and all orders for this visit:    Gastroesophageal reflux disease without esophagitis    Fibromyalgia  -     meloxicam (MOBIC) 7 5 mg tablet; Take 1 tablet (7 5 mg total) by mouth 2 (two) times a day with meals  -     DULoxetine (CYMBALTA) 60 mg delayed release capsule; Take 1 capsule (60 mg total) by mouth 2 (two) times a day    Rheumatoid arthritis involving multiple joints (HCC)  -     meloxicam (MOBIC) 7 5 mg tablet; Take 1 tablet (7 5 mg total) by mouth 2 (two) times a day with meals  -     DULoxetine (CYMBALTA) 60 mg delayed release capsule; Take 1 capsule (60 mg total) by mouth 2 (two) times a day  -     CBC and differential  -     Comprehensive metabolic panel  -     C-reactive protein  -     Sedimentation rate, automated        Rheumatic Disease Summary:  Established care- May, 2015- hx of FMS and RA diagnosed by unknown physician on HCQ and Cymbalta  -Started on Mobic         HPI  Zeyad Giraldo is a 52 y o   female who presents today for follow up for seronegative RA/FMS  Pain in the low back and knees  Also int he hands and feet  Not taking HCQ due to insurance issues  Swelling in the feet  Occasionally in the hands with swelling  +AM stiffness x all day  No difficulty with sleep, occasionally with difficulty  +fatigue  The following portions of the patient's history were reviewed and updated as appropriate: allergies, current medications, past family history, past medical history, past social history, past surgical history and problem list     Review of Systems:   Review of Systems   Constitutional: Positive for fatigue and fever (low grade occasionally )  Negative for appetite change, chills and unexpected weight change  HENT: Negative for congestion, mouth sores and sore throat  No recent infxn    Eyes: Negative for pain, redness and visual disturbance  No dry eye or dry mouth    Respiratory: Negative for cough, chest tightness and shortness of breath  Cardiovascular: Negative for chest pain and leg swelling  Gastrointestinal: Negative for abdominal pain, blood in stool, constipation, diarrhea, nausea and vomiting  Endocrine: Negative for polydipsia and polyuria  Genitourinary: Negative for frequency and hematuria  Skin: Negative for color change and rash  +Hair loss  No nail changes aside from brittle  No raynauds   Neurological: Positive for headaches  Negative for weakness, light-headedness and numbness  Hematological: Negative for adenopathy  Psychiatric/Behavioral: Negative for behavioral problems  The patient is not nervous/anxious  Home Medications:     Current Outpatient Prescriptions:     DULoxetine (CYMBALTA) 60 mg delayed release capsule, TAKE 1 CAPSULE BY MOUTH TWICE DAILY, Disp: 180 capsule, Rfl: 0    hydroxychloroquine (PLAQUENIL) 200 mg tablet, Take 1 tablet (200 mg total) by mouth 2 (two) times a day with meals, Disp: 180 tablet, Rfl: 0    meloxicam (MOBIC) 7 5 mg tablet, Take 1 tablet (7 5 mg total) by mouth 2 (two) times a day with meals, Disp: 60 tablet, Rfl: 2    nystatin-triamcinolone (MYCOLOG-II) ointment, Apply topically 2 (two) times a day, Disp: 30 g, Rfl: 1    senna (SENOKOT) 8 6 mg, Take 2 tablets (17 2 mg total) by mouth daily at bedtime, Disp: 180 each, Rfl: 1    Objective:    Vitals:    10/12/18 1345   BP: 120/70   BP Location: Left arm   Patient Position: Sitting   Cuff Size: Standard   Pulse: 88   Weight: 70 8 kg (156 lb)   Height: 5' 3 5" (1 613 m)       Physical Exam   Constitutional: She is oriented to person, place, and time  She appears well-developed and well-nourished  HENT:   Head: Normocephalic  Mouth/Throat: Oropharynx is clear and moist    Eyes: Pupils are equal, round, and reactive to light  Conjunctivae are normal    Neck: Normal range of motion  Neck supple  Cardiovascular: Normal rate, regular rhythm and normal heart sounds      Pulmonary/Chest: Effort normal and breath sounds normal    Musculoskeletal:   +Multiple myofascial tender points of the upper and lower extremities and the spine  +Crepitus of the knees  Neurological: She is alert and oriented to person, place, and time  Skin: Skin is warm and dry  Psychiatric: She has a normal mood and affect   Her behavior is normal      Musculoskeletal--Peripheral Joint Exam:  Physical Exam     Tenderness:   RUE: glenohumeral, ulnohumeral and radiohumeral and wrist  LUE: glenohumeral, ulnohumeral and radiohumeral and wrist  Right hand: 2nd MCP, 3rd MCP, 4th MCP, 5th MCP, 2nd PIP, 3rd PIP, 4th PIP and 5th PIP  Left hand: 2nd MCP, 3rd MCP, 4th MCP, 5th MCP, 2nd PIP, 3rd PIP, 4th PIP, 5th PIP, 2nd DIP, 3rd DIP, 4th DIP and 5th DIP  RLE: tibiofemoral and tibiotalar  LLE: tibiofemoral and tibiotalar  Right foot: 1st MTP, 2nd MTP, 3rd MTP, 4th MTP and 5th MTP  Left foot: 1st MTP, 2nd MTP, 3rd MTP, 4th MTP and 5th MTP    LINCOLN-28 tender joint count: 24  LINCOLN-28 swollen joint count: 0

## 2018-10-17 ENCOUNTER — TELEPHONE (OUTPATIENT)
Dept: RHEUMATOLOGY | Facility: CLINIC | Age: 53
End: 2018-10-17

## 2018-10-17 NOTE — TELEPHONE ENCOUNTER
Caller: Patient   C/B #: [phone] [facility]    Patient states that Pharmacy only dispensed 30 pills of the Cymbalta  She is going away on the 27th and she needs a 90 day supply as specified on the script   She is asking if we can please assist   Thanks     [employer] [id number],   [employer] [address]

## 2018-10-18 NOTE — TELEPHONE ENCOUNTER
Mitali Arango, can you let patient know that she cannot get a 90 day supply due to her insurance  She will have to discuss her refills with them, thanks

## 2018-10-18 NOTE — TELEPHONE ENCOUNTER
Called pharmacy and they stated it was because of her insurance  They will only cover a 30 day supply of this medication

## 2018-10-18 NOTE — TELEPHONE ENCOUNTER
Looks like I sent 90 day supply to pharmacy, can you just confirm with the pharmacy that I did  Not sure why patient only got 30 , thanks

## 2018-10-19 ENCOUNTER — OFFICE VISIT (OUTPATIENT)
Dept: INTERNAL MEDICINE CLINIC | Facility: CLINIC | Age: 53
End: 2018-10-19
Payer: COMMERCIAL

## 2018-10-19 VITALS
SYSTOLIC BLOOD PRESSURE: 152 MMHG | BODY MASS INDEX: 26.67 KG/M2 | DIASTOLIC BLOOD PRESSURE: 82 MMHG | OXYGEN SATURATION: 97 % | HEIGHT: 64 IN | HEART RATE: 111 BPM | WEIGHT: 156.2 LBS

## 2018-10-19 DIAGNOSIS — M06.09 RHEUMATOID ARTHRITIS OF MULTIPLE SITES WITH NEGATIVE RHEUMATOID FACTOR (HCC): ICD-10-CM

## 2018-10-19 DIAGNOSIS — F32.0 CURRENT MILD EPISODE OF MAJOR DEPRESSIVE DISORDER WITHOUT PRIOR EPISODE (HCC): ICD-10-CM

## 2018-10-19 DIAGNOSIS — E55.9 VITAMIN D DEFICIENCY: ICD-10-CM

## 2018-10-19 DIAGNOSIS — E78.2 MIXED HYPERLIPIDEMIA: ICD-10-CM

## 2018-10-19 DIAGNOSIS — Z23 NEED FOR INFLUENZA VACCINATION: ICD-10-CM

## 2018-10-19 DIAGNOSIS — F41.9 ANXIETY: Primary | ICD-10-CM

## 2018-10-19 PROCEDURE — 90471 IMMUNIZATION ADMIN: CPT

## 2018-10-19 PROCEDURE — 90682 RIV4 VACC RECOMBINANT DNA IM: CPT

## 2018-10-19 PROCEDURE — 99214 OFFICE O/P EST MOD 30 MIN: CPT | Performed by: INTERNAL MEDICINE

## 2018-10-19 PROCEDURE — 3008F BODY MASS INDEX DOCD: CPT | Performed by: INTERNAL MEDICINE

## 2018-10-19 PROCEDURE — 1036F TOBACCO NON-USER: CPT | Performed by: INTERNAL MEDICINE

## 2018-10-19 RX ORDER — BUSPIRONE HYDROCHLORIDE 5 MG/1
5 TABLET ORAL 2 TIMES DAILY
Qty: 60 TABLET | Refills: 3 | Status: SHIPPED | OUTPATIENT
Start: 2018-10-19 | End: 2019-04-12 | Stop reason: SDUPTHER

## 2018-10-19 NOTE — PROGRESS NOTES
Assessment/Plan:  Cont Cymbalta  Start Buspar   She recalls taking Wellbutrin before which did not help     Problem List Items Addressed This Visit        Musculoskeletal and Integument    Rheumatoid arthritis of multiple sites with negative rheumatoid factor (HCC)    Relevant Orders    TSH, 3rd generation with Free T4 reflex       Other    Hyperlipidemia    Relevant Orders    Lipid panel    TSH, 3rd generation with Free T4 reflex      Other Visit Diagnoses     Need for influenza vaccination    -  Primary    Relevant Orders    influenza vaccine, 6626-2149, quadrivalent, recombinant, PF, 0 5 mL, for patients 18 yr+ (FLUBLOK) (Completed)    Anxiety        Relevant Medications    busPIRone (BUSPAR) 5 mg tablet    Other Relevant Orders    TSH, 3rd generation with Free T4 reflex    Vitamin D deficiency        Relevant Orders    Vitamin D 25 hydroxy            Subjective:      Patient ID: Alida Ramirez is a 52 y o  female  HPI  Here for routine follow up  RA fibromyalgia,sees rheum  On Mobic and Cymbalta  Reporting increasing anxiety  Stress at home living with daughter and son  Says she needs to move out  Visiting mother in [place] next week    The following portions of the patient's history were reviewed and updated as appropriate: allergies, current medications, past family history, past medical history, past social history, past surgical history and problem list     Review of Systems   Constitutional: Negative for fatigue, fever and unexpected weight change  HENT: Negative for ear pain, hearing loss, sinus pain, sinus pressure and sore throat  Respiratory: Negative for cough, shortness of breath and wheezing  Cardiovascular: Positive for leg swelling  Negative for chest pain and palpitations  Gastrointestinal: Negative for abdominal pain, constipation, diarrhea, nausea and vomiting  Musculoskeletal: Positive for arthralgias  Negative for myalgias     Neurological: Negative for dizziness and headaches  Psychiatric/Behavioral: Positive for dysphoric mood and sleep disturbance  The patient is nervous/anxious  Objective:      /82   Pulse (!) 111   Ht 5' 3 5" (1 613 m)   Wt 70 9 kg (156 lb 3 2 oz)   SpO2 97%   BMI 27 24 kg/m²          Physical Exam   Constitutional: She is oriented to person, place, and time  She appears well-developed and well-nourished  HENT:   Head: Normocephalic and atraumatic  Right Ear: External ear normal    Left Ear: External ear normal    Mouth/Throat: Oropharynx is clear and moist    Eyes: Conjunctivae are normal    Neck: Neck supple  Cardiovascular: Normal rate, regular rhythm and normal heart sounds  No murmur heard  Pulmonary/Chest: Effort normal and breath sounds normal  No respiratory distress  She has no wheezes  She has no rales  Abdominal: Soft  Bowel sounds are normal  She exhibits no distension and no mass  There is no tenderness  There is no rebound and no guarding  Musculoskeletal: Normal range of motion  Neurological: She is alert and oriented to person, place, and time  Skin: Skin is warm and dry  Psychiatric: She has a normal mood and affect   Her behavior is normal  Judgment and thought content normal

## 2018-12-12 ENCOUNTER — TELEPHONE (OUTPATIENT)
Dept: OBGYN CLINIC | Facility: HOSPITAL | Age: 53
End: 2018-12-12

## 2018-12-17 ENCOUNTER — TELEPHONE (OUTPATIENT)
Dept: OBGYN CLINIC | Facility: HOSPITAL | Age: 53
End: 2018-12-17

## 2018-12-17 DIAGNOSIS — M79.7 FIBROMYALGIA: ICD-10-CM

## 2018-12-17 DIAGNOSIS — M06.9 RHEUMATOID ARTHRITIS INVOLVING MULTIPLE JOINTS (HCC): ICD-10-CM

## 2018-12-17 RX ORDER — DULOXETIN HYDROCHLORIDE 60 MG/1
60 CAPSULE, DELAYED RELEASE ORAL 2 TIMES DAILY
Qty: 180 CAPSULE | Refills: 0 | Status: SHIPPED | OUTPATIENT
Start: 2018-12-17 | End: 2019-01-10 | Stop reason: SDUPTHER

## 2018-12-17 NOTE — TELEPHONE ENCOUNTER
Patient called to cancel F/UP  appt today 12/17 with Dr Dayana Nicolas  She also asked if she could have her DuLoxetine 60mg  refilled       She states as soon as she has new insurance she will reschedule for her F/UP

## 2019-01-10 ENCOUNTER — TELEPHONE (OUTPATIENT)
Dept: OBGYN CLINIC | Facility: CLINIC | Age: 54
End: 2019-01-10

## 2019-01-10 DIAGNOSIS — M06.9 RHEUMATOID ARTHRITIS INVOLVING MULTIPLE JOINTS (HCC): ICD-10-CM

## 2019-01-10 DIAGNOSIS — M79.7 FIBROMYALGIA: ICD-10-CM

## 2019-01-10 RX ORDER — DULOXETIN HYDROCHLORIDE 60 MG/1
60 CAPSULE, DELAYED RELEASE ORAL 2 TIMES DAILY
Qty: 180 CAPSULE | Refills: 0 | Status: SHIPPED | OUTPATIENT
Start: 2019-01-10 | End: 2019-02-08 | Stop reason: SDUPTHER

## 2019-01-10 NOTE — TELEPHONE ENCOUNTER
Pt contacted Call Center requested refill of their medication  Medication Name: cymbalta      Dosage of Med: 60 mg      Frequency of Med: 2/day      Remaining Medication:       Pharmacy and Location: [facility] [address] [place]      Thank you

## 2019-02-07 NOTE — PROGRESS NOTES
Assessment and Plan: This is a 24-year-old female presenting today for follow-up for a history of seronegative rheumatoid arthritis and fibromyalgia  The patient states that due to insurance changes she has been off of Plaquenil for about 6 months now and has not noticed any change in her joint complaints  She has been utilizing meloxicam with Cymbalta with improvement  She states that she was diagnosed with RA by her previous rheumatologist in [facility] and was on [address] at that time  It does appear the patient is confused about her previous diagnosis as [address] is generally use for myofascial pain syndrome and not RA  Patient's exam does not reveal any evidence of inflammatory arthritis however she does have multiple myofascial tender points which would be more consistent with myofascial pain syndrome  Her repeat labs in 2015 did reveal a negative CCP and negative rheumatoid factor  I believe the majority of patient's complaints at this time a result of myofascial pain syndrome and I do not see any evidence of inflammatory arthritis  Patient would like to return back to [address] as she did have significant response to this  Patient will continue with meloxicam however was given instructions on how to wean Cymbalta  She is currently utilizing Cymbalta 60 mg twice a day and was asked to decrease her dose to 60 mg daily for at least the next 3 weeks  She will then further wean to 60 mg every other day for at least another 3 weeks and then stop the medication  Once she is off the medication and feeling well, she will contact the office and I will send a prescription in to the pharmacy for [address]    I will continue to follow her for the next 3 months however if she has no evidence of synovitis at her next appointment, I do not believe we are dealing with an inflammatory arthritis and she will follow up on an as-needed basis         Plan:  Diagnoses and all orders for this visit:    Rheumatoid arthritis of multiple sites with negative rheumatoid factor (HCC)    Fibromyalgia    Other long term (current) drug therapy              Rheumatic Disease Summary:  Established care- May, 2015- hx of FMS and ?RA diagnosed by unknown physician on HCQ and Cymbalta  States she was previously on Kwaxuma with good response   -Started on Mobic with improvement  -October, 2018- off plaquenil without any change in joint symptoms           HPI  Kathrin Maravilla is a 52 y o   female who presents today for follow up for history of seronegative RA  Still off of Plaquenil and no change in joint pain  Some low back pain, elbows, and knees  +Swelling in BLE, not  joint swelling  +AM stiffness in the hands x few minutes  Taking Cymbalta and Mobic with improvement  Was seeing a rheumatologist in [place] prior to here and dx with RA  Was on medication Savella 10 mg with improvement  Increased fatigue           The following portions of the patient's history were reviewed and updated as appropriate: allergies, current medications, past family history, past medical history, past social history, past surgical history and problem list     Review of Systems:   Review of Systems   Constitutional: Negative for appetite change, chills, fatigue, fever and unexpected weight change  HENT: Positive for congestion  Negative for mouth sores and sore throat  Eyes: Negative for pain, redness and visual disturbance  +Dry mouth  No dry eyes  Respiratory: Negative for cough, chest tightness and shortness of breath  Cardiovascular: Negative for chest pain and leg swelling  Gastrointestinal: Negative for abdominal pain, blood in stool, constipation, diarrhea, nausea and vomiting  Endocrine: Negative for polydipsia and polyuria  Genitourinary: Negative for frequency and hematuria  Skin: Negative for color change and rash  +Some hair loss and nail changes    No raynauds    Neurological: Negative for weakness, light-headedness, numbness and headaches (intermittently )  Hematological: Negative for adenopathy  Psychiatric/Behavioral: Negative for behavioral problems  The patient is not nervous/anxious  Home Medications:     Current Outpatient Prescriptions:     busPIRone (BUSPAR) 5 mg tablet, Take 1 tablet (5 mg total) by mouth 2 (two) times a day, Disp: 60 tablet, Rfl: 3    DULoxetine (CYMBALTA) 60 mg delayed release capsule, Take 1 capsule (60 mg total) by mouth 2 (two) times a day, Disp: 180 capsule, Rfl: 0    meloxicam (MOBIC) 7 5 mg tablet, Take 1 tablet (7 5 mg total) by mouth 2 (two) times a day with meals, Disp: 60 tablet, Rfl: 2    nystatin-triamcinolone (MYCOLOG-II) ointment, Apply topically 2 (two) times a day, Disp: 30 g, Rfl: 1    senna (SENOKOT) 8 6 mg, Take 2 tablets (17 2 mg total) by mouth daily at bedtime, Disp: 180 each, Rfl: 1    Objective:    Vitals:    02/08/19 1514   BP: 110/80   Pulse: (!) 120   Weight: 75 8 kg (167 lb)   Height: 5' 3" (1 6 m)       Physical Exam   Constitutional: She is oriented to person, place, and time  She appears well-developed and well-nourished  HENT:   Head: Normocephalic  Mouth/Throat: Oropharynx is clear and moist    Eyes: Pupils are equal, round, and reactive to light  Conjunctivae are normal    Neck: Normal range of motion  Neck supple  Cardiovascular: Normal rate, regular rhythm and normal heart sounds  Pulmonary/Chest: Effort normal and breath sounds normal    Musculoskeletal:   +Myofascial tender points throughout the spine and of the upper and lower extremities  +Crepitus of the knees  Neurological: She is alert and oriented to person, place, and time  Skin: Skin is warm and dry  Psychiatric: She has a normal mood and affect   Her behavior is normal      Musculoskeletal--Peripheral Joint Exam:  Physical Exam     Tenderness:   RUE: glenohumeral, ulnohumeral and radiohumeral and wrist  LUE: glenohumeral, ulnohumeral and radiohumeral and wrist  Right hand: 2nd MCP, 3rd MCP, 4th MCP, 5th MCP, 2nd PIP, 3rd PIP, 4th PIP and 5th PIP  Left hand: 2nd MCP, 3rd MCP, 4th MCP, 5th MCP, 2nd PIP, 3rd PIP, 4th PIP and 5th PIP  RLE: acetabulofemoral, tibiofemoral and tibiotalar  LLE: acetabulofemoral, tibiofemoral and tibiotalar    LINCOLN-28 tender joint count: 24  LINCOLN-28 swollen joint count: 0      Labs:  Component      Latest Ref Rng & Units 1/63/2736   CYCLIC CITRULLINATED PEPTIDE ANTIBODY      0 - 19 units <1   RHEUMATOID FACTOR      Negative Negative

## 2019-02-08 ENCOUNTER — OFFICE VISIT (OUTPATIENT)
Dept: RHEUMATOLOGY | Facility: CLINIC | Age: 54
End: 2019-02-08
Payer: COMMERCIAL

## 2019-02-08 VITALS
HEART RATE: 120 BPM | BODY MASS INDEX: 29.59 KG/M2 | WEIGHT: 167 LBS | HEIGHT: 63 IN | SYSTOLIC BLOOD PRESSURE: 110 MMHG | DIASTOLIC BLOOD PRESSURE: 80 MMHG

## 2019-02-08 DIAGNOSIS — Z79.899 OTHER LONG TERM (CURRENT) DRUG THERAPY: ICD-10-CM

## 2019-02-08 DIAGNOSIS — M79.7 FIBROMYALGIA: ICD-10-CM

## 2019-02-08 DIAGNOSIS — M06.9 RHEUMATOID ARTHRITIS INVOLVING MULTIPLE JOINTS (HCC): ICD-10-CM

## 2019-02-08 DIAGNOSIS — M06.09 RHEUMATOID ARTHRITIS OF MULTIPLE SITES WITH NEGATIVE RHEUMATOID FACTOR (HCC): Primary | ICD-10-CM

## 2019-02-08 PROCEDURE — 99214 OFFICE O/P EST MOD 30 MIN: CPT | Performed by: PHYSICIAN ASSISTANT

## 2019-02-08 RX ORDER — DULOXETIN HYDROCHLORIDE 60 MG/1
60 CAPSULE, DELAYED RELEASE ORAL 2 TIMES DAILY
Qty: 180 CAPSULE | Refills: 1 | Status: SHIPPED | OUTPATIENT
Start: 2019-02-08 | End: 2019-11-18 | Stop reason: ALTCHOICE

## 2019-02-08 RX ORDER — MELOXICAM 7.5 MG/1
7.5 TABLET ORAL 2 TIMES DAILY WITH MEALS
Qty: 180 TABLET | Refills: 1 | Status: SHIPPED | OUTPATIENT
Start: 2019-02-08 | End: 2019-10-21 | Stop reason: ALTCHOICE

## 2019-02-13 ENCOUNTER — APPOINTMENT (OUTPATIENT)
Dept: LAB | Facility: HOSPITAL | Age: 54
End: 2019-02-13
Payer: COMMERCIAL

## 2019-02-13 LAB
25(OH)D3 SERPL-MCNC: 22 NG/ML (ref 30–100)
ALBUMIN SERPL BCP-MCNC: 4 G/DL (ref 3.5–5)
ALP SERPL-CCNC: 93 U/L (ref 46–116)
ALT SERPL W P-5'-P-CCNC: 21 U/L (ref 12–78)
ANION GAP SERPL CALCULATED.3IONS-SCNC: 5 MMOL/L (ref 4–13)
AST SERPL W P-5'-P-CCNC: 19 U/L (ref 5–45)
BASOPHILS # BLD AUTO: 0.03 THOUSANDS/ΜL (ref 0–0.1)
BASOPHILS NFR BLD AUTO: 1 % (ref 0–1)
BILIRUB SERPL-MCNC: 0.38 MG/DL (ref 0.2–1)
BUN SERPL-MCNC: 9 MG/DL (ref 5–25)
CALCIUM SERPL-MCNC: 8.7 MG/DL (ref 8.3–10.1)
CHLORIDE SERPL-SCNC: 104 MMOL/L (ref 100–108)
CHOLEST SERPL-MCNC: 262 MG/DL (ref 50–200)
CO2 SERPL-SCNC: 28 MMOL/L (ref 21–32)
CREAT SERPL-MCNC: 0.6 MG/DL (ref 0.6–1.3)
CRP SERPL QL: <3 MG/L
EOSINOPHIL # BLD AUTO: 0.15 THOUSAND/ΜL (ref 0–0.61)
EOSINOPHIL NFR BLD AUTO: 2 % (ref 0–6)
ERYTHROCYTE [DISTWIDTH] IN BLOOD BY AUTOMATED COUNT: 15.1 % (ref 11.6–15.1)
ERYTHROCYTE [SEDIMENTATION RATE] IN BLOOD: 7 MM/HOUR (ref 0–20)
GFR SERPL CREATININE-BSD FRML MDRD: 103 ML/MIN/1.73SQ M
GLUCOSE P FAST SERPL-MCNC: 97 MG/DL (ref 65–99)
HCT VFR BLD AUTO: 37.1 % (ref 34.8–46.1)
HDLC SERPL-MCNC: 80 MG/DL (ref 40–60)
HGB BLD-MCNC: 11.3 G/DL (ref 11.5–15.4)
IMM GRANULOCYTES # BLD AUTO: 0.03 THOUSAND/UL (ref 0–0.2)
IMM GRANULOCYTES NFR BLD AUTO: 1 % (ref 0–2)
LDLC SERPL CALC-MCNC: 145 MG/DL (ref 0–100)
LYMPHOCYTES # BLD AUTO: 1.96 THOUSANDS/ΜL (ref 0.6–4.47)
LYMPHOCYTES NFR BLD AUTO: 31 % (ref 14–44)
MCH RBC QN AUTO: 24 PG (ref 26.8–34.3)
MCHC RBC AUTO-ENTMCNC: 30.5 G/DL (ref 31.4–37.4)
MCV RBC AUTO: 79 FL (ref 82–98)
MONOCYTES # BLD AUTO: 0.41 THOUSAND/ΜL (ref 0.17–1.22)
MONOCYTES NFR BLD AUTO: 7 % (ref 4–12)
NEUTROPHILS # BLD AUTO: 3.7 THOUSANDS/ΜL (ref 1.85–7.62)
NEUTS SEG NFR BLD AUTO: 58 % (ref 43–75)
NONHDLC SERPL-MCNC: 182 MG/DL
NRBC BLD AUTO-RTO: 0 /100 WBCS
PLATELET # BLD AUTO: 319 THOUSANDS/UL (ref 149–390)
PMV BLD AUTO: 9.1 FL (ref 8.9–12.7)
POTASSIUM SERPL-SCNC: 3.8 MMOL/L (ref 3.5–5.3)
PROT SERPL-MCNC: 7.4 G/DL (ref 6.4–8.2)
RBC # BLD AUTO: 4.71 MILLION/UL (ref 3.81–5.12)
SODIUM SERPL-SCNC: 137 MMOL/L (ref 136–145)
TRIGL SERPL-MCNC: 184 MG/DL
TSH SERPL DL<=0.05 MIU/L-ACNC: 1.82 UIU/ML (ref 0.36–3.74)
WBC # BLD AUTO: 6.28 THOUSAND/UL (ref 4.31–10.16)

## 2019-02-13 PROCEDURE — 80061 LIPID PANEL: CPT | Performed by: INTERNAL MEDICINE

## 2019-02-13 PROCEDURE — 82306 VITAMIN D 25 HYDROXY: CPT | Performed by: INTERNAL MEDICINE

## 2019-02-13 PROCEDURE — 84443 ASSAY THYROID STIM HORMONE: CPT | Performed by: INTERNAL MEDICINE

## 2019-02-13 PROCEDURE — 80053 COMPREHEN METABOLIC PANEL: CPT | Performed by: PHYSICIAN ASSISTANT

## 2019-02-13 PROCEDURE — 86140 C-REACTIVE PROTEIN: CPT | Performed by: PHYSICIAN ASSISTANT

## 2019-02-13 PROCEDURE — 85652 RBC SED RATE AUTOMATED: CPT | Performed by: PHYSICIAN ASSISTANT

## 2019-02-13 PROCEDURE — 36415 COLL VENOUS BLD VENIPUNCTURE: CPT | Performed by: PHYSICIAN ASSISTANT

## 2019-02-13 PROCEDURE — 85025 COMPLETE CBC W/AUTO DIFF WBC: CPT | Performed by: PHYSICIAN ASSISTANT

## 2019-02-28 ENCOUNTER — TELEPHONE (OUTPATIENT)
Dept: RHEUMATOLOGY | Facility: CLINIC | Age: 54
End: 2019-02-28

## 2019-02-28 NOTE — TELEPHONE ENCOUNTER
Called patient and was sent to [facility]  I left a detailed message explaining the information below and asked that she call back to update on the status of the Cymbalta

## 2019-02-28 NOTE — TELEPHONE ENCOUNTER
Patient  [phone]  Sheila Alicia    Patient is requesting a medication change  She would like to stop taking DULoxetine (CYMBALTA) 60 mg as per Dr Philomena Mae   Please change it for  FAVALLA 25 mg please send to pharmacy on file

## 2019-02-28 NOTE — TELEPHONE ENCOUNTER
I gave patient weaning instructions on how to stop Cymbalta at her last appt, is she off cymbalta now? If so, I will send a script for Savella to the pharmacy  If she is still on Cymbalta, she needs to wean the medication before I will prescribe Savella, thanks

## 2019-04-12 ENCOUNTER — OFFICE VISIT (OUTPATIENT)
Dept: INTERNAL MEDICINE CLINIC | Facility: CLINIC | Age: 54
End: 2019-04-12
Payer: COMMERCIAL

## 2019-04-12 VITALS
WEIGHT: 164.4 LBS | HEIGHT: 63 IN | BODY MASS INDEX: 29.13 KG/M2 | HEART RATE: 92 BPM | DIASTOLIC BLOOD PRESSURE: 70 MMHG | SYSTOLIC BLOOD PRESSURE: 122 MMHG | OXYGEN SATURATION: 96 %

## 2019-04-12 DIAGNOSIS — D64.9 ANEMIA, UNSPECIFIED TYPE: ICD-10-CM

## 2019-04-12 DIAGNOSIS — M79.7 FIBROMYALGIA: ICD-10-CM

## 2019-04-12 DIAGNOSIS — E55.9 VITAMIN D DEFICIENCY: ICD-10-CM

## 2019-04-12 DIAGNOSIS — Z11.59 NEED FOR HEPATITIS C SCREENING TEST: ICD-10-CM

## 2019-04-12 DIAGNOSIS — F41.9 ANXIETY: Primary | ICD-10-CM

## 2019-04-12 DIAGNOSIS — E78.2 MIXED HYPERLIPIDEMIA: ICD-10-CM

## 2019-04-12 DIAGNOSIS — F32.0 CURRENT MILD EPISODE OF MAJOR DEPRESSIVE DISORDER WITHOUT PRIOR EPISODE (HCC): ICD-10-CM

## 2019-04-12 PROCEDURE — 99214 OFFICE O/P EST MOD 30 MIN: CPT | Performed by: INTERNAL MEDICINE

## 2019-04-12 PROCEDURE — 3008F BODY MASS INDEX DOCD: CPT | Performed by: INTERNAL MEDICINE

## 2019-04-12 PROCEDURE — 1036F TOBACCO NON-USER: CPT | Performed by: INTERNAL MEDICINE

## 2019-04-12 RX ORDER — BUSPIRONE HYDROCHLORIDE 5 MG/1
5 TABLET ORAL 2 TIMES DAILY
Qty: 180 TABLET | Refills: 1 | Status: SHIPPED | OUTPATIENT
Start: 2019-04-12 | End: 2019-08-12 | Stop reason: SDUPTHER

## 2019-05-06 ENCOUNTER — HOSPITAL ENCOUNTER (OUTPATIENT)
Dept: RADIOLOGY | Facility: HOSPITAL | Age: 54
Discharge: HOME | End: 2019-05-06
Attending: OBSTETRICS & GYNECOLOGY
Payer: COMMERCIAL

## 2019-05-06 VITALS — HEIGHT: 63 IN | BODY MASS INDEX: 29.06 KG/M2 | WEIGHT: 164 LBS

## 2019-05-06 DIAGNOSIS — Z12.31 ENCOUNTER FOR SCREENING MAMMOGRAM FOR MALIGNANT NEOPLASM OF BREAST: ICD-10-CM

## 2019-05-06 PROBLEM — M15.0 PRIMARY OSTEOARTHRITIS INVOLVING MULTIPLE JOINTS: Status: ACTIVE | Noted: 2019-05-06

## 2019-05-06 PROBLEM — M15.9 PRIMARY OSTEOARTHRITIS INVOLVING MULTIPLE JOINTS: Status: ACTIVE | Noted: 2019-05-06

## 2019-05-06 PROCEDURE — 77067 SCR MAMMO BI INCL CAD: CPT

## 2019-05-14 DIAGNOSIS — K59.09 CHRONIC CONSTIPATION: ICD-10-CM

## 2019-05-14 RX ORDER — SENNOSIDES 8.6 MG
TABLET ORAL
Qty: 180 TABLET | Refills: 0 | Status: SHIPPED | OUTPATIENT
Start: 2019-05-14 | End: 2019-08-12 | Stop reason: SDUPTHER

## 2019-08-12 ENCOUNTER — OFFICE VISIT (OUTPATIENT)
Dept: INTERNAL MEDICINE CLINIC | Facility: CLINIC | Age: 54
End: 2019-08-12
Payer: COMMERCIAL

## 2019-08-12 VITALS
HEIGHT: 63 IN | OXYGEN SATURATION: 95 % | RESPIRATION RATE: 16 BRPM | DIASTOLIC BLOOD PRESSURE: 78 MMHG | WEIGHT: 162.2 LBS | BODY MASS INDEX: 28.74 KG/M2 | HEART RATE: 102 BPM | SYSTOLIC BLOOD PRESSURE: 112 MMHG

## 2019-08-12 DIAGNOSIS — K59.09 CHRONIC CONSTIPATION: ICD-10-CM

## 2019-08-12 DIAGNOSIS — F41.9 ANXIETY: Primary | ICD-10-CM

## 2019-08-12 DIAGNOSIS — Z11.59 NEED FOR HEPATITIS C SCREENING TEST: ICD-10-CM

## 2019-08-12 DIAGNOSIS — M79.7 FIBROMYALGIA: ICD-10-CM

## 2019-08-12 DIAGNOSIS — E78.2 MIXED HYPERLIPIDEMIA: ICD-10-CM

## 2019-08-12 DIAGNOSIS — E55.9 VITAMIN D DEFICIENCY: ICD-10-CM

## 2019-08-12 PROCEDURE — 99214 OFFICE O/P EST MOD 30 MIN: CPT | Performed by: INTERNAL MEDICINE

## 2019-08-12 PROCEDURE — 1036F TOBACCO NON-USER: CPT | Performed by: INTERNAL MEDICINE

## 2019-08-12 PROCEDURE — 3008F BODY MASS INDEX DOCD: CPT | Performed by: INTERNAL MEDICINE

## 2019-08-12 RX ORDER — SENNOSIDES 8.6 MG
2 TABLET ORAL
Qty: 180 TABLET | Refills: 1 | Status: SHIPPED | OUTPATIENT
Start: 2019-08-12 | End: 2020-08-11 | Stop reason: SDUPTHER

## 2019-08-12 RX ORDER — BUSPIRONE HYDROCHLORIDE 5 MG/1
5 TABLET ORAL 2 TIMES DAILY
Qty: 180 TABLET | Refills: 1 | Status: SHIPPED | OUTPATIENT
Start: 2019-08-12 | End: 2019-11-18 | Stop reason: ALTCHOICE

## 2019-08-12 NOTE — PROGRESS NOTES
Assessment/Plan:    Chronic constipation  Cont Senna  Colonoscopy due 2026    Fibromyalgia  Weaning off Cymbalta  May decrease to every other day x 3 weeks then stop  Schedule f/u with rheum    Anxiety  Good response to Buspar    Vitamin D deficiency  She stopped taking D as she did not feel well on it  Will check level         Problem List Items Addressed This Visit        Digestive    Chronic constipation     Cont Senna  Colonoscopy due 2026         Relevant Medications    senna (SENOKOT) 8 6 mg       Other    Hyperlipidemia    Relevant Orders    CBC and differential    Comprehensive metabolic panel    Lipid panel    Fibromyalgia     Weaning off Cymbalta  May decrease to every other day x 3 weeks then stop  Schedule f/u with rheum         Anxiety - Primary     Good response to Buspar         Relevant Medications    busPIRone (BUSPAR) 5 mg tablet    Vitamin D deficiency     She stopped taking D as she did not feel well on it  Will check level         Relevant Orders    Vitamin D 25 hydroxy      Other Visit Diagnoses     Need for hepatitis C screening test                Subjective:      Patient ID: Giorgio Jama is a 62 y o  female  HPI  Here for a follow up  Fibromyalgia managed by rheum  She is on Cymbalta 60mg a day x 2 months  Plan was to wean off then try [address] [facility]  No better/worse with less Cymbalta  She needed to cancel her May appt with them bec of vacation  Anxiety controlled on Buspar  Chronic constipation on Senna  Colonoscopy in 2016 normal    The following portions of the patient's history were reviewed and updated as appropriate: allergies, current medications, past family history, past social history, past surgical history and problem list     Review of Systems   Constitutional: Negative for fatigue, fever and unexpected weight change  HENT: Negative for ear pain, hearing loss, sinus pressure, sinus pain and sore throat           Occasional itchy nose   Eyes: Positive for itching (occasional)  Respiratory: Negative for cough, shortness of breath and wheezing  Cardiovascular: Positive for leg swelling  Negative for chest pain and palpitations  Gastrointestinal: Positive for constipation (better on Senna)  Negative for abdominal pain, blood in stool, diarrhea, nausea and vomiting  Genitourinary: Negative for difficulty urinating, dysuria, menstrual problem and vaginal bleeding  Musculoskeletal: Positive for arthralgias  Negative for myalgias  Neurological: Negative for dizziness and headaches (occasional)  Psychiatric/Behavioral: The patient is nervous/anxious (better on Buspar)  Objective:      /78   Pulse 102   Resp 16   Ht 5' 3" (1 6 m)   Wt 73 6 kg (162 lb 3 2 oz)   SpO2 95%   BMI 28 73 kg/m²          Physical Exam   Constitutional: She is oriented to person, place, and time  She appears well-developed and well-nourished  HENT:   Head: Normocephalic and atraumatic  Right Ear: External ear normal    Left Ear: External ear normal    Mouth/Throat: Oropharynx is clear and moist    Eyes: Conjunctivae are normal    Neck: Neck supple  Cardiovascular: Normal rate, regular rhythm and normal heart sounds  No murmur heard  Pulmonary/Chest: Effort normal and breath sounds normal  No respiratory distress  She has no wheezes  She has no rales  Abdominal: Soft  Bowel sounds are normal  She exhibits no distension and no mass  There is no tenderness  There is no rebound and no guarding  Musculoskeletal: Normal range of motion  Neurological: She is alert and oriented to person, place, and time  Skin: Skin is warm and dry  Psychiatric: She has a normal mood and affect   Her behavior is normal  Judgment and thought content normal

## 2019-09-20 ENCOUNTER — ANNUAL EXAM (OUTPATIENT)
Dept: OBGYN CLINIC | Facility: CLINIC | Age: 54
End: 2019-09-20
Payer: COMMERCIAL

## 2019-09-20 VITALS — BODY MASS INDEX: 28.66 KG/M2 | DIASTOLIC BLOOD PRESSURE: 66 MMHG | WEIGHT: 161.8 LBS | SYSTOLIC BLOOD PRESSURE: 120 MMHG

## 2019-09-20 DIAGNOSIS — Z01.419 WELL FEMALE EXAM WITH ROUTINE GYNECOLOGICAL EXAM: Primary | ICD-10-CM

## 2019-09-20 DIAGNOSIS — Z12.31 ENCOUNTER FOR SCREENING MAMMOGRAM FOR MALIGNANT NEOPLASM OF BREAST: ICD-10-CM

## 2019-09-20 PROCEDURE — 99396 PREV VISIT EST AGE 40-64: CPT | Performed by: OBSTETRICS & GYNECOLOGY

## 2019-09-20 NOTE — PROGRESS NOTES
ASSESSMENT & PLAN: Louis Willson is a 62 y o   with normal gynecologic exam     1   Routine well woman exam done today  2   Pap and HPV:Pap with HPV was not done today  Current ASCCP Guidelines reviewed  3   Mammogram ordered  Recommend yearly mammography  4   Colonoscopy recommended  5  The patient is not sexually active  6  The following were reviewed in today's visit: breast self exam, mammography screening ordered, menopause, osteoporosis, exercise and healthy diet  7  Patient to return to office in 12 months for annual exam      All questions have been answered to her satisfaction  CC:  Annual Gynecologic Examination    HPI: Louis Willson is a 62 y o  Laura Hallmark who presents for annual gynecologic examination  She has the following concerns:  None  Health Maintenance:    She exercises 4 days per week  She wears her seatbelt routinely  She does perform regular monthly self breast exams  She feels safe at home  Patient tries to follow a balanced diet  Last mammogram: 2019  Last colonoscopy:        Past Medical History:   Diagnosis Date    Acid reflux     Anemia     last assessed 2016    Arthritis     rheumatoid    Depression     Fibromyalgia     Rheumatoid arthritis ([facility] [address] )     last assessed 10/29/2015       Past Surgical History:   Procedure Laterality Date    HYSTERECTOMY      NE COLONOSCOPY FLX DX W/COLLJ SPEC WHEN PFRMD N/A 2016    Procedure: COLONOSCOPY;  Surgeon: Pilo Hall DO;  Location: [facility]; Service: Gastroenterology    NE OPEN RX DISTAL RADIUS FX, EXTRA-ARTICULAR Right 2016    Procedure: OPEN REDUCTION INTERNAL FIXATION RIGHT DISTAL RADIUS (EXTRA-ARTICULAR); Surgeon: Kenzie Virk MD;  Location[facility];  Service: Orthopedics    TONSILLECTOMY         Past OB/Gyn History:   No LMP recorded  Patient has had a hysterectomy      Menstrual History:  OB History        2    Para   2    Term   2         AB        Living   1       SAB        TAB        Ectopic        Multiple        Live Births   1                  No LMP recorded  Patient has had a hysterectomy  Menstrual history: Patient is post menopausal    History of sexually transmitted infection: No  Patient is not currently sexually active  Birth control: postmenopausal      Family History   Problem Relation Age of Onset    No Known Problems Mother     No Known Problems Father        Social History:  Social History     Socioeconomic History    Marital status: Single     Spouse name: Not on file    Number of children: Not on file    Years of education: Not on file    Highest education level: Not on file   Occupational History    Occupation: [employer]     Employer: [employer]   Social Needs    Financial resource strain: Not on file    Food insecurity:     Worry: Not on file     Inability: Not on file    Transportation needs:     Medical: Not on file     Non-medical: Not on file   Tobacco Use    Smoking status: Never Smoker    Smokeless tobacco: Never Used   Substance and Sexual Activity    Alcohol use: No    Drug use: No    Sexual activity: Never   Lifestyle    Physical activity:     Days per week: Not on file     Minutes per session: Not on file    Stress: Not on file   Relationships    Social connections:     Talks on phone: Not on file     Gets together: Not on file     Attends Anabaptism service: Not on file     Active member of club or organization: Not on file     Attends meetings of clubs or organizations: Not on file     Relationship status: Not on file    Intimate partner violence:     Fear of current or ex partner: Not on file     Emotionally abused: Not on file     Physically abused: Not on file     Forced sexual activity: Not on file   Other Topics Concern    Not on file   Social History Narrative    Not on file     Presently lives with daughter  Patient is single    Patient is currently employed  Allergies   Allergen Reactions    Other      seasonal         Current Outpatient Medications:     busPIRone (BUSPAR) 5 mg tablet, Take 1 tablet (5 mg total) by mouth 2 (two) times a day, Disp: 180 tablet, Rfl: 1    DULoxetine (CYMBALTA) 60 mg delayed release capsule, Take 1 capsule (60 mg total) by mouth 2 (two) times a day, Disp: 180 capsule, Rfl: 1    meloxicam (MOBIC) 7 5 mg tablet, Take 1 tablet (7 5 mg total) by mouth 2 (two) times a day with meals, Disp: 180 tablet, Rfl: 1    senna (SENOKOT) 8 6 mg, Take 2 tablets (17 2 mg total) by mouth daily at bedtime, Disp: 180 tablet, Rfl: 1    Review of Systems:  Review of Systems   Constitutional: Negative for unexpected weight change  Respiratory: Negative for shortness of breath  Cardiovascular: Negative for chest pain  Gastrointestinal: Positive for constipation  Negative for abdominal distention, abdominal pain, blood in stool, nausea and vomiting  Genitourinary: Negative for difficulty urinating, dysuria, frequency, urgency, vaginal bleeding and vaginal discharge  Neurological: Negative for headaches  Physical Exam:  /66 (BP Location: Right arm, Patient Position: Sitting, Cuff Size: Standard)   Wt 73 4 kg (161 lb 12 8 oz)   BMI 28 66 kg/m²    Physical Exam   Constitutional: She is oriented to person, place, and time  Vital signs are normal  She appears well-developed and well-nourished  Genitourinary: Pelvic exam was performed with patient supine  There is no rash, tenderness or lesion on the right labia  There is no rash, tenderness or lesion on the left labia  Vagina exhibits no lesion  Rugosity: moderate atrophy  There is tenderness in the vagina  No erythema or bleeding in the vagina  No vaginal discharge found  Right adnexum does not display mass, does not display tenderness and does not display fullness  Left adnexum does not display mass, does not display tenderness and does not display fullness  Genitourinary Comments: Absent uterus and cervix  Cuff intact  No palpable masses  No lesions visualized  No bladder tenderness  HENT:   Head: Normocephalic  Neck: No thyromegaly present  Cardiovascular: Normal rate, regular rhythm and normal heart sounds  Pulmonary/Chest: Effort normal and breath sounds normal  No respiratory distress  Right breast exhibits no inverted nipple, no mass, no nipple discharge, no skin change and no tenderness  Left breast exhibits no inverted nipple, no mass, no nipple discharge, no skin change and no tenderness  Abdominal: Soft  She exhibits no distension  There is no tenderness  Neurological: She is alert and oriented to person, place, and time  Psychiatric: She has a normal mood and affect  Her behavior is normal    Vitals reviewed

## 2019-10-18 ENCOUNTER — HOSPITAL ENCOUNTER (EMERGENCY)
Facility: HOSPITAL | Age: 54
Discharge: HOME | End: 2019-10-18
Attending: EMERGENCY MEDICINE
Payer: COMMERCIAL

## 2019-10-18 ENCOUNTER — APPOINTMENT (EMERGENCY)
Dept: RADIOLOGY | Facility: HOSPITAL | Age: 54
End: 2019-10-18
Payer: COMMERCIAL

## 2019-10-18 VITALS
TEMPERATURE: 98.4 F | DIASTOLIC BLOOD PRESSURE: 65 MMHG | WEIGHT: 161.82 LBS | BODY MASS INDEX: 28.67 KG/M2 | OXYGEN SATURATION: 98 % | RESPIRATION RATE: 18 BRPM | HEIGHT: 63 IN | HEART RATE: 88 BPM | SYSTOLIC BLOOD PRESSURE: 125 MMHG

## 2019-10-18 DIAGNOSIS — M54.9 BACK PAIN: ICD-10-CM

## 2019-10-18 DIAGNOSIS — M25.521 RIGHT ELBOW PAIN: ICD-10-CM
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
## 2019-10-18 DIAGNOSIS — M25.569 KNEE PAIN: ICD-10-CM

## 2019-10-18 LAB
ANION GAP SERPL CALCULATED.3IONS-SCNC: 5 MMOL/L (ref 4–13)
ATRIAL RATE: 103 BPM
BUN SERPL-MCNC: 10 MG/DL (ref 5–25)
CALCIUM SERPL-MCNC: 9.1 MG/DL (ref 8.3–10.1)
CHLORIDE SERPL-SCNC: 105 MMOL/L (ref 100–108)
CO2 SERPL-SCNC: 28 MMOL/L (ref 21–32)
CREAT SERPL-MCNC: 0.57 MG/DL (ref 0.6–1.3)
GFR SERPL CREATININE-BSD FRML MDRD: 104 ML/MIN/1.73SQ M
GLUCOSE SERPL-MCNC: 101 MG/DL (ref 65–140)
P AXIS: 46 DEGREES
POTASSIUM SERPL-SCNC: 3.8 MMOL/L (ref 3.5–5.3)
PR INTERVAL: 136 MS
QRS AXIS: -21 DEGREES
QRSD INTERVAL: 68 MS
QT INTERVAL: 348 MS
QTC INTERVAL: 455 MS
SODIUM SERPL-SCNC: 138 MMOL/L (ref 136–145)
T WAVE AXIS: 10 DEGREES
VENTRICULAR RATE: 103 BPM

## 2019-10-18 PROCEDURE — 73080 X-RAY EXAM OF ELBOW: CPT

## 2019-10-18 PROCEDURE — 96374 THER/PROPH/DIAG INJ IV PUSH: CPT

## 2019-10-18 PROCEDURE — 73564 X-RAY EXAM KNEE 4 OR MORE: CPT

## 2019-10-18 PROCEDURE — 74177 CT ABD & PELVIS W/CONTRAST: CPT

## 2019-10-18 PROCEDURE — 71260 CT THORAX DX C+: CPT

## 2019-10-18 PROCEDURE — 93010 ELECTROCARDIOGRAM REPORT: CPT | Performed by: INTERNAL MEDICINE

## 2019-10-18 PROCEDURE — 99285 EMERGENCY DEPT VISIT HI MDM: CPT

## 2019-10-18 PROCEDURE — 99284 EMERGENCY DEPT VISIT MOD MDM: CPT | Performed by: EMERGENCY MEDICINE

## 2019-10-18 PROCEDURE — 36415 COLL VENOUS BLD VENIPUNCTURE: CPT | Performed by: EMERGENCY MEDICINE

## 2019-10-18 PROCEDURE — 93005 ELECTROCARDIOGRAM TRACING: CPT

## 2019-10-18 PROCEDURE — 80048 BASIC METABOLIC PNL TOTAL CA: CPT | Performed by: EMERGENCY MEDICINE

## 2019-10-18 RX ORDER — KETOROLAC TROMETHAMINE 30 MG/ML
15 INJECTION, SOLUTION INTRAMUSCULAR; INTRAVENOUS ONCE
Status: DISCONTINUED | OUTPATIENT
Start: 2019-10-18 | End: 2019-10-18

## 2019-10-18 RX ORDER — ACETAMINOPHEN 325 MG/1
650 TABLET ORAL ONCE
Status: COMPLETED | OUTPATIENT
Start: 2019-10-18 | End: 2019-10-18

## 2019-10-18 RX ORDER — KETOROLAC TROMETHAMINE 30 MG/ML
15 INJECTION, SOLUTION INTRAMUSCULAR; INTRAVENOUS ONCE
Status: COMPLETED | OUTPATIENT
Start: 2019-10-18 | End: 2019-10-18

## 2019-10-18 RX ADMIN — KETOROLAC TROMETHAMINE 15 MG: 30 INJECTION, SOLUTION INTRAMUSCULAR at 07:54

## 2019-10-18 RX ADMIN — IOHEXOL 100 ML: 350 INJECTION, SOLUTION INTRAVENOUS at 09:05

## 2019-10-18 RX ADMIN — ACETAMINOPHEN 650 MG: 325 TABLET ORAL at 07:52
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
## 2019-10-21 ENCOUNTER — OFFICE VISIT (OUTPATIENT)
Dept: INTERNAL MEDICINE CLINIC | Facility: CLINIC | Age: 54
End: 2019-10-21
Payer: COMMERCIAL

## 2019-10-21 VITALS
BODY MASS INDEX: 28.88 KG/M2 | SYSTOLIC BLOOD PRESSURE: 122 MMHG | TEMPERATURE: 98.3 F | OXYGEN SATURATION: 96 % | HEIGHT: 63 IN | RESPIRATION RATE: 16 BRPM | HEART RATE: 105 BPM | DIASTOLIC BLOOD PRESSURE: 72 MMHG | WEIGHT: 163 LBS

## 2019-10-21 DIAGNOSIS — M62.830 BACK MUSCLE SPASM: Primary | ICD-10-CM

## 2019-10-21 PROCEDURE — 99214 OFFICE O/P EST MOD 30 MIN: CPT | Performed by: NURSE PRACTITIONER

## 2019-10-21 PROCEDURE — 96372 THER/PROPH/DIAG INJ SC/IM: CPT

## 2019-10-21 RX ORDER — KETOROLAC TROMETHAMINE 30 MG/ML
30 INJECTION, SOLUTION INTRAMUSCULAR; INTRAVENOUS ONCE
Status: COMPLETED | OUTPATIENT
Start: 2019-10-21 | End: 2019-10-21

## 2019-10-21 RX ORDER — CYCLOBENZAPRINE HCL 10 MG
10 TABLET ORAL 3 TIMES DAILY PRN
Qty: 7 TABLET | Refills: 0 | Status: SHIPPED | OUTPATIENT
Start: 2019-10-21 | End: 2019-11-25

## 2019-10-21 RX ADMIN — KETOROLAC TROMETHAMINE 30 MG: 30 INJECTION, SOLUTION INTRAMUSCULAR; INTRAVENOUS at 16:56
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
## 2019-10-29 PROBLEM — M62.830 BACK MUSCLE SPASM: Status: ACTIVE | Noted: 2019-10-29

## 2019-11-18 ENCOUNTER — OFFICE VISIT (OUTPATIENT)
Dept: INTERNAL MEDICINE CLINIC | Facility: CLINIC | Age: 54
End: 2019-11-18
Payer: COMMERCIAL

## 2019-11-18 VITALS
RESPIRATION RATE: 14 BRPM | WEIGHT: 164.4 LBS | OXYGEN SATURATION: 97 % | SYSTOLIC BLOOD PRESSURE: 132 MMHG | BODY MASS INDEX: 29.13 KG/M2 | DIASTOLIC BLOOD PRESSURE: 84 MMHG | HEART RATE: 93 BPM | HEIGHT: 63 IN

## 2019-11-18 DIAGNOSIS — G89.29 CHRONIC PAIN OF BOTH KNEES: ICD-10-CM

## 2019-11-18 DIAGNOSIS — M25.562 CHRONIC PAIN OF BOTH KNEES: ICD-10-CM

## 2019-11-18 DIAGNOSIS — M25.561 CHRONIC PAIN OF BOTH KNEES: ICD-10-CM

## 2019-11-18 DIAGNOSIS — R07.82 INTERCOSTAL PAIN: ICD-10-CM

## 2019-11-18 DIAGNOSIS — M54.2 CERVICALGIA: Primary | ICD-10-CM

## 2019-11-18 PROCEDURE — 99214 OFFICE O/P EST MOD 30 MIN: CPT | Performed by: NURSE PRACTITIONER

## 2019-11-18 RX ORDER — TRAMADOL HYDROCHLORIDE 50 MG/1
50 TABLET ORAL EVERY 6 HOURS PRN
Qty: 30 TABLET | Refills: 0
Start: 2019-11-18 | End: 2019-11-18 | Stop reason: SDUPTHER

## 2019-11-18 RX ORDER — TRAMADOL HYDROCHLORIDE 50 MG/1
50 TABLET ORAL EVERY 6 HOURS PRN
Qty: 30 TABLET | Refills: 0 | Status: SHIPPED | OUTPATIENT
Start: 2019-11-18 | End: 2019-12-16 | Stop reason: SDUPTHER

## 2019-11-21 PROBLEM — M54.2 CERVICALGIA: Status: ACTIVE | Noted: 2019-11-21
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
## 2019-11-25 ENCOUNTER — OFFICE VISIT (OUTPATIENT)
Dept: OBGYN CLINIC | Facility: CLINIC | Age: 54
End: 2019-11-25
Payer: COMMERCIAL

## 2019-11-25 ENCOUNTER — OFFICE VISIT (OUTPATIENT)
Dept: RHEUMATOLOGY | Facility: CLINIC | Age: 54
End: 2019-11-25
Payer: COMMERCIAL

## 2019-11-25 ENCOUNTER — OFFICE VISIT (OUTPATIENT)
Dept: INTERNAL MEDICINE CLINIC | Facility: CLINIC | Age: 54
End: 2019-11-25
Payer: COMMERCIAL

## 2019-11-25 VITALS
TEMPERATURE: 97.7 F | HEIGHT: 63 IN | WEIGHT: 165.4 LBS | HEART RATE: 99 BPM | DIASTOLIC BLOOD PRESSURE: 64 MMHG | BODY MASS INDEX: 29.3 KG/M2 | SYSTOLIC BLOOD PRESSURE: 112 MMHG | OXYGEN SATURATION: 98 %

## 2019-11-25 VITALS
WEIGHT: 164.46 LBS | HEART RATE: 88 BPM | SYSTOLIC BLOOD PRESSURE: 112 MMHG | DIASTOLIC BLOOD PRESSURE: 70 MMHG | BODY MASS INDEX: 29.14 KG/M2 | HEIGHT: 63 IN

## 2019-11-25 VITALS — BODY MASS INDEX: 29.23 KG/M2 | DIASTOLIC BLOOD PRESSURE: 64 MMHG | SYSTOLIC BLOOD PRESSURE: 110 MMHG | WEIGHT: 165 LBS

## 2019-11-25 DIAGNOSIS — B02.9 HERPES ZOSTER WITHOUT COMPLICATION: Primary | ICD-10-CM

## 2019-11-25 DIAGNOSIS — M25.50 POLYARTHRALGIA: ICD-10-CM

## 2019-11-25 DIAGNOSIS — M25.542 ARTHRALGIA OF BOTH HANDS: Primary | ICD-10-CM

## 2019-11-25 DIAGNOSIS — M25.541 ARTHRALGIA OF BOTH HANDS: Primary | ICD-10-CM

## 2019-11-25 DIAGNOSIS — M79.7 FIBROMYALGIA: ICD-10-CM

## 2019-11-25 PROCEDURE — 99214 OFFICE O/P EST MOD 30 MIN: CPT | Performed by: INTERNAL MEDICINE

## 2019-11-25 PROCEDURE — 99213 OFFICE O/P EST LOW 20 MIN: CPT | Performed by: OBSTETRICS & GYNECOLOGY

## 2019-11-25 PROCEDURE — 1036F TOBACCO NON-USER: CPT | Performed by: INTERNAL MEDICINE

## 2019-11-25 RX ORDER — GABAPENTIN 300 MG/1
300 CAPSULE ORAL 3 TIMES DAILY
Qty: 90 CAPSULE | Refills: 1 | Status: SHIPPED | OUTPATIENT
Start: 2019-11-25 | End: 2019-12-02 | Stop reason: SDUPTHER

## 2019-11-25 RX ORDER — VALACYCLOVIR HYDROCHLORIDE 1 G/1
1000 TABLET, FILM COATED ORAL 3 TIMES DAILY
Qty: 21 TABLET | Refills: 0 | Status: SHIPPED | OUTPATIENT
Start: 2019-11-25 | End: 2020-03-02 | Stop reason: ALTCHOICE

## 2019-11-25 NOTE — PROGRESS NOTES
Assessment and Plan:   Patient is a 68-year-old female who presents for rheumatology follow-up regarding prior diagnosis of rheumatoid arthritis and previously treated with Plaquenil, now off Plaquenil for over 1 year  She also has a diagnosis of fibromyalgia  Discussion with her reveals generalized joint and muscle pain but no typical features of an inflammatory arthritis  She does have tenderness and pain in her hands which is out of proportion to any findings on exam today  I discussed with her that I would like to clarify her diagnosis of rheumatoid arthritis as I do not see clear signs of this on exam today but do want to rule out subclinical synovitis  We will do an ultrasound to help assess for this  For her fibromyalgia she previously felt better on Savella  She states her family doctor would not prescribe this for her  We discussed my job is to rule out an underlying autoimmune or inflammatory disease like RA, and that I do not generally manage chronic noninflammatory pain like fibromyalgia  She is now off Cymbalta and in between medications  We discussed I will start her on the [facility] but in the long-term if we rule out rheumatoid arthritis on the  US, then she will not need long-term Rheumatology follow-up and will have to continue follow-up through her family doctor for the fibromyalgia  She was understanding and agreeable with that plan  We will determine future need for follow-up after her ultrasound result  Plan:  Diagnoses and all orders for this visit:    Arthralgia of both hands  -     US MSK complete; Future    Fibromyalgia  -     Milnacipran HCl (SAVELLA) 25 MG TABS; 1 tab daily x5 days, then 1 tab BID thereafter    Polyarthralgia        Follow-up plan: 2 months to review US        Rheumatic Disease Summary  1  Fibromyalgia, prior dx of ?RA  -Established care- May, 2015- hx of FMS and ?RA diagnosed by unknown physician presented on HCQ and Cymbalta   States she was previously on Bhavin Leaver with good response   -Labs 2015: negative RF, CCP  -Started on Mobic with improvement  -October, 2018- off plaquenil without any change in joint symptoms  -Feb, 2019- transitioned off Cymbalta, wanted to retry Oliver Leaver   -Visit 11/25/19: ordered hand US to clarify RA dx, started savella   2  Comorbidities: anxiety, depression     MARIAMA South is a 62 y o   female who presents for rheumatology follow-up for history of fibromyalgia and prior diagnosis of rheumatoid arthritis  She previously presented to this practice to the previous rheumatologist on Plaquenil therapy and Cymbalta  Over the year she has not manifested typical symptoms of RA and has now been off Plaquenil for over 1 year without change  She returns today for follow-up  Translation phone used for [place]  Patient is not sure who gave her prior dx of RA, was not a rheumatologist    Her worst pain is in her hands, knees, but has generalized myofascial tenderness  In the morning she has pain and stiffness in her elbows, hands, knees and feet  This does not improve with movement and activity  Pain is present all the time regardless of activity level  She states she has been off the cymbalta now for about 3-4 weeks and feels worse  She recalls being on the savella in the past which helped her  She states that her family doctor would not prescribe this for her  She has been off plaquenil now for over 1 year without any change in symptoms, no worsening and no joint swelling  No rashes, Raynaud's symptoms, or sicca symptoms  The following portions of the patient's history were reviewed and updated as appropriate: allergies, current medications, past family history, past medical history, past social history, past surgical history and problem list     Review of Systems:   Review of Systems   Constitutional: Negative for chills, fatigue, fever and unexpected weight change     HENT: Negative for mouth sores and trouble swallowing  Eyes: Negative for pain and visual disturbance  Respiratory: Negative for cough and shortness of breath  Cardiovascular: Negative for chest pain and leg swelling  Gastrointestinal: Negative for abdominal pain, blood in stool, constipation, diarrhea and nausea  Genitourinary: Negative for hematuria  Musculoskeletal: Positive for arthralgias and myalgias  Negative for back pain and joint swelling  Skin: Negative for rash  Neurological: Negative for weakness and numbness  Hematological: Negative for adenopathy  Psychiatric/Behavioral: Negative for sleep disturbance  Home Medications:    Current Outpatient Medications:     cyclobenzaprine (FLEXERIL) 10 mg tablet, Take 1 tablet (10 mg total) by mouth 3 (three) times a day as needed for muscle spasms, Disp: 7 tablet, Rfl: 0    senna (SENOKOT) 8 6 mg, Take 2 tablets (17 2 mg total) by mouth daily at bedtime, Disp: 180 tablet, Rfl: 1    traMADol (ULTRAM) 50 mg tablet, Take 1 tablet (50 mg total) by mouth every 6 (six) hours as needed for moderate pain, Disp: 30 tablet, Rfl: 0    Milnacipran HCl (SAVELLA) 25 MG TABS, 1 tab daily x5 days, then 1 tab BID thereafter, Disp: 60 tablet, Rfl: 2    Objective:    Vitals:    11/25/19 0923   BP: 112/70   BP Location: Left arm   Patient Position: Sitting   Cuff Size: Standard   Pulse: 88   Weight: 74 6 kg (164 lb 7 4 oz)   Height: 5' 3" (1 6 m)       Physical Exam   Constitutional: She appears well-developed and well-nourished  She is cooperative  HENT:   Head: Normocephalic and atraumatic  Eyes: Conjunctivae and EOM are normal  No scleral icterus  Neck: No spinous process tenderness and no muscular tenderness present  No thyromegaly present  Musculoskeletal:   Generalized soft tissue and joint tenderness  Tenderness in the hands out of proportion to any findings  No obvious synovitis or swelling on exam     Lymphadenopathy:     She has no cervical adenopathy     Neurological: She is alert  No sensory deficit  Skin: Skin is warm and dry  No rash noted  Nails show no clubbing  Psychiatric: She has a normal mood and affect  Imaging:   XR left knee 10/18/19:  Normal   XR right elbow 10/18/19:  Normal   XR wrists 2/2018:    No erosive changes     Labs:   Component      Latest Ref Rng & Units 10/18/2019   Sodium      136 - 145 mmol/L 138   Potassium      3 5 - 5 3 mmol/L 3 8   Chloride      100 - 108 mmol/L 105   CO2      21 - 32 mmol/L 28   Anion Gap      4 - 13 mmol/L 5   BUN      5 - 25 mg/dL 10   Creatinine      0 60 - 1 30 mg/dL 0 57 (L)   Glucose, Random      65 - 140 mg/dL 101   Calcium      8 3 - 10 1 mg/dL 9 1   eGFR      ml/min/1 73sq m 104     Component      Latest Ref Rng & Units 2/13/2019   WBC      4 31 - 10 16 Thousand/uL 6 28   Red Blood Cell Count      3 81 - 5 12 Million/uL 4 71   Hemoglobin      11 5 - 15 4 g/dL 11 3 (L)   HCT      34 8 - 46 1 % 37 1   MCV      82 - 98 fL 79 (L)   MCH      26 8 - 34 3 pg 24 0 (L)   MCHC      31 4 - 37 4 g/dL 30 5 (L)   RDW      11 6 - 15 1 % 15 1   MPV      8 9 - 12 7 fL 9 1   Platelet Count      007 - 390 Thousands/uL 319   nRBC      /100 WBCs 0   Neutrophils %      43 - 75 % 58   Immat GRANS %      0 - 2 % 1   Lymphocytes Relative      14 - 44 % 31   Monocytes Relative      4 - 12 % 7   Eosinophils      0 - 6 % 2   Basophils Relative      0 - 1 % 1   Absolute Neutrophils      1 85 - 7 62 Thousands/µL 3 70   Immature Grans Absolute      0 00 - 0 20 Thousand/uL 0 03   Lymphocytes Absolute      0 60 - 4 47 Thousands/µL 1 96   Absolute Monocytes      0 17 - 1 22 Thousand/µL 0 41   Absolute Eosinophils      0 00 - 0 61 Thousand/µL 0 15   Basophils Absolute      0 00 - 0 10 Thousands/µL 0 03   Sodium      136 - 145 mmol/L 137   Potassium      3 5 - 5 3 mmol/L 3 8   Chloride      100 - 108 mmol/L 104   CO2      21 - 32 mmol/L 28   Anion Gap      4 - 13 mmol/L 5   BUN      5 - 25 mg/dL 9   Creatinine      0 60 - 1 30 mg/dL 0 60   GLUCOSE FASTING      65 - 99 mg/dL 97   Calcium      8 3 - 10 1 mg/dL 8 7   AST      5 - 45 U/L 19   ALT      12 - 78 U/L 21   Alkaline Phosphatase      46 - 116 U/L 93   Total Protein      6 4 - 8 2 g/dL 7 4   Albumin      3 5 - 5 0 g/dL 4 0   TOTAL BILIRUBIN      0 20 - 1 00 mg/dL 0 38   eGFR      ml/min/1 73sq m 103   C-REACTIVE PROTEIN      <3 0 mg/L <3 0   Sed Rate      0 - 20 mm/hour 7   TSH 3RD GENERATON      0 358 - 3 740 uIU/mL 1 820   Vit D, 25-Hydroxy      30 0 - 100 0 ng/mL 22 0 (L)     Component      Latest Ref Rng & Units 1/48/9858   CYCLIC CITRULLINATED PEPTIDE ANTIBODY      0 - 19 units <1   RHEUMATOID FACTOR      Negative Negative

## 2019-11-25 NOTE — PROGRESS NOTES
Assessment/Plan  Diagnoses and all orders for this visit:    Herpes zoster without complication   - I spoke with her PCP, Dr Irvin Collado, who agreed to see patient today  Phoebe Ibarra is a 62 y o  female here for a problem visit  Patient is complaining of painful rash since Friday  States in is on her groin area  She is in a lot of pain and unable to sit  Pain is on her right inner and posterior thigh surface  She is extremely uncomfortable  Patient Active Problem List   Diagnosis    Closed fracture of right distal radius    Rheumatoid arthritis involving multiple joints (HCC)    Fibromyalgia    Other long term (current) drug therapy    Depression    Gastroesophageal reflux disease    Headache    Chronic constipation    Wrist weakness    Pain in right wrist    Acid reflux disease    Dense breasts    Hyperlipidemia    Rheumatoid arthritis of multiple sites with negative rheumatoid factor (HCC)    Anxiety    Vitamin D deficiency    Primary osteoarthritis involving multiple joints    Back muscle spasm    Cervicalgia         Gynecologic History  No LMP recorded  Patient has had a hysterectomy  Menstrual History:  OB History        2    Para   2    Term   2            AB        Living   1       SAB        TAB        Ectopic        Multiple        Live Births   1               No LMP recorded  Patient has had a hysterectomy           Obstetric History  OB History    Para Term  AB Living   2 2 2     1   SAB TAB Ectopic Multiple Live Births           1      # Outcome Date GA Lbr Billy/2nd Weight Sex Delivery Anes PTL Lv   2 Term      Vag-Spont   RAY   1 Term      Vag-Spont            Past Medical History:   Diagnosis Date    Acid reflux     Anemia     last assessed 2016    Arthritis     rheumatoid    Depression     Fibromyalgia     Rheumatoid arthritis ([facility] [address] )     last assessed 10/29/2015       Past Surgical History:   Procedure Laterality Date    HYSTERECTOMY      PA COLONOSCOPY FLX DX W/COLLJ SPEC WHEN PFRMD N/A 5/5/2016    Procedure: COLONOSCOPY;  Surgeon: Heather Peter DO;  Location: [facility]; Service: Gastroenterology    PA OPEN RX DISTAL RADIUS FX, EXTRA-ARTICULAR Right 5/12/2016    Procedure: OPEN REDUCTION INTERNAL FIXATION RIGHT DISTAL RADIUS (EXTRA-ARTICULAR);   Surgeon: Leatha Gutierrez MD;  [facility];  Service: Orthopedics    TONSILLECTOMY           Family History   Problem Relation Age of Onset    No Known Problems Mother     No Known Problems Father        Social History     Socioeconomic History    Marital status: Single     Spouse name: Not on file    Number of children: Not on file    Years of education: Not on file    Highest education level: Not on file   Occupational History    Occupation: [employer]     Employer: [employer]   Social Needs    Financial resource strain: Not on file    Food insecurity:     Worry: Not on file     Inability: Not on file    Transportation needs:     Medical: Not on file     Non-medical: Not on file   Tobacco Use    Smoking status: Never Smoker    Smokeless tobacco: Never Used   Substance and Sexual Activity    Alcohol use: No    Drug use: No    Sexual activity: Never   Lifestyle    Physical activity:     Days per week: Not on file     Minutes per session: Not on file    Stress: Not on file   Relationships    Social connections:     Talks on phone: Not on file     Gets together: Not on file     Attends Shinto service: Not on file     Active member of club or organization: Not on file     Attends meetings of clubs or organizations: Not on file     Relationship status: Not on file    Intimate partner violence:     Fear of current or ex partner: Not on file     Emotionally abused: Not on file     Physically abused: Not on file     Forced sexual activity: Not on file   Other Topics Concern    Not on file   Social History Narrative    Not on file Allergies  Other    Medications    Current Outpatient Medications:     Milnacipran HCl (SAVELLA) 25 MG TABS, 1 tab daily x5 days, then 1 tab BID thereafter (Patient not taking: Reported on 11/25/2019), Disp: 60 tablet, Rfl: 2    senna (SENOKOT) 8 6 mg, Take 2 tablets (17 2 mg total) by mouth daily at bedtime (Patient not taking: Reported on 11/25/2019), Disp: 180 tablet, Rfl: 1    traMADol (ULTRAM) 50 mg tablet, Take 1 tablet (50 mg total) by mouth every 6 (six) hours as needed for moderate pain (Patient not taking: Reported on 11/25/2019), Disp: 30 tablet, Rfl: 0      Review of Systems  Review of Systems   Constitutional: Negative for chills and fever  Genitourinary: Negative for dysuria  Skin: Positive for rash  Objective     /64   Wt 74 8 kg (165 lb)   BMI 29 23 kg/m²       Physical Exam   Constitutional: She appears distressed  Genitourinary: There is no rash, tenderness or lesion on the right labia  There is no rash, tenderness or lesion on the left labia  Genitourinary Comments: No lesions seen on vulva  Speculum exam not done as patient could not tolerate laying flat  Skin: Rash noted  Rash is vesicular  Vitals reviewed

## 2019-11-25 NOTE — PATIENT INSTRUCTIONS
Schedule the ultrasound of your hands by calling [phone]  Please make sure to have this test done before your next appointment
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
## 2019-12-02 ENCOUNTER — OFFICE VISIT (OUTPATIENT)
Dept: INTERNAL MEDICINE CLINIC | Facility: CLINIC | Age: 54
End: 2019-12-02
Payer: COMMERCIAL

## 2019-12-02 VITALS
WEIGHT: 162 LBS | DIASTOLIC BLOOD PRESSURE: 68 MMHG | HEART RATE: 100 BPM | HEIGHT: 63 IN | OXYGEN SATURATION: 96 % | SYSTOLIC BLOOD PRESSURE: 122 MMHG | BODY MASS INDEX: 28.7 KG/M2

## 2019-12-02 DIAGNOSIS — B02.9 HERPES ZOSTER WITHOUT COMPLICATION: ICD-10-CM

## 2019-12-02 DIAGNOSIS — M79.7 FIBROMYALGIA: Primary | ICD-10-CM

## 2019-12-02 PROCEDURE — 3008F BODY MASS INDEX DOCD: CPT | Performed by: INTERNAL MEDICINE

## 2019-12-02 PROCEDURE — 99214 OFFICE O/P EST MOD 30 MIN: CPT | Performed by: INTERNAL MEDICINE

## 2019-12-02 RX ORDER — GABAPENTIN 300 MG/1
CAPSULE ORAL
Qty: 126 CAPSULE | Refills: 0 | Status: SHIPPED | OUTPATIENT
Start: 2019-12-02 | End: 2020-02-11
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
## 2019-12-06 ENCOUNTER — HOSPITAL ENCOUNTER (OUTPATIENT)
Dept: RADIOLOGY | Facility: HOSPITAL | Age: 54
Discharge: HOME | End: 2019-12-06
Payer: COMMERCIAL

## 2019-12-06 DIAGNOSIS — M25.541 ARTHRALGIA OF BOTH HANDS: ICD-10-CM

## 2019-12-06 DIAGNOSIS — M25.542 ARTHRALGIA OF BOTH HANDS: ICD-10-CM

## 2019-12-06 PROCEDURE — 76881 US COMPL JOINT R-T W/IMG: CPT

## 2019-12-16 ENCOUNTER — OFFICE VISIT (OUTPATIENT)
Dept: INTERNAL MEDICINE CLINIC | Facility: CLINIC | Age: 54
End: 2019-12-16
Payer: COMMERCIAL

## 2019-12-16 VITALS
OXYGEN SATURATION: 98 % | WEIGHT: 165.4 LBS | SYSTOLIC BLOOD PRESSURE: 118 MMHG | TEMPERATURE: 98.6 F | HEIGHT: 63 IN | BODY MASS INDEX: 29.3 KG/M2 | DIASTOLIC BLOOD PRESSURE: 72 MMHG | HEART RATE: 104 BPM

## 2019-12-16 DIAGNOSIS — M54.2 CERVICALGIA: ICD-10-CM

## 2019-12-16 DIAGNOSIS — M62.830 BACK MUSCLE SPASM: Primary | ICD-10-CM

## 2019-12-16 PROCEDURE — 99214 OFFICE O/P EST MOD 30 MIN: CPT | Performed by: NURSE PRACTITIONER

## 2019-12-16 RX ORDER — TRAMADOL HYDROCHLORIDE 50 MG/1
50 TABLET ORAL EVERY 6 HOURS PRN
Qty: 30 TABLET | Refills: 0
Start: 2019-12-16 | End: 2019-12-16 | Stop reason: SDUPTHER

## 2019-12-16 RX ORDER — TRAMADOL HYDROCHLORIDE 50 MG/1
50 TABLET ORAL EVERY 6 HOURS PRN
Qty: 30 TABLET | Refills: 0 | Status: SHIPPED | OUTPATIENT
Start: 2019-12-16 | End: 2020-03-02 | Stop reason: ALTCHOICE
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
## 2020-01-14 ENCOUNTER — EVALUATION (OUTPATIENT)
Dept: PHYSICAL THERAPY | Facility: REHABILITATION | Age: 55
End: 2020-01-14
Payer: COMMERCIAL

## 2020-01-14 DIAGNOSIS — M54.2 CERVICALGIA: ICD-10-CM

## 2020-01-14 DIAGNOSIS — M25.562 CHRONIC PAIN OF BOTH KNEES: ICD-10-CM

## 2020-01-14 DIAGNOSIS — R07.82 INTERCOSTAL PAIN: ICD-10-CM

## 2020-01-14 DIAGNOSIS — M25.561 CHRONIC PAIN OF BOTH KNEES: ICD-10-CM

## 2020-01-14 DIAGNOSIS — G89.29 CHRONIC PAIN OF BOTH KNEES: ICD-10-CM

## 2020-01-14 PROCEDURE — 97162 PT EVAL MOD COMPLEX 30 MIN: CPT | Performed by: PHYSICAL THERAPIST

## 2020-01-14 PROCEDURE — 97110 THERAPEUTIC EXERCISES: CPT | Performed by: PHYSICAL THERAPIST
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
## 2020-01-17 ENCOUNTER — OFFICE VISIT (OUTPATIENT)
Dept: PHYSICAL THERAPY | Facility: REHABILITATION | Age: 55
End: 2020-01-17
Payer: COMMERCIAL

## 2020-01-17 DIAGNOSIS — R07.82 INTERCOSTAL PAIN: ICD-10-CM

## 2020-01-17 DIAGNOSIS — G89.29 CHRONIC PAIN OF BOTH KNEES: ICD-10-CM

## 2020-01-17 DIAGNOSIS — M25.562 CHRONIC PAIN OF BOTH KNEES: ICD-10-CM

## 2020-01-17 DIAGNOSIS — M25.561 CHRONIC PAIN OF BOTH KNEES: ICD-10-CM

## 2020-01-17 DIAGNOSIS — M54.2 CERVICALGIA: Primary | ICD-10-CM

## 2020-01-17 PROCEDURE — 97110 THERAPEUTIC EXERCISES: CPT

## 2020-01-17 PROCEDURE — 97140 MANUAL THERAPY 1/> REGIONS: CPT

## 2020-01-17 PROCEDURE — 97112 NEUROMUSCULAR REEDUCATION: CPT

## 2020-01-17 NOTE — PROGRESS NOTES
Daily Note     Today's date: 2020  Patient name: Destiny Spring  : 1965  MRN: [id number]  Referring provider: Cheryle Mullet, CRNP  Dx:   Encounter Diagnosis     ICD-10-CM    1  Cervicalgia M54 2    2  Chronic pain of both knees M25 561     M25 562     G89 29    3  Intercostal pain R07 82                   Subjective: Pt  reports no change in status upon arrival       Objective: See treatment diary below      Assessment: Tolerated treatment well  Patient would benefit from continued PT  Pt did not tolerate resting position well with foam roll, was able to complete all other exercises with no increase in pain  Pt  1:1 with PTA for entirety  No notable restrictions with IASTM  Plan: Continue per plan of care        Precautions: RA, fibromyalgia, depression, anxiety       Manual              IASTM UT/LS  KP 6'            F/B cervical ROM KP 4'                                                       Exercise Diary              UBE gentle  3/3            TB rows, extensions 2x15 ea otb            Rhomboid stretch  5x20"            B/L shoulder ER w/ TB nv            Chin tucks in supine w/ hold  20x5"            1/2 roll progression  4x1' (no resting position)                                                                                                                                                                                                      Modalities               PRN

## 2020-01-21 ENCOUNTER — OFFICE VISIT (OUTPATIENT)
Dept: PHYSICAL THERAPY | Facility: REHABILITATION | Age: 55
End: 2020-01-21
Payer: COMMERCIAL

## 2020-01-21 DIAGNOSIS — G89.29 CHRONIC PAIN OF BOTH KNEES: ICD-10-CM

## 2020-01-21 DIAGNOSIS — M54.2 CERVICALGIA: Primary | ICD-10-CM

## 2020-01-21 DIAGNOSIS — R07.82 INTERCOSTAL PAIN: ICD-10-CM

## 2020-01-21 DIAGNOSIS — M25.561 CHRONIC PAIN OF BOTH KNEES: ICD-10-CM

## 2020-01-21 DIAGNOSIS — M25.562 CHRONIC PAIN OF BOTH KNEES: ICD-10-CM

## 2020-01-21 PROCEDURE — 97112 NEUROMUSCULAR REEDUCATION: CPT

## 2020-01-21 PROCEDURE — 97140 MANUAL THERAPY 1/> REGIONS: CPT

## 2020-01-21 PROCEDURE — 97110 THERAPEUTIC EXERCISES: CPT

## 2020-01-21 NOTE — PROGRESS NOTES
Daily Note     Today's date: 2020  Patient name: Matilda Bah  : 1965  MRN: [id number]  Referring provider: KERMIT Friend  Dx:   Encounter Diagnosis     ICD-10-CM    1  Cervicalgia M54 2    2  Chronic pain of both knees M25 561     M25 562     G89 29    3  Intercostal pain R07 82                   Subjective: Pt  reports no change in status upon arrival       Objective: See treatment diary below      Assessment: Tolerated treatment well  Patient would benefit from continued PT   Pt  able to complete all exercises with no increase in pain during or after session  Pt able to progress exercises this session without complaint  Pt  1:1 with PTA for entirety  Plan: Continue per plan of care        Precautions: RA, fibromyalgia, depression, anxiety       Manual             IASTM UT/LS  KP 6' KP 5'           F/B cervical ROM KP 4' KP 5'                                                      Exercise Diary             UBE gentle  3/3 3/3           TB rows, extensions 2x15 ea otb 2x15 ea otb           Rhomboid stretch  5x20" 5x20"           B/L shoulder ER w/ TB nv 15x5" otb           Chin tucks in supine w/ hold  20x5" 20x5"           1/2 roll progression  4x1' (no resting position) 4x1' (no resting)           DNF  20x3"                                                                                                                                                                                        Modalities              MH PRN

## 2020-01-24 ENCOUNTER — OFFICE VISIT (OUTPATIENT)
Dept: PHYSICAL THERAPY | Facility: REHABILITATION | Age: 55
End: 2020-01-24
Payer: COMMERCIAL

## 2020-01-24 DIAGNOSIS — G89.29 CHRONIC PAIN OF BOTH KNEES: ICD-10-CM

## 2020-01-24 DIAGNOSIS — M25.562 CHRONIC PAIN OF BOTH KNEES: ICD-10-CM

## 2020-01-24 DIAGNOSIS — M54.2 CERVICALGIA: Primary | ICD-10-CM

## 2020-01-24 DIAGNOSIS — R07.82 INTERCOSTAL PAIN: ICD-10-CM

## 2020-01-24 DIAGNOSIS — M25.561 CHRONIC PAIN OF BOTH KNEES: ICD-10-CM

## 2020-01-24 PROCEDURE — 97140 MANUAL THERAPY 1/> REGIONS: CPT

## 2020-01-24 PROCEDURE — 97112 NEUROMUSCULAR REEDUCATION: CPT

## 2020-01-24 PROCEDURE — 97110 THERAPEUTIC EXERCISES: CPT

## 2020-01-24 NOTE — PROGRESS NOTES
Daily Note     Today's date: 2020  Patient name: Valentino Coop  : 1965  MRN: [id number]  Referring provider: KERMIT Juarez  Dx:   Encounter Diagnosis     ICD-10-CM    1  Cervicalgia M54 2    2  Chronic pain of both knees M25 561     M25 562     G89 29    3  Intercostal pain R07 82                   Subjective: Pt reports improvement upon arrival, states that neck has been feeling better      Objective: See treatment diary below      Assessment: Tolerated treatment well  Patient would benefit from continued PT   Pt  able to complete all exercises with no increase in pain during or after session  Pt  1:1 with PTA for entirety  Plan: Continue per plan of care        Precautions: RA, fibromyalgia, depression, anxiety       Manual            IASTM UT/LS  KP 6' KP 5' KP 5'          F/B cervical ROM KP 4' KP 5' KP 4'                                                     Exercise Diary            UBE gentle  3/3 3/3 3/3          TB rows, extensions 2x15 ea otb 2x15 ea otb 2x15 ea otb          Rhomboid stretch  5x20" 5x20" 5x20"          B/L shoulder ER w/ TB nv 15x5" otb 15x5" otb          Chin tucks in supine w/ hold  20x5" 20x5" 20x5"          1/2 roll progression  4x1' (no resting position) 4x1' (no resting) 4x1' (no resting)          DNF  20x3" 20x3"                                                                                                                                                                                       Modalities               PRN

## 2020-01-27 ENCOUNTER — TRANSCRIBE ORDERS (OUTPATIENT)
Dept: LAB | Facility: CLINIC | Age: 55
End: 2020-01-27

## 2020-01-27 ENCOUNTER — OFFICE VISIT (OUTPATIENT)
Dept: RHEUMATOLOGY | Facility: CLINIC | Age: 55
End: 2020-01-27
Payer: COMMERCIAL

## 2020-01-27 VITALS
HEIGHT: 63 IN | WEIGHT: 165.34 LBS | SYSTOLIC BLOOD PRESSURE: 122 MMHG | DIASTOLIC BLOOD PRESSURE: 78 MMHG | HEART RATE: 98 BPM | BODY MASS INDEX: 29.3 KG/M2

## 2020-01-27 DIAGNOSIS — M79.7 FIBROMYALGIA: Primary | ICD-10-CM

## 2020-01-27 DIAGNOSIS — M25.542 ARTHRALGIA OF BOTH HANDS: ICD-10-CM

## 2020-01-27 DIAGNOSIS — M15.9 PRIMARY OSTEOARTHRITIS INVOLVING MULTIPLE JOINTS: ICD-10-CM

## 2020-01-27 DIAGNOSIS — R60.0 LOWER EXTREMITY EDEMA: ICD-10-CM

## 2020-01-27 DIAGNOSIS — M25.541 ARTHRALGIA OF BOTH HANDS: ICD-10-CM

## 2020-01-27 PROCEDURE — 99214 OFFICE O/P EST MOD 30 MIN: CPT | Performed by: INTERNAL MEDICINE

## 2020-01-27 NOTE — PROGRESS NOTES
Assessment and Plan:   Patient is a 66-year-old female who presents for rheumatology follow-up regarding workup for prior diagnosis of RA  Additional workup with hand ultrasound ruled out underlying inflammatory arthritis in her hands and there were no signs to suggest RA  Her exam today does not reveal any inflammatory arthritis, but does reveal widespread myofascial and joint pain out of proportion any objective findings most consistent with fibromyalgia  I again reviewed this with her and that there were is no evidence to suggest RA on her ultrasound  Since we have adequately ruled this out she does not need additional Rheumatology follow-up  She will follow-up with her family doctor regarding the fibromyalgia pain as I do not manage this in the long-term  I also suggested considering carpal tunnel syndrome for the discomfort she is describing in her hands and she can discuss with her family doctor if in EMG is indicated  Plan:  Diagnoses and all orders for this visit:    Fibromyalgia    Arthralgia of both hands    Primary osteoarthritis involving multiple joints    Lower extremity edema         Follow-up plan: no rheumatology follow-up needed       Rheumatic Disease Summary  1  Fibromyalgia, prior dx of ?RA  -Established care- May, 2015- hx of FMS and ?RA diagnosed by unknown physician presented on HCQ and Cymbalta  States she was previously on Kwaxuma with good response   -Labs 2015: negative RF, CCP  -Started on Mobic with improvement  -October, 2018- off plaquenil without any change in joint symptoms  -Feb, 2019- transitioned off Cymbalta, wanted to retry Kwaxuma   -Visit 11/25/19: ordered hand US to clarify RA dx, started savella   -Started on gabapentin by PCP 11/2019 for shingles  -US hands 12/6/19: normal, no evidence of inflammatory arthritis   -Visit 1/27/20: no IA on exam, exam c/w fibromyalgia, no f/u needed  2   Comorbidities: anxiety, depression, h/o shingles 11/2019     HPI  BLANCA IM GESÄUSE Poncho Reyes is a 62 y o   female with fibromyalgia who presents for rheumatology follow-up regarding history of diagnosis of RA and additional workup regarding this  Since last visit she was able to get the ultrasound done of her hands which was negative for signs of rheumatoid arthritis and was normal     The  services for [facility] were used during the visit  She is on both gabapentin and savella which her PCP started 2-3 months ago  She still complains of diffuse pain and pain in her hands  She does feel better after starting the medications but still states she has a lot of pain  We reviewed her ultrasound results today at her appointment  The following portions of the patient's history were reviewed and updated as appropriate: allergies, current medications, past family history, past medical history, past social history, past surgical history and problem list     Review of Systems:   Review of Systems   Constitutional: Negative for fatigue and unexpected weight change  HENT: Negative for mouth sores  Respiratory: Negative for cough and shortness of breath  Gastrointestinal: Negative for constipation and diarrhea  Musculoskeletal: Positive for arthralgias and myalgias  Negative for back pain and joint swelling  Skin: Negative for color change and rash  Neurological: Negative for weakness  Psychiatric/Behavioral: Negative for sleep disturbance         Home Medications:    Current Outpatient Medications:     gabapentin (NEURONTIN) 300 mg capsule, 2 caps TID for 2 weeks then 1 cap TID for 1 week then 1 BID for 1 week then 1 daily for 1 week, Disp: 126 capsule, Rfl: 0    Milnacipran HCl (SAVELLA) 25 MG TABS, 1 tab daily x5 days, then 1 tab BID thereafter, Disp: 60 tablet, Rfl: 2    senna (SENOKOT) 8 6 mg, Take 2 tablets (17 2 mg total) by mouth daily at bedtime, Disp: 180 tablet, Rfl: 1    traMADol (ULTRAM) 50 mg tablet, Take 1 tablet (50 mg total) by mouth every 6 (six) hours as needed for moderate pain, Disp: 30 tablet, Rfl: 0    valACYclovir (VALTREX) 1,000 mg tablet, Take 1 tablet (1,000 mg total) by mouth 3 (three) times a day for 7 days, Disp: 21 tablet, Rfl: 0    Objective:    Vitals:    01/27/20 1015   BP: 122/78   Pulse: 98   Weight: 75 kg (165 lb 5 5 oz)   Height: 5' 3" (1 6 m)       Physical Exam   Constitutional: She appears well-developed and well-nourished  She is cooperative  No distress  HENT:   Head: Normocephalic and atraumatic  Eyes: Conjunctivae, EOM and lids are normal  No scleral icterus  Neck: Neck supple  Pulmonary/Chest: Effort normal  No accessory muscle usage  No tachypnea  No respiratory distress  Musculoskeletal:   No joint swelling or synovitis anywhere  Diffuse reproducible soft tissue and joint pain out of proportion to any significant findings  Neurological: She is alert  Skin: Skin is dry and intact  No rash noted  Psychiatric: She has a normal mood and affect  Her behavior is normal        Imaging:   US hands 12/6/19: IMPRESSION:  There is no evidence of inflammatory arthritis in the bilateral hands and wrists

## 2020-01-28 ENCOUNTER — OFFICE VISIT (OUTPATIENT)
Dept: PHYSICAL THERAPY | Facility: REHABILITATION | Age: 55
End: 2020-01-28
Payer: COMMERCIAL

## 2020-01-28 DIAGNOSIS — G89.29 CHRONIC PAIN OF BOTH KNEES: ICD-10-CM

## 2020-01-28 DIAGNOSIS — M25.561 CHRONIC PAIN OF BOTH KNEES: ICD-10-CM

## 2020-01-28 DIAGNOSIS — M54.2 CERVICALGIA: Primary | ICD-10-CM

## 2020-01-28 DIAGNOSIS — M25.562 CHRONIC PAIN OF BOTH KNEES: ICD-10-CM

## 2020-01-28 PROCEDURE — 97110 THERAPEUTIC EXERCISES: CPT | Performed by: PHYSICAL THERAPIST

## 2020-01-28 PROCEDURE — 97140 MANUAL THERAPY 1/> REGIONS: CPT | Performed by: PHYSICAL THERAPIST

## 2020-01-28 PROCEDURE — 97112 NEUROMUSCULAR REEDUCATION: CPT | Performed by: PHYSICAL THERAPIST

## 2020-01-28 NOTE — PROGRESS NOTES
Daily Note     Today's date: 2020  Patient name: Toño Paredes  : 1965  MRN: [id number]  Referring provider: KERMIT Johnson  Dx:   Encounter Diagnosis     ICD-10-CM    1  Cervicalgia M54 2    2  Chronic pain of both knees M25 561     M25 562     G89 29                   Subjective: Reports some improvement in neck pain upon arrival today  Objective: See treatment diary below      Assessment: Tolerated treatment well  Patient would benefit from continued PT, did well with exercises today with no complaints of pain, demonstrated some sensitivity to IASTM, unable to reduce restrictions present  Plan: Continue per plan of care        Precautions: RA, fibromyalgia, depression, anxiety       Manual           IASTM UT/LS  KP 6' KP 5' KP 5' CW 4'          F/B cervical ROM KP 4' KP 5' KP 4' CW 4'                                                     Exercise Diary            UBE gentle  3/3 3/3 3/3 33'          TB rows, extensions 2x15 ea otb 2x15 ea otb 2x15 ea otb 2x20 ea OTB         Rhomboid stretch  5x20" 5x20" 5x20" np         B/L shoulder ER w/ TB nv 15x5" otb 15x5" otb 20x5" OTB         Chin tucks in supine w/ hold  20x5" 20x5" 20x5" 20x5"          1/2 roll progression  4x1' (no resting position) 4x1' (no resting) 4x1' (no resting) 4x1'          DNF  20x3" 20x3" 20x5"                                                                                                                                                                                       Modalities               PRN

## 2020-01-31 ENCOUNTER — OFFICE VISIT (OUTPATIENT)
Dept: PHYSICAL THERAPY | Facility: REHABILITATION | Age: 55
End: 2020-01-31
Payer: COMMERCIAL

## 2020-01-31 DIAGNOSIS — M25.562 CHRONIC PAIN OF BOTH KNEES: ICD-10-CM

## 2020-01-31 DIAGNOSIS — M54.2 CERVICALGIA: Primary | ICD-10-CM

## 2020-01-31 DIAGNOSIS — M25.561 CHRONIC PAIN OF BOTH KNEES: ICD-10-CM

## 2020-01-31 DIAGNOSIS — G89.29 CHRONIC PAIN OF BOTH KNEES: ICD-10-CM

## 2020-01-31 DIAGNOSIS — R07.82 INTERCOSTAL PAIN: ICD-10-CM

## 2020-01-31 PROCEDURE — 97140 MANUAL THERAPY 1/> REGIONS: CPT

## 2020-01-31 PROCEDURE — 97112 NEUROMUSCULAR REEDUCATION: CPT

## 2020-01-31 PROCEDURE — 97110 THERAPEUTIC EXERCISES: CPT

## 2020-01-31 NOTE — PROGRESS NOTES
Daily Note     Today's date: 2020  Patient name: Unique Zhang  : 1965  MRN: [id number]  Referring provider: KERMIT Palmer  Dx:   Encounter Diagnosis     ICD-10-CM    1  Cervicalgia M54 2    2  Chronic pain of both knees M25 561     M25 562     G89 29    3  Intercostal pain R07 82                   Subjective: Pt reports overall improvement upon arrival      Objective: See treatment diary below      Assessment: Tolerated treatment well  Patient would benefit from continued PT   Pt  able to complete all exercises with no increase in pain during or after session  Pt able to progress exercises this session without complaint  Pt  1:1 with PTA for entirety  Plan: Continue per plan of care        Precautions: RA, fibromyalgia, depression, anxiety       Manual          IASTM UT/LS  KP 6' KP 5' KP 5' CW 4'  KP 5'        F/B cervical ROM KP 4' KP 5' KP 4' CW 4'  KP 4'                                                   Exercise Diary          UBE gentle  3/3 3/3 3/3 3/3'  3/3        TB rows, extensions 2x15 ea otb 2x15 ea otb 2x15 ea otb 2x20 ea OTB 2x15 ea gtb        Rhomboid stretch  5x20" 5x20" 5x20" np 5x20"        B/L shoulder ER w/ TB nv 15x5" otb 15x5" otb 20x5" OTB 20x5" otb        Chin tucks in supine w/ hold  20x5" 20x5" 20x5" 20x5"  20x5"        1/2 roll progression  4x1' (no resting position) 4x1' (no resting) 4x1' (no resting) 4x1'  4x1' (no resting)        DNF  20x3" 20x3" 20x5"  20x5"                                                                                                                                                                                     Modalities               PRN

## 2020-02-11 DIAGNOSIS — B02.9 HERPES ZOSTER WITHOUT COMPLICATION: ICD-10-CM

## 2020-02-11 RX ORDER — GABAPENTIN 300 MG/1
300 CAPSULE ORAL DAILY
Qty: 90 CAPSULE | Refills: 0 | Status: SHIPPED | OUTPATIENT
Start: 2020-02-11 | End: 2020-05-17

## 2020-02-18 NOTE — PROGRESS NOTES
Patient will be D/C due to noncompliance to therapy, no formal re-evaluation performed and goals were not assessed

## 2020-02-24 LAB
25(OH)D3+25(OH)D2 SERPL-MCNC: 27.3 NG/ML (ref 30–100)
ALBUMIN SERPL-MCNC: 4.3 G/DL (ref 3.8–4.9)
ALBUMIN/GLOB SERPL: 1.7 {RATIO} (ref 1.2–2.2)
ALP SERPL-CCNC: 95 IU/L (ref 39–117)
ALT SERPL-CCNC: 12 IU/L (ref 0–32)
AST SERPL-CCNC: 20 IU/L (ref 0–40)
BASOPHILS # BLD AUTO: 0 X10E3/UL (ref 0–0.2)
BASOPHILS NFR BLD AUTO: 1 %
BILIRUB SERPL-MCNC: 0.3 MG/DL (ref 0–1.2)
BUN SERPL-MCNC: 8 MG/DL (ref 6–24)
BUN/CREAT SERPL: 12 (ref 9–23)
CALCIUM SERPL-MCNC: 9.7 MG/DL (ref 8.7–10.2)
CHLORIDE SERPL-SCNC: 98 MMOL/L (ref 96–106)
CHOLEST SERPL-MCNC: 253 MG/DL (ref 100–199)
CO2 SERPL-SCNC: 25 MMOL/L (ref 20–29)
CREAT SERPL-MCNC: 0.67 MG/DL (ref 0.57–1)
EOSINOPHIL # BLD AUTO: 0.1 X10E3/UL (ref 0–0.4)
EOSINOPHIL NFR BLD AUTO: 2 %
ERYTHROCYTE [DISTWIDTH] IN BLOOD BY AUTOMATED COUNT: 15.8 % (ref 11.7–15.4)
GLOBULIN SER-MCNC: 2.6 G/DL (ref 1.5–4.5)
GLUCOSE SERPL-MCNC: 93 MG/DL (ref 65–99)
HCT VFR BLD AUTO: 38.3 % (ref 34–46.6)
HDLC SERPL-MCNC: 76 MG/DL
HGB BLD-MCNC: 12.2 G/DL (ref 11.1–15.9)
IMM GRANULOCYTES # BLD: 0 X10E3/UL (ref 0–0.1)
IMM GRANULOCYTES NFR BLD: 0 %
LDLC SERPL CALC-MCNC: 146 MG/DL (ref 0–99)
LYMPHOCYTES # BLD AUTO: 3.1 X10E3/UL (ref 0.7–3.1)
LYMPHOCYTES NFR BLD AUTO: 47 %
MCH RBC QN AUTO: 24.4 PG (ref 26.6–33)
MCHC RBC AUTO-ENTMCNC: 31.9 G/DL (ref 31.5–35.7)
MCV RBC AUTO: 76 FL (ref 79–97)
MONOCYTES # BLD AUTO: 0.5 X10E3/UL (ref 0.1–0.9)
MONOCYTES NFR BLD AUTO: 8 %
NEUTROPHILS # BLD AUTO: 2.7 X10E3/UL (ref 1.4–7)
NEUTROPHILS NFR BLD AUTO: 42 %
PLATELET # BLD AUTO: 378 X10E3/UL (ref 150–450)
POTASSIUM SERPL-SCNC: 4.2 MMOL/L (ref 3.5–5.2)
PROT SERPL-MCNC: 6.9 G/DL (ref 6–8.5)
RBC # BLD AUTO: 5.01 X10E6/UL (ref 3.77–5.28)
SL AMB EGFR AFRICAN AMERICAN: 114 ML/MIN/1.73
SL AMB EGFR NON AFRICAN AMERICAN: 99 ML/MIN/1.73
SL AMB VLDL CHOLESTEROL CALC: 31 MG/DL (ref 5–40)
SODIUM SERPL-SCNC: 140 MMOL/L (ref 134–144)
TRIGL SERPL-MCNC: 156 MG/DL (ref 0–149)
WBC # BLD AUTO: 6.4 X10E3/UL (ref 3.4–10.8)

## 2020-03-02 ENCOUNTER — OFFICE VISIT (OUTPATIENT)
Dept: INTERNAL MEDICINE CLINIC | Facility: CLINIC | Age: 55
End: 2020-03-02
Payer: COMMERCIAL

## 2020-03-02 VITALS
HEIGHT: 63 IN | OXYGEN SATURATION: 97 % | DIASTOLIC BLOOD PRESSURE: 64 MMHG | BODY MASS INDEX: 28.03 KG/M2 | HEART RATE: 113 BPM | TEMPERATURE: 98.7 F | WEIGHT: 158.2 LBS | SYSTOLIC BLOOD PRESSURE: 104 MMHG

## 2020-03-02 DIAGNOSIS — J20.9 ACUTE BRONCHITIS, UNSPECIFIED ORGANISM: Primary | ICD-10-CM

## 2020-03-02 DIAGNOSIS — M79.7 FIBROMYALGIA: ICD-10-CM

## 2020-03-02 DIAGNOSIS — E78.2 MIXED HYPERLIPIDEMIA: ICD-10-CM

## 2020-03-02 PROCEDURE — 1036F TOBACCO NON-USER: CPT | Performed by: INTERNAL MEDICINE

## 2020-03-02 PROCEDURE — 99214 OFFICE O/P EST MOD 30 MIN: CPT | Performed by: INTERNAL MEDICINE

## 2020-03-02 PROCEDURE — 3008F BODY MASS INDEX DOCD: CPT | Performed by: INTERNAL MEDICINE

## 2020-03-02 RX ORDER — BENZONATATE 100 MG/1
100 CAPSULE ORAL 3 TIMES DAILY PRN
Qty: 30 CAPSULE | Refills: 0 | Status: SHIPPED | OUTPATIENT
Start: 2020-03-02 | End: 2020-03-30 | Stop reason: SDUPTHER

## 2020-03-02 RX ORDER — AZITHROMYCIN 250 MG/1
TABLET, FILM COATED ORAL
Qty: 6 TABLET | Refills: 0 | Status: SHIPPED | OUTPATIENT
Start: 2020-03-02 | End: 2020-03-06

## 2020-03-02 NOTE — PROGRESS NOTES
Assessment/Plan:  Start Zpack  Call if not improving    Fibromyalgia  Continue gabapentin and Savella    Hyperlipidemia  Stable  10y CVD risk 1 9%  Low fat diet discussed         Problem List Items Addressed This Visit        Other    Fibromyalgia     Continue gabapentin and Savella         Hyperlipidemia     Stable  10y CVD risk 1 9%  Low fat diet discussed           Other Visit Diagnoses     Acute bronchitis, unspecified organism    -  Primary    Relevant Medications    azithromycin (ZITHROMAX) 250 mg tablet    benzonatate (TESSALON PERLES) 100 mg capsule            Subjective:      Patient ID: Kala Lazcano is a 62 y o  female  HPI  3 weeks ago, started with a sore throat, sinus congestion,productive cough  + fever, chills, poor appetite  +nausea, no vomiting, diarrhea  No sick contacts  No recent travel or contact with recent travelers  OTC Mucinex and Naproxen  She has not taken any naproxen today  Severe case of shingles in December, rash resolved  She is still taking gabapentin which helps with fibromyalgia also  She is on Savella  as well  Recent labs-vit D 27   HDL 76   CMP CBC  Normal    The following portions of the patient's history were reviewed and updated as appropriate: allergies, current medications, past medical history, past social history, past surgical history and problem list     Review of Systems   Constitutional: Positive for chills, fatigue, fever and unexpected weight change  HENT: Positive for congestion, sinus pressure and sore throat  Negative for ear pain  Respiratory: Positive for cough and shortness of breath  Negative for wheezing  Cardiovascular: Positive for chest pain (with coughing), palpitations and leg swelling (feet)  Gastrointestinal: Positive for nausea  Negative for abdominal pain, constipation, diarrhea and vomiting  Genitourinary: Negative for difficulty urinating  Musculoskeletal: Positive for arthralgias and myalgias  Neurological: Positive for headaches  Psychiatric/Behavioral: Positive for dysphoric mood  Objective:      /64   Pulse (!) 113   Temp 98 7 °F (37 1 °C)   Ht 5' 3" (1 6 m)   Wt 71 8 kg (158 lb 3 2 oz)   SpO2 97%   BMI 28 02 kg/m²          Physical Exam   Constitutional: She is oriented to person, place, and time  She appears ill  No distress  HENT:   Head: Normocephalic and atraumatic  Right Ear: External ear normal    Left Ear: External ear normal    Mouth/Throat: Oropharynx is clear and moist    Eyes: Conjunctivae are normal    Neck: Neck supple  Cardiovascular: Normal rate, regular rhythm and normal heart sounds  No murmur heard  Pulmonary/Chest: Effort normal and breath sounds normal  No respiratory distress  She has no wheezes  She has no rales  She exhibits tenderness (tenderness over the lower rib cage anteriorly)  Abdominal: Soft  She exhibits no distension and no mass  There is no tenderness  There is no rebound and no guarding  Neurological: She is alert and oriented to person, place, and time  Skin: Skin is warm and dry  She is not diaphoretic  Psychiatric: She has a normal mood and affect   Her behavior is normal  Judgment and thought content normal

## 2020-03-19 ENCOUNTER — TELEPHONE (OUTPATIENT)
Dept: INTERNAL MEDICINE CLINIC | Facility: CLINIC | Age: 55
End: 2020-03-19

## 2020-03-19 DIAGNOSIS — J20.9 ACUTE BRONCHITIS, UNSPECIFIED ORGANISM: Primary | ICD-10-CM

## 2020-03-20 ENCOUNTER — HOSPITAL ENCOUNTER (OUTPATIENT)
Dept: RADIOLOGY | Facility: HOSPITAL | Age: 55
Discharge: HOME | End: 2020-03-20
Payer: COMMERCIAL

## 2020-03-20 ENCOUNTER — TRANSCRIBE ORDERS (OUTPATIENT)
Dept: RADIOLOGY | Facility: HOSPITAL | Age: 55
End: 2020-03-20

## 2020-03-20 DIAGNOSIS — J20.9 ACUTE BRONCHITIS, UNSPECIFIED ORGANISM: Primary | ICD-10-CM

## 2020-03-20 DIAGNOSIS — J20.9 ACUTE BRONCHITIS, UNSPECIFIED ORGANISM: ICD-10-CM

## 2020-03-20 PROCEDURE — 71046 X-RAY EXAM CHEST 2 VIEWS: CPT

## 2020-03-20 RX ORDER — PREDNISONE 20 MG/1
20 TABLET ORAL 2 TIMES DAILY WITH MEALS
Qty: 10 TABLET | Refills: 0 | Status: SHIPPED | OUTPATIENT
Start: 2020-03-20 | End: 2020-03-30 | Stop reason: ALTCHOICE

## 2020-03-30 ENCOUNTER — TELEMEDICINE (OUTPATIENT)
Dept: INTERNAL MEDICINE CLINIC | Facility: CLINIC | Age: 55
End: 2020-03-30
Payer: COMMERCIAL

## 2020-03-30 DIAGNOSIS — R12 HEARTBURN: ICD-10-CM

## 2020-03-30 DIAGNOSIS — J20.9 ACUTE BRONCHITIS, UNSPECIFIED ORGANISM: Primary | ICD-10-CM

## 2020-03-30 DIAGNOSIS — M79.7 FIBROMYALGIA: ICD-10-CM

## 2020-03-30 DIAGNOSIS — J20.9 ACUTE BRONCHITIS, UNSPECIFIED ORGANISM: ICD-10-CM

## 2020-03-30 PROCEDURE — 99214 OFFICE O/P EST MOD 30 MIN: CPT | Performed by: INTERNAL MEDICINE

## 2020-03-30 RX ORDER — FLUTICASONE PROPIONATE 50 MCG
SPRAY, SUSPENSION (ML) NASAL
Qty: 48 G | Refills: 0 | Status: SHIPPED | OUTPATIENT
Start: 2020-03-30 | End: 2020-08-11 | Stop reason: SDUPTHER

## 2020-03-30 RX ORDER — OMEPRAZOLE 20 MG/1
20 CAPSULE, DELAYED RELEASE ORAL
Qty: 30 CAPSULE | Refills: 0 | Status: SHIPPED | OUTPATIENT
Start: 2020-03-30 | End: 2020-03-30

## 2020-03-30 RX ORDER — BENZONATATE 100 MG/1
100 CAPSULE ORAL 3 TIMES DAILY PRN
Qty: 30 CAPSULE | Refills: 0 | Status: SHIPPED | OUTPATIENT
Start: 2020-03-30 | End: 2020-04-06 | Stop reason: HOSPADM

## 2020-03-30 RX ORDER — OMEPRAZOLE 20 MG/1
CAPSULE, DELAYED RELEASE ORAL
Qty: 90 CAPSULE | Refills: 0 | Status: SHIPPED | OUTPATIENT
Start: 2020-03-30 | End: 2020-04-06 | Stop reason: HOSPADM

## 2020-03-30 RX ORDER — BUDESONIDE AND FORMOTEROL FUMARATE DIHYDRATE 160; 4.5 UG/1; UG/1
2 AEROSOL RESPIRATORY (INHALATION) 2 TIMES DAILY
Qty: 10.2 G | Refills: 0 | Status: SHIPPED | OUTPATIENT
Start: 2020-03-30 | End: 2020-04-06

## 2020-03-30 RX ORDER — FLUTICASONE PROPIONATE 50 MCG
1 SPRAY, SUSPENSION (ML) NASAL 2 TIMES DAILY
Qty: 16 G | Refills: 0 | Status: SHIPPED | OUTPATIENT
Start: 2020-03-30 | End: 2020-03-30

## 2020-03-30 NOTE — PROGRESS NOTES
Virtual Regular Visit    Problem List Items Addressed This Visit        Other    Fibromyalgia    Relevant Medications    Milnacipran HCl (Savella) 25 MG TABS      Other Visit Diagnoses     Acute bronchitis, unspecified organism    -  Primary    Relevant Medications    benzonatate (TESSALON PERLES) 100 mg capsule    budesonide-formoterol (SYMBICORT) 160-4 5 mcg/act inhaler    fluticasone (FLONASE) 50 mcg/act nasal spray    Heartburn        Relevant Medications    omeprazole (PriLOSEC) 20 mg delayed release capsule        Start nasal steroid, inhaler and PPI  1 week f/u       Reason for visit is persistent cough    Encounter provider Kobe Acuna MD    Provider located at [address] [id number]      Recent Visits  No visits were found meeting these conditions  Showing recent visits within past 7 days and meeting all other requirements     Today's Visits  Date Type Provider Dept   03/30/20 Telemedicine Kobe Acuna MD [facility]   03/30/20 Telephone [address] today's visits and meeting all other requirements     Future Appointments  Date Type Provider Dept   03/30/20 Mary Lou Nix MD [facility]   Showing future appointments within next 150 days and meeting all other requirements        The patient was identified by name and date of birth  Cherry Rash was informed that this is a telemedicine visit and that the visit is being conducted through [facility] and patient was informed that this is not a secure, HIPAA-complaint platform  she agrees to proceed     My office door was closed  No one else was in the room  She acknowledged consent and understanding of privacy and security of the video platform  The patient has agreed to participate and understands they can discontinue the visit at any time  Patient is aware this is a billable service  Subjective  Ruben Cuevas is a 62 y o  female persistent cough  Cough for 2 months without relief from Zpack (3/2) and prednisone (on 3/20)  CXR on 3/20 was normal  No OTC meds at this time  No fever, sore throat, rhinorrhea  + sinus congestion, pressure in the head, SOB, wheezing, heartburn  Cough worse at night    Past Medical History:   Diagnosis Date    Acid reflux     Anemia     last assessed 03/23/2016    Arthritis     rheumatoid    Depression     Fibromyalgia        Past Surgical History:   Procedure Laterality Date    HYSTERECTOMY      MI COLONOSCOPY FLX DX W/COLLJ SPEC WHEN PFRMD N/A 5/5/2016    Procedure: COLONOSCOPY;  Surgeon: Burgess Brianne DO;  [facility]: [facility]; Service: Gastroenterology    MI OPEN RX DISTAL RADIUS FX, EXTRA-ARTICULAR Right 5/12/2016    Procedure: OPEN REDUCTION INTERNAL FIXATION RIGHT DISTAL RADIUS (EXTRA-ARTICULAR); Surgeon: Lani Fritz MD;  Location[facility];  Service: Orthopedics    TONSILLECTOMY         Current Outpatient Medications   Medication Sig Dispense Refill    gabapentin (NEURONTIN) 300 mg capsule Take 1 capsule (300 mg total) by mouth daily 90 capsule 0    Milnacipran HCl (Savella) 25 MG TABS Take 1 tablet (25 mg total) by mouth 2 (two) times a day 180 tablet 1    benzonatate (TESSALON PERLES) 100 mg capsule Take 1 capsule (100 mg total) by mouth 3 (three) times a day as needed for cough 30 capsule 0    budesonide-formoterol (SYMBICORT) 160-4 5 mcg/act inhaler Inhale 2 puffs 2 (two) times a day Rinse mouth after use  10 2 g 0    fluticasone (FLONASE) 50 mcg/act nasal spray 1 spray into each nostril 2 (two) times a day 16 g 0    omeprazole (PriLOSEC) 20 mg delayed release capsule Take 1 capsule (20 mg total) by mouth daily before breakfast 30 capsule 0    senna (SENOKOT) 8 6 mg Take 2 tablets (17 2 mg total) by mouth daily at bedtime 180 tablet 1     No current facility-administered medications for this visit  Allergies   Allergen Reactions    Other      seasonal       Review of Systems   Constitutional: Negative for chills and fever  HENT: Positive for congestion and sinus pressure  Negative for sore throat  Respiratory: Positive for cough, shortness of breath and wheezing  Gastrointestinal: Negative for diarrhea and vomiting  Neurological: Positive for headaches  Physical Exam   Constitutional: She is oriented to person, place, and time  She appears distressed (incessant cough)  Neurological: She is alert and oriented to person, place, and time  Psychiatric: She has a normal mood and affect  Her behavior is normal         I spent 15 minutes with the patient during this visit    [id number]

## 2020-04-06 ENCOUNTER — TELEMEDICINE (OUTPATIENT)
Dept: INTERNAL MEDICINE CLINIC | Facility: CLINIC | Age: 55
End: 2020-04-06
Payer: COMMERCIAL

## 2020-04-06 DIAGNOSIS — R05.3 CHRONIC COUGH: Primary | ICD-10-CM

## 2020-04-06 DIAGNOSIS — K21.9 GASTROESOPHAGEAL REFLUX DISEASE, ESOPHAGITIS PRESENCE NOT SPECIFIED: ICD-10-CM

## 2020-04-06 PROCEDURE — 99213 OFFICE O/P EST LOW 20 MIN: CPT | Performed by: INTERNAL MEDICINE

## 2020-04-06 RX ORDER — ALBUTEROL SULFATE 90 UG/1
2 AEROSOL, METERED RESPIRATORY (INHALATION) EVERY 4 HOURS PRN
Qty: 18 G
Start: 2020-04-06 | End: 2020-08-11 | Stop reason: SDUPTHER

## 2020-05-17 DIAGNOSIS — B02.9 HERPES ZOSTER WITHOUT COMPLICATION: ICD-10-CM

## 2020-05-17 RX ORDER — GABAPENTIN 300 MG/1
CAPSULE ORAL
Qty: 90 CAPSULE | Refills: 0 | Status: SHIPPED | OUTPATIENT
Start: 2020-05-17 | End: 2020-06-04

## 2020-06-03 DIAGNOSIS — B02.9 HERPES ZOSTER WITHOUT COMPLICATION: ICD-10-CM

## 2020-06-04 RX ORDER — GABAPENTIN 300 MG/1
CAPSULE ORAL
Qty: 90 CAPSULE | Refills: 0 | Status: SHIPPED | OUTPATIENT
Start: 2020-06-04 | End: 2020-08-11 | Stop reason: SDUPTHER

## 2020-07-16 DIAGNOSIS — R12 HEARTBURN: ICD-10-CM

## 2020-07-17 DIAGNOSIS — R12 HEARTBURN: ICD-10-CM

## 2020-07-17 RX ORDER — OMEPRAZOLE 20 MG/1
CAPSULE, DELAYED RELEASE ORAL
Qty: 30 CAPSULE | Refills: 0 | Status: SHIPPED | OUTPATIENT
Start: 2020-07-17 | End: 2020-07-19

## 2020-07-19 RX ORDER — OMEPRAZOLE 20 MG/1
CAPSULE, DELAYED RELEASE ORAL
Qty: 90 CAPSULE | Refills: 1 | Status: SHIPPED | OUTPATIENT
Start: 2020-07-19 | End: 2020-08-11 | Stop reason: SDUPTHER

## 2020-08-11 ENCOUNTER — OFFICE VISIT (OUTPATIENT)
Dept: INTERNAL MEDICINE CLINIC | Facility: CLINIC | Age: 55
End: 2020-08-11
Payer: COMMERCIAL

## 2020-08-11 VITALS
OXYGEN SATURATION: 98 % | HEART RATE: 100 BPM | SYSTOLIC BLOOD PRESSURE: 112 MMHG | TEMPERATURE: 98 F | BODY MASS INDEX: 26.08 KG/M2 | WEIGHT: 147.2 LBS | DIASTOLIC BLOOD PRESSURE: 68 MMHG | HEIGHT: 63 IN

## 2020-08-11 DIAGNOSIS — J45.991 COUGH VARIANT ASTHMA: ICD-10-CM

## 2020-08-11 DIAGNOSIS — J30.89 NON-SEASONAL ALLERGIC RHINITIS, UNSPECIFIED TRIGGER: ICD-10-CM

## 2020-08-11 DIAGNOSIS — E78.2 MIXED HYPERLIPIDEMIA: ICD-10-CM

## 2020-08-11 DIAGNOSIS — K59.09 CHRONIC CONSTIPATION: ICD-10-CM

## 2020-08-11 DIAGNOSIS — M79.7 FIBROMYALGIA: Primary | ICD-10-CM

## 2020-08-11 DIAGNOSIS — F41.9 ANXIETY: ICD-10-CM

## 2020-08-11 DIAGNOSIS — Z12.4 CERVICAL CANCER SCREENING: ICD-10-CM

## 2020-08-11 DIAGNOSIS — Z12.31 BREAST CANCER SCREENING BY MAMMOGRAM: ICD-10-CM

## 2020-08-11 DIAGNOSIS — K21.9 GASTROESOPHAGEAL REFLUX DISEASE, ESOPHAGITIS PRESENCE NOT SPECIFIED: ICD-10-CM

## 2020-08-11 PROBLEM — Z79.899 OTHER LONG TERM (CURRENT) DRUG THERAPY: Status: RESOLVED | Noted: 2018-01-24 | Resolved: 2020-08-11

## 2020-08-11 PROBLEM — F32.A DEPRESSION: Status: RESOLVED | Noted: 2018-01-24 | Resolved: 2020-08-11

## 2020-08-11 PROBLEM — R29.898 WRIST WEAKNESS: Status: RESOLVED | Noted: 2018-08-28 | Resolved: 2020-08-11

## 2020-08-11 PROBLEM — M54.2 CERVICALGIA: Status: RESOLVED | Noted: 2019-11-21 | Resolved: 2020-08-11

## 2020-08-11 PROBLEM — M25.531 PAIN IN RIGHT WRIST: Status: RESOLVED | Noted: 2018-08-28 | Resolved: 2020-08-11

## 2020-08-11 PROBLEM — R51.9 HEADACHE: Status: RESOLVED | Noted: 2018-01-24 | Resolved: 2020-08-11

## 2020-08-11 PROCEDURE — 99214 OFFICE O/P EST MOD 30 MIN: CPT | Performed by: INTERNAL MEDICINE

## 2020-08-11 PROCEDURE — 1036F TOBACCO NON-USER: CPT | Performed by: INTERNAL MEDICINE

## 2020-08-11 PROCEDURE — 3008F BODY MASS INDEX DOCD: CPT | Performed by: INTERNAL MEDICINE

## 2020-08-11 RX ORDER — ALBUTEROL SULFATE 90 UG/1
2 AEROSOL, METERED RESPIRATORY (INHALATION) EVERY 4 HOURS PRN
Qty: 18 G | Refills: 1 | Status: SHIPPED | OUTPATIENT
Start: 2020-08-11 | End: 2021-05-26 | Stop reason: SDUPTHER

## 2020-08-11 RX ORDER — BUSPIRONE HYDROCHLORIDE 5 MG/1
5 TABLET ORAL 2 TIMES DAILY
Qty: 180 TABLET | Refills: 3 | Status: SHIPPED | OUTPATIENT
Start: 2020-08-11 | End: 2021-05-26 | Stop reason: SDUPTHER

## 2020-08-11 RX ORDER — SENNOSIDES 8.6 MG
2 TABLET ORAL
Qty: 180 TABLET | Refills: 0 | Status: SHIPPED | OUTPATIENT
Start: 2020-08-11 | End: 2021-06-25 | Stop reason: SDUPTHER

## 2020-08-11 RX ORDER — OMEPRAZOLE 20 MG/1
20 CAPSULE, DELAYED RELEASE ORAL
Qty: 90 CAPSULE | Refills: 1 | Status: SHIPPED | OUTPATIENT
Start: 2020-08-11 | End: 2021-06-25 | Stop reason: SDUPTHER

## 2020-08-11 RX ORDER — FLUTICASONE PROPIONATE 50 MCG
1 SPRAY, SUSPENSION (ML) NASAL 2 TIMES DAILY PRN
Qty: 16 G | Refills: 1 | Status: SHIPPED | OUTPATIENT
Start: 2020-08-11 | End: 2020-08-11

## 2020-08-11 RX ORDER — MILNACIPRAN HYDROCHLORIDE 25 MG/1
25 TABLET, FILM COATED ORAL 2 TIMES DAILY
Qty: 180 TABLET | Refills: 1 | Status: SHIPPED | OUTPATIENT
Start: 2020-08-11 | End: 2020-12-20

## 2020-08-11 RX ORDER — BUSPIRONE HYDROCHLORIDE 5 MG/1
5 TABLET ORAL 2 TIMES DAILY
COMMUNITY
End: 2020-08-11 | Stop reason: SDUPTHER

## 2020-08-11 RX ORDER — GABAPENTIN 300 MG/1
300 CAPSULE ORAL 2 TIMES DAILY
Qty: 180 CAPSULE | Refills: 3 | Status: SHIPPED | OUTPATIENT
Start: 2020-08-11 | End: 2020-09-10 | Stop reason: SDUPTHER

## 2020-08-11 RX ORDER — FLUTICASONE PROPIONATE 50 MCG
SPRAY, SUSPENSION (ML) NASAL
Qty: 48 G | Refills: 1 | Status: SHIPPED | OUTPATIENT
Start: 2020-08-11 | End: 2021-08-02

## 2020-08-11 NOTE — PATIENT INSTRUCTIONS
Increase gabapentin to 300mg twice a day for a few weeks  Try over the counter Claritin , Allegra or Zyrtec   Continue with Flonase spray

## 2020-08-11 NOTE — PROGRESS NOTES
Assessment/Plan:  Increase gabapentin to BID for back pain which I suspect is from [facility]  May decrease back to once a day after a few weeks  Tray OTC antihistamine for rhinitis, continue Flonase  BMI Counseling: Body mass index is 26 08 kg/m²  The BMI is above normal  Nutrition recommendations include consuming healthier snacks  Exercise recommendations include exercising 3-5 times per week  Problem List Items Addressed This Visit        Digestive    Gastroesophageal reflux disease    Relevant Medications    omeprazole (PriLOSEC) 20 mg delayed release capsule    Chronic constipation    Relevant Medications    senna (SENOKOT) 8 6 mg       Respiratory    Non-seasonal allergic rhinitis    Relevant Medications    fluticasone (FLONASE) 50 mcg/act nasal spray    Cough variant asthma    Relevant Medications    albuterol (Ventolin HFA) 90 mcg/act inhaler       Other    Anxiety    Relevant Medications    busPIRone (BUSPAR) 5 mg tablet    Fibromyalgia - Primary    Relevant Medications    gabapentin (NEURONTIN) 300 mg capsule    Milnacipran HCl (Savella) 25 MG TABS    Hyperlipidemia    Relevant Orders    Comprehensive metabolic panel    Lipid Panel with Direct LDL reflex      Other Visit Diagnoses     Cervical cancer screening        Relevant Orders    Ambulatory referral to Gynecology    Breast cancer screening by mammogram        Relevant Orders    Mammo screening bilateral w 3d & cad            Subjective:      Patient ID: Lashae German is a 60 y o  female      HPI  Prolonged cough for a few months starting in Feb  This has resolved but occasionally , she gets chest tightness and uses albuterol  She uses this a few times a week  C/o back pain for 2 weeks, no injuries  She has been exercising -stretching and walking 4-5mi daily   Eating healthy, avoiding snacks and has lost weight  Known fibromyalgia on Savella and gabapentin    The following portions of the patient's history were reviewed and updated as appropriate: allergies, current medications, past family history, past medical history, past social history, past surgical history and problem list     Review of Systems   Constitutional: Negative for chills, fever and unexpected weight change (+weight loss)  HENT: Positive for congestion, postnasal drip, rhinorrhea and sneezing  Chronic, using Flonase and Vicks   Respiratory: Positive for chest tightness  Negative for cough and shortness of breath  Cardiovascular: Negative for chest pain and palpitations  Gastrointestinal: Negative for abdominal pain and constipation  Genitourinary: Negative for difficulty urinating  Musculoskeletal: Positive for back pain  Neurological: Negative for dizziness and headaches  Psychiatric/Behavioral: The patient is not nervous/anxious (controlled on Buspar)  Objective:      /68   Pulse 100   Temp 98 °F (36 7 °C)   Ht 5' 3" (1 6 m)   Wt 66 8 kg (147 lb 3 2 oz)   SpO2 98%   BMI 26 08 kg/m²          Physical Exam  Constitutional:       Appearance: She is well-developed  HENT:      Head: Normocephalic and atraumatic  Right Ear: There is no impacted cerumen  Left Ear: There is no impacted cerumen  Eyes:      Conjunctiva/sclera: Conjunctivae normal    Neck:      Musculoskeletal: Neck supple  Cardiovascular:      Rate and Rhythm: Normal rate and regular rhythm  Heart sounds: Normal heart sounds  No murmur  Pulmonary:      Effort: Pulmonary effort is normal  No respiratory distress  Breath sounds: Normal breath sounds  No wheezing or rales  Abdominal:      General: There is no distension  Palpations: Abdomen is soft  There is no mass  Tenderness: There is no abdominal tenderness  There is no guarding or rebound  Musculoskeletal:         General: Tenderness (generalized over the paraspinal muscles on her back) present  Right lower leg: No edema  Left lower leg: No edema     Skin:     General: Skin is warm and dry  Neurological:      Mental Status: She is alert and oriented to person, place, and time  Psychiatric:         Behavior: Behavior normal          Thought Content:  Thought content normal          Judgment: Judgment normal

## 2020-09-09 ENCOUNTER — TELEPHONE (OUTPATIENT)
Dept: INTERNAL MEDICINE CLINIC | Facility: CLINIC | Age: 55
End: 2020-09-09

## 2020-09-09 NOTE — TELEPHONE ENCOUNTER
Patient called in she was in for back pain 8/11 and was to increase gabapentin  She stated she increased the rx but is not feeling any relief patient was instructed to call you if there was no improvement      Please advise

## 2020-09-10 DIAGNOSIS — M79.7 FIBROMYALGIA: ICD-10-CM

## 2020-09-10 RX ORDER — GABAPENTIN 300 MG/1
300 CAPSULE ORAL 3 TIMES DAILY
Qty: 180 CAPSULE | Refills: 3
Start: 2020-09-10 | End: 2021-06-25 | Stop reason: SDUPTHER

## 2020-10-09 ENCOUNTER — HOSPITAL ENCOUNTER (OUTPATIENT)
Dept: MAMMOGRAPHY | Facility: CLINIC | Age: 55
Discharge: HOME | End: 2020-10-09
Payer: COMMERCIAL

## 2020-10-09 DIAGNOSIS — Z12.31 BREAST CANCER SCREENING BY MAMMOGRAM: ICD-10-CM

## 2020-10-09 DIAGNOSIS — Z12.31 ENCOUNTER FOR SCREENING MAMMOGRAM FOR MALIGNANT NEOPLASM OF BREAST: ICD-10-CM

## 2020-10-09 PROCEDURE — 77063 BREAST TOMOSYNTHESIS BI: CPT

## 2020-10-09 PROCEDURE — 77067 SCR MAMMO BI INCL CAD: CPT

## 2020-10-23 ENCOUNTER — ANNUAL EXAM (OUTPATIENT)
Dept: OBGYN CLINIC | Facility: CLINIC | Age: 55
End: 2020-10-23
Payer: COMMERCIAL

## 2020-10-23 VITALS
TEMPERATURE: 97.9 F | BODY MASS INDEX: 23.82 KG/M2 | SYSTOLIC BLOOD PRESSURE: 116 MMHG | DIASTOLIC BLOOD PRESSURE: 64 MMHG | HEIGHT: 65 IN | WEIGHT: 143 LBS

## 2020-10-23 DIAGNOSIS — Z01.419 WOMEN'S ANNUAL ROUTINE GYNECOLOGICAL EXAMINATION: Primary | ICD-10-CM

## 2020-10-23 DIAGNOSIS — B37.2 CUTANEOUS CANDIDIASIS: ICD-10-CM

## 2020-10-23 DIAGNOSIS — Z12.4 CERVICAL CANCER SCREENING: ICD-10-CM

## 2020-10-23 PROCEDURE — 99396 PREV VISIT EST AGE 40-64: CPT | Performed by: OBSTETRICS & GYNECOLOGY

## 2020-10-23 PROCEDURE — 1036F TOBACCO NON-USER: CPT | Performed by: OBSTETRICS & GYNECOLOGY

## 2020-10-23 RX ORDER — NYSTATIN 100000 [USP'U]/G
POWDER TOPICAL 2 TIMES DAILY
Qty: 60 G | Refills: 5 | Status: SHIPPED | OUTPATIENT
Start: 2020-10-23 | End: 2021-09-01 | Stop reason: ALTCHOICE

## 2020-12-19 DIAGNOSIS — M79.7 FIBROMYALGIA: ICD-10-CM

## 2020-12-20 RX ORDER — MILNACIPRAN HYDROCHLORIDE 25 MG/1
TABLET, FILM COATED ORAL
Qty: 180 TABLET | Refills: 1 | Status: SHIPPED | OUTPATIENT
Start: 2020-12-20 | End: 2021-06-25 | Stop reason: SDUPTHER

## 2021-02-07 LAB
ALBUMIN SERPL-MCNC: 4.3 G/DL (ref 3.6–5.1)
ALBUMIN/GLOB SERPL: 1.5 (CALC) (ref 1–2.5)
ALP SERPL-CCNC: 83 U/L (ref 37–153)
ALT SERPL-CCNC: 12 U/L (ref 6–29)
AST SERPL-CCNC: 17 U/L (ref 10–35)
BILIRUB SERPL-MCNC: 0.4 MG/DL (ref 0.2–1.2)
BUN SERPL-MCNC: 9 MG/DL (ref 7–25)
BUN/CREAT SERPL: NORMAL (CALC) (ref 6–22)
CALCIUM SERPL-MCNC: 9.6 MG/DL (ref 8.6–10.4)
CHLORIDE SERPL-SCNC: 102 MMOL/L (ref 98–110)
CHOLEST SERPL-MCNC: 283 MG/DL
CHOLEST/HDLC SERPL: 3.7 (CALC)
CO2 SERPL-SCNC: 32 MMOL/L (ref 20–32)
CREAT SERPL-MCNC: 0.54 MG/DL (ref 0.5–1.05)
GLOBULIN SER CALC-MCNC: 2.8 G/DL (CALC) (ref 1.9–3.7)
GLUCOSE SERPL-MCNC: 98 MG/DL (ref 65–99)
HDLC SERPL-MCNC: 76 MG/DL
LDLC SERPL CALC-MCNC: 178 MG/DL (CALC)
NONHDLC SERPL-MCNC: 207 MG/DL (CALC)
POTASSIUM SERPL-SCNC: 4.6 MMOL/L (ref 3.5–5.3)
PROT SERPL-MCNC: 7.1 G/DL (ref 6.1–8.1)
SL AMB EGFR AFRICAN AMERICAN: 121 ML/MIN/1.73M2
SL AMB EGFR NON AFRICAN AMERICAN: 105 ML/MIN/1.73M2
SODIUM SERPL-SCNC: 139 MMOL/L (ref 135–146)
TRIGL SERPL-MCNC: 143 MG/DL

## 2021-02-17 ENCOUNTER — TELEMEDICINE (OUTPATIENT)
Dept: INTERNAL MEDICINE CLINIC | Facility: CLINIC | Age: 56
End: 2021-02-17
Payer: COMMERCIAL

## 2021-02-17 VITALS — TEMPERATURE: 98.4 F

## 2021-02-17 DIAGNOSIS — B34.9 VIRAL INFECTION, UNSPECIFIED: Primary | ICD-10-CM

## 2021-02-17 DIAGNOSIS — B34.9 VIRAL INFECTION, UNSPECIFIED: ICD-10-CM

## 2021-02-17 PROCEDURE — U0003 INFECTIOUS AGENT DETECTION BY NUCLEIC ACID (DNA OR RNA); SEVERE ACUTE RESPIRATORY SYNDROME CORONAVIRUS 2 (SARS-COV-2) (CORONAVIRUS DISEASE [COVID-19]), AMPLIFIED PROBE TECHNIQUE, MAKING USE OF HIGH THROUGHPUT TECHNOLOGIES AS DESCRIBED BY CMS-2020-01-R: HCPCS | Performed by: INTERNAL MEDICINE

## 2021-02-17 PROCEDURE — G2012 BRIEF CHECK IN BY MD/QHP: HCPCS | Performed by: INTERNAL MEDICINE

## 2021-02-17 PROCEDURE — U0005 INFEC AGEN DETEC AMPLI PROBE: HCPCS | Performed by: INTERNAL MEDICINE

## 2021-02-17 RX ORDER — KETOTIFEN FUMARATE 0.35 MG/ML
SOLUTION OPHTHALMIC
COMMUNITY
Start: 2020-12-27 | End: 2021-09-01 | Stop reason: ALTCHOICE

## 2021-02-17 NOTE — PROGRESS NOTES
COVID-19 Virtual Visit     Assessment/Plan:    Problem List Items Addressed This Visit     None      Visit Diagnoses     Viral infection, unspecified    -  Primary    Relevant Orders    Novel Coronavirus (Covid-19),PCR [facility] - Collected at Mobile V[facility] or [facility]         Disposition:     I referred patient to one of our centralized sites for a COVID-19 swab  Continue Mucinex at this time    I have spent 10 minutes directly with the patient  Greater than 50% of this time was spent in counseling/coordination of care regarding: instructions for management  Encounter provider Chencho Ferrari MD    Provider located at [address] [place] [phone]    Recent Visits  No visits were found meeting these conditions  Showing recent visits within past 7 days and meeting all other requirements     Today's Visits  Date Type Provider Dept   02/17/21 Telemedicine Chencho Ferrari MD [facility] [facility] today's visits and meeting all other requirements     Future Appointments  No visits were found meeting these conditions  Showing future appointments within next 150 days and meeting all other requirements        Patient agrees to participate in a virtual check in via telephone or video visit instead of presenting to the office to address urgent/immediate medical needs  Patient is aware this is a billable service  After connecting through Telephone, the patient was identified by name and date of birth  Carlin Tariq was informed that this was a telemedicine visit and that the exam was being conducted confidentially over secure lines  My office door was closed  No one else was in the room  Carlin Tariq acknowledged consent and understanding of privacy and security of the telemedicine visit   I informed the patient that I have reviewed her record in Epic and presented the opportunity for her to ask any questions regarding the visit today  The patient agreed to participate  Subjective:   Vijaya Syzmanski is a 60 y o  female who is concerned about COVID-19  Patient's symptoms include fatigue, rhinorrhea, sore throat, cough, nausea, myalgias and headache  Patient denies fever, chills, congestion, anosmia, loss of taste, shortness of breath, chest tightness, abdominal pain, vomiting and diarrhea  Date of symptom onset: 2/13/2021    Exposure:   Contact with a person who is under investigation (PUI) for or who is positive for COVID-19 within the last 14 days?: No    Hospitalized recently for fever and/or lower respiratory symptoms?: No      Currently a healthcare worker that is involved in direct patient care?: No      Works in a special setting where the risk of COVID-19 transmission may be high? (this may include long-term care, correctional and longterm facilities; homeless shelters; assisted-living facilities and group homes ): No      Resident in a special setting where the risk of COVID-19 transmission may be high? (this may include long-term care, correctional and longterm facilities; homeless shelters; assisted-living facilities and group homes ): No      Daughter translating for her  Taking Mucinex     No results found for: Germaine Cardoso, [address] [place] [address], [address] [place] [address],[address] [address]  Past Medical History:   Diagnosis Date    Acid reflux     Acid reflux disease 8/11/2015    Anemia     last assessed 03/23/2016    Arthritis     rheumatoid    Cervicalgia 11/21/2019    Depression     Depression 1/24/2018    Fibromyalgia     Headache 1/24/2018    Other long term (current) drug therapy 1/24/2018    Pain in right wrist 8/28/2018    Wrist weakness 8/28/2018     Past Surgical History:   Procedure Laterality Date    HYSTERECTOMY      MA COLONOSCOPY FLX DX W/COLLJ SPEC WHEN PFRMD N/A 5/5/2016    Procedure: COLONOSCOPY;  Surgeon: Heather Peter DO;  [facility];   Service: Gastroenterology    MA OPEN RX DISTAL RADIUS FX, EXTRA-ARTICULAR Right 5/12/2016    Procedure: OPEN REDUCTION INTERNAL FIXATION RIGHT DISTAL RADIUS (EXTRA-ARTICULAR); Surgeon: Lani Fritz MD;  [facility];  Service: Orthopedics    TONSILLECTOMY       Current Outpatient Medications   Medication Sig Dispense Refill    albuterol (Ventolin HFA) 90 mcg/act inhaler Inhale 2 puffs every 4 (four) hours as needed for wheezing 18 g 1    busPIRone (BUSPAR) 5 mg tablet Take 1 tablet (5 mg total) by mouth 2 (two) times a day 180 tablet 3    fluticasone (FLONASE) 50 mcg/act nasal spray USE 1 SPRAY IN EACH NOSTRIL TWICE DAILY AS NEEDED FOR RHINITIS 48 g 1    gabapentin (NEURONTIN) 300 mg capsule Take 1 capsule (300 mg total) by mouth 3 (three) times a day 180 capsule 3    nystatin (MYCOSTATIN) powder Apply topically 2 (two) times a day 60 g 5    omeprazole (PriLOSEC) 20 mg delayed release capsule Take 1 capsule (20 mg total) by mouth daily before breakfast 90 capsule 1    Savella 25 MG TABS TAKE 1 TABLET BY MOUTH TWICE DAILY 180 tablet 1    senna (SENOKOT) 8 6 mg Take 2 tablets (17 2 mg total) by mouth daily at bedtime as needed for constipation 180 tablet 0    Zaditor 0 025 % ophthalmic solution        No current facility-administered medications for this visit  Allergies   Allergen Reactions    Other      seasonal       Review of Systems   Constitutional: Positive for fatigue  Negative for chills and fever  HENT: Positive for rhinorrhea and sore throat  Negative for congestion  Respiratory: Positive for cough  Negative for chest tightness and shortness of breath  Gastrointestinal: Positive for nausea  Negative for abdominal pain, diarrhea and vomiting  Musculoskeletal: Positive for myalgias  Neurological: Positive for headaches       Objective:    Vitals:    02/17/21 1043   Temp: 98 4 °F (36 9 °C)       Physical Exam  VIRTUAL VISIT DISCLAIMER    Ruben Cuevas acknowledges that she has consented to an online visit or consultation  She understands that the online visit is based solely on information provided by her, and that, in the absence of a face-to-face physical evaluation by the physician, the diagnosis she receives is both limited and provisional in terms of accuracy and completeness  This is not intended to replace a full medical face-to-face evaluation by the physician  Pro Carr understands and accepts these terms

## 2021-02-18 LAB — SARS-COV-2 RNA RESP QL NAA+PROBE: POSITIVE

## 2021-02-19 ENCOUNTER — TELEMEDICINE (OUTPATIENT)
Dept: INTERNAL MEDICINE CLINIC | Facility: CLINIC | Age: 56
End: 2021-02-19
Payer: COMMERCIAL

## 2021-02-19 DIAGNOSIS — U07.1 COVID-19: Primary | ICD-10-CM

## 2021-02-19 PROCEDURE — 99214 OFFICE O/P EST MOD 30 MIN: CPT | Performed by: INTERNAL MEDICINE

## 2021-02-19 RX ORDER — ONDANSETRON 4 MG/1
4 TABLET, ORALLY DISINTEGRATING ORAL EVERY 6 HOURS PRN
Qty: 20 TABLET | Refills: 0 | Status: SHIPPED | OUTPATIENT
Start: 2021-02-19 | End: 2021-05-26 | Stop reason: SDUPTHER

## 2021-02-19 NOTE — PROGRESS NOTES
COVID-19 Virtual Visit     Assessment/Plan:    Problem List Items Addressed This Visit     None      Visit Diagnoses     COVID-19    -  Primary    Relevant Medications    ondansetron (ZOFRAN-ODT) 4 mg disintegrating tablet         Disposition:     I recommended continued isolation until at least 24 hours have passed since recovery defined as resolution of fever without the use of fever-reducing medications AND improvement in COVID symptoms AND 10 days have passed since onset of symptoms (or 10 days have passed since date of first positive viral diagnostic test for asymptomatic patients)  She is not asthmatic and does not meet any other criteria for monoclonal antibody infusion  Stay well hydrated  Ok to take OTC cough medications  I will send Zofran for nausea  COVID-19 [address]    Your healthcare provider and/or public health staff have evaluated you and have determined that you do not need to remain in the hospital at this time   At this time you can be isolated at home where you will be monitored by staff from your local or state health department  You should carefully follow the prevention and isolation steps below until a healthcare provider or local or state health department says that you can return to your normal activities  Stay home except to get medical care    People who are mildly ill with COVID-19 are able to isolate at home during their illness  You should restrict activities outside your home, except for getting medical care  Do not go to work, school, or public areas  Avoid using public transportation, ride-sharing, or taxis  Separate yourself from other people and animals in your home    People: As much as possible, you should stay in a specific room and away from other people in your home  Also, you should use a separate bathroom, if available  Animals:  You should restrict contact with pets and other animals while you are sick with COVID-19, just like you would around other people  Although there have not been reports of pets or other animals becoming sick with COVID-19, it is still recommended that people sick with COVID-19 limit contact with animals until more information is known about the virus  When possible, have another member of your household care for your animals while you are sick  If you are sick with COVID-19, avoid contact with your pet, including petting, snuggling, being kissed or licked, and sharing food  If you must care for your pet or be around animals while you are sick, wash your hands before and after you interact with pets and wear a facemask  See COVID-19 and Animals for more information  Call ahead before visiting your doctor    If you have a medical appointment, call the healthcare provider and tell them that you have or may have COVID-19  This will help the healthcare providers office take steps to keep other people from getting infected or exposed  Wear a facemask    You should wear a facemask when you are around other people (e g , sharing a room or vehicle) or pets and before you enter a healthcare providers office  If you are not able to wear a facemask (for example, because it causes trouble breathing), then people who live with you should not stay in the same room with you, or they should wear a facemask if they enter your room  Cover your coughs and sneezes    Cover your mouth and nose with a tissue when you cough or sneeze  Throw used tissues in a lined trash can  Immediately wash your hands with soap and water for at least 20 seconds or, if soap and water are not available, clean your hands with an alcohol-based hand  that contains at least 60% alcohol  Clean your hands often    Wash your hands often with soap and water for at least 20 seconds, especially after blowing your nose, coughing, or sneezing; going to the bathroom; and before eating or preparing food   If soap and water are not readily available, use an alcohol-based hand  with at least 60% alcohol, covering all surfaces of your hands and rubbing them together until they feel dry  Soap and water are the best option if hands are visibly dirty  Avoid touching your eyes, nose, and mouth with unwashed hands  Avoid sharing personal household items    You should not share dishes, drinking glasses, cups, eating utensils, towels, or bedding with other people or pets in your home  After using these items, they should be washed thoroughly with soap and water  Clean all high-touch surfaces everyday    High touch surfaces include counters, tabletops, doorknobs, bathroom fixtures, toilets, phones, keyboards, tablets, and bedside tables  Also, clean any surfaces that may have blood, stool, or body fluids on them  Use a household cleaning spray or wipe, according to the label instructions  Labels contain instructions for safe and effective use of the cleaning product including precautions you should take when applying the product, such as wearing gloves and making sure you have good ventilation during use of the product  Monitor your symptoms    Seek prompt medical attention if your illness is worsening (e g , difficulty breathing)  Before seeking care, call your healthcare provider and tell them that you have, or are being evaluated for, COVID-19  Put on a facemask before you enter the facility  These steps will help the healthcare providers office to keep other people in the office or waiting room from getting infected or exposed  Ask your healthcare provider to call the local or state health department  Persons who are placed under active monitoring or facilitated self-monitoring should follow instructions provided by their local health department or occupational health professionals, as appropriate  If you have a medical emergency and need to call 911, notify the dispatch personnel that you have, or are being evaluated for COVID-19   If possible, put on a facemask before emergency medical services arrive  Discontinuing home isolation    Patients with confirmed COVID-19 should remain under home isolation precautions until the following conditions are met: They have had no fever for at least 24 hours (that is one full day of no fever without the use medicine that reduces fevers)  AND  other symptoms have improved (for example, when their cough or shortness of breath have improved)  AND  If had mild or moderate illness, at least 10 days have passed since their symptoms first appeared or if severe illness (needed oxygen) or immunosuppressed, at least 20 days have passed since symptoms first appeared  Patients with confirmed COVID-19 should also notify close contacts (including their workplace) and ask that they self-quarantine  Currently, close contact is defined as being within 6 feet for 15 minutes or more from the period 24 hours starting 48 hours before symptom onset to the time at which the patient went into isolation   Close contacts of patients diagnosed with COVID-19 should be instructed by the patient to self-quarantine for 14 days from the last time of their last contact with the patient  Source: [employer]    I have spent 15 minutes directly with the patient  Greater than 50% of this time was spent in counseling/coordination of care regarding: risks and benefits of treatment options and patient and family education          Encounter provider Kobe Acuna MD    Provider located at [address] [place] [phone]    Recent Visits  Date Type Provider Dept   02/17/21 Mary Lou Nix MD [facility] recent visits within past 7 days and meeting all other requirements     Today's Visits  Date Type Provider Dept   02/19/21 Telemedicine Kobe Acuna MD [facility] [facility] today's visits and meeting all other requirements     Future Appointments  No visits were found meeting these conditions  Showing future appointments within next 150 days and meeting all other requirements      This virtual check-in was done via [employer] and patient was informed that this is not a secure, HIPAA-compliant platform  She agrees to proceed  Patient agrees to participate in a virtual check in via telephone or video visit instead of presenting to the office to address urgent/immediate medical needs  Patient is aware this is a billable service  After connecting through [facility], the patient was identified by name and date of birth  Krishna Beatty was informed that this was a telemedicine visit and that the exam was being conducted confidentially over secure lines  My office door was closed  No one else was in the room  Krishna Beatty acknowledged consent and understanding of privacy and security of the telemedicine visit  I informed the patient that I have reviewed her record in Epic and presented the opportunity for her to ask any questions regarding the visit today  The patient agreed to participate  Subjective:   Krishna Beatty is a 60 y o  female who has been screened for COVID-19  Symptom change since last report: worsening  Patient's symptoms include fatigue, rhinorrhea, sore throat, cough, abdominal pain, nausea, diarrhea, myalgias and headache  Patient denies fever, chills, congestion, anosmia, loss of taste, shortness of breath, chest tightness and vomiting  Lm Palacios has been staying home and has isolated themselves in her home  She is taking care to not share personal items and is cleaning all surfaces that are touched often, like counters, tabletops, and doorknobs using household cleaning sprays or wipes  She is wearing a mask when she leaves her room       Date of symptom onset: 2/13/2021  Date of positive COVID-19 PCR: 2/17/2021    GI symptoms started yesterday  Daughter and son also +  Daughter helping translate  Denies any history of asthma  She needed an inhaler last year for a respiratory infection but no previous dx of asthma    Lab Results   Component Value Date    SARSCOV2 Positive (A) 02/17/2021     Past Medical History:   Diagnosis Date    Acid reflux     Acid reflux disease 8/11/2015    Anemia     last assessed 03/23/2016    Arthritis     rheumatoid    Cervicalgia 11/21/2019    Depression     Depression 1/24/2018    Fibromyalgia     Headache 1/24/2018    Other long term (current) drug therapy 1/24/2018    Pain in right wrist 8/28/2018    Wrist weakness 8/28/2018     Past Surgical History:   Procedure Laterality Date    HYSTERECTOMY      [place] COLONOSCOPY FLX DX W/COLLJ SPEC WHEN PFRMD N/A 5/5/2016    Procedure: COLONOSCOPY;  Surgeon: Eduardo Baez DO;  [facility]; Service: Gastroenterology    NH OPEN RX DISTAL RADIUS FX, EXTRA-ARTICULAR Right 5/12/2016    Procedure: OPEN REDUCTION INTERNAL FIXATION RIGHT DISTAL RADIUS (EXTRA-ARTICULAR);   Surgeon: Radha Banuelos MD;  [facility];  Service: Orthopedics    TONSILLECTOMY       Current Outpatient Medications   Medication Sig Dispense Refill    albuterol (Ventolin HFA) 90 mcg/act inhaler Inhale 2 puffs every 4 (four) hours as needed for wheezing 18 g 1    busPIRone (BUSPAR) 5 mg tablet Take 1 tablet (5 mg total) by mouth 2 (two) times a day 180 tablet 3    fluticasone (FLONASE) 50 mcg/act nasal spray USE 1 SPRAY IN EACH NOSTRIL TWICE DAILY AS NEEDED FOR RHINITIS 48 g 1    gabapentin (NEURONTIN) 300 mg capsule Take 1 capsule (300 mg total) by mouth 3 (three) times a day 180 capsule 3    nystatin (MYCOSTATIN) powder Apply topically 2 (two) times a day 60 g 5    omeprazole (PriLOSEC) 20 mg delayed release capsule Take 1 capsule (20 mg total) by mouth daily before breakfast 90 capsule 1    ondansetron (ZOFRAN-ODT) 4 mg disintegrating tablet Take 1 tablet (4 mg total) by mouth every 6 (six) hours as needed for nausea or vomiting 20 tablet 0    Savella 25 MG TABS TAKE 1 TABLET BY MOUTH TWICE DAILY 180 tablet 1    senna (SENOKOT) 8 6 mg Take 2 tablets (17 2 mg total) by mouth daily at bedtime as needed for constipation 180 tablet 0    Zaditor 0 025 % ophthalmic solution        No current facility-administered medications for this visit  Allergies   Allergen Reactions    Other      seasonal       Review of Systems   Constitutional: Positive for fatigue  Negative for chills and fever  HENT: Positive for rhinorrhea and sore throat  Negative for congestion  Respiratory: Positive for cough  Negative for chest tightness and shortness of breath  Gastrointestinal: Positive for abdominal pain, diarrhea and nausea  Negative for vomiting  Musculoskeletal: Positive for myalgias  Neurological: Positive for headaches  Objective: There were no vitals filed for this visit  Physical Exam  Constitutional:       General: She is not in acute distress  Appearance: She is ill-appearing  She is not toxic-appearing  Pulmonary:      Effort: No respiratory distress  Neurological:      Mental Status: She is oriented to person, place, and time  Psychiatric:         Mood and Affect: Mood normal          Behavior: Behavior normal        VIRTUAL VISIT DISCLAIMER    Kala Jer acknowledges that she has consented to an online visit or consultation  She understands that the online visit is based solely on information provided by her, and that, in the absence of a face-to-face physical evaluation by the physician, the diagnosis she receives is both limited and provisional in terms of accuracy and completeness  This is not intended to replace a full medical face-to-face evaluation by the physician  Kala Randle understands and accepts these terms

## 2021-02-22 ENCOUNTER — TELEMEDICINE (OUTPATIENT)
Dept: INTERNAL MEDICINE CLINIC | Facility: CLINIC | Age: 56
End: 2021-02-22
Payer: COMMERCIAL

## 2021-02-22 DIAGNOSIS — U07.1 COVID-19: Primary | ICD-10-CM

## 2021-02-22 PROCEDURE — 3725F SCREEN DEPRESSION PERFORMED: CPT | Performed by: INTERNAL MEDICINE

## 2021-02-22 PROCEDURE — 1036F TOBACCO NON-USER: CPT | Performed by: INTERNAL MEDICINE

## 2021-02-22 PROCEDURE — 99213 OFFICE O/P EST LOW 20 MIN: CPT | Performed by: INTERNAL MEDICINE

## 2021-02-22 NOTE — PROGRESS NOTES
COVID-19 Virtual Visit     Assessment/Plan:    Problem List Items Addressed This Visit     None      Visit Diagnoses     COVID-19    -  Primary         Disposition:     I recommended continued isolation until at least 24 hours have passed since recovery defined as resolution of fever without the use of fever-reducing medications AND improvement in COVID symptoms AND 10 days have passed since onset of symptoms (or 10 days have passed since date of first positive viral diagnostic test for asymptomatic patients)  Call with any new or worsening symptoms    I have spent 15 minutes directly with the patient  Greater than 50% of this time was spent in counseling/coordination of care regarding: instructions for management and impressions  Encounter provider Delisa Corbin MD    Provider located at [address] [place] [id number]    Recent Visits  Date Type Provider Dept   02/19/21 Darren Russo MD [place] [id number]   02/17/21 Telemedicine Delisa Corbin MD [facility]   Showing recent visits within past 7 days and meeting all other requirements     Today's Visits  Date Type Provider Dept   02/22/21 Telemedicine Delisa Corbin MD [facility] today's visits and meeting all other requirements     Future Appointments  No visits were found meeting these conditions  Showing future appointments within next 150 days and meeting all other requirements      This virtual check-in was done via [employer] and patient was informed that this is a secure, HIPAA-compliant platform  She agrees to proceed  Patient agrees to participate in a virtual check in via telephone or video visit instead of presenting to the office to address urgent/immediate medical needs  Patient is aware this is a billable service  After connecting through [facility], the patient was identified by name and date of birth   Wendy Yost was informed that this was a telemedicine visit and that the exam was being conducted confidentially over secure lines  My office door was closed  No one else was in the room  Wendy Yost acknowledged consent and understanding of privacy and security of the telemedicine visit  I informed the patient that I have reviewed her record in Epic and presented the opportunity for her to ask any questions regarding the visit today  The patient agreed to participate  Subjective:   Wendy Yost is a 60 y o  female who has been screened for COVID-19  Symptom change since last report: improving  Patient's symptoms include chills, fatigue, nasal congestion, rhinorrhea, cough, nausea (better with Zofran), myalgias and headache  Patient denies fever, sore throat, shortness of breath, chest tightness, abdominal pain, vomiting and diarrhea  Mark Aleman has been staying home and has isolated themselves in her home  She is taking care to not share personal items and is cleaning all surfaces that are touched often, like counters, tabletops, and doorknobs using household cleaning sprays or wipes  She is wearing a mask when she leaves her room  Date of symptom onset: 2/13/2021  Date of positive COVID-19 PCR: 2/17/2021    Lab Results   Component Value Date    SARSCOV2 Positive (A) 02/17/2021     Past Medical History:   Diagnosis Date    Acid reflux     Acid reflux disease 8/11/2015    Anemia     last assessed 03/23/2016    Arthritis     rheumatoid    Cervicalgia 11/21/2019    Depression     Depression 1/24/2018    Fibromyalgia     Headache 1/24/2018    Other long term (current) drug therapy 1/24/2018    Pain in right wrist 8/28/2018    Wrist weakness 8/28/2018     Past Surgical History:   Procedure Laterality Date    HYSTERECTOMY      NC COLONOSCOPY FLX DX W/COLLJ SPEC WHEN PFRMD N/A 5/5/2016    Procedure: COLONOSCOPY;  Surgeon: Nasir Horn DO;  Location: [facility];   Service: Gastroenterology    OK OPEN RX DISTAL RADIUS FX, EXTRA-ARTICULAR Right 5/12/2016    Procedure: OPEN REDUCTION INTERNAL FIXATION RIGHT DISTAL RADIUS (EXTRA-ARTICULAR); Surgeon: Terese Fernandez MD;  Location[facility];  Service: Orthopedics    TONSILLECTOMY       Current Outpatient Medications   Medication Sig Dispense Refill    albuterol (Ventolin HFA) 90 mcg/act inhaler Inhale 2 puffs every 4 (four) hours as needed for wheezing 18 g 1    busPIRone (BUSPAR) 5 mg tablet Take 1 tablet (5 mg total) by mouth 2 (two) times a day 180 tablet 3    fluticasone (FLONASE) 50 mcg/act nasal spray USE 1 SPRAY IN EACH NOSTRIL TWICE DAILY AS NEEDED FOR RHINITIS 48 g 1    gabapentin (NEURONTIN) 300 mg capsule Take 1 capsule (300 mg total) by mouth 3 (three) times a day 180 capsule 3    nystatin (MYCOSTATIN) powder Apply topically 2 (two) times a day 60 g 5    omeprazole (PriLOSEC) 20 mg delayed release capsule Take 1 capsule (20 mg total) by mouth daily before breakfast 90 capsule 1    ondansetron (ZOFRAN-ODT) 4 mg disintegrating tablet Take 1 tablet (4 mg total) by mouth every 6 (six) hours as needed for nausea or vomiting 20 tablet 0    Savella 25 MG TABS TAKE 1 TABLET BY MOUTH TWICE DAILY 180 tablet 1    senna (SENOKOT) 8 6 mg Take 2 tablets (17 2 mg total) by mouth daily at bedtime as needed for constipation 180 tablet 0    Zaditor 0 025 % ophthalmic solution        No current facility-administered medications for this visit  Allergies   Allergen Reactions    Other      seasonal       Review of Systems   Constitutional: Positive for chills and fatigue  Negative for fever  HENT: Positive for congestion and rhinorrhea  Negative for sore throat  Respiratory: Positive for cough  Negative for chest tightness and shortness of breath  Gastrointestinal: Positive for nausea (better with Zofran)  Negative for abdominal pain, diarrhea and vomiting  Musculoskeletal: Positive for myalgias     Neurological: Positive for headaches  Objective: There were no vitals filed for this visit  Physical Exam  Constitutional:       General: She is not in acute distress  Appearance: She is not ill-appearing, toxic-appearing or diaphoretic  Pulmonary:      Effort: No respiratory distress  Neurological:      Mental Status: She is oriented to person, place, and time  Psychiatric:         Mood and Affect: Mood normal          Behavior: Behavior normal          Thought Content: Thought content normal          Judgment: Judgment normal        VIRTUAL VISIT DISCLAIMER    Wendicristian Navarrete acknowledges that she has consented to an online visit or consultation  She understands that the online visit is based solely on information provided by her, and that, in the absence of a face-to-face physical evaluation by the physician, the diagnosis she receives is both limited and provisional in terms of accuracy and completeness  This is not intended to replace a full medical face-to-face evaluation by the physician  Wendi Navarrete understands and accepts these terms

## 2021-05-26 ENCOUNTER — APPOINTMENT (OUTPATIENT)
Dept: LAB | Facility: CLINIC | Age: 56
End: 2021-05-26
Payer: COMMERCIAL

## 2021-05-26 ENCOUNTER — HOSPITAL ENCOUNTER (OUTPATIENT)
Dept: RADIOLOGY | Facility: HOSPITAL | Age: 56
Discharge: HOME | End: 2021-05-26
Payer: COMMERCIAL

## 2021-05-26 ENCOUNTER — OFFICE VISIT (OUTPATIENT)
Dept: INTERNAL MEDICINE CLINIC | Facility: CLINIC | Age: 56
End: 2021-05-26
Payer: COMMERCIAL

## 2021-05-26 VITALS
WEIGHT: 127.2 LBS | HEART RATE: 106 BPM | SYSTOLIC BLOOD PRESSURE: 122 MMHG | OXYGEN SATURATION: 97 % | BODY MASS INDEX: 21.19 KG/M2 | HEIGHT: 65 IN | DIASTOLIC BLOOD PRESSURE: 72 MMHG

## 2021-05-26 DIAGNOSIS — R63.4 WEIGHT LOSS: ICD-10-CM

## 2021-05-26 DIAGNOSIS — U09.9 POST-COVID SYNDROME: Primary | ICD-10-CM

## 2021-05-26 DIAGNOSIS — E78.2 MIXED HYPERLIPIDEMIA: ICD-10-CM

## 2021-05-26 DIAGNOSIS — R11.0 NAUSEA: ICD-10-CM

## 2021-05-26 DIAGNOSIS — F41.9 ANXIETY: ICD-10-CM

## 2021-05-26 DIAGNOSIS — U09.9 POST-COVID SYNDROME: ICD-10-CM

## 2021-05-26 LAB
ALBUMIN SERPL BCP-MCNC: 2.7 G/DL (ref 3.5–5)
ALP SERPL-CCNC: 70 U/L (ref 46–116)
ALT SERPL W P-5'-P-CCNC: 13 U/L (ref 12–78)
ANION GAP SERPL CALCULATED.3IONS-SCNC: 8 MMOL/L (ref 4–13)
AST SERPL W P-5'-P-CCNC: 11 U/L (ref 5–45)
BASOPHILS # BLD AUTO: 0.05 THOUSANDS/ΜL (ref 0–0.1)
BASOPHILS NFR BLD AUTO: 1 % (ref 0–1)
BILIRUB SERPL-MCNC: 0.36 MG/DL (ref 0.2–1)
BUN SERPL-MCNC: 11 MG/DL (ref 5–25)
CALCIUM ALBUM COR SERPL-MCNC: 10 MG/DL (ref 8.3–10.1)
CALCIUM SERPL-MCNC: 9 MG/DL (ref 8.3–10.1)
CHLORIDE SERPL-SCNC: 105 MMOL/L (ref 100–108)
CHOLEST SERPL-MCNC: 252 MG/DL (ref 50–200)
CO2 SERPL-SCNC: 30 MMOL/L (ref 21–32)
CREAT SERPL-MCNC: 0.58 MG/DL (ref 0.6–1.3)
EOSINOPHIL # BLD AUTO: 0.11 THOUSAND/ΜL (ref 0–0.61)
EOSINOPHIL NFR BLD AUTO: 2 % (ref 0–6)
ERYTHROCYTE [DISTWIDTH] IN BLOOD BY AUTOMATED COUNT: 14.4 % (ref 11.6–15.1)
GFR SERPL CREATININE-BSD FRML MDRD: 103 ML/MIN/1.73SQ M
GLUCOSE P FAST SERPL-MCNC: 97 MG/DL (ref 65–99)
HCT VFR BLD AUTO: 36.6 % (ref 34.8–46.1)
HDLC SERPL-MCNC: 90 MG/DL
HGB BLD-MCNC: 11.6 G/DL (ref 11.5–15.4)
IMM GRANULOCYTES # BLD AUTO: 0.02 THOUSAND/UL (ref 0–0.2)
IMM GRANULOCYTES NFR BLD AUTO: 0 % (ref 0–2)
LDLC SERPL CALC-MCNC: 140 MG/DL (ref 0–100)
LYMPHOCYTES # BLD AUTO: 1.83 THOUSANDS/ΜL (ref 0.6–4.47)
LYMPHOCYTES NFR BLD AUTO: 33 % (ref 14–44)
MCH RBC QN AUTO: 26.1 PG (ref 26.8–34.3)
MCHC RBC AUTO-ENTMCNC: 31.7 G/DL (ref 31.4–37.4)
MCV RBC AUTO: 82 FL (ref 82–98)
MONOCYTES # BLD AUTO: 0.41 THOUSAND/ΜL (ref 0.17–1.22)
MONOCYTES NFR BLD AUTO: 7 % (ref 4–12)
NEUTROPHILS # BLD AUTO: 3.12 THOUSANDS/ΜL (ref 1.85–7.62)
NEUTS SEG NFR BLD AUTO: 57 % (ref 43–75)
NRBC BLD AUTO-RTO: 0 /100 WBCS
PLATELET # BLD AUTO: 293 THOUSANDS/UL (ref 149–390)
PMV BLD AUTO: 9.5 FL (ref 8.9–12.7)
POTASSIUM SERPL-SCNC: 4.2 MMOL/L (ref 3.5–5.3)
PROT SERPL-MCNC: 7.4 G/DL (ref 6.4–8.2)
RBC # BLD AUTO: 4.45 MILLION/UL (ref 3.81–5.12)
SODIUM SERPL-SCNC: 143 MMOL/L (ref 136–145)
TRIGL SERPL-MCNC: 108 MG/DL
TSH SERPL DL<=0.05 MIU/L-ACNC: 0.77 UIU/ML (ref 0.36–3.74)
WBC # BLD AUTO: 5.54 THOUSAND/UL (ref 4.31–10.16)

## 2021-05-26 PROCEDURE — 1036F TOBACCO NON-USER: CPT | Performed by: INTERNAL MEDICINE

## 2021-05-26 PROCEDURE — 36415 COLL VENOUS BLD VENIPUNCTURE: CPT

## 2021-05-26 PROCEDURE — 71046 X-RAY EXAM CHEST 2 VIEWS: CPT

## 2021-05-26 PROCEDURE — 80053 COMPREHEN METABOLIC PANEL: CPT

## 2021-05-26 PROCEDURE — 85025 COMPLETE CBC W/AUTO DIFF WBC: CPT

## 2021-05-26 PROCEDURE — 3008F BODY MASS INDEX DOCD: CPT | Performed by: INTERNAL MEDICINE

## 2021-05-26 PROCEDURE — 80061 LIPID PANEL: CPT

## 2021-05-26 PROCEDURE — 99214 OFFICE O/P EST MOD 30 MIN: CPT | Performed by: INTERNAL MEDICINE

## 2021-05-26 PROCEDURE — 84443 ASSAY THYROID STIM HORMONE: CPT

## 2021-05-26 RX ORDER — ONDANSETRON 4 MG/1
4 TABLET, ORALLY DISINTEGRATING ORAL EVERY 6 HOURS PRN
Qty: 30 TABLET | Refills: 2 | Status: SHIPPED | OUTPATIENT
Start: 2021-05-26 | End: 2021-06-25 | Stop reason: SDUPTHER

## 2021-05-26 RX ORDER — BUSPIRONE HYDROCHLORIDE 10 MG/1
10 TABLET ORAL 2 TIMES DAILY
Qty: 60 TABLET | Refills: 2 | Status: SHIPPED | OUTPATIENT
Start: 2021-05-26 | End: 2021-06-25 | Stop reason: SDUPTHER

## 2021-05-26 RX ORDER — ALBUTEROL SULFATE 90 UG/1
2 AEROSOL, METERED RESPIRATORY (INHALATION) EVERY 4 HOURS PRN
Qty: 18 G | Refills: 1 | Status: SHIPPED | OUTPATIENT
Start: 2021-05-26
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
## 2021-06-25 ENCOUNTER — OFFICE VISIT (OUTPATIENT)
Dept: INTERNAL MEDICINE CLINIC | Facility: CLINIC | Age: 56
End: 2021-06-25
Payer: COMMERCIAL

## 2021-06-25 VITALS
HEART RATE: 113 BPM | WEIGHT: 137 LBS | BODY MASS INDEX: 22.82 KG/M2 | SYSTOLIC BLOOD PRESSURE: 130 MMHG | OXYGEN SATURATION: 97 % | HEIGHT: 65 IN | DIASTOLIC BLOOD PRESSURE: 74 MMHG | TEMPERATURE: 98.1 F

## 2021-06-25 DIAGNOSIS — K21.9 GASTROESOPHAGEAL REFLUX DISEASE: ICD-10-CM

## 2021-06-25 DIAGNOSIS — R11.0 NAUSEA: ICD-10-CM

## 2021-06-25 DIAGNOSIS — M79.7 FIBROMYALGIA: ICD-10-CM

## 2021-06-25 DIAGNOSIS — F41.9 ANXIETY: ICD-10-CM

## 2021-06-25 DIAGNOSIS — U09.9 POST-ACUTE SEQUELAE OF COVID-19 (PASC): Primary | ICD-10-CM

## 2021-06-25 DIAGNOSIS — K59.09 CHRONIC CONSTIPATION: ICD-10-CM

## 2021-06-25 PROCEDURE — 3008F BODY MASS INDEX DOCD: CPT | Performed by: INTERNAL MEDICINE

## 2021-06-25 PROCEDURE — 99214 OFFICE O/P EST MOD 30 MIN: CPT | Performed by: INTERNAL MEDICINE

## 2021-06-25 PROCEDURE — 1036F TOBACCO NON-USER: CPT | Performed by: INTERNAL MEDICINE

## 2021-06-25 RX ORDER — ONDANSETRON 4 MG/1
4 TABLET, ORALLY DISINTEGRATING ORAL EVERY 6 HOURS PRN
Qty: 30 TABLET | Refills: 3 | Status: SHIPPED | OUTPATIENT
Start: 2021-06-25 | End: 2021-09-01 | Stop reason: ALTCHOICE

## 2021-06-25 RX ORDER — MILNACIPRAN HYDROCHLORIDE 25 MG/1
25 TABLET, FILM COATED ORAL 2 TIMES DAILY
Qty: 180 TABLET | Refills: 3 | Status: SHIPPED | OUTPATIENT
Start: 2021-06-25 | End: 2021-09-01 | Stop reason: SDUPTHER

## 2021-06-25 RX ORDER — SENNOSIDES 8.6 MG
17.2 TABLET ORAL
Qty: 180 TABLET | Refills: 3 | Status: SHIPPED | OUTPATIENT
Start: 2021-06-25 | End: 2021-09-01 | Stop reason: ALTCHOICE

## 2021-06-25 RX ORDER — BUSPIRONE HYDROCHLORIDE 15 MG/1
15 TABLET ORAL 2 TIMES DAILY
Qty: 180 TABLET | Refills: 3 | Status: SHIPPED | OUTPATIENT
Start: 2021-06-25 | End: 2021-09-01 | Stop reason: SDUPTHER

## 2021-06-25 RX ORDER — GABAPENTIN 300 MG/1
300 CAPSULE ORAL 3 TIMES DAILY
Qty: 270 CAPSULE | Refills: 3 | Status: SHIPPED | OUTPATIENT
Start: 2021-06-25 | End: 2021-09-01 | Stop reason: SDUPTHER

## 2021-06-25 RX ORDER — OMEPRAZOLE 20 MG/1
20 CAPSULE, DELAYED RELEASE ORAL
Qty: 90 CAPSULE | Refills: 3 | Status: SHIPPED | OUTPATIENT
Start: 2021-06-25 | End: 2021-09-01 | Stop reason: SDUPTHER

## 2021-06-25 NOTE — PROGRESS NOTES
Assessment/Plan:  Increase buspirone to 15mg BID  Continue rest of medications  COVID vaccine recommended     Problem List Items Addressed This Visit        Digestive    Gastroesophageal reflux disease    Relevant Medications    omeprazole (PriLOSEC) 20 mg delayed release capsule    Chronic constipation    Relevant Medications    senna (SENOKOT) 8 6 mg       Other    Anxiety    Relevant Medications    busPIRone (BUSPAR) 15 mg tablet    Fibromyalgia    Relevant Medications    Milnacipran HCl (Savella) 25 MG TABS    gabapentin (NEURONTIN) 300 mg capsule      Other Visit Diagnoses     Post-acute sequelae of COVID-19 (PASC)    -  Primary    Nausea        Relevant Medications    ondansetron (ZOFRAN-ODT) 4 mg disintegrating tablet            Subjective:      Patient ID: Syeda Rodriguez is a 60 y o  female  HPI  Here for a follow up  Debbie in February  She was seen 4 weeks ago for persistent fatigue poor appetite cough and SOB  CXR normal and labs unremarkable  She was also feeling very anxious so we increased her buspirone to 10mg BID  She is feeling much better  Her appetite has improved and she has gained some weight back  She still get nauseous and has epigastric pain  She takes Zofran PRN  Anxiety is better but will present  She has had only 1 panic attack in the past 4 weeks    She was also feeling very anxious  The following portions of the patient's history were reviewed and updated as appropriate: allergies, current medications, past family history, past medical history, past social history, past surgical history and problem list     Review of Systems   Constitutional: Positive for fatigue  Negative for chills, fever and unexpected weight change  Respiratory: Negative for cough and shortness of breath  Cardiovascular: Negative for chest pain  Gastrointestinal: Positive for abdominal pain (epigastric), constipation and nausea  Negative for diarrhea and vomiting     Genitourinary: Negative for difficulty urinating  Neurological: Positive for headaches  Negative for dizziness  Psychiatric/Behavioral: The patient is nervous/anxious  Objective:      /74   Pulse (!) 113   Temp 98 1 °F (36 7 °C)   Ht 5' 5" (1 651 m)   Wt 62 1 kg (137 lb)   SpO2 97%   BMI 22 80 kg/m²          Physical Exam  Constitutional:       General: She is not in acute distress  Appearance: She is well-developed  She is not ill-appearing, toxic-appearing or diaphoretic  HENT:      Head: Normocephalic and atraumatic  Eyes:      Conjunctiva/sclera: Conjunctivae normal    Cardiovascular:      Rate and Rhythm: Normal rate and regular rhythm  Heart sounds: Normal heart sounds  No murmur heard  Pulmonary:      Effort: Pulmonary effort is normal  No respiratory distress  Breath sounds: Normal breath sounds  No wheezing or rales  Abdominal:      General: There is no distension  Palpations: Abdomen is soft  There is no mass  Tenderness: There is no abdominal tenderness  There is no guarding or rebound  Musculoskeletal:      Cervical back: Neck supple  Right lower leg: No edema  Left lower leg: No edema  Skin:     General: Skin is warm and dry  Neurological:      Mental Status: She is alert and oriented to person, place, and time  Psychiatric:         Behavior: Behavior normal          Thought Content:  Thought content normal          Judgment: Judgment normal

## 2021-08-02 DIAGNOSIS — J30.89 NON-SEASONAL ALLERGIC RHINITIS, UNSPECIFIED TRIGGER: ICD-10-CM

## 2021-08-02 RX ORDER — FLUTICASONE PROPIONATE 50 MCG
SPRAY, SUSPENSION (ML) NASAL
Qty: 48 G | Refills: 1 | Status: SHIPPED | OUTPATIENT
Start: 2021-08-02 | End: 2021-09-01 | Stop reason: SDUPTHER

## 2021-09-01 ENCOUNTER — OFFICE VISIT (OUTPATIENT)
Dept: INTERNAL MEDICINE CLINIC | Facility: CLINIC | Age: 56
End: 2021-09-01
Payer: COMMERCIAL

## 2021-09-01 VITALS
OXYGEN SATURATION: 99 % | BODY MASS INDEX: 23.36 KG/M2 | WEIGHT: 140.2 LBS | TEMPERATURE: 96.9 F | SYSTOLIC BLOOD PRESSURE: 120 MMHG | HEIGHT: 65 IN | DIASTOLIC BLOOD PRESSURE: 80 MMHG | HEART RATE: 91 BPM

## 2021-09-01 DIAGNOSIS — E78.2 MIXED HYPERLIPIDEMIA: ICD-10-CM

## 2021-09-01 DIAGNOSIS — J30.89 NON-SEASONAL ALLERGIC RHINITIS, UNSPECIFIED TRIGGER: ICD-10-CM

## 2021-09-01 DIAGNOSIS — M79.7 FIBROMYALGIA: ICD-10-CM

## 2021-09-01 DIAGNOSIS — K21.9 GASTROESOPHAGEAL REFLUX DISEASE: Primary | ICD-10-CM

## 2021-09-01 DIAGNOSIS — F41.9 ANXIETY: ICD-10-CM

## 2021-09-01 PROCEDURE — 99214 OFFICE O/P EST MOD 30 MIN: CPT | Performed by: INTERNAL MEDICINE

## 2021-09-01 RX ORDER — BUSPIRONE HYDROCHLORIDE 15 MG/1
15 TABLET ORAL 2 TIMES DAILY
Qty: 180 TABLET | Refills: 3 | Status: SHIPPED | OUTPATIENT
Start: 2021-09-01 | End: 2022-04-25 | Stop reason: SDUPTHER

## 2021-09-01 RX ORDER — FLUTICASONE PROPIONATE 50 MCG
1 SPRAY, SUSPENSION (ML) NASAL 2 TIMES DAILY
Qty: 48 G | Refills: 1 | Status: SHIPPED | OUTPATIENT
Start: 2021-09-01

## 2021-09-01 RX ORDER — GABAPENTIN 300 MG/1
300 CAPSULE ORAL 3 TIMES DAILY
Qty: 270 CAPSULE | Refills: 3 | Status: SHIPPED | OUTPATIENT
Start: 2021-09-01 | End: 2021-10-13

## 2021-09-01 RX ORDER — MILNACIPRAN HYDROCHLORIDE 25 MG/1
25 TABLET, FILM COATED ORAL 2 TIMES DAILY
Qty: 180 TABLET | Refills: 3 | Status: SHIPPED | OUTPATIENT
Start: 2021-09-01 | End: 2022-03-18 | Stop reason: DRUGHIGH

## 2021-09-01 RX ORDER — KETOTIFEN FUMARATE 0.35 MG/ML
1 SOLUTION OPHTHALMIC 2 TIMES DAILY PRN
Qty: 5 ML | Refills: 1 | Status: SHIPPED | OUTPATIENT
Start: 2021-09-01 | End: 2022-04-25

## 2021-09-01 RX ORDER — OMEPRAZOLE 20 MG/1
20 CAPSULE, DELAYED RELEASE ORAL
Qty: 180 CAPSULE | Refills: 3 | Status: SHIPPED | OUTPATIENT
Start: 2021-09-01 | End: 2022-04-25 | Stop reason: SDUPTHER

## 2021-09-01 NOTE — PROGRESS NOTES
Assessment/Plan:    Gastroesophageal reflux disease  Increase omeprazole to BID  Ulcer free diet discussed    Fibromyalgia  Appears stable on Savella and gabapentin    Anxiety  Stable on buspirone  Continue yoga     Hyperlipidemia  Limit snacks, full fat dairy products         Problem List Items Addressed This Visit        Digestive    Gastroesophageal reflux disease - Primary     Increase omeprazole to BID  Ulcer free diet discussed         Relevant Medications    omeprazole (PriLOSEC) 20 mg delayed release capsule       Respiratory    Non-seasonal allergic rhinitis    Relevant Medications    Zaditor 0 025 % ophthalmic solution       Other    Fibromyalgia     Appears stable on Savella and gabapentin         Hyperlipidemia     Limit snacks, full fat dairy products         Relevant Orders    CBC and differential    Comprehensive metabolic panel    Lipid Panel with Direct LDL reflex    Anxiety     Stable on buspirone  Continue yoga                  Subjective:      Patient ID: Devonna Bamberger is a 60 y o  female  HPI  C/p nausea most mornings with butter taste in mouth  Denies heartburn  She eats spicy food frequently  Sleeps about 4 hours after supper  She is on omeprazole in the morning    Anxiety stable on Buspar  She is practicing yoga which helps    The following portions of the patient's history were reviewed and updated as appropriate: allergies, current medications, past family history, past medical history, past social history, past surgical history and problem list     Review of Systems   Constitutional: Negative for fever and unexpected weight change  HENT: Negative for rhinorrhea, sore throat and trouble swallowing  Respiratory: Negative for cough and shortness of breath  Cardiovascular: Negative for chest pain, palpitations and leg swelling  Gastrointestinal: Negative for abdominal pain, constipation, diarrhea, nausea (every morning with bitter taste in mouth) and vomiting     Genitourinary: Negative for difficulty urinating  Musculoskeletal: Positive for myalgias  Psychiatric/Behavioral: The patient is nervous/anxious  Objective:      /80   Pulse 91   Temp (!) 96 9 °F (36 1 °C) (Temporal)   Ht 5' 5" (1 651 m)   Wt 63 6 kg (140 lb 3 2 oz)   SpO2 99%   BMI 23 33 kg/m²          Physical Exam  Constitutional:       General: She is not in acute distress  Appearance: She is well-developed  She is not ill-appearing, toxic-appearing or diaphoretic  HENT:      Head: Normocephalic and atraumatic  Eyes:      Conjunctiva/sclera: Conjunctivae normal    Cardiovascular:      Rate and Rhythm: Normal rate and regular rhythm  Heart sounds: Normal heart sounds  No murmur heard  Pulmonary:      Effort: Pulmonary effort is normal  No respiratory distress  Breath sounds: Normal breath sounds  No wheezing or rales  Abdominal:      General: There is no distension  Palpations: Abdomen is soft  There is no mass  Tenderness: There is no abdominal tenderness  There is no guarding or rebound  Musculoskeletal:      Cervical back: Neck supple  Right lower leg: No edema  Left lower leg: No edema  Skin:     General: Skin is warm and dry  Neurological:      Mental Status: She is alert and oriented to person, place, and time  Psychiatric:         Mood and Affect: Mood normal          Behavior: Behavior normal          Thought Content:  Thought content normal          Judgment: Judgment normal

## 2021-10-27 ENCOUNTER — TELEPHONE (OUTPATIENT)
Dept: INTERNAL MEDICINE CLINIC | Facility: CLINIC | Age: 56
End: 2021-10-27

## 2021-12-07 ENCOUNTER — ANNUAL EXAM (OUTPATIENT)
Dept: OBGYN CLINIC | Facility: CLINIC | Age: 56
End: 2021-12-07
Payer: COMMERCIAL

## 2021-12-07 ENCOUNTER — TELEPHONE (OUTPATIENT)
Dept: OBGYN CLINIC | Facility: CLINIC | Age: 56
End: 2021-12-07

## 2021-12-07 VITALS
HEIGHT: 65 IN | DIASTOLIC BLOOD PRESSURE: 64 MMHG | BODY MASS INDEX: 23.32 KG/M2 | SYSTOLIC BLOOD PRESSURE: 112 MMHG | WEIGHT: 140 LBS

## 2021-12-07 DIAGNOSIS — B37.2 CUTANEOUS CANDIDIASIS: ICD-10-CM

## 2021-12-07 DIAGNOSIS — Z12.31 ENCOUNTER FOR SCREENING MAMMOGRAM FOR MALIGNANT NEOPLASM OF BREAST: ICD-10-CM

## 2021-12-07 DIAGNOSIS — Z01.419 WOMEN'S ANNUAL ROUTINE GYNECOLOGICAL EXAMINATION: Primary | ICD-10-CM

## 2021-12-07 DIAGNOSIS — B37.2 CUTANEOUS CANDIDIASIS: Primary | ICD-10-CM

## 2021-12-07 PROCEDURE — 0503F POSTPARTUM CARE VISIT: CPT | Performed by: OBSTETRICS & GYNECOLOGY

## 2021-12-07 PROCEDURE — 99396 PREV VISIT EST AGE 40-64: CPT | Performed by: OBSTETRICS & GYNECOLOGY

## 2021-12-07 RX ORDER — NYSTATIN 100000 U/G
OINTMENT TOPICAL 2 TIMES DAILY
Qty: 30 G | Refills: 0 | Status: SHIPPED | OUTPATIENT
Start: 2021-12-07

## 2021-12-07 RX ORDER — NYSTATIN AND TRIAMCINOLONE ACETONIDE 100000; 1 [USP'U]/G; MG/G
OINTMENT TOPICAL 2 TIMES DAILY
Qty: 60 G | Refills: 6 | Status: SHIPPED | OUTPATIENT
Start: 2021-12-07

## 2021-12-07 RX ORDER — NYSTATIN AND TRIAMCINOLONE ACETONIDE 100000; 1 [USP'U]/G; MG/G
OINTMENT TOPICAL 2 TIMES DAILY
Qty: 60 G | Refills: 6 | Status: SHIPPED | OUTPATIENT
Start: 2021-12-07 | End: 2021-12-07

## 2021-12-17 ENCOUNTER — VBI (OUTPATIENT)
Dept: ADMINISTRATIVE | Facility: OTHER | Age: 56
End: 2021-12-17

## 2022-03-01 ENCOUNTER — APPOINTMENT (OUTPATIENT)
Dept: LAB | Facility: HOSPITAL | Age: 57
End: 2022-03-01
Payer: COMMERCIAL

## 2022-03-01 DIAGNOSIS — E78.2 MIXED HYPERLIPIDEMIA: ICD-10-CM

## 2022-03-01 LAB
ALBUMIN SERPL BCP-MCNC: 3.9 G/DL (ref 3.5–5)
ALP SERPL-CCNC: 89 U/L (ref 46–116)
ALT SERPL W P-5'-P-CCNC: 23 U/L (ref 12–78)
ANION GAP SERPL CALCULATED.3IONS-SCNC: 3 MMOL/L (ref 4–13)
AST SERPL W P-5'-P-CCNC: 14 U/L (ref 5–45)
BASOPHILS # BLD AUTO: 0.06 THOUSANDS/ΜL (ref 0–0.1)
BASOPHILS NFR BLD AUTO: 1 % (ref 0–1)
BILIRUB SERPL-MCNC: 0.46 MG/DL (ref 0.2–1)
BUN SERPL-MCNC: 15 MG/DL (ref 5–25)
CALCIUM SERPL-MCNC: 9.4 MG/DL (ref 8.3–10.1)
CHLORIDE SERPL-SCNC: 106 MMOL/L (ref 100–108)
CHOLEST SERPL-MCNC: 267 MG/DL
CO2 SERPL-SCNC: 30 MMOL/L (ref 21–32)
CREAT SERPL-MCNC: 0.68 MG/DL (ref 0.6–1.3)
EOSINOPHIL # BLD AUTO: 0.09 THOUSAND/ΜL (ref 0–0.61)
EOSINOPHIL NFR BLD AUTO: 2 % (ref 0–6)
ERYTHROCYTE [DISTWIDTH] IN BLOOD BY AUTOMATED COUNT: 14.3 % (ref 11.6–15.1)
GFR SERPL CREATININE-BSD FRML MDRD: 96 ML/MIN/1.73SQ M
GLUCOSE P FAST SERPL-MCNC: 101 MG/DL (ref 65–99)
HCT VFR BLD AUTO: 38.9 % (ref 34.8–46.1)
HDLC SERPL-MCNC: 75 MG/DL
HGB BLD-MCNC: 12 G/DL (ref 11.5–15.4)
IMM GRANULOCYTES # BLD AUTO: 0.01 THOUSAND/UL (ref 0–0.2)
IMM GRANULOCYTES NFR BLD AUTO: 0 % (ref 0–2)
LDLC SERPL CALC-MCNC: 168 MG/DL (ref 0–100)
LYMPHOCYTES # BLD AUTO: 2.82 THOUSANDS/ΜL (ref 0.6–4.47)
LYMPHOCYTES NFR BLD AUTO: 48 % (ref 14–44)
MCH RBC QN AUTO: 24.6 PG (ref 26.8–34.3)
MCHC RBC AUTO-ENTMCNC: 30.8 G/DL (ref 31.4–37.4)
MCV RBC AUTO: 80 FL (ref 82–98)
MONOCYTES # BLD AUTO: 0.36 THOUSAND/ΜL (ref 0.17–1.22)
MONOCYTES NFR BLD AUTO: 6 % (ref 4–12)
NEUTROPHILS # BLD AUTO: 2.5 THOUSANDS/ΜL (ref 1.85–7.62)
NEUTS SEG NFR BLD AUTO: 43 % (ref 43–75)
NRBC BLD AUTO-RTO: 0 /100 WBCS
PLATELET # BLD AUTO: 313 THOUSANDS/UL (ref 149–390)
PMV BLD AUTO: 9.2 FL (ref 8.9–12.7)
POTASSIUM SERPL-SCNC: 4.4 MMOL/L (ref 3.5–5.3)
PROT SERPL-MCNC: 7.9 G/DL (ref 6.4–8.2)
RBC # BLD AUTO: 4.88 MILLION/UL (ref 3.81–5.12)
SODIUM SERPL-SCNC: 139 MMOL/L (ref 136–145)
TRIGL SERPL-MCNC: 122 MG/DL
WBC # BLD AUTO: 5.84 THOUSAND/UL (ref 4.31–10.16)

## 2022-03-01 PROCEDURE — 80053 COMPREHEN METABOLIC PANEL: CPT

## 2022-03-01 PROCEDURE — 80061 LIPID PANEL: CPT

## 2022-03-01 PROCEDURE — 36415 COLL VENOUS BLD VENIPUNCTURE: CPT

## 2022-03-01 PROCEDURE — 85025 COMPLETE CBC W/AUTO DIFF WBC: CPT

## 2022-03-18 ENCOUNTER — OFFICE VISIT (OUTPATIENT)
Dept: INTERNAL MEDICINE CLINIC | Facility: CLINIC | Age: 57
End: 2022-03-18
Payer: COMMERCIAL

## 2022-03-18 VITALS
HEIGHT: 64 IN | SYSTOLIC BLOOD PRESSURE: 122 MMHG | DIASTOLIC BLOOD PRESSURE: 70 MMHG | OXYGEN SATURATION: 99 % | WEIGHT: 144.8 LBS | HEART RATE: 105 BPM | BODY MASS INDEX: 24.72 KG/M2

## 2022-03-18 DIAGNOSIS — E78.2 MIXED HYPERLIPIDEMIA: ICD-10-CM

## 2022-03-18 DIAGNOSIS — M79.7 FIBROMYALGIA: Primary | ICD-10-CM

## 2022-03-18 DIAGNOSIS — H81.10 BENIGN PAROXYSMAL POSITIONAL VERTIGO, UNSPECIFIED LATERALITY: ICD-10-CM

## 2022-03-18 PROCEDURE — 99214 OFFICE O/P EST MOD 30 MIN: CPT | Performed by: INTERNAL MEDICINE

## 2022-03-18 NOTE — PROGRESS NOTES
Assessment/Plan:    Fibromyalgia  Increase Savella to 50mg BID  Resume exercise    Hyperlipidemia  Discussed dietary changes and exercise to help with lowering lipids    Benign paroxysmal positional vertigo  May take OTC meclizine prn  Start vestibular rehab         Problem List Items Addressed This Visit        Nervous and Auditory    Benign paroxysmal positional vertigo     May take OTC meclizine prn  Start vestibular rehab         Relevant Orders    Ambulatory Referral to Physical Therapy       Other    Fibromyalgia - Primary     Increase Savella to 50mg BID  Resume exercise         Relevant Medications    Milnacipran HCl 50 MG TABS    Hyperlipidemia     Discussed dietary changes and exercise to help with lowering lipids                 Subjective:      Patient ID: Shayna Acosta is a 60 y o  female  HPI   used  1 month of generalized pain in the joints and muscles, stopped her from exercising  Known fibromyalgia and is on gabapentin 300mg TID and Savella 25mg BID  She takes Aleve PRN  She used to take duloxetine 60mg BID but weaned off in 2019 and requested switch to [facility]  She recalls taking Lyrica from PCP in [place] yeats ago  3 weeks started with vertigo when she gets in and out of bed, turns her head  Unchanged in the past 3 weeks    The following portions of the patient's history were reviewed and updated as appropriate: allergies, current medications, past family history, past medical history, past social history, past surgical history and problem list     Review of Systems   Constitutional: Positive for fatigue  Negative for chills, fever and unexpected weight change  HENT: Negative for rhinorrhea  Respiratory: Negative for shortness of breath  Cardiovascular: Negative for chest pain and palpitations  Gastrointestinal: Negative for abdominal pain, constipation, diarrhea, nausea and vomiting  Genitourinary: Negative for difficulty urinating     Musculoskeletal: Positive for arthralgias and myalgias  Neurological: Positive for dizziness  Psychiatric/Behavioral: Positive for dysphoric mood and sleep disturbance  Objective:      /70 (BP Location: Left arm, Patient Position: Sitting, Cuff Size: Standard)   Pulse 105   Ht 5' 4" (1 626 m)   Wt 65 7 kg (144 lb 12 8 oz)   SpO2 99%   BMI 24 85 kg/m²          Physical Exam  Constitutional:       General: She is not in acute distress  Appearance: She is well-developed  She is not ill-appearing, toxic-appearing or diaphoretic  HENT:      Head: Normocephalic and atraumatic  Right Ear: External ear normal  There is no impacted cerumen  Left Ear: External ear normal  There is no impacted cerumen  Eyes:      Conjunctiva/sclera: Conjunctivae normal    Cardiovascular:      Rate and Rhythm: Normal rate and regular rhythm  Heart sounds: Normal heart sounds  No murmur heard  Pulmonary:      Effort: Pulmonary effort is normal  No respiratory distress  Breath sounds: Normal breath sounds  No wheezing or rales  Abdominal:      General: Bowel sounds are normal  There is no distension  Palpations: Abdomen is soft  There is no mass  Tenderness: There is no abdominal tenderness  There is no guarding or rebound  Musculoskeletal:      Cervical back: Neck supple  Right lower leg: No edema  Left lower leg: No edema  Skin:     General: Skin is warm and dry  Neurological:      Mental Status: She is alert and oriented to person, place, and time  Cranial Nerves: No facial asymmetry  Gait: Gait normal    Psychiatric:         Mood and Affect: Mood normal          Behavior: Behavior normal          Thought Content:  Thought content normal          Judgment: Judgment normal

## 2022-03-18 NOTE — PATIENT INSTRUCTIONS
Vértigo posicional paroxístico sophie, cuidados ambulatorios   INFORMACIÓN GENERAL:   El vértigo posicional paroxístico sophie  es amy condición en el oído interno  Con el vértigo posicional paroxístico sophie, usted tiene ataques paroxísticos (súbitos) de vértigo cuando cambia la posición de champagne estevan  Vértigo es amy sensación súbita donde siente que usted, o la habitación se está moviendo o girando  Con cada ataque de vértigo, usted podría tener nístagmo  El nístagmo es un movimiento del kassandra rápido y tembloroso que es incontrolable  Estos ataques de vértigo y nístagmo olguin de varios segundos a 1 minuto  Síntomas comunes incluyen los siguientes:  Movimientos de [facility], gary mirar hacia arriba o hacia aba y [place], podría conducir a un ataque de vértigo  El vértigo también puede ocurrir inmediatamente después de haberse acostado o dado vuelta en la cama  El nístagmo va a disminuir con los ataques de vértigo que ocurren juntos  Cuando usted tiene un ataque de vértigo posicional paroxístico sophie, también podría tener los síntomas siguientes:  · Cambios en la visión    · Náusea (malestar estomacal) o vómitos    · Balance deficiente y sentirse inestable al caminar  Busque cuidados inmediatos para los siguientes síntomas:   · Un dolor de estevan severo que no desaparece    · Cambios nuevos en champagne vista o sensación de debilidad o confusión    · Dificultad para escuchar, resonancia o zumbido en los oídos    · Síntomas que olguin más de 1 minuto  El tratamiento para el vértigo posicional paroxístico sophie  puede terminar por sí solo  Los tratamientos pueden incluir movimientos de [place] o terapia del equilibrio  Es posible que usted necesite de [facility] si [place] fallado otros tratamientos  Maneje kenan síntomas:   · Evite movimientos de estevan repentinos  · No se doble a la altura de la cintura  · Mantenga champagne estevan elevada cuando está acostado    Ponga almohadas por debajo de la parte de [facility] de champagne espalda y estevan o descanse en un sillón reclinable  · Trate de no quedarse en cama por largos periodos de [place]  [place] de posición con frecuencia mientras está en cama  Trate de no acostarse con garcia estevan en el mismo lado por largos periodos de [place]  · Otoniel Sensor a sesiones de terapia de rehabilitación del equilibrio y vestibular  Liborio las terapias, usted aprende ejercicios para mejorar garcia equilibrio y fuerza  También la terapia puede ayudarle a disminuir kenan [employer] y prevenir lesiones si corre el riesgo de caídas  Pida más información sobre esta terapia y ejercicios para disminuir kenan síntomas  Programe amy pari con garcia proveedor de [employer] se le haya indicado: Anote kenan preguntas para que se acuerde de hacerlas liborio kenan visitas  ACUERDOS SOBRE GARCIA CUIDADO:   Usted tiene el derecho de participar en la planificación de garcia cuidado  Aprenda todo lo que pueda sobre garcia condición y gary darle tratamiento  Discuta con kenan médicos kenan opciones de tratamiento para juntos decidir el cuidado que usted quiere recibir  Usted siempre tiene el derecho a rechazar garcia tratamiento  Esta información es sólo para uso en educación  Garcia intención no es darle un consejo médico sobre enfermedades o tratamientos  Colsulte con garcia Flaherty Wang farmacéutico antes de seguir cualquier régimen médico para saber si es seguro y efectivo para usted  © 2014 [address] is for End User's use only and may not be sold, redistributed or otherwise used for commercial purposes  All illustrations and images included in CareNotes® are the copyrighted property of A D A M , Inc  or Sher Bui

## 2022-03-31 ENCOUNTER — EVALUATION (OUTPATIENT)
Dept: PHYSICAL THERAPY | Facility: OTHER | Age: 57
End: 2022-03-31
Payer: COMMERCIAL

## 2022-03-31 DIAGNOSIS — H81.10 BENIGN PAROXYSMAL POSITIONAL VERTIGO, UNSPECIFIED LATERALITY: Primary | ICD-10-CM

## 2022-03-31 PROCEDURE — 97140 MANUAL THERAPY 1/> REGIONS: CPT

## 2022-03-31 PROCEDURE — 97162 PT EVAL MOD COMPLEX 30 MIN: CPT

## 2022-03-31 NOTE — PROGRESS NOTES
PT Evaluation     Today's date: 3/31/2022  Patient name: Blanca Blizzard  : 1965  MRN: [id number]  Referring provider: Tonya Hendrickson MD  Dx:   Encounter Diagnosis     ICD-10-CM    1  Benign paroxysmal positional vertigo, unspecified laterality  H81 10 Ambulatory Referral to Physical Therapy                  Assessment  Assessment details: Blanca Blizzard is a pleasant 60 y o  female who presents with vertigo upon positional changes  Patient's greatest concerns are alleviating the dizziness  Pt's primary language is Portuguese, had a difficult time understanding PT's questions- some answers may not be accurate  Pt states she noticed dizziness about one month ago  She described the dizziness as room spinning  She notices the dizziness most when rolling in bed on both sides and when transitioning from supine to sit  It lasts for about 10 seconds  If she is not changing positions, she does not have the dizziness  She also noticed tinnitus when the dizziness began  No recent changes in medication per patient  Pt also reported neck pain, headaches, double vision, and feeling like she is going to pass out  Denies nausea, dysphagia, and dysarthria  Denies trauma to the head or neck  Objectively, pt had full cervical ROM  Cervical testing negative  Positive R Hilario-Hallpike, Epley maneuver performed  Pt instructed not to perform extreme head movements, but can move head normally  Pt may be experiencing these symptoms and objective findings based on clinical presentation of benign paroxysmal positional vertigo  Pt instructed to call Adama Drummond to report how she is feeling over the next few days with her dizziness  May need to refer to [facility] [address] for neurologic PT if dizziness persists  Pt would benefit from skilled physical therapy services to address current deficits, improve quality of life, and restore PLOF with transition to home exercise program when appropriate        Primary Impairments:  1) dizziness    Function Based Goals:  Patient will be independent with HEP upon discharge  Patient will be able to independently manage symptoms upon discharge  Patient will resume prior level of function and home ADLs without symptoms upon discharge  Patient will be able to transfer in bed without symptoms upon discharge  Patient will be able to transfer from supine to sit without symptoms upon discharge     Impairments: abnormal movement, lacks appropriate home exercise program and poor posture     Symptom irritability: moderateUnderstanding of Dx/Px/POC: good   Prognosis: good    Plan  Plan details: Pt will follow-up with report of symptoms, will decide need for neuro referral  Patient would benefit from: skilled physical therapy  Referral necessary: No  Planned therapy interventions: activity modification, balance, body mechanics training, breathing training, coordination, flexibility, functional ROM exercises, home exercise program, therapeutic exercise, therapeutic activities, stretching, strengthening, postural training, patient education, neuromuscular re-education, motor coordination training, massage and manual therapy  Duration in weeks: 12  Treatment plan discussed with: patient        Subjective Evaluation    History of Present Illness  Date of onset: 3/1/2022  Quality of life: good    Pain  No pain reported  Progression: no change    Patient Goals  Patient goal: relieve dizziness        Objective     General Comments:      Cervical/Thoracic Comments  VBI: L   (-)    R   (-)  Sharp-Kayy: (-)  Alar Ligament: L  (-)     R   (-)     Cervical ROM: full in all directions, no symptoms reported     Posture: FHP, rounded shoulders    (+) R Hilario-Hallpike             Precautions: anxiety, fibromyalgia      Manuals 3/31            Epley maneuver performed            Cervical eval 10'                                      Neuro Re-Ed Ther Ex                                                                                                                     Ther Activity                                       Gait Training                                       Modalities

## 2022-04-05 ENCOUNTER — HOSPITAL ENCOUNTER (OUTPATIENT)
Dept: MAMMOGRAPHY | Facility: HOSPITAL | Age: 57
Discharge: HOME | End: 2022-04-05
Attending: OBSTETRICS & GYNECOLOGY
Payer: COMMERCIAL

## 2022-04-05 VITALS — HEIGHT: 64 IN | BODY MASS INDEX: 24.73 KG/M2 | WEIGHT: 144.84 LBS

## 2022-04-05 DIAGNOSIS — Z12.31 ENCOUNTER FOR SCREENING MAMMOGRAM FOR MALIGNANT NEOPLASM OF BREAST: ICD-10-CM

## 2022-04-05 PROCEDURE — 77067 SCR MAMMO BI INCL CAD: CPT

## 2022-04-05 PROCEDURE — 77063 BREAST TOMOSYNTHESIS BI: CPT

## 2022-04-25 ENCOUNTER — OFFICE VISIT (OUTPATIENT)
Dept: INTERNAL MEDICINE CLINIC | Facility: CLINIC | Age: 57
End: 2022-04-25
Payer: COMMERCIAL

## 2022-04-25 VITALS
HEIGHT: 64 IN | HEART RATE: 88 BPM | RESPIRATION RATE: 16 BRPM | WEIGHT: 143.6 LBS | DIASTOLIC BLOOD PRESSURE: 80 MMHG | SYSTOLIC BLOOD PRESSURE: 124 MMHG | OXYGEN SATURATION: 99 % | BODY MASS INDEX: 24.52 KG/M2

## 2022-04-25 DIAGNOSIS — F41.9 ANXIETY: ICD-10-CM

## 2022-04-25 DIAGNOSIS — M79.7 FIBROMYALGIA: Primary | ICD-10-CM

## 2022-04-25 DIAGNOSIS — K21.9 GASTROESOPHAGEAL REFLUX DISEASE: ICD-10-CM

## 2022-04-25 PROCEDURE — 99214 OFFICE O/P EST MOD 30 MIN: CPT | Performed by: INTERNAL MEDICINE

## 2022-04-25 RX ORDER — BUSPIRONE HYDROCHLORIDE 15 MG/1
15 TABLET ORAL 2 TIMES DAILY
Qty: 180 TABLET | Refills: 3 | Status: SHIPPED | OUTPATIENT
Start: 2022-04-25

## 2022-04-25 RX ORDER — OMEPRAZOLE 20 MG/1
20 CAPSULE, DELAYED RELEASE ORAL
Qty: 180 CAPSULE | Refills: 3 | Status: SHIPPED | OUTPATIENT
Start: 2022-04-25 | End: 2022-06-28

## 2022-04-25 RX ORDER — GABAPENTIN 400 MG/1
400 CAPSULE ORAL 3 TIMES DAILY
Qty: 270 CAPSULE | Refills: 3 | Status: SHIPPED | OUTPATIENT
Start: 2022-04-25 | End: 2022-07-28 | Stop reason: SDUPTHER

## 2022-04-25 NOTE — PROGRESS NOTES
Assessment/Plan:  Increase gabapentin to 400mg TID  Continue Savella 50mg BID  Plans to resume yoga       Problem List Items Addressed This Visit        Digestive    Gastroesophageal reflux disease    Relevant Medications    omeprazole (PriLOSEC) 20 mg delayed release capsule       Other    Anxiety    Relevant Medications    busPIRone (BUSPAR) 15 mg tablet    Fibromyalgia - Primary    Relevant Medications    Milnacipran HCl 50 MG TABS    gabapentin (NEURONTIN) 400 mg capsule            Subjective:      Patient ID: Josie Reed is a 60 y o  female  HPI  Fibromyalgia not improved on Savella 50mg BID  Remains very tender all over  She is also on gabapentin 300mg TID  Recalls taking Lyrica in the past but Ace Prime worked better  She has noticed feeling depressed since Ace Prime raised  She is on buspirone for anxiety  Dizziness resolved after 1 visit to vestibular rehab    The following portions of the patient's history were reviewed and updated as appropriate: allergies, current medications, past family history, past medical history, past social history, past surgical history and problem list     Review of Systems   Constitutional: Negative for chills, fever and unexpected weight change  Respiratory: Negative for cough and shortness of breath  Cardiovascular: Negative for chest pain and palpitations  Gastrointestinal: Positive for constipation  Negative for abdominal pain  Genitourinary: Negative for difficulty urinating  Musculoskeletal: Positive for arthralgias, back pain and myalgias  Neurological: Negative for dizziness  Objective:      /80   Pulse 88   Resp 16   Ht 5' 4" (1 626 m)   Wt 65 1 kg (143 lb 9 6 oz)   SpO2 99%   BMI 24 65 kg/m²          Physical Exam  Constitutional:       General: She is not in acute distress  Appearance: She is well-developed  She is not ill-appearing, toxic-appearing or diaphoretic  HENT:      Head: Normocephalic and atraumatic     Eyes: Conjunctiva/sclera: Conjunctivae normal    Cardiovascular:      Rate and Rhythm: Normal rate and regular rhythm  Heart sounds: Normal heart sounds  No murmur heard  Pulmonary:      Effort: Pulmonary effort is normal  No respiratory distress  Breath sounds: Normal breath sounds  No wheezing or rales  Abdominal:      General: There is no distension  Palpations: Abdomen is soft  There is no mass  Tenderness: There is no abdominal tenderness  There is no guarding or rebound  Musculoskeletal:         General: Tenderness (multiple trigger points on her back) present  Cervical back: Neck supple  Right lower leg: No edema  Left lower leg: No edema  Skin:     General: Skin is warm and dry  Neurological:      Mental Status: She is alert and oriented to person, place, and time  Psychiatric:         Mood and Affect: Mood normal          Behavior: Behavior normal          Thought Content:  Thought content normal          Judgment: Judgment normal

## 2022-04-29 ENCOUNTER — VBI (OUTPATIENT)
Dept: ADMINISTRATIVE | Facility: OTHER | Age: 57
End: 2022-04-29

## 2022-06-28 DIAGNOSIS — K21.9 GASTROESOPHAGEAL REFLUX DISEASE: ICD-10-CM

## 2022-06-28 DIAGNOSIS — M79.7 FIBROMYALGIA: ICD-10-CM

## 2022-06-28 RX ORDER — OMEPRAZOLE 20 MG/1
CAPSULE, DELAYED RELEASE ORAL
Qty: 90 CAPSULE | Refills: 0 | Status: SHIPPED | OUTPATIENT
Start: 2022-06-28

## 2022-06-28 RX ORDER — GABAPENTIN 300 MG/1
CAPSULE ORAL
Qty: 270 CAPSULE | Refills: 3 | Status: SHIPPED | OUTPATIENT
Start: 2022-06-28 | End: 2022-07-28

## 2022-07-28 ENCOUNTER — OFFICE VISIT (OUTPATIENT)
Dept: INTERNAL MEDICINE CLINIC | Facility: CLINIC | Age: 57
End: 2022-07-28
Payer: COMMERCIAL

## 2022-07-28 VITALS
WEIGHT: 141.2 LBS | HEART RATE: 102 BPM | HEIGHT: 64 IN | OXYGEN SATURATION: 97 % | TEMPERATURE: 97.4 F | DIASTOLIC BLOOD PRESSURE: 68 MMHG | SYSTOLIC BLOOD PRESSURE: 124 MMHG | RESPIRATION RATE: 14 BRPM | BODY MASS INDEX: 24.11 KG/M2

## 2022-07-28 DIAGNOSIS — M79.7 FIBROMYALGIA: Primary | ICD-10-CM

## 2022-07-28 DIAGNOSIS — E78.2 MIXED HYPERLIPIDEMIA: ICD-10-CM

## 2022-07-28 DIAGNOSIS — F41.9 ANXIETY: ICD-10-CM

## 2022-07-28 PROCEDURE — 99214 OFFICE O/P EST MOD 30 MIN: CPT | Performed by: INTERNAL MEDICINE

## 2022-07-28 RX ORDER — SENNA PLUS 8.6 MG/1
1 TABLET ORAL DAILY
COMMUNITY

## 2022-07-28 RX ORDER — GABAPENTIN 300 MG/1
600 CAPSULE ORAL 3 TIMES DAILY
Qty: 540 CAPSULE | Refills: 1 | Status: SHIPPED | OUTPATIENT
Start: 2022-07-28

## 2022-07-28 NOTE — PROGRESS NOTES
Assessment/Plan:    Anxiety  Stable on buspirone    Fibromyalgia  Increase gabapentin to 600mg TID  Encouraged to try acupuncture and referred to [address] regular exercise         Problem List Items Addressed This Visit        Other    Hyperlipidemia    Relevant Orders    CBC and differential    Comprehensive metabolic panel    Lipid Panel with Direct LDL reflex    Fibromyalgia - Primary     Increase gabapentin to 600mg TID  Encouraged to try acupuncture and referred to [address] regular exercise         Relevant Medications    gabapentin (NEURONTIN) 300 mg capsule    Anxiety     Stable on buspirone                 Subjective:      Patient ID: Syeda Rodriguez is a 60 y o  female  HPI  Fibromyalgia on Savella and gabapentin  She continues to do yoga and takes walks  She is interested in acupuncture for her mid to lower back pain  She is taking Aleve PRN    The following portions of the patient's history were reviewed and updated as appropriate: allergies, current medications, past family history, past medical history, past social history, past surgical history and problem list     Review of Systems   Constitutional: Negative for chills, fever and unexpected weight change  Respiratory: Negative for shortness of breath  Cardiovascular: Negative for chest pain, palpitations and leg swelling  Gastrointestinal: Positive for constipation  Negative for abdominal pain and diarrhea  Genitourinary: Negative for difficulty urinating  Musculoskeletal: Positive for back pain  Objective:      /68   Pulse 102   Temp (!) 97 4 °F (36 3 °C)   Resp 14   Ht 5' 4" (1 626 m)   Wt 64 kg (141 lb 3 2 oz)   SpO2 97%   BMI 24 24 kg/m²          Physical Exam  Constitutional:       General: She is not in acute distress  Appearance: She is well-developed  She is not ill-appearing, toxic-appearing or diaphoretic     HENT:      Head: Normocephalic and atraumatic  Eyes:      Conjunctiva/sclera: Conjunctivae normal    Cardiovascular:      Rate and Rhythm: Normal rate and regular rhythm  Heart sounds: Normal heart sounds  No murmur heard  Pulmonary:      Effort: Pulmonary effort is normal  No respiratory distress  Breath sounds: Normal breath sounds  No wheezing or rales  Abdominal:      General: There is no distension  Palpations: Abdomen is soft  There is no mass  Tenderness: There is no abdominal tenderness  There is no guarding or rebound  Musculoskeletal:         General: Tenderness (triger points on her back) present  Cervical back: Neck supple  Right lower leg: No edema  Left lower leg: No edema  Skin:     General: Skin is warm and dry  Neurological:      Mental Status: She is alert and oriented to person, place, and time  Psychiatric:         Mood and Affect: Mood normal          Behavior: Behavior normal          Thought Content:  Thought content normal          Judgment: Judgment normal

## 2022-07-28 NOTE — PATIENT INSTRUCTIONS
[address], [facility], [address] [place] [address]   [phone]

## 2022-07-29 NOTE — ASSESSMENT & PLAN NOTE
Increase gabapentin to 600mg TID  Encouraged to try acupuncture and referred to [facility] regular exercise

## 2022-09-26 DIAGNOSIS — J30.89 NON-SEASONAL ALLERGIC RHINITIS, UNSPECIFIED TRIGGER: ICD-10-CM

## 2022-09-26 DIAGNOSIS — K21.9 GASTROESOPHAGEAL REFLUX DISEASE: ICD-10-CM

## 2022-09-26 RX ORDER — FLUTICASONE PROPIONATE 50 MCG
SPRAY, SUSPENSION (ML) NASAL
Qty: 48 G | Refills: 1 | Status: SHIPPED | OUTPATIENT
Start: 2022-09-26

## 2022-09-26 RX ORDER — OMEPRAZOLE 20 MG/1
CAPSULE, DELAYED RELEASE ORAL
Qty: 90 CAPSULE | Refills: 0 | Status: SHIPPED | OUTPATIENT
Start: 2022-09-26

## 2022-09-29 ENCOUNTER — OFFICE VISIT (OUTPATIENT)
Dept: INTERNAL MEDICINE CLINIC | Facility: CLINIC | Age: 57
End: 2022-09-29
Payer: COMMERCIAL

## 2022-09-29 VITALS — TEMPERATURE: 99.1 F | OXYGEN SATURATION: 98 %

## 2022-09-29 DIAGNOSIS — J06.9 URI, ACUTE: ICD-10-CM

## 2022-09-29 DIAGNOSIS — B34.9 VIRAL INFECTION, UNSPECIFIED: Primary | ICD-10-CM

## 2022-09-29 PROCEDURE — 99213 OFFICE O/P EST LOW 20 MIN: CPT | Performed by: NURSE PRACTITIONER

## 2022-09-29 PROCEDURE — U0005 INFEC AGEN DETEC AMPLI PROBE: HCPCS | Performed by: NURSE PRACTITIONER

## 2022-09-29 PROCEDURE — U0003 INFECTIOUS AGENT DETECTION BY NUCLEIC ACID (DNA OR RNA); SEVERE ACUTE RESPIRATORY SYNDROME CORONAVIRUS 2 (SARS-COV-2) (CORONAVIRUS DISEASE [COVID-19]), AMPLIFIED PROBE TECHNIQUE, MAKING USE OF HIGH THROUGHPUT TECHNOLOGIES AS DESCRIBED BY CMS-2020-01-R: HCPCS | Performed by: NURSE PRACTITIONER

## 2022-09-29 RX ORDER — AZITHROMYCIN 250 MG/1
TABLET, FILM COATED ORAL
Qty: 6 TABLET | Refills: 0 | Status: SHIPPED | OUTPATIENT
Start: 2022-09-29 | End: 2022-10-03

## 2022-09-29 NOTE — PROGRESS NOTES
COVID-19 Outpatient Progress Note    Assessment/Plan:    Problem List Items Addressed This Visit    None     Visit Diagnoses     Viral infection, unspecified    -  Primary    Relevant Orders    COVID Only - Office Collect         Disposition:     PCR testing will be performed to test for COVID-19  While awaiting the results of covid testing, patient was advised to isolate  Patient has asymptomatic or mild COVID-19 infection  Based off CDC guidelines, they were recommended to isolate for 5 days  If they are asymptomatic or symptoms are improving with no fevers in the past 24 hours, isolation may be ended followed by 5 days of wearing a mask when around othes to minimize risk of infecting others  If still have a fever or other symptoms have not improved, continue to isolate until they improve  Regardless of when they end isolation, avoid being around people who are more likely to get very sick from COVID-19 until at least day 11  Discussed vitamin D, vitamin C, and/or zinc supplementation with patient  Supportive care per discussion  Will call tomorrow with test result      I have spent 10 minutes directly with the patient  Greater than 50% of this time was spent in counseling/coordination of care regarding: prognosis, risks and benefits of treatment options, instructions for management, patient and family education and impressions         Encounter provider: KERMIT Fernando     Provider located at: [address] [place] [id number]     Recent Visits  Date Type Provider Dept   09/28/22 Telemedicine Leticia Nicholas MD [address] recent visits within past 7 days and meeting all other requirements  Today's Visits  Date Type Provider Dept   09/29/22 Office Visit Srinivas Fernando today's visits and meeting all other requirements  Future Appointments  No visits were found meeting these conditions  Showing future appointments within next 150 days and meeting all other requirements     Subjective:   Ismael Lantigua is a 59 y o  female who is concerned about COVID-19  Patient's symptoms include fever, chills, fatigue, malaise, nasal congestion, rhinorrhea, sore throat, cough, myalgias and headache  Patient denies shortness of breath, chest tightness, abdominal pain, nausea and diarrhea  - Date of symptom onset: 9/24/2022      COVID-19 vaccination status: Not vaccinated    Exposure:   Contact with a person who is under investigation (PUI) for or who is positive for COVID-19 within the last 14 days?: No    Hospitalized recently for fever and/or lower respiratory symptoms?: No      Currently a healthcare worker that is involved in direct patient care?: No      Works in a special setting where the risk of COVID-19 transmission may be high? (this may include long-term care, correctional and MCC facilities; homeless shelters; assisted-living facilities and group homes ): No      Resident in a special setting where the risk of COVID-19 transmission may be high? (this may include long-term care, correctional and MCC facilities; homeless shelters; assisted-living facilities and group homes ): No      Lab Results   Component Value Date    SARSCOV2 Positive (A) 02/17/2021       Review of Systems   Constitutional: Positive for chills, fatigue and fever  HENT: Positive for congestion, rhinorrhea and sore throat  Respiratory: Positive for cough  Negative for chest tightness and shortness of breath  Cardiovascular: Negative for chest pain  Gastrointestinal: Negative for abdominal pain, diarrhea and nausea  Musculoskeletal: Positive for myalgias  Neurological: Positive for headaches  Negative for dizziness and weakness       Current Outpatient Medications on File Prior to Visit   Medication Sig    albuterol (Ventolin HFA) 90 mcg/act inhaler Inhale 2 puffs every 4 (four) hours as needed for wheezing (Patient not taking: Reported on 9/28/2022)    busPIRone (BUSPAR) 15 mg tablet Take 1 tablet (15 mg total) by mouth 2 (two) times a day    fluticasone (FLONASE) 50 mcg/act nasal spray SHAKE LIQUID AND USE 1 SPRAY IN EACH NOSTRIL TWICE DAILY    gabapentin (NEURONTIN) 300 mg capsule Take 2 capsules (600 mg total) by mouth 3 (three) times a day    Milnacipran HCl 50 MG TABS Take 1 tablet by mouth 2 (two) times a day    nystatin (MYCOSTATIN) ointment Apply topically 2 (two) times a day (Patient not taking: Reported on 7/28/2022)    nystatin-triamcinolone (MYCOLOG-II) ointment Apply topically 2 (two) times a day (Patient not taking: Reported on 7/28/2022)    omeprazole (PriLOSEC) 20 mg delayed release capsule TAKE 1 CAPSULE(20 MG) BY MOUTH DAILY BEFORE AND BREAKFAST    senna (SENOKOT) 8 6 MG tablet Take 1 tablet by mouth daily (Patient not taking: Reported on 9/28/2022)       Objective:    Temp 99 1 °F (37 3 °C)   SpO2 98%      Physical Exam  Constitutional:       General: She is not in acute distress  Appearance: Normal appearance  She is ill-appearing  HENT:      Mouth/Throat:      Mouth: Mucous membranes are moist       Pharynx: Posterior oropharyngeal erythema present  Cardiovascular:      Rate and Rhythm: Normal rate and regular rhythm  Pulses: Normal pulses  Heart sounds: Normal heart sounds  Pulmonary:      Effort: Pulmonary effort is normal       Breath sounds: Normal breath sounds  Lymphadenopathy:      Cervical: No cervical adenopathy  Neurological:      Mental Status: She is alert         [place] [address]

## 2022-09-30 LAB — SARS-COV-2 RNA RESP QL NAA+PROBE: NEGATIVE

## 2022-11-17 RX ORDER — LATANOPROST 50 UG/ML
SOLUTION/ DROPS OPHTHALMIC
COMMUNITY
Start: 2022-09-07

## 2022-11-21 ENCOUNTER — OFFICE VISIT (OUTPATIENT)
Dept: INTERNAL MEDICINE CLINIC | Facility: CLINIC | Age: 57
End: 2022-11-21

## 2022-11-21 VITALS
TEMPERATURE: 97.1 F | HEART RATE: 96 BPM | HEIGHT: 64 IN | OXYGEN SATURATION: 98 % | WEIGHT: 146.4 LBS | BODY MASS INDEX: 25 KG/M2 | DIASTOLIC BLOOD PRESSURE: 70 MMHG | SYSTOLIC BLOOD PRESSURE: 98 MMHG

## 2022-11-21 DIAGNOSIS — M79.7 FIBROMYALGIA: Primary | ICD-10-CM

## 2022-11-21 DIAGNOSIS — Z23 ENCOUNTER FOR IMMUNIZATION: ICD-10-CM

## 2022-11-21 DIAGNOSIS — R60.0 BILATERAL LEG EDEMA: ICD-10-CM

## 2022-11-21 DIAGNOSIS — M79.7 FIBROMYALGIA: ICD-10-CM

## 2022-11-21 DIAGNOSIS — H93.11 TINNITUS AURIUM, RIGHT: ICD-10-CM

## 2022-11-21 DIAGNOSIS — Z13.820 SCREENING FOR OSTEOPOROSIS: ICD-10-CM

## 2022-11-21 RX ORDER — GABAPENTIN 300 MG/1
CAPSULE ORAL
Qty: 630 CAPSULE | Refills: 1 | Status: SHIPPED | OUTPATIENT
Start: 2022-11-21

## 2022-11-21 NOTE — PATIENT INSTRUCTIONS
Use compression stockings daily  Increase Savella to twice a day  Increase gabapentin to 3 caps at bedtime  Schedule with ENT specialist  Schedule bone density scan

## 2022-11-21 NOTE — PROGRESS NOTES
Assessment/Plan:    Fibromyalgia  Raise Savella to BID  Raise bedtime gabapentin to 900mg  Continue regular exercise    Tinnitus aurium, right  Started after viral infection in September  Advised to f/u with ENT    Bilateral leg edema  Possibly from medications  Advised to use compression stockings           Problem List Items Addressed This Visit        Other    Fibromyalgia - Primary     Raise Savella to BID  Raise bedtime gabapentin to 900mg  Continue regular exercise         Relevant Medications    gabapentin (NEURONTIN) 300 mg capsule    Bilateral leg edema     Possibly from medications  Advised to use compression stockings         Tinnitus aurium, right     Started after viral infection in September  Advised to f/u with ENT         Relevant Orders    Ambulatory Referral to Otolaryngology   Other Visit Diagnoses     Encounter for immunization        Relevant Orders    influenza vaccine, quadrivalent, recombinant, PF, 0 5 mL, for patients 18 yr+ (FLUBLOK) (Completed)    Screening for osteoporosis        Relevant Orders    DXA bone density spine hip and pelvis            Subjective:      Patient ID: Rigoberto Dowell is a 59 y o  female  HPI  Here for a follow up  C/o tinnitus in the right ear since URI in September (COVID neg)  Fibromyalgia on Savella 50mg a day and gabapentin 600mg TID  Anxiety stable on buspirone 15mg BID  C/o trouble maintaining sleep    The following portions of the patient's history were reviewed and updated as appropriate: allergies, current medications, past family history, past medical history, past social history, past surgical history and problem list     Review of Systems   Constitutional: Negative for chills, fever and unexpected weight change  HENT: Positive for tinnitus  Negative for congestion and hearing loss  Respiratory: Negative for shortness of breath  Cardiovascular: Positive for leg swelling (at the end of the day)  Negative for chest pain and palpitations  Gastrointestinal: Negative for abdominal pain  Musculoskeletal: Positive for arthralgias and back pain  Neurological: Negative for dizziness  Psychiatric/Behavioral: Positive for sleep disturbance  Objective:      BP 98/70 (BP Location: Left arm, Patient Position: Sitting, Cuff Size: Large)   Pulse 96   Temp (!) 97 1 °F (36 2 °C) (Probe)   Ht 5' 4" (1 626 m)   Wt 66 4 kg (146 lb 6 4 oz)   SpO2 98%   BMI 25 13 kg/m²          Physical Exam  Constitutional:       General: She is not in acute distress  Appearance: She is well-developed and well-nourished  She is not ill-appearing, toxic-appearing or diaphoretic  HENT:      Head: Normocephalic and atraumatic  Right Ear: External ear normal  There is no impacted cerumen  Left Ear: External ear normal  There is no impacted cerumen  Mouth/Throat:      Mouth: Oropharynx is clear and moist    Eyes:      Conjunctiva/sclera: Conjunctivae normal    Cardiovascular:      Rate and Rhythm: Normal rate and regular rhythm  Heart sounds: Normal heart sounds  No murmur heard  Pulmonary:      Effort: Pulmonary effort is normal  No respiratory distress  Breath sounds: Normal breath sounds  No wheezing or rales  Abdominal:      General: There is no distension  Palpations: Abdomen is soft  There is no mass  Tenderness: There is no abdominal tenderness  There is no guarding or rebound  Musculoskeletal:      Cervical back: Neck supple  Right lower leg: No edema  Left lower leg: No edema  Neurological:      Mental Status: She is alert and oriented to person, place, and time  Psychiatric:         Mood and Affect: Mood and affect and mood normal          Behavior: Behavior normal          Thought Content:  Thought content normal          Judgment: Judgment normal

## 2022-12-07 ENCOUNTER — TELEPHONE (OUTPATIENT)
Dept: OTHER | Facility: OTHER | Age: 57
End: 2022-12-07

## 2022-12-07 NOTE — TELEPHONE ENCOUNTER
Patient is calling to get an appt with Dr Kristine Nuno she states she is having trouble with her hearing on the right

## 2022-12-25 DIAGNOSIS — K21.9 GASTROESOPHAGEAL REFLUX DISEASE: ICD-10-CM

## 2022-12-25 RX ORDER — OMEPRAZOLE 20 MG/1
CAPSULE, DELAYED RELEASE ORAL
Qty: 90 CAPSULE | Refills: 0 | Status: SHIPPED | OUTPATIENT
Start: 2022-12-25

## 2023-01-13 DIAGNOSIS — K21.9 GASTROESOPHAGEAL REFLUX DISEASE: ICD-10-CM

## 2023-01-13 RX ORDER — OMEPRAZOLE 20 MG/1
20 CAPSULE, DELAYED RELEASE ORAL DAILY
Qty: 90 CAPSULE | Refills: 0 | Status: SHIPPED | OUTPATIENT
Start: 2023-01-13

## 2023-01-13 NOTE — TELEPHONE ENCOUNTER
Pharmacy calling for a refill  Medication Refill Request     Name omeprazole   Dose/Frequency 20mg daily  Quantity 90  Verified pharmacy   [x]  Verified ordering Provider   [x]  Does patient have enough for the next 3 days?  Yes [] No []

## 2023-01-24 DIAGNOSIS — K21.9 GASTROESOPHAGEAL REFLUX DISEASE: ICD-10-CM

## 2023-01-24 RX ORDER — OMEPRAZOLE 20 MG/1
20 CAPSULE, DELAYED RELEASE ORAL DAILY
Qty: 90 CAPSULE | Refills: 0 | OUTPATIENT
Start: 2023-01-24

## 2023-03-09 ENCOUNTER — OFFICE VISIT (OUTPATIENT)
Dept: AUDIOLOGY | Facility: CLINIC | Age: 58
End: 2023-03-09

## 2023-03-09 ENCOUNTER — OFFICE VISIT (OUTPATIENT)
Dept: OTOLARYNGOLOGY | Facility: CLINIC | Age: 58
End: 2023-03-09

## 2023-03-09 VITALS
WEIGHT: 146 LBS | OXYGEN SATURATION: 95 % | BODY MASS INDEX: 24.92 KG/M2 | TEMPERATURE: 97.1 F | HEART RATE: 90 BPM | SYSTOLIC BLOOD PRESSURE: 102 MMHG | HEIGHT: 64 IN | DIASTOLIC BLOOD PRESSURE: 70 MMHG

## 2023-03-09 DIAGNOSIS — H83.3X3 NOISE-INDUCED HEARING LOSS OF BOTH EARS: Primary | ICD-10-CM

## 2023-03-09 DIAGNOSIS — H93.11 TINNITUS, RIGHT EAR: ICD-10-CM

## 2023-03-09 DIAGNOSIS — H90.5 SENSORINEURAL HEARING LOSS (SNHL) OF LEFT EAR, UNSPECIFIED HEARING STATUS ON CONTRALATERAL SIDE: Primary | ICD-10-CM

## 2023-03-09 DIAGNOSIS — R94.120 ABNORMALLY COMPLIANT RIGHT MIDDLE EAR SYSTEM WITH TYPE AD TYMPANOGRAM CURVE: ICD-10-CM

## 2023-03-09 NOTE — PROGRESS NOTES
AUDIOLOGY AUDIOMETRIC EVALUATION      Name:  Radha Shipley  :  1965  Age:  59 y o  Date of Evaluation: 3/9/2023    History: Tinnitus  Reason for visit:  Ms Carmela Aldridge is seen today at the request of Dr Lukas Cantrell for an evaluation of hearing  EVALUATION RESULTS:      Audiogram Description:     Right Left     Normal   Normal     Conductive   Conductive    Sensorineural X Sensorineural     Mixed   Mixed   X Unspecified   Unspecified      Mild SNHL AS / Mild Unspecified (4-6kHz) HL AD        Impedence Audiometry:     Tympanometry     Type Vol Press Comp   R Ad 1 2 ml -5 daPa 4 0 ml   L A 1 3 ml -10 daPa 2 0 ml          Pure Tone Audiometry (AC):         250  1500 2000 3000 4000 6000 8000   R 20 15   10 10 5 10 20 25 20   L 10 10   5 10 20 15 30 30 20         Speech Audiometry:     Right Ear:  SRT: 20dB         WRS was excellent (100%) at 50dB presentation level     Left Ear:  SRT: 15dB          WRS was excellent (100%) at 50dB presentation level        Test-Retest Reliability: Good  PT Stimulus: Puretone  Speech Stimulus: Recorded  Recognition Test: Bulgarian List C,D  Response: Push Button  Transducer: Inserts        FOLLOW-UP  Patient to follow-up with Dr Ascencion Peraza afterwards for review         Mati Odell    Licensed Audiologist

## 2023-03-09 NOTE — PROGRESS NOTES
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  Currently no exposure to loud noises  Recommend follow-up in 1 year  Reviewed management of tinnitus including masking techniques, biofeedback and avoidance of completely silent environment  Reviewed hearing preservation, follow-up in 1 year  ______________________________________________________________________    Reason for consultation :tinitus    HPI: Dioni Patton is a 59 y o  female tinnitus, first noticed approximately 8 months ago  Initially was bilateral   More recently only on her right ear  No associated subjective hearing loss no otorrhea  She reports that synchronously with the onset of the tinnitus she had flu, associated also to vertigo  Vertigo has completely subsided    PRIOR VISIT, ENDOSCOPIC EVALUATION :     REVIEW OF SYSTEMS:    Review of systems:  10 Point ROS was performed and negative except as above or otherwise noted in the medical record      Historical Information   Past Medical History:   Diagnosis Date   • Acid reflux    • Acid reflux disease 8/11/2015   • Anemia     last assessed 03/23/2016   • Arthritis     rheumatoid   • Cervicalgia 11/21/2019   • Depression    • Depression 1/24/2018   • Fibromyalgia    • Headache 1/24/2018   • Other long term (current) drug therapy 1/24/2018   • Pain in right wrist 8/28/2018   • Wrist weakness 8/28/2018 Past Surgical History:   Procedure Laterality Date   • HYSTERECTOMY      age 48   • ME COLONOSCOPY FLX DX W/COLLJ SPEC WHEN PFRMD N/A 5/5/2016    Procedure: COLONOSCOPY;  Surgeon: Yoseph Sinclair DO;  Location: [facility]; Service: Gastroenterology   • ME OPEN RX DISTAL RADIUS FX, EXTRA-ARTICULAR Right 5/12/2016    Procedure: OPEN REDUCTION INTERNAL FIXATION RIGHT DISTAL RADIUS (EXTRA-ARTICULAR); Surgeon: Bryan Dia MD;  Location[facility];  Service: Orthopedics   • TONSILLECTOMY       Social History   Social History     Substance and Sexual Activity   Alcohol Use No     Social History     Substance and Sexual Activity   Drug Use No     Social History     Tobacco Use   Smoking Status Never   Smokeless Tobacco Never     Family History   Problem Relation Age of Onset   • No Known Problems Mother    • No Known Problems Father    • No Known Problems Daughter    • No Known Problems Son        Meds/Allergies       Current Outpatient Medications:   •  busPIRone (BUSPAR) 15 mg tablet  •  fluticasone (FLONASE) 50 mcg/act nasal spray  •  gabapentin (NEURONTIN) 300 mg capsule  •  latanoprost (XALATAN) 0 005 % ophthalmic solution  •  Milnacipran HCl 50 MG TABS  •  omeprazole (PriLOSEC) 20 mg delayed release capsule    Allergies   Allergen Reactions   • Other      seasonal         PHYSICAL EXAM:    Blood pressure 102/70, pulse 90, temperature (!) 97 1 °F (36 2 °C), temperature source Temporal, height 5' 4" (1 626 m), weight 66 2 kg (146 lb), SpO2 95 %  Body mass index is 25 06 kg/m²  Constitutional: Oriented to person, place, and time  Well-developed and well-nourished, no apparent distress, non-toxic appearance  Cooperative, able to hear and answer questions without difficulty  Voice: Normal voice quality  Head: Normocephalic, atraumatic  No scars, masses or lesions  Face: Symmetric, no edema, no sinus tenderness  Eyes: Vision grossly intact, extra-ocular movement intact    Right Ear: External ear normal  Auditory canal clear  Tympanic membrane well-appearing, without retraction or scarring  No fluid present  No post-auricular erythema or tenderness  Left Ear: External ear normal   Auditory canal clear  Tympanic membrane well-appearing, without retraction or scarring  No fluid present  No post-auricular erythema or tenderness  Nose: Septum midline, nares clear  Mucosa moist, turbinates well appearing  No crusting, polyps or discharge evident  Oral cavity: Edentulous, wears dentures  Mucosa moist, lips normal   Tongue mobile, floor of mouth normal   Hard palate unremarkable  No masses or lesions  Oropharynx: Uvula is midline, soft palate normal   Unremarkable oropharyngeal inlet  Tonsillectomy scar  Posterior pharyngeal wall clear  No masses or lesions  Salivary glands:  Parotid glands and submandibular glands symmetric, no enlargement or tenderness  Neck: Normal laryngeal elevation with swallow  Trachea midline  No masses or lesions  No palpable adenopathy  Thyroid: normal in size, unremarkable without tenderness or palpable nodules  Pulmonary/Chest: Normal effort and rate  No respiratory distress  Musculoskeletal: Normal range of motion  Neurological: Cranial nerves 2-12 intact  Skin: Skin is warm and dry  Psychiatric: Normal mood and affect  Audiometry:  3/9/2023 overall excellent hearing level, pure-tone average 10/12, she does have bilateral noise-induced hearing loss with 4000 Hz at 30 dB and 8 kHz at 20 dB bilaterally  Type a tympanograms bilaterally, excellent word discrimination score

## 2023-03-20 ENCOUNTER — OFFICE VISIT (OUTPATIENT)
Dept: INTERNAL MEDICINE CLINIC | Facility: CLINIC | Age: 58
End: 2023-03-20

## 2023-03-20 VITALS
HEIGHT: 64 IN | HEART RATE: 107 BPM | WEIGHT: 147.6 LBS | DIASTOLIC BLOOD PRESSURE: 70 MMHG | OXYGEN SATURATION: 99 % | SYSTOLIC BLOOD PRESSURE: 120 MMHG | BODY MASS INDEX: 25.2 KG/M2

## 2023-03-20 DIAGNOSIS — E55.9 VITAMIN D DEFICIENCY: ICD-10-CM

## 2023-03-20 DIAGNOSIS — M79.7 FIBROMYALGIA: Primary | ICD-10-CM

## 2023-03-20 DIAGNOSIS — K21.9 GASTROESOPHAGEAL REFLUX DISEASE: ICD-10-CM

## 2023-03-20 DIAGNOSIS — F41.9 ANXIETY: ICD-10-CM

## 2023-03-20 DIAGNOSIS — R51.9 BILATERAL HEADACHES: ICD-10-CM

## 2023-03-20 RX ORDER — OMEPRAZOLE 20 MG/1
20 CAPSULE, DELAYED RELEASE ORAL DAILY
Qty: 90 CAPSULE | Refills: 3 | Status: SHIPPED | OUTPATIENT
Start: 2023-03-20

## 2023-03-20 RX ORDER — GABAPENTIN 300 MG/1
CAPSULE ORAL
Qty: 630 CAPSULE | Refills: 3 | Status: SHIPPED | OUTPATIENT
Start: 2023-03-20

## 2023-03-20 RX ORDER — BUSPIRONE HYDROCHLORIDE 15 MG/1
15 TABLET ORAL 2 TIMES DAILY
Qty: 180 TABLET | Refills: 3 | Status: SHIPPED | OUTPATIENT
Start: 2023-03-20

## 2023-03-20 NOTE — PROGRESS NOTES
Assessment/Plan:  New bitemporal headaches  Get eye exam as scheduled  Obtain labs r/o GCA  Continue Tylenol 500mg 3-4 times a day, limit use of Advil        Problem List Items Addressed This Visit        Digestive    Gastroesophageal reflux disease    Relevant Medications    omeprazole (PriLOSEC) 20 mg delayed release capsule       Other    Vitamin D deficiency    Relevant Orders    Vitamin D 25 hydroxy    Anxiety    Relevant Medications    busPIRone (BUSPAR) 15 mg tablet    Fibromyalgia - Primary    Relevant Medications    gabapentin (NEURONTIN) 300 mg capsule    Milnacipran HCl 50 MG TABS   Other Visit Diagnoses     Bilateral headaches        Relevant Orders    C-reactive protein    Sedimentation rate, automated    CBC and Platelet    Basic metabolic panel            Subjective:      Patient ID: Elizabeth Morin is a 59 y o  female  HPI  Fibromyalgia and on gabapentin and Savella  She is exercising with yoga , Geoffrey chi  C/o 3 weeks of bitemporal headaches, vise like  Vision is unchanged  She has an eye exam scheduled  Taking Tylenol 500mg BID, Advil 200mg BID with relief  Saw ENT for tinnitus, exam and hearing normal  Ankles edema unchanged, worse at the end of the day  Poor sleep, takes melatonin PRN    The following portions of the patient's history were reviewed and updated as appropriate: allergies, current medications, past family history, past medical history, past social history, past surgical history and problem list     Review of Systems   Constitutional: Negative for fever  HENT: Positive for tinnitus  Negative for congestion and sore throat  Respiratory: Negative for cough  Gastrointestinal: Positive for nausea  Musculoskeletal: Positive for myalgias  Neurological: Positive for headaches  Psychiatric/Behavioral: Positive for sleep disturbance           Objective:      /70 (BP Location: Left arm, Patient Position: Sitting, Cuff Size: Standard)   Pulse (!) 107   Ht 5' 4" (1 626 m) Wt 67 kg (147 lb 9 6 oz)   SpO2 99%   BMI 25 34 kg/m²          Physical Exam  Constitutional:       General: She is not in acute distress  Appearance: She is well-developed  She is not ill-appearing, toxic-appearing or diaphoretic  Eyes:      Conjunctiva/sclera: Conjunctivae normal       Pupils: Pupils are equal, round, and reactive to light  Cardiovascular:      Rate and Rhythm: Normal rate and regular rhythm  Heart sounds: Normal heart sounds  No murmur heard  Pulmonary:      Effort: Pulmonary effort is normal  No respiratory distress  Breath sounds: Normal breath sounds  No wheezing or rales  Abdominal:      General: There is no distension  Palpations: Abdomen is soft  There is no mass  Tenderness: There is no abdominal tenderness  There is no guarding or rebound  Musculoskeletal:      Cervical back: Neck supple  Right lower leg: Edema (non pitting ankle) present  Left lower leg: Edema (non pitting ankle) present  Neurological:      Mental Status: She is alert and oriented to person, place, and time  Psychiatric:         Mood and Affect: Mood normal          Behavior: Behavior normal          Thought Content:  Thought content normal          Judgment: Judgment normal

## 2023-03-24 ENCOUNTER — APPOINTMENT (OUTPATIENT)
Dept: LAB | Facility: CLINIC | Age: 58
End: 2023-03-24

## 2023-03-24 DIAGNOSIS — E78.2 MIXED HYPERLIPIDEMIA: ICD-10-CM

## 2023-03-24 DIAGNOSIS — E55.9 VITAMIN D DEFICIENCY: ICD-10-CM

## 2023-03-24 DIAGNOSIS — R51.9 BILATERAL HEADACHES: ICD-10-CM

## 2023-03-24 LAB
25(OH)D3 SERPL-MCNC: 69.6 NG/ML (ref 30–100)
ALBUMIN SERPL BCP-MCNC: 4.2 G/DL (ref 3.5–5)
ALP SERPL-CCNC: 86 U/L (ref 46–116)
ALT SERPL W P-5'-P-CCNC: 18 U/L (ref 12–78)
ANION GAP SERPL CALCULATED.3IONS-SCNC: 3 MMOL/L (ref 4–13)
AST SERPL W P-5'-P-CCNC: 18 U/L (ref 5–45)
BASOPHILS # BLD AUTO: 0.06 THOUSANDS/ÂΜL (ref 0–0.1)
BASOPHILS NFR BLD AUTO: 1 % (ref 0–1)
BILIRUB SERPL-MCNC: 0.44 MG/DL (ref 0.2–1)
BUN SERPL-MCNC: 8 MG/DL (ref 5–25)
CALCIUM SERPL-MCNC: 9.7 MG/DL (ref 8.3–10.1)
CHLORIDE SERPL-SCNC: 104 MMOL/L (ref 96–108)
CHOLEST SERPL-MCNC: 302 MG/DL
CO2 SERPL-SCNC: 30 MMOL/L (ref 21–32)
CREAT SERPL-MCNC: 0.59 MG/DL (ref 0.6–1.3)
CRP SERPL QL: <3 MG/L
EOSINOPHIL # BLD AUTO: 0.09 THOUSAND/ÂΜL (ref 0–0.61)
EOSINOPHIL NFR BLD AUTO: 2 % (ref 0–6)
ERYTHROCYTE [DISTWIDTH] IN BLOOD BY AUTOMATED COUNT: 14.6 % (ref 11.6–15.1)
ERYTHROCYTE [SEDIMENTATION RATE] IN BLOOD: 22 MM/HOUR (ref 0–29)
GFR SERPL CREATININE-BSD FRML MDRD: 100 ML/MIN/1.73SQ M
GLUCOSE P FAST SERPL-MCNC: 99 MG/DL (ref 65–99)
HCT VFR BLD AUTO: 39.2 % (ref 34.8–46.1)
HDLC SERPL-MCNC: 86 MG/DL
HGB BLD-MCNC: 12.4 G/DL (ref 11.5–15.4)
IMM GRANULOCYTES # BLD AUTO: 0.01 THOUSAND/UL (ref 0–0.2)
IMM GRANULOCYTES NFR BLD AUTO: 0 % (ref 0–2)
LDLC SERPL CALC-MCNC: 183 MG/DL (ref 0–100)
LYMPHOCYTES # BLD AUTO: 2.06 THOUSANDS/ÂΜL (ref 0.6–4.47)
LYMPHOCYTES NFR BLD AUTO: 36 % (ref 14–44)
MCH RBC QN AUTO: 25.1 PG (ref 26.8–34.3)
MCHC RBC AUTO-ENTMCNC: 31.6 G/DL (ref 31.4–37.4)
MCV RBC AUTO: 79 FL (ref 82–98)
MONOCYTES # BLD AUTO: 0.39 THOUSAND/ÂΜL (ref 0.17–1.22)
MONOCYTES NFR BLD AUTO: 7 % (ref 4–12)
NEUTROPHILS # BLD AUTO: 3.14 THOUSANDS/ÂΜL (ref 1.85–7.62)
NEUTS SEG NFR BLD AUTO: 54 % (ref 43–75)
NRBC BLD AUTO-RTO: 0 /100 WBCS
PLATELET # BLD AUTO: 373 THOUSANDS/UL (ref 149–390)
PMV BLD AUTO: 10.3 FL (ref 8.9–12.7)
POTASSIUM SERPL-SCNC: 3.7 MMOL/L (ref 3.5–5.3)
PROT SERPL-MCNC: 7.6 G/DL (ref 6.4–8.4)
RBC # BLD AUTO: 4.95 MILLION/UL (ref 3.81–5.12)
SODIUM SERPL-SCNC: 137 MMOL/L (ref 135–147)
TRIGL SERPL-MCNC: 166 MG/DL
WBC # BLD AUTO: 5.75 THOUSAND/UL (ref 4.31–10.16)

## 2023-04-03 DIAGNOSIS — Z12.31 ENCOUNTER FOR SCREENING MAMMOGRAM FOR BREAST CANCER: Primary | ICD-10-CM

## 2023-04-13 PROBLEM — G44.229 CHRONIC TENSION-TYPE HEADACHE, NOT INTRACTABLE: Status: ACTIVE | Noted: 2023-04-13

## 2023-04-17 DIAGNOSIS — M79.7 FIBROMYALGIA: ICD-10-CM

## 2023-04-27 ENCOUNTER — HOSPITAL ENCOUNTER (OUTPATIENT)
Dept: RADIOLOGY | Age: 58
Discharge: HOME | End: 2023-04-27

## 2023-04-27 VITALS — HEIGHT: 64 IN | BODY MASS INDEX: 24.92 KG/M2 | WEIGHT: 146 LBS

## 2023-04-27 DIAGNOSIS — Z13.820 SCREENING FOR OSTEOPOROSIS: ICD-10-CM

## 2023-05-03 ENCOUNTER — TELEMEDICINE (OUTPATIENT)
Dept: INTERNAL MEDICINE CLINIC | Facility: CLINIC | Age: 58
End: 2023-05-03

## 2023-05-03 VITALS — WEIGHT: 147 LBS | BODY MASS INDEX: 25.1 KG/M2 | HEIGHT: 64 IN

## 2023-05-03 DIAGNOSIS — B34.9 VIRAL INFECTION: Primary | ICD-10-CM

## 2023-05-03 RX ORDER — BENZONATATE 200 MG/1
200 CAPSULE ORAL 3 TIMES DAILY PRN
Qty: 20 CAPSULE | Refills: 0 | Status: SHIPPED | OUTPATIENT
Start: 2023-05-03

## 2023-05-03 NOTE — PROGRESS NOTES
Virtual Regular Visit    Verification of patient location:    Patient is located at Home in the following state in which I hold an active license [place]      Assessment/Plan:  Advised to test for COVID and call if +  Discussed that I suspect this is viral and treatment is supportive  Problem List Items Addressed This Visit    None  Visit Diagnoses     Viral infection    -  Primary    Relevant Medications    benzonatate (TESSALON) 200 MG capsule               Reason for visit is   Chief Complaint   Patient presents with    Cough     Little congestions, headaches  Denies fever  Going on for 3 days, no covid test    Virtual Regular Visit        Encounter provider Mable Castle MD    Provider located at [address] [place] [id number]      Recent Visits  No visits were found meeting these conditions  Showing recent visits within past 7 days and meeting all other requirements  Today's Visits  Date Type Provider Dept   05/03/23 Telemedicine Mable Castle MD [id number] [facility] today's visits and meeting all other requirements  Future Appointments  No visits were found meeting these conditions  Showing future appointments within next 150 days and meeting all other requirements     It was my intent to perform this visit via video technology but the patient was not able to do a video connection so the visit was completed via audio telephone only  The patient was identified by name and date of birth  Julio Raygoza was informed that this is a telemedicine visit and that the visit is being conducted through Telephone  My office door was closed  No one else was in the room  She acknowledged consent and understanding of privacy and security of the video platform  The patient has agreed to participate and understands they can discontinue the visit at any time  Patient is aware this is a billable service  Subjective  Julio Raygoza is a 59 y o  female with a cough   HPI   Started 3 days ago with a fever nasal congestion runny nose sore throat productive cough SOB headache  No vomiting diarrhea  OTC meds: Mucinex  No COVID test performed    Past Medical History:   Diagnosis Date    Acid reflux     Acid reflux disease 8/11/2015    Anemia     last assessed 03/23/2016    Arthritis     rheumatoid    Cervicalgia 11/21/2019    Depression     Depression 1/24/2018    Fibromyalgia     Headache 1/24/2018    Other long term (current) drug therapy 1/24/2018    Pain in right wrist 8/28/2018    Wrist weakness 8/28/2018       Past Surgical History:   Procedure Laterality Date    HYSTERECTOMY      age 48    TX COLONOSCOPY FLX DX W/COLLJ SPEC WHEN PFRMD N/A 5/5/2016    Procedure: COLONOSCOPY;  Surgeon: Reynaldo Shirley DO;  [facility]: [facility]; Service: Gastroenterology    TX OPTX DSTL RADL X-ARTIC FX/EPIPHYSL SEP Right 5/12/2016    Procedure: OPEN REDUCTION INTERNAL FIXATION RIGHT DISTAL RADIUS (EXTRA-ARTICULAR);   Surgeon: Mani Infante MD;  [facility];  Service: Orthopedics    TONSILLECTOMY         Current Outpatient Medications   Medication Sig Dispense Refill    benzonatate (TESSALON) 200 MG capsule Take 1 capsule (200 mg total) by mouth 3 (three) times a day as needed for cough 20 capsule 0    busPIRone (BUSPAR) 15 mg tablet Take 1 tablet (15 mg total) by mouth 2 (two) times a day 180 tablet 3    gabapentin (NEURONTIN) 300 mg capsule 2 caps in the morning, 2 caps in the afternoon and 3 caps at bedtime 630 capsule 3    latanoprost (XALATAN) 0 005 % ophthalmic solution INSTILL 1 DROP IN RIGHT EYE EVERY NIGHT AT BEDTIME      Milnacipran HCl 50 MG TABS Take 1 tablet by mouth 2 (two) times a day 180 tablet 3    omeprazole (PriLOSEC) 20 mg delayed release capsule Take 1 capsule (20 mg total) by mouth daily 90 capsule 3    fluticasone (FLONASE) 50 mcg/act nasal spray SHAKE LIQUID AND USE 1 SPRAY IN EACH NOSTRIL TWICE DAILY (Patient not "taking: Reported on 4/13/2023) 48 g 1     No current facility-administered medications for this visit  Allergies   Allergen Reactions    Other      seasonal       Review of Systems   Constitutional: Positive for fever  HENT: Positive for congestion, rhinorrhea and sore throat  Respiratory: Positive for cough and shortness of breath  Gastrointestinal: Negative for nausea and vomiting         Video Exam    Vitals:    05/03/23 1344   Weight: 66 7 kg (147 lb)   Height: 5' 4\" (1 626 m)       Physical Exam     Visit Time  Total Visit Duration: 15      "

## 2023-05-07 ENCOUNTER — HOSPITAL ENCOUNTER (OUTPATIENT)
Dept: MAMMOGRAPHY | Facility: HOSPITAL | Age: 58
Discharge: HOME | End: 2023-05-07
Attending: OBSTETRICS & GYNECOLOGY

## 2023-05-07 DIAGNOSIS — Z12.31 ENCOUNTER FOR SCREENING MAMMOGRAM FOR BREAST CANCER: ICD-10-CM

## 2023-05-07 DIAGNOSIS — Z12.31 ENCOUNTER FOR SCREENING MAMMOGRAM FOR MALIGNANT NEOPLASM OF BREAST: ICD-10-CM

## 2023-05-11 ENCOUNTER — TELEPHONE (OUTPATIENT)
Dept: OTHER | Facility: OTHER | Age: 58
End: 2023-05-11

## 2023-05-11 NOTE — TELEPHONE ENCOUNTER
Patient requesting a call back to discuss test results  Ordering Provider: Devang Correia, DO Date Completed: 5 7 2023  Lab []  MRI []  X-Ray []  CT []  Colonoscopy []  EGD []  Biopsy []  Other [x]mammogram    Patient is requesting a call back form PCP office to discuss test results

## 2023-06-02 ENCOUNTER — OFFICE VISIT (OUTPATIENT)
Dept: INTERNAL MEDICINE CLINIC | Facility: CLINIC | Age: 58
End: 2023-06-02

## 2023-06-02 VITALS
BODY MASS INDEX: 25.3 KG/M2 | HEART RATE: 89 BPM | OXYGEN SATURATION: 99 % | WEIGHT: 148.2 LBS | SYSTOLIC BLOOD PRESSURE: 108 MMHG | HEIGHT: 64 IN | DIASTOLIC BLOOD PRESSURE: 68 MMHG

## 2023-06-02 DIAGNOSIS — M79.7 FIBROMYALGIA: ICD-10-CM

## 2023-06-02 DIAGNOSIS — M81.0 OSTEOPOROSIS WITHOUT CURRENT PATHOLOGICAL FRACTURE, UNSPECIFIED OSTEOPOROSIS TYPE: Primary | ICD-10-CM

## 2023-06-02 DIAGNOSIS — M81.0 OSTEOPOROSIS WITHOUT CURRENT PATHOLOGICAL FRACTURE, UNSPECIFIED OSTEOPOROSIS TYPE: ICD-10-CM

## 2023-06-02 RX ORDER — GABAPENTIN 300 MG/1
CAPSULE ORAL
Qty: 210 CAPSULE | Refills: 11 | Status: SHIPPED | OUTPATIENT
Start: 2023-06-02

## 2023-06-02 RX ORDER — ALENDRONATE SODIUM 70 MG/1
70 TABLET ORAL
Qty: 4 TABLET | Refills: 11 | Status: SHIPPED | OUTPATIENT
Start: 2023-06-02 | End: 2023-06-03

## 2023-06-02 NOTE — PROGRESS NOTES
"Assessment/Plan:    Osteoporosis without current pathological fracture  Agrees to start alendronate 70mg weekly   Discussed treatment for up to 5 years  DXA in 2 years    Fibromyalgia  Increase gabapentin by 300mg which will be the highest dose 2400mg /day  Continue Savella  Continue regular exercise         Problem List Items Addressed This Visit        Musculoskeletal and Integument    Osteoporosis without current pathological fracture - Primary     Agrees to start alendronate 70mg weekly   Discussed treatment for up to 5 years  DXA in 2 years         Relevant Medications    alendronate (Fosamax) 70 mg tablet       Other    Fibromyalgia     Increase gabapentin by 300mg which will be the highest dose 2400mg /day  Continue Savella  Continue regular exercise         Relevant Medications    gabapentin (NEURONTIN) 300 mg capsule         Subjective:      Patient ID: Ada Yaw is a 59 y o  female  HPI  Here to review DXA showing osteoporosis T score -3 in the lumbar spine  Fracture of the wrist in 2016  Normal D, calcium  She exercises regularly  Fibromyalgia pain not controlled    The following portions of the patient's history were reviewed and updated as appropriate: allergies, current medications, past family history, past medical history, past social history, past surgical history and problem list     Review of Systems   Musculoskeletal: Positive for back pain, myalgias and neck pain  Objective:      /68 (BP Location: Left arm, Patient Position: Sitting, Cuff Size: Standard)   Pulse 89   Ht 5' 4\" (1 626 m)   Wt 67 2 kg (148 lb 3 2 oz)   SpO2 99%   BMI 25 44 kg/m²          Physical Exam  Constitutional:       Appearance: She is not ill-appearing  Cardiovascular:      Rate and Rhythm: Regular rhythm  Heart sounds: Normal heart sounds  Pulmonary:      Breath sounds: Normal breath sounds  Abdominal:      Palpations: Abdomen is soft  Tenderness:  There is no abdominal " tenderness  Musculoskeletal:         General: Tenderness present     Psychiatric:         Mood and Affect: Mood normal          Behavior: Behavior normal

## 2023-06-02 NOTE — ASSESSMENT & PLAN NOTE
Increase gabapentin by 300mg which will be the highest dose 2400mg /day  Continue Savella  Continue regular exercise

## 2023-06-03 RX ORDER — ALENDRONATE SODIUM 70 MG/1
TABLET ORAL
Qty: 12 TABLET | Refills: 3 | Status: SHIPPED | OUTPATIENT
Start: 2023-06-03

## 2023-07-05 NOTE — PATIENT INSTRUCTIONS
[id number] sent medical record request. I sent all records on file for this patient; as we just saw her as a new patient on 6/22/2023. I sent these records via secure portal upload. May decrease Cymbalta or duloxetine to 60mg every there day for 3 weeks then stop  Schedule follow up with rheumatology

## 2023-07-18 ENCOUNTER — ANNUAL EXAM (OUTPATIENT)
Dept: OBGYN CLINIC | Facility: CLINIC | Age: 58
End: 2023-07-18
Payer: COMMERCIAL

## 2023-07-18 VITALS
SYSTOLIC BLOOD PRESSURE: 112 MMHG | BODY MASS INDEX: 24.92 KG/M2 | WEIGHT: 146 LBS | DIASTOLIC BLOOD PRESSURE: 78 MMHG | HEIGHT: 64 IN

## 2023-07-18 DIAGNOSIS — Z01.419 WOMEN'S ANNUAL ROUTINE GYNECOLOGICAL EXAMINATION: Primary | ICD-10-CM

## 2023-07-18 DIAGNOSIS — N95.1 HOT FLASHES DUE TO MENOPAUSE: ICD-10-CM

## 2023-07-18 DIAGNOSIS — Z12.31 ENCOUNTER FOR SCREENING MAMMOGRAM FOR MALIGNANT NEOPLASM OF BREAST: ICD-10-CM

## 2023-07-18 PROCEDURE — 99396 PREV VISIT EST AGE 40-64: CPT | Performed by: OBSTETRICS & GYNECOLOGY

## 2023-07-18 NOTE — PROGRESS NOTES
ASSESSMENT & PLAN:   Diagnoses and all orders for this visit:    Women's annual routine gynecological examination  -     Ambulatory Referral to Obstetrics / Gynecology    Encounter for screening mammogram for malignant neoplasm of breast  -     Mammo screening bilateral w 3d & cad; Future    Hot flashes due to menopause   - recommended black cohosh          The following were reviewed in today's visit: ASCCP guidelines, Gardisil vaccination, STD testing breast self exam, mammography screening ordered, use and side effects of HRT, menopause, exercise and healthy diet. Patient to return to office in yearly for annual exam.     All questions have been answered to her satisfaction. CC:  Annual Gynecologic Examination  Chief Complaint   Patient presents with   • Gynecologic Exam     Pt is here for her yearly exam. Pap utd and mammo ordered. Pt doing well no concerns. HPI: Anastasiia Hanna is a 59 y.o. [id number] who presents for annual gynecologic examination. She has the following concerns:  Hot flashes. Not interested in HRT. Recommended black cohosh. Health Maintenance:    Exercise: frequently  Breast exams/breast awareness: yes  Diet: tries to eat healthy  Last mammogram: 2023  Colorectal cancer screenin2016      Past Medical History:   Diagnosis Date   • Acid reflux    • Acid reflux disease 2015   • Anemia     last assessed 2016   • Arthritis     rheumatoid   • Cervicalgia 2019   • Depression    • Depression 2018   • Fibromyalgia    • Headache 2018   • Other long term (current) drug therapy 2018   • Pain in right wrist 2018   • Wrist weakness 2018       Past Surgical History:   Procedure Laterality Date   • HYSTERECTOMY      age 48   • ME COLONOSCOPY FLX DX W/COLLJ SPEC WHEN PFRMD N/A 2016    Procedure: COLONOSCOPY;  Surgeon: Bruce Payne DO;  Location: [facility];   Service: Gastroenterology   • ME OPTX DSTL RADL X-ARTIC FX/EPIPHYSL SEP Right 5/12/2016    Procedure: OPEN REDUCTION INTERNAL FIXATION RIGHT DISTAL RADIUS (EXTRA-ARTICULAR); Surgeon: Perry Gordon MD;  Location: [facility];  Service: Orthopedics   • TONSILLECTOMY         Past OB/Gyn History:   No LMP recorded. Patient has had a hysterectomy. Menopausal status: postmenopausal  Menopausal symptoms: hot flashes    Patient is not currently sexually active. Family History  Family History   Problem Relation Age of Onset   • No Known Problems Mother    • No Known Problems Father    • No Known Problems Daughter    • No Known Problems Son        Family history of uterine or ovarian cancer: no  Family history of breast cancer: no  Family history of colon cancer: no    Social History:  Social History     Socioeconomic History   • Marital status: Single     Spouse name: Not on file   • Number of children: Not on file   • Years of education: Not on file   • Highest education level: Not on file   Occupational History   • Occupation: [employer]     Employer: [employer]   Tobacco Use   • Smoking status: Never   • Smokeless tobacco: Never   Vaping Use   • Vaping Use: Never used   Substance and Sexual Activity   • Alcohol use: No   • Drug use: No   • Sexual activity: Not Currently   Other Topics Concern   • Not on file   Social History Narrative   • Not on file     Social Determinants of Health     Financial Resource Strain: Not on file   Food Insecurity: Not on file   Transportation Needs: Not on file   Physical Activity: Not on file   Stress: Not on file   Social Connections: Not on file   Intimate Partner Violence: Not on file   Housing Stability: Not on file     Domestic violence screen: negative    Allergies:   Allergies   Allergen Reactions   • Other      seasonal       Medications:    Current Outpatient Medications:   •  alendronate (FOSAMAX) 70 mg tablet, TAKE 1 TABLET BY MOUTH EVERY 7 DAYS WITH A FULL GLASS OF WATER. STAY UPRIGHT AND DO NOT LIE DOWN FOR AT LEAST 30 MINUTES AFTER, Disp: 12 tablet, Rfl: 3  •  benzonatate (TESSALON) 200 MG capsule, Take 1 capsule (200 mg total) by mouth 3 (three) times a day as needed for cough, Disp: 20 capsule, Rfl: 0  •  busPIRone (BUSPAR) 15 mg tablet, Take 1 tablet (15 mg total) by mouth 2 (two) times a day, Disp: 180 tablet, Rfl: 3  •  gabapentin (NEURONTIN) 300 mg capsule, 3 caps in the morning, 2 caps in the afternoon and 3 caps at bedtime, Disp: 210 capsule, Rfl: 11  •  latanoprost (XALATAN) 0.005 % ophthalmic solution, INSTILL 1 DROP IN RIGHT EYE EVERY NIGHT AT BEDTIME, Disp: , Rfl:   •  Milnacipran HCl 50 MG TABS, Take 1 tablet by mouth 2 (two) times a day, Disp: 180 tablet, Rfl: 3  •  omeprazole (PriLOSEC) 20 mg delayed release capsule, Take 1 capsule (20 mg total) by mouth daily, Disp: 90 capsule, Rfl: 3  •  fluticasone (FLONASE) 50 mcg/act nasal spray, SHAKE LIQUID AND USE 1 SPRAY IN EACH NOSTRIL TWICE DAILY (Patient not taking: Reported on 4/13/2023), Disp: 48 g, Rfl: 1    Review of Systems:  Review of Systems   Respiratory: Negative for shortness of breath. Cardiovascular: Negative for chest pain. Gastrointestinal: Negative for abdominal distention, abdominal pain, blood in stool, constipation, nausea and vomiting. Genitourinary: Negative for difficulty urinating, dysuria, frequency, pelvic pain, urgency, vaginal bleeding, vaginal discharge and vaginal pain. Physical Exam:  /78 (BP Location: Right arm, Patient Position: Sitting, Cuff Size: Standard)   Ht 5' 4" (1.626 m)   Wt 66.2 kg (146 lb)   BMI 25.06 kg/m²    Physical Exam  Constitutional:       General: She is awake. Appearance: Normal appearance. She is well-developed. Genitourinary:      Vulva and bladder normal.      Right Labia: No rash, tenderness or lesions. Left Labia: No tenderness, lesions or rash. No vaginal discharge, erythema, tenderness or bleeding. Anterior vaginal prolapse present. Moderate vaginal atrophy present. Right Adnexa: not tender, not full and no mass present. Left Adnexa: not tender, not full and no mass present. Cervix is absent. Uterus is absent. No urethral prolapse present. Bladder is not tender. Pelvic exam was performed with patient in the lithotomy position. Breasts:     Right: Normal. No inverted nipple, mass, nipple discharge, skin change or tenderness. Left: Normal. No inverted nipple, mass, nipple discharge, skin change or tenderness. HENT:      Head: Normocephalic and atraumatic. Cardiovascular:      Rate and Rhythm: Normal rate and regular rhythm. Heart sounds: Normal heart sounds. Pulmonary:      Effort: Pulmonary effort is normal. No tachypnea or respiratory distress. Breath sounds: Normal breath sounds. Abdominal:      General: There is no distension. Palpations: Abdomen is soft. Tenderness: There is no abdominal tenderness. There is no guarding. Musculoskeletal:      Cervical back: Neck supple. Lymphadenopathy:      Upper Body:      Right upper body: No supraclavicular or axillary adenopathy. Left upper body: No supraclavicular or axillary adenopathy. Neurological:      General: No focal deficit present. Mental Status: She is alert and oriented to person, place, and time. Psychiatric:         Mood and Affect: Mood normal.         Behavior: Behavior normal.         Thought Content: Thought content normal.         Judgment: Judgment normal.   Vitals reviewed.

## 2023-10-12 ENCOUNTER — APPOINTMENT (OUTPATIENT)
Dept: LAB | Facility: CLINIC | Age: 58
End: 2023-10-12
Payer: COMMERCIAL

## 2023-10-12 DIAGNOSIS — E78.2 MIXED HYPERLIPIDEMIA: ICD-10-CM

## 2023-10-12 LAB
ANION GAP SERPL CALCULATED.3IONS-SCNC: 4 MMOL/L
BUN SERPL-MCNC: 10 MG/DL (ref 5–25)
CALCIUM SERPL-MCNC: 9.4 MG/DL (ref 8.4–10.2)
CHLORIDE SERPL-SCNC: 105 MMOL/L (ref 96–108)
CHOLEST SERPL-MCNC: 264 MG/DL
CO2 SERPL-SCNC: 32 MMOL/L (ref 21–32)
CREAT SERPL-MCNC: 0.64 MG/DL (ref 0.6–1.3)
GFR SERPL CREATININE-BSD FRML MDRD: 97 ML/MIN/1.73SQ M
GLUCOSE P FAST SERPL-MCNC: 89 MG/DL (ref 65–99)
HDLC SERPL-MCNC: 88 MG/DL
LDLC SERPL CALC-MCNC: 154 MG/DL (ref 0–100)
POTASSIUM SERPL-SCNC: 4.8 MMOL/L (ref 3.5–5.3)
SODIUM SERPL-SCNC: 141 MMOL/L (ref 135–147)
TRIGL SERPL-MCNC: 110 MG/DL

## 2023-10-12 PROCEDURE — 36415 COLL VENOUS BLD VENIPUNCTURE: CPT

## 2023-10-12 PROCEDURE — 80048 BASIC METABOLIC PNL TOTAL CA: CPT

## 2023-10-12 PROCEDURE — 80061 LIPID PANEL: CPT

## 2023-10-16 ENCOUNTER — OFFICE VISIT (OUTPATIENT)
Dept: INTERNAL MEDICINE CLINIC | Facility: CLINIC | Age: 58
End: 2023-10-16
Payer: COMMERCIAL

## 2023-10-16 VITALS
HEIGHT: 64 IN | BODY MASS INDEX: 25.27 KG/M2 | TEMPERATURE: 96.7 F | SYSTOLIC BLOOD PRESSURE: 110 MMHG | DIASTOLIC BLOOD PRESSURE: 70 MMHG | WEIGHT: 148 LBS | HEART RATE: 93 BPM | OXYGEN SATURATION: 99 %

## 2023-10-16 DIAGNOSIS — Z23 ENCOUNTER FOR IMMUNIZATION: ICD-10-CM

## 2023-10-16 DIAGNOSIS — E78.2 MIXED HYPERLIPIDEMIA: ICD-10-CM

## 2023-10-16 DIAGNOSIS — F41.9 ANXIETY: ICD-10-CM

## 2023-10-16 DIAGNOSIS — K13.0 ANGULAR CHEILITIS: Primary | ICD-10-CM

## 2023-10-16 DIAGNOSIS — M79.7 FIBROMYALGIA: ICD-10-CM

## 2023-10-16 PROBLEM — H81.10 BENIGN PAROXYSMAL POSITIONAL VERTIGO: Status: RESOLVED | Noted: 2022-03-18 | Resolved: 2023-10-16

## 2023-10-16 PROCEDURE — 90471 IMMUNIZATION ADMIN: CPT

## 2023-10-16 PROCEDURE — 90686 IIV4 VACC NO PRSV 0.5 ML IM: CPT

## 2023-10-16 PROCEDURE — 99214 OFFICE O/P EST MOD 30 MIN: CPT | Performed by: INTERNAL MEDICINE

## 2023-10-16 RX ORDER — CLOTRIMAZOLE 1 %
CREAM (GRAM) TOPICAL 2 TIMES DAILY
Qty: 14 G | Refills: 0 | Status: SHIPPED | OUTPATIENT
Start: 2023-10-16

## 2023-10-16 RX ORDER — BUSPIRONE HYDROCHLORIDE 15 MG/1
15 TABLET ORAL 3 TIMES DAILY
Qty: 270 TABLET | Refills: 3 | Status: SHIPPED | OUTPATIENT
Start: 2023-10-16

## 2023-10-16 NOTE — PROGRESS NOTES
Name: Gaston Sweet      : 1965      MRN: [id number]  Encounter Provider: Mary Kay Cid MD  Encounter Date: 10/16/2023   [facility]     1. Angular cheilitis  -     clotrimazole (LOTRIMIN) 1 % cream; Apply topically 2 (two) times a day    2. Anxiety  Assessment & Plan:  Increase buspirone to 15mg TID    Orders:  -     busPIRone (BUSPAR) 15 mg tablet; Take 1 tablet (15 mg total) by mouth 3 (three) times a day    3. Fibromyalgia  Assessment & Plan:  Continue Savella and gabapentin  Ria [place] has been the most helpful one   No improvement on duloxetine in the past  Continue regular exercise      4. Mixed hyperlipidemia  Assessment & Plan:  Continue to monitor      5. Encounter for immunization  -     influenza vaccine, quadrivalent, 0.5 mL, preservative-free, for adult and pediatric patients 6 mos+ (AFLURIA, FLUARIX, FLULAVAL, FLUZONE)      BMI Counseling: Body mass index is 25.4 kg/m². The BMI is above normal. Nutrition recommendations include reducing intake of saturated and trans fat. Rationale for BMI follow-up plan is due to patient being overweight or obese. COVID and RSV vaccines discussed  Reported forgetfulness at the end of the visit-will discuss further at next visit    Subjective      A few weeks of a sore in the right corner of the mouth without relief from Vaseline, Oragel, Neosporin and gentian violet  C/o depression and anxiety with panic attacks  Denies suicidal thoughts  Able to sleep but wakes up early and cannot fall back to sleep        Review of Systems   Constitutional:  Negative for fever and unexpected weight change. Respiratory:  Negative for shortness of breath. Cardiovascular:  Negative for chest pain and palpitations. Gastrointestinal:  Negative for abdominal pain, constipation and diarrhea. Genitourinary:  Negative for difficulty urinating. Musculoskeletal:  Positive for arthralgias and myalgias. Neurological:  Negative for dizziness and headaches. Forgetful   Psychiatric/Behavioral:  Positive for dysphoric mood and sleep disturbance. The patient is nervous/anxious. Current Outpatient Medications on File Prior to Visit   Medication Sig   • alendronate (FOSAMAX) 70 mg tablet TAKE 1 TABLET BY MOUTH EVERY 7 DAYS WITH A FULL GLASS OF WATER. STAY UPRIGHT AND DO NOT LIE DOWN FOR AT LEAST 30 MINUTES AFTER   • gabapentin (NEURONTIN) 300 mg capsule 3 caps in the morning, 2 caps in the afternoon and 3 caps at bedtime   • latanoprost (XALATAN) 0.005 % ophthalmic solution INSTILL 1 DROP IN RIGHT EYE EVERY NIGHT AT BEDTIME   • Milnacipran HCl 50 MG TABS Take 1 tablet by mouth 2 (two) times a day   • omeprazole (PriLOSEC) 20 mg delayed release capsule Take 1 capsule (20 mg total) by mouth daily   • [DISCONTINUED] busPIRone (BUSPAR) 15 mg tablet Take 1 tablet (15 mg total) by mouth 2 (two) times a day   • [DISCONTINUED] benzonatate (TESSALON) 200 MG capsule Take 1 capsule (200 mg total) by mouth 3 (three) times a day as needed for cough (Patient not taking: Reported on 10/16/2023)   • [DISCONTINUED] fluticasone (FLONASE) 50 mcg/act nasal spray SHAKE LIQUID AND USE 1 SPRAY IN EACH NOSTRIL TWICE DAILY (Patient not taking: Reported on 4/13/2023)       Objective     /70 (BP Location: Left arm, Patient Position: Sitting, Cuff Size: Large)   Pulse 93   Temp (!) 96.7 °F (35.9 °C) (Probe)   Ht 5' 4" (1.626 m)   Wt 67.1 kg (148 lb)   SpO2 99%   BMI 25.40 kg/m²     Physical Exam  Constitutional:       Appearance: She is well-developed. She is not ill-appearing. HENT:      Mouth/Throat:      Mouth: Oral lesions (crusty dry at the angle of the mouth on the right) present. Eyes:      Conjunctiva/sclera: Conjunctivae normal.   Cardiovascular:      Rate and Rhythm: Normal rate and regular rhythm. Heart sounds: Normal heart sounds. No murmur heard.   Pulmonary:      Effort: Pulmonary effort is normal. No respiratory distress. Breath sounds: Normal breath sounds. No wheezing or rales. Abdominal:      General: There is no distension. Palpations: Abdomen is soft. There is no mass. Tenderness: There is no abdominal tenderness. There is no guarding or rebound. Musculoskeletal:      Cervical back: Neck supple. Neurological:      Mental Status: She is alert and oriented to person, place, and time. Psychiatric:         Mood and Affect: Mood is depressed. Behavior: Behavior normal.         Thought Content: Thought content normal. Thought content does not include suicidal ideation.          Judgment: Judgment normal.       Dread Trujillo MD

## 2023-10-16 NOTE — ASSESSMENT & PLAN NOTE
Continue Savella and gabapentin  Minda Sport has been the most helpful one   No improvement on duloxetine in the past  Continue regular exercise

## 2024-01-18 ENCOUNTER — OFFICE VISIT (OUTPATIENT)
Dept: INTERNAL MEDICINE CLINIC | Facility: CLINIC | Age: 59
End: 2024-01-18
Payer: COMMERCIAL

## 2024-01-18 VITALS
WEIGHT: 151.8 LBS | HEART RATE: 92 BPM | SYSTOLIC BLOOD PRESSURE: 110 MMHG | HEIGHT: 64 IN | BODY MASS INDEX: 25.92 KG/M2 | DIASTOLIC BLOOD PRESSURE: 72 MMHG | OXYGEN SATURATION: 98 % | RESPIRATION RATE: 16 BRPM

## 2024-01-18 DIAGNOSIS — K21.9 GASTROESOPHAGEAL REFLUX DISEASE: ICD-10-CM

## 2024-01-18 DIAGNOSIS — E78.2 MIXED HYPERLIPIDEMIA: ICD-10-CM

## 2024-01-18 DIAGNOSIS — R09.82 POST-NASAL DRIP: ICD-10-CM

## 2024-01-18 DIAGNOSIS — M79.7 FIBROMYALGIA: ICD-10-CM

## 2024-01-18 DIAGNOSIS — Z23 ENCOUNTER FOR IMMUNIZATION: ICD-10-CM

## 2024-01-18 DIAGNOSIS — R09.82 POST-NASAL DRIP: Primary | ICD-10-CM

## 2024-01-18 PROCEDURE — 90678 RSV VACC PREF BIVALENT IM: CPT

## 2024-01-18 PROCEDURE — 99214 OFFICE O/P EST MOD 30 MIN: CPT | Performed by: INTERNAL MEDICINE

## 2024-01-18 PROCEDURE — 90471 IMMUNIZATION ADMIN: CPT

## 2024-01-18 RX ORDER — OMEPRAZOLE 20 MG/1
20 CAPSULE, DELAYED RELEASE ORAL DAILY
Qty: 90 CAPSULE | Refills: 3 | Status: SHIPPED | OUTPATIENT
Start: 2024-01-18

## 2024-01-18 RX ORDER — FLUTICASONE PROPIONATE 50 MCG
2 SPRAY, SUSPENSION (ML) NASAL DAILY
Qty: 16 G | Refills: 1 | Status: SHIPPED | OUTPATIENT
Start: 2024-01-18 | End: 2024-01-18

## 2024-01-18 RX ORDER — FLUTICASONE PROPIONATE 50 MCG
2 SPRAY, SUSPENSION (ML) NASAL DAILY
Qty: 48 G | Refills: 0 | Status: SHIPPED | OUTPATIENT
Start: 2024-01-18

## 2024-01-18 NOTE — PATIENT INSTRUCTIONS
Vacuna contra el VRS (virus respiratorio sincitial) para adultos   CUIDADO AMBULATORIO:   La vacunas contra el virus respiratorio sincicial (VRS) se administra gary amy inyección en 1 dosis para prevenir el VRS en adultos de 60 años de edad o más. Champagne médico también puede recomendarle la vacuna si tiene un riesgo alto de contraer la infección por VRS. La infección por VRS causa problemas respiratorios porque hace que las vías aéreas se inflamen y se llenen de líquido y mucosidad. La infección por VRS con frecuencia conlleva a otros problemas pulmonares, gary bronquitis o bronquiolitis. El VRS se transmite principalmente a través de la tos, los estornudos o el contacto cercano. La infección por VRS es más común desde el otoño hasta la primavera.  Llame al número de emergencias local (911 en los [place]) si:  Champagne boca y garganta están inflamadas.    Usted tiene sibilancias o dificultad para respirar.    Usted tiene dolor de pecho o champagne corazón late más rápido de lo normal.    Usted siente que se va a desmayar.    Busque atención médica de inmediato si:  Champagne lakisha está simmons o inflamado.    Usted tiene urticaria que se propaga por todo champagne cuerpo.    Llame a champagne médico si:  Se siente débil o mareado.    Usted tiene más dolor, enrojecimiento o inflamación alrededor del área donde le aplicaron la inyección.    Usted tiene preguntas o inquietudes relacionadas con la vacuna contra el VRS.    Qué informar a champagne médico antes de recibir la vacuna contra el VRS:  Tiene amy alergia conocida a cualquier componente (parte) de la vacuna.    Barb un medicamento que afecta el sistema inmunitario, gary los esteroides.    Tiene un sistema inmunitario débil, gary cuando se tiene cáncer o VIH o antecedentes familiares de problemas inmunitarios.    Padece fibrilación auricular (Fib A) u otra cardiopatía, amy enfermedad pulmonar o diabetes.    Tiene antecedentes de síndrome de Guillain-Barré, especialmente después de jona recibido amy  vacuna.    Tiene alguna alergia grave.    Motivos para no recibir la vacuna contra el VRS o esperar para recibirla.  No se vacune contra el VRS si alguna vez ha tenido amy reacción alérgica grave a alguno de los componentes.    Espere para recibir la vacuna contra el VRS si está enfermo o tiene fiebre el día de la pari para la vacuna.    Riesgos de la vacuna contra el VRS: La vacuna podría causar síntomas leves, gary fiebre, dolor de estevan y dolor muscular y articular. También es posible que tenga sensibilidad o enrojecimiento de leve a moderado en el área donde le aplicaron la vacuna. Usted aún podría contraer el VRS después de recibir la vacuna. Usted podría tener amy reacción alérgica a la vacuna. [place] puede poner en peligro champagne luis.  Aplique amy compresa tibia al área de la inyección según indicaciones para disminuir el dolor y la inflamación.  Acuda a la consulta de control con champagne médico según las indicaciones: Anote kenan preguntas para que se acuerde de hacerlas liborio kenan visitas.  © Copyright Merative 2023 Information is for End User's use only and may not be sold, redistributed or otherwise used for commercial purposes.  Esta información es sólo para uso en educación. Champagne intención no es darle un consejo médico sobre enfermedades o tratamientos. Colsulte con champagne médico, enfermera o farmacéutico antes de seguir cualquier régimen médico para saber si es seguro y efectivo para usted.

## 2024-01-18 NOTE — PROGRESS NOTES
"Name: Mary Beth Lyons      : 1965      MRN: [id number]  Encounter Provider: Lea Reyes, MD  Encounter Date: 2024   [facility]    Assessment & Plan     1. Post-nasal drip  -     fluticasone (FLONASE) 50 mcg/act nasal spray; 2 sprays into each nostril daily    2. Mixed hyperlipidemia  -     Lipid panel; Future; Expected date: 2024  -     Comprehensive metabolic panel; Future; Expected date: 2024  -     CBC; Future; Expected date: 2024    3. Encounter for immunization  -     Respiratory Syncytial Virus (RSV) vaccine (recombinant) ([place]rysvo)    4. Fibromyalgia  -     Milnacipran HCl 50 MG TABS; Take 1 tablet by mouth 2 (two) times a day    5. Gastroesophageal reflux disease  -     omeprazole (PriLOSEC) 20 mg delayed release capsule; Take 1 capsule (20 mg total) by mouth daily    Continue current meds  Try Flonase       Subjective      A few weeks of post nasal drip with runny nose  There is so much phlegm she feels like she is \"drowning\"  She also has watery eyes  Denies known allergens  Anxiety much better with increase in buspirone  Chronic pain in the knees and lower back from fibromyalgia  She continues to exercise daily        Review of Systems   Respiratory:  Negative for shortness of breath.    Cardiovascular:  Negative for chest pain and palpitations.   Gastrointestinal:  Negative for abdominal pain, constipation and diarrhea.   Musculoskeletal:  Positive for arthralgias, back pain and myalgias.       Current Outpatient Medications on File Prior to Visit   Medication Sig   • alendronate (FOSAMAX) 70 mg tablet TAKE 1 TABLET BY MOUTH EVERY 7 DAYS WITH A FULL GLASS OF WATER. STAY UPRIGHT AND DO NOT LIE DOWN FOR AT LEAST 30 MINUTES AFTER   • busPIRone (BUSPAR) 15 mg tablet Take 1 tablet (15 mg total) by mouth 3 (three) times a day   • clotrimazole (LOTRIMIN) 1 % cream Apply topically 2 (two) times a day   • gabapentin (NEURONTIN) 300 mg " "capsule 3 caps in the morning, 2 caps in the afternoon and 3 caps at bedtime   • latanoprost (XALATAN) 0.005 % ophthalmic solution INSTILL 1 DROP IN RIGHT EYE EVERY NIGHT AT BEDTIME   • [DISCONTINUED] Milnacipran HCl 50 MG TABS Take 1 tablet by mouth 2 (two) times a day   • [DISCONTINUED] omeprazole (PriLOSEC) 20 mg delayed release capsule Take 1 capsule (20 mg total) by mouth daily       Objective     /72   Pulse 92   Resp 16   Ht 5' 4\" (1.626 m)   Wt 68.9 kg (151 lb 12.8 oz)   SpO2 98%   BMI 26.06 kg/m²     Physical Exam  Constitutional:       Appearance: She is well-developed. She is not ill-appearing.   HENT:      Right Ear: External ear normal. There is no impacted cerumen.      Left Ear: External ear normal. There is no impacted cerumen.      Mouth/Throat:      Pharynx: No posterior oropharyngeal erythema.   Eyes:      Conjunctiva/sclera: Conjunctivae normal.   Cardiovascular:      Rate and Rhythm: Normal rate and regular rhythm.      Heart sounds: Normal heart sounds. No murmur heard.  Pulmonary:      Effort: Pulmonary effort is normal. No respiratory distress.      Breath sounds: Normal breath sounds. No wheezing or rales.   Abdominal:      General: There is no distension.      Palpations: Abdomen is soft. There is no mass.      Tenderness: There is no abdominal tenderness. There is no guarding or rebound.   Musculoskeletal:      Cervical back: Neck supple.      Lumbar back: Negative right straight leg raise test and negative left straight leg raise test.      Right lower leg: No edema.      Left lower leg: No edema.   Neurological:      Mental Status: She is alert and oriented to person, place, and time.   Psychiatric:         Mood and Affect: Mood normal.         Behavior: Behavior normal.         Thought Content: Thought content normal.         Judgment: Judgment normal.       Lea Reyes, MD    "

## 2024-02-08 DIAGNOSIS — M79.7 FIBROMYALGIA: ICD-10-CM

## 2024-02-08 RX ORDER — GABAPENTIN 300 MG/1
CAPSULE ORAL
Qty: 726 CAPSULE | Refills: 0 | Status: SHIPPED | OUTPATIENT
Start: 2024-02-08

## 2024-03-06 DIAGNOSIS — R09.82 POST-NASAL DRIP: ICD-10-CM

## 2024-03-06 RX ORDER — FLUTICASONE PROPIONATE 50 MCG
2 SPRAY, SUSPENSION (ML) NASAL DAILY
Qty: 48 G | Refills: 1 | Status: SHIPPED | OUTPATIENT
Start: 2024-03-06

## 2024-04-12 DIAGNOSIS — M81.0 OSTEOPOROSIS WITHOUT CURRENT PATHOLOGICAL FRACTURE, UNSPECIFIED OSTEOPOROSIS TYPE: ICD-10-CM

## 2024-04-12 RX ORDER — ALENDRONATE SODIUM 70 MG/1
TABLET ORAL
Qty: 12 TABLET | Refills: 3 | Status: SHIPPED | OUTPATIENT
Start: 2024-04-12

## 2024-04-28 DIAGNOSIS — M79.7 FIBROMYALGIA: ICD-10-CM

## 2024-04-28 RX ORDER — GABAPENTIN 300 MG/1
CAPSULE ORAL
Qty: 726 CAPSULE | Refills: 0 | Status: SHIPPED | OUTPATIENT
Start: 2024-04-28

## 2024-06-27 DIAGNOSIS — R09.82 POST-NASAL DRIP: ICD-10-CM

## 2024-06-27 DIAGNOSIS — M79.7 FIBROMYALGIA: ICD-10-CM

## 2024-06-27 RX ORDER — GABAPENTIN 300 MG/1
CAPSULE ORAL
Qty: 720 CAPSULE | Refills: 1 | Status: SHIPPED | OUTPATIENT
Start: 2024-06-27

## 2024-06-27 RX ORDER — FLUTICASONE PROPIONATE 50 MCG
2 SPRAY, SUSPENSION (ML) NASAL DAILY
Qty: 48 G | Refills: 1 | Status: SHIPPED | OUTPATIENT
Start: 2024-06-27

## 2024-07-01 ENCOUNTER — APPOINTMENT (OUTPATIENT)
Dept: LAB | Facility: CLINIC | Age: 59
End: 2024-07-01
Payer: COMMERCIAL

## 2024-07-01 DIAGNOSIS — E78.2 MIXED HYPERLIPIDEMIA: ICD-10-CM

## 2024-07-01 LAB
ALBUMIN SERPL BCG-MCNC: 4.2 G/DL (ref 3.5–5)
ALP SERPL-CCNC: 38 U/L (ref 34–104)
ALT SERPL W P-5'-P-CCNC: 10 U/L (ref 7–52)
ANION GAP SERPL CALCULATED.3IONS-SCNC: 4 MMOL/L (ref 4–13)
AST SERPL W P-5'-P-CCNC: 17 U/L (ref 13–39)
BILIRUB SERPL-MCNC: 0.45 MG/DL (ref 0.2–1)
BUN SERPL-MCNC: 12 MG/DL (ref 5–25)
CALCIUM SERPL-MCNC: 8.9 MG/DL (ref 8.4–10.2)
CHLORIDE SERPL-SCNC: 104 MMOL/L (ref 96–108)
CHOLEST SERPL-MCNC: 251 MG/DL
CO2 SERPL-SCNC: 31 MMOL/L (ref 21–32)
CREAT SERPL-MCNC: 0.63 MG/DL (ref 0.6–1.3)
ERYTHROCYTE [DISTWIDTH] IN BLOOD BY AUTOMATED COUNT: 15.2 % (ref 11.6–15.1)
GFR SERPL CREATININE-BSD FRML MDRD: 97 ML/MIN/1.73SQ M
GLUCOSE P FAST SERPL-MCNC: 93 MG/DL (ref 65–99)
HCT VFR BLD AUTO: 37.1 % (ref 34.8–46.1)
HDLC SERPL-MCNC: 75 MG/DL
HGB BLD-MCNC: 11.4 G/DL (ref 11.5–15.4)
LDLC SERPL CALC-MCNC: 151 MG/DL (ref 0–100)
MCH RBC QN AUTO: 24.5 PG (ref 26.8–34.3)
MCHC RBC AUTO-ENTMCNC: 30.7 G/DL (ref 31.4–37.4)
MCV RBC AUTO: 80 FL (ref 82–98)
NONHDLC SERPL-MCNC: 176 MG/DL
PLATELET # BLD AUTO: 317 THOUSANDS/UL (ref 149–390)
PMV BLD AUTO: 9.9 FL (ref 8.9–12.7)
POTASSIUM SERPL-SCNC: 4.6 MMOL/L (ref 3.5–5.3)
PROT SERPL-MCNC: 6.8 G/DL (ref 6.4–8.4)
RBC # BLD AUTO: 4.66 MILLION/UL (ref 3.81–5.12)
SODIUM SERPL-SCNC: 139 MMOL/L (ref 135–147)
TRIGL SERPL-MCNC: 123 MG/DL
WBC # BLD AUTO: 6.41 THOUSAND/UL (ref 4.31–10.16)

## 2024-07-01 PROCEDURE — 85027 COMPLETE CBC AUTOMATED: CPT

## 2024-07-01 PROCEDURE — 80061 LIPID PANEL: CPT

## 2024-07-01 PROCEDURE — 36415 COLL VENOUS BLD VENIPUNCTURE: CPT

## 2024-07-01 PROCEDURE — 80053 COMPREHEN METABOLIC PANEL: CPT

## 2024-07-03 DIAGNOSIS — D64.9 ANEMIA, UNSPECIFIED TYPE: Primary | ICD-10-CM

## 2024-07-05 ENCOUNTER — TELEPHONE (OUTPATIENT)
Age: 59
End: 2024-07-05

## 2024-07-05 NOTE — TELEPHONE ENCOUNTER
Spoke with patient and her daughter regarding below test results.   She denies any vadim stools or blood in her stools.  She was advised to get labs done and we would reach out once resulted with additional recommendations.  She understood and had no additional questions or concerns.     Please call that her cholesterol remains high. She is anemic this time. Any dark stools, blood in the stool? If yes, provide # for [facility] for her to call to make an appointment. If not, I would like her to get additional labs to check her iron level. Order entered for the blood test.No need to fast

## 2024-07-08 ENCOUNTER — APPOINTMENT (OUTPATIENT)
Dept: LAB | Facility: CLINIC | Age: 59
End: 2024-07-08
Payer: COMMERCIAL

## 2024-07-08 DIAGNOSIS — D64.9 ANEMIA, UNSPECIFIED TYPE: ICD-10-CM

## 2024-07-08 LAB
FERRITIN SERPL-MCNC: 13 NG/ML (ref 11–307)
HCT VFR BLD AUTO: 39.4 % (ref 34.8–46.1)
HGB BLD-MCNC: 12.2 G/DL (ref 11.5–15.4)
IRON SATN MFR SERPL: 22 % (ref 15–50)
IRON SERPL-MCNC: 97 UG/DL (ref 50–212)
TIBC SERPL-MCNC: 439 UG/DL (ref 250–450)
UIBC SERPL-MCNC: 342 UG/DL (ref 155–355)

## 2024-07-08 PROCEDURE — 36415 COLL VENOUS BLD VENIPUNCTURE: CPT

## 2024-07-08 PROCEDURE — 82728 ASSAY OF FERRITIN: CPT

## 2024-07-08 PROCEDURE — 85014 HEMATOCRIT: CPT

## 2024-07-08 PROCEDURE — 83540 ASSAY OF IRON: CPT

## 2024-07-08 PROCEDURE — 85018 HEMOGLOBIN: CPT

## 2024-07-08 PROCEDURE — 83550 IRON BINDING TEST: CPT

## 2024-08-13 ENCOUNTER — ANNUAL EXAM (OUTPATIENT)
Dept: OBGYN CLINIC | Facility: CLINIC | Age: 59
End: 2024-08-13
Payer: COMMERCIAL

## 2024-08-13 VITALS
HEIGHT: 64 IN | BODY MASS INDEX: 26.12 KG/M2 | DIASTOLIC BLOOD PRESSURE: 66 MMHG | SYSTOLIC BLOOD PRESSURE: 110 MMHG | WEIGHT: 153 LBS

## 2024-08-13 DIAGNOSIS — Z12.31 ENCOUNTER FOR SCREENING MAMMOGRAM FOR MALIGNANT NEOPLASM OF BREAST: ICD-10-CM

## 2024-08-13 DIAGNOSIS — L90.0 LICHEN SCLEROSUS: ICD-10-CM

## 2024-08-13 DIAGNOSIS — Z01.419 WOMEN'S ANNUAL ROUTINE GYNECOLOGICAL EXAMINATION: Primary | ICD-10-CM

## 2024-08-13 PROCEDURE — 99396 PREV VISIT EST AGE 40-64: CPT | Performed by: OBSTETRICS & GYNECOLOGY

## 2024-08-13 RX ORDER — CLOBETASOL PROPIONATE 0.5 MG/G
OINTMENT TOPICAL DAILY
Qty: 30 G | Refills: 1 | Status: SHIPPED | OUTPATIENT
Start: 2024-08-13

## 2024-08-13 NOTE — PATIENT INSTRUCTIONS
Lichen sclerosis is an inflammatory skin condition that usually affects postmenopausal women.  It can affect any part of the body but in the woman it most often occurs near the clitoris, the inner and outer labia and the anal region.  The skin in the affected area becomes thin, white in, wrinkled and can cause itching and pain.    There is no clear cause, but is believe that there may be some autoimmune conditions that may be associated, such as thyroid disease, diabetes, alopecia, which is hair loss and vitiligo, which is loss of skin color.  It can be found in relatives and therefore may have a hereditary component.  It is not an infection so therefore it is not contagious.  Friction or damage to the skin can bring out lichen sclerosis and make it worse.    Some patients may experience no symptoms, well others may have a dull painful discomfort in the vulvar area.  The most common symptoms include vulvar itching, anal itching and painful intercourse.  Besides the skin being thin white and wrinkled sometimes there may be purple colored areas of bruising and cracks may form around the anus, labia and clitoris.  Rubbing from intercourse may cause bleeding.  If left untreated may change to the appearance of the genital area and cause alleviate the stick together and very the clitoris.  The opening of the vagina may become narrow with cracks, fissures and thickened scarred skin, making sexual intercourse examination is painful.  Lichen sclerosis does not affect her internal reproductive organs.  The fragile skin of lichen sclerosis may be more prone to infections such as yeast infections.    Diagnosis is usually made from the typical appearance of the skin condition.  Sometimes a small sample may be taken (biopsy) for definitive diagnosis.    No treatment is likely to reverse the changes of lichen sclerosis completely but the symptoms and signs of condition can be controlled with topical ointments to the affected area.   Steroid ointments are often recommended to reduce inflammation and itching.      Self-care can help with management of symptoms.      -Avoid washing with soap and instead use emollient soap substitute/cream.    -Carefully drive herself after passing urine to reduce contact of urine with skin.   -Using moisturizer and/or a barrier cream like Vaseline can help protect the skin from urine exposure.    -If sexual intercourse is painful because of tightening of the entrance to the vagina, the use of lubrication should help.    -Keep an eye on the skin for any significant changes and reports them to your gynecologist.    -Long life examinations are important. If the skin does not respond to steroid cream, or if the symptoms become worse, in particular skin thickening, soreness or ulceration lasting greater than 2 weeks, you need to tell your medical provider without delay.  A biopsy may become necessary at this point.    Please contact the office at [phone] with any questions.

## 2024-08-13 NOTE — PROGRESS NOTES
ASSESSMENT & PLAN:   Diagnoses and all orders for this visit:    Women's annual routine gynecological examination    Encounter for screening mammogram for malignant neoplasm of breast  -     Mammo screening bilateral w 3d & cad; Future    Lichen sclerosus  -     clobetasol (TEMOVATE) 0.05 % ointment; Apply topically in the morning X12 weeks          The following were reviewed in today's visit: ASCCP guidelines, Gardisil vaccination, STD testing breast self exam, mammography screening ordered, menopause, and exercise.    Patient to return to office in yearly for annual exam.     All questions have been answered to her satisfaction.        CC:  Annual Gynecologic Examination  Chief Complaint   Patient presents with    Gynecologic Exam     Pt is here for her yearly exam. Mammo scheduled. Pt has a blister on her left vaginal lip. Pt is trying vagisil and is still uncomfortable.  hx of hysterectomy  mammo: 2023-scheduled  DXA 2023  Colonoscopy 2016         HPI: Mary Beth Lyons is a 59 y.o.  who presents for annual gynecologic examination.  She has the following concerns: blister on labia.  Using aveeno soap.       Health Maintenance:    Exercise: frequently  Breast exams/breast awareness: yes  Last mammogram: 2023, scheduled for September  Colorectal cancer screenin    Past Medical History:   Diagnosis Date    Acid reflux     Acid reflux disease 2015    Anemia     last assessed 2016    Arthritis     rheumatoid    Benign paroxysmal positional vertigo 2022    Cervicalgia 2019    Depression     Depression 2018    Fibromyalgia     Headache 2018    Other long term (current) drug therapy 2018    Pain in right wrist 2018    Wrist weakness 2018       Past Surgical History:   Procedure Laterality Date    HYSTERECTOMY      age 46    MD COLONOSCOPY FLX DX W/COLLJ SPEC WHEN PFRMD N/A 2016    Procedure: COLONOSCOPY;  Surgeon: Gabbi Greer DO;   Location: [facility];  Service: Gastroenterology    LA OPTX DSTL RADL X-ARTIC FX/EPIPHYSL SEP Right 5/12/2016    Procedure: OPEN REDUCTION INTERNAL FIXATION RIGHT DISTAL RADIUS (EXTRA-ARTICULAR);  Surgeon: Randy Acuna MD;  Location: [facility];  Service: Orthopedics    TONSILLECTOMY         Past OB/Gyn History:   No LMP recorded. Patient has had a hysterectomy.    Menopausal status: postmenopausal    Patient is not currently sexually active.       Family History  Family History   Problem Relation Age of Onset    No Known Problems Mother     No Known Problems Father     No Known Problems Daughter     No Known Problems Son     Breast cancer Cousin        Family history of uterine or ovarian cancer: no  Family history of breast cancer:yes - cousin  Family history of colon cancer: no    Social History:  Social History     Socioeconomic History    Marital status: Single     Spouse name: Not on file    Number of children: Not on file    Years of education: Not on file    Highest education level: Not on file   Occupational History    Occupation: [employer]     Employer: [employer]   Tobacco Use    Smoking status: Never    Smokeless tobacco: Never   Vaping Use    Vaping status: Never Used   Substance and Sexual Activity    Alcohol use: No    Drug use: No    Sexual activity: Not Currently   Other Topics Concern    Not on file   Social History Narrative    Not on file     Social Determinants of Health     Financial Resource Strain: Not on file   Food Insecurity: Not on file   Transportation Needs: Not on file   Physical Activity: Not on file   Stress: Not on file   Social Connections: Not on file   Intimate Partner Violence: Not on file   Housing Stability: Not on file     Domestic violence screen: negative    Allergies:  Allergies   Allergen Reactions    Other      seasonal       Medications:    Current Outpatient Medications:     alendronate (FOSAMAX) 70 mg tablet, TAKE 1 TABLET BY MOUTH EVERY 7 DAYS WITH A GLASS OF  "WATER, STAY UPRIGHT AND DO NOT LIE DOWN FOR AT LEAST 30 MINUTES AFTER, Disp: 12 tablet, Rfl: 3    busPIRone (BUSPAR) 15 mg tablet, Take 1 tablet (15 mg total) by mouth 3 (three) times a day, Disp: 270 tablet, Rfl: 3    clobetasol (TEMOVATE) 0.05 % ointment, Apply topically in the morning X12 weeks, Disp: 30 g, Rfl: 1    clotrimazole (LOTRIMIN) 1 % cream, Apply topically 2 (two) times a day, Disp: 14 g, Rfl: 0    fluticasone (FLONASE) 50 mcg/act nasal spray, SHAKE LIQUID AND USE 2 SPRAYS IN EACH NOSTRIL DAILY, Disp: 48 g, Rfl: 1    gabapentin (NEURONTIN) 300 mg capsule, TAKE 3 CAPSULES BY MOUTH EVERY MORNING, 2 CAPSULES IN THE AFTERNOON, AND 3 CAPSULES AT BEDTIME, Disp: 720 capsule, Rfl: 1    latanoprost (XALATAN) 0.005 % ophthalmic solution, INSTILL 1 DROP IN RIGHT EYE EVERY NIGHT AT BEDTIME, Disp: , Rfl:     Milnacipran HCl 50 MG TABS, Take 1 tablet by mouth 2 (two) times a day, Disp: 180 tablet, Rfl: 3    omeprazole (PriLOSEC) 20 mg delayed release capsule, Take 1 capsule (20 mg total) by mouth daily, Disp: 90 capsule, Rfl: 3    Review of Systems:  Review of Systems   Constitutional:  Negative for chills and fever.   Respiratory:  Negative for shortness of breath.    Cardiovascular:  Negative for chest pain.   Gastrointestinal:  Negative for blood in stool, constipation, nausea and vomiting.   Genitourinary:  Negative for difficulty urinating, dysuria, frequency, pelvic pain, urgency, vaginal bleeding, vaginal discharge and vaginal pain.         Physical Exam:  /66 (BP Location: Right arm, Patient Position: Sitting, Cuff Size: Standard)   Ht 5' 4\" (1.626 m)   Wt 69.4 kg (153 lb)   BMI 26.26 kg/m²    Physical Exam  Constitutional:       General: She is awake.      Appearance: Normal appearance. She is well-developed.   Genitourinary:      Vulva and bladder normal.      Right Labia: No rash, tenderness or lesions.     Left Labia: lesions.      Left Labia: No tenderness or rash.           Vaginal cuff " intact.     No vaginal discharge, erythema, tenderness or bleeding.      Mild vaginal atrophy present.       Right Adnexa: not tender, not full and no mass present.     Left Adnexa: not tender, not full and no mass present.     Cervix is absent.      Uterus is absent.      No urethral prolapse present.      Bladder is not tender.       Pelvic exam was performed with patient in the lithotomy position.   Breasts:     Right: Normal. No inverted nipple, mass, nipple discharge, skin change or tenderness.      Left: Normal. No inverted nipple, mass, nipple discharge, skin change or tenderness.   HENT:      Head: Normocephalic and atraumatic.   Cardiovascular:      Rate and Rhythm: Normal rate and regular rhythm.      Heart sounds: Normal heart sounds.   Pulmonary:      Effort: Pulmonary effort is normal. No tachypnea or respiratory distress.      Breath sounds: Normal breath sounds.   Abdominal:      General: There is no distension.      Palpations: Abdomen is soft.      Tenderness: There is no abdominal tenderness. There is no guarding.   Musculoskeletal:      Cervical back: Neck supple.   Lymphadenopathy:      Upper Body:      Right upper body: No supraclavicular or axillary adenopathy.      Left upper body: No supraclavicular or axillary adenopathy.   Neurological:      General: No focal deficit present.      Mental Status: She is alert and oriented to person, place, and time.   Psychiatric:         Mood and Affect: Mood normal.         Behavior: Behavior normal.         Thought Content: Thought content normal.         Judgment: Judgment normal.   Vitals reviewed.

## 2024-08-14 ENCOUNTER — OFFICE VISIT (OUTPATIENT)
Dept: INTERNAL MEDICINE CLINIC | Facility: CLINIC | Age: 59
End: 2024-08-14
Payer: COMMERCIAL

## 2024-08-14 VITALS
OXYGEN SATURATION: 99 % | BODY MASS INDEX: 26.09 KG/M2 | HEIGHT: 64 IN | WEIGHT: 152.8 LBS | SYSTOLIC BLOOD PRESSURE: 104 MMHG | DIASTOLIC BLOOD PRESSURE: 66 MMHG | HEART RATE: 91 BPM

## 2024-08-14 DIAGNOSIS — K21.9 GASTROESOPHAGEAL REFLUX DISEASE: ICD-10-CM

## 2024-08-14 DIAGNOSIS — F41.9 ANXIETY: ICD-10-CM

## 2024-08-14 DIAGNOSIS — M81.0 OSTEOPOROSIS WITHOUT CURRENT PATHOLOGICAL FRACTURE, UNSPECIFIED OSTEOPOROSIS TYPE: ICD-10-CM

## 2024-08-14 DIAGNOSIS — G89.29 CHRONIC PAIN OF BOTH KNEES: ICD-10-CM

## 2024-08-14 DIAGNOSIS — M25.562 CHRONIC PAIN OF BOTH KNEES: ICD-10-CM

## 2024-08-14 DIAGNOSIS — Z00.00 ANNUAL PHYSICAL EXAM: Primary | ICD-10-CM

## 2024-08-14 DIAGNOSIS — F51.04 PSYCHOPHYSIOLOGICAL INSOMNIA: ICD-10-CM

## 2024-08-14 DIAGNOSIS — M25.561 CHRONIC PAIN OF BOTH KNEES: ICD-10-CM

## 2024-08-14 DIAGNOSIS — E78.2 MIXED HYPERLIPIDEMIA: ICD-10-CM

## 2024-08-14 DIAGNOSIS — E55.9 VITAMIN D DEFICIENCY: ICD-10-CM

## 2024-08-14 PROCEDURE — 99396 PREV VISIT EST AGE 40-64: CPT | Performed by: INTERNAL MEDICINE

## 2024-08-14 RX ORDER — BUSPIRONE HYDROCHLORIDE 15 MG/1
15 TABLET ORAL 3 TIMES DAILY
Qty: 270 TABLET | Refills: 3 | Status: SHIPPED | OUTPATIENT
Start: 2024-08-14

## 2024-08-14 NOTE — PATIENT INSTRUCTIONS
"Patient Education     Routine physical for adults   The Basics   Written by the doctors and editors at [facility]   What is a physical? -- A physical is a routine visit, or \"check-up,\" with your doctor. You might also hear it called a \"wellness visit\" or \"preventive visit.\"  During each visit, the doctor will:   Ask about your physical and mental health   Ask about your habits, behaviors, and lifestyle   Do an exam   Give you vaccines if needed   Talk to you about any medicines you take   Give advice about your health   Answer your questions  Getting regular check-ups is an important part of taking care of your health. It can help your doctor find and treat any problems you have. But it's also important for preventing health problems.  A routine physical is different from a \"sick visit.\" A sick visit is when you see a doctor because of a health concern or problem. Since physicals are scheduled ahead of time, you can think about what you want to ask the doctor.  How often should I get a physical? -- It depends on your age and health. In general, for people age 21 years and older:   If you are younger than 50 years, you might be able to get a physical every 3 years.   If you are 50 years or older, your doctor might recommend a physical every year.  If you have an ongoing health condition, like diabetes or high blood pressure, your doctor will probably want to see you more often.  What happens during a physical? -- In general, each visit will include:   Physical exam - The doctor or nurse will check your height, weight, heart rate, and blood pressure. They will also look at your eyes and ears. They will ask about how you are feeling and whether you have any symptoms that bother you.   Medicines - It's a good idea to bring a list of all the medicines you take to each doctor visit. Your doctor will talk to you about your medicines and answer any questions. Tell them if you are having any side effects that bother you. You " "should also tell them if you are having trouble paying for any of your medicines.   Habits and behaviors - This includes:   Your diet   Your exercise habits   Whether you smoke, drink alcohol, or use drugs   Whether you are sexually active   Whether you feel safe at home  Your doctor will talk to you about things you can do to improve your health and lower your risk of health problems. They will also offer help and support. For example, if you want to quit smoking, they can give you advice and might prescribe medicines. If you want to improve your diet or get more physical activity, they can help you with this, too.   Lab tests, if needed - The tests you get will depend on your age and situation. For example, your doctor might want to check your:   Cholesterol   Blood sugar   Iron level   Vaccines - The recommended vaccines will depend on your age, health, and what vaccines you already had. Vaccines are very important because they can prevent certain serious or deadly infections.   Discussion of screening - \"Screening\" means checking for diseases or other health problems before they cause symptoms. Your doctor can recommend screening based on your age, risk, and preferences. This might include tests to check for:   Cancer, such as breast, prostate, cervical, ovarian, colorectal, prostate, lung, or skin cancer   Sexually transmitted infections, such as chlamydia and gonorrhea   Mental health conditions like depression and anxiety  Your doctor will talk to you about the different types of screening tests. They can help you decide which screenings to have. They can also explain what the results might mean.   Answering questions - The physical is a good time to ask the doctor or nurse questions about your health. If needed, they can refer you to other doctors or specialists, too.  Adults older than 65 years often need other care, too. As you get older, your doctor will talk to you about:   How to prevent falling at " home   Hearing or vision tests   Memory testing   How to take your medicines safely   Making sure that you have the help and support you need at home  All topics are updated as new evidence becomes available and our peer review process is complete.  This topic retrieved from [employer] on: May 02, 2024.  Topic 447547 Version 1.0  Release: 32.4.3 - C32.122  © 2024 UpToDate, Inc. and/or its affiliates. All rights reserved.  Consumer Information Use and Disclaimer   Disclaimer: This generalized information is a limited summary of diagnosis, treatment, and/or medication information. It is not meant to be comprehensive and should be used as a tool to help the user understand and/or assess potential diagnostic and treatment options. It does NOT include all information about conditions, treatments, medications, side effects, or risks that may apply to a specific patient. It is not intended to be medical advice or a substitute for the medical advice, diagnosis, or treatment of a health care provider based on the health care provider's examination and assessment of a patient's specific and unique circumstances. Patients must speak with a health care provider for complete information about their health, medical questions, and treatment options, including any risks or benefits regarding use of medications. This information does not endorse any treatments or medications as safe, effective, or approved for treating a specific patient. UpToDate, Inc. and its affiliates disclaim any warranty or liability relating to this information or the use thereof.The use of this information is governed by the Terms of Use, available at https://www.woltersPopdustuwer.com/en/know/clinical-effectiveness-terms. 2024© UpToDate, Inc. and its affiliates and/or licensors. All rights reserved.  Copyright   © 2024 UpToDate, Inc. and/or its affiliates. All rights reserved.

## 2024-08-14 NOTE — PROGRESS NOTES
Adult Annual Physical  Name: Mary Beth Lyons      : 1965      MRN: [id number]  Encounter Provider: Lea Reyes, MD  Encounter Date: 2024   [facility]    Assessment & Plan   1. Annual physical exam  2. Psychophysiological insomnia  -     Magnesium 400 MG CAPS; Take 1 capsule (400 mg total) by mouth in the morning  3. Gastroesophageal reflux disease  Assessment & Plan:  She would like to go back to BID omeprazole since once daily is not enough  New Rx sent  Continue ulcer free diet, avoid NSAIDs  Orders:  -     omeprazole (PriLOSEC) 20 mg delayed release capsule; Take 1 capsule (20 mg total) by mouth 2 (two) times a day  4. Osteoporosis without current pathological fracture, unspecified osteoporosis type  -     DXA bone density spine hip and pelvis; Future; Expected date: 2025  -     CBC and differential; Future; Expected date: 2025  5. Vitamin D deficiency  -     Vitamin D 25 hydroxy; Future; Expected date: 2025  6. Mixed hyperlipidemia  -     Comprehensive metabolic panel; Future; Expected date: 2025  -     Lipid panel; Future; Expected date: 2025  7. Chronic pain of both knees  -     XR knee 3 vw right non injury; Future; Expected date: 2024  -     XR knee 3 vw left non injury; Future; Expected date: 2024  8. Anxiety  -     busPIRone (BUSPAR) 15 mg tablet; Take 1 tablet (15 mg total) by mouth 3 (three) times a day    Immunizations and preventive care screenings were discussed with patient today. Appropriate education was printed on patient's after visit summary.    Counseling:  Ulcer free diet, compression socks, trial of magnesium supplement         History of Present Illness     Adult Annual Physical:  Patient presents for annual physical.     Diet and Physical Activity:  - Diet/Nutrition: well balanced diet.  - Exercise: 5-7 times a week on average and walking.    Depression Screening:  - PHQ-2 Score: 0    General  "Health:  - Sleep: sleeps poorly.  - Hearing: normal hearing bilateral ears.  - Vision: wears glasses and goes for regular eye exams.  - Dental: regular dental visits.    /GYN Health:  - Follows with GYN: yes.   - Menopause: postmenopausal.     Review of Systems   Musculoskeletal:  Positive for arthralgias (knees, hips) and back pain.   Neurological:  Positive for tremors (episodes of face shaking, eyes twitching right>left for a month; no hand tremors).   Psychiatric/Behavioral:  Positive for sleep disturbance (trouble falling and staying asleep , no relief from melatonin).          Objective     /66 (BP Location: Left arm, Patient Position: Sitting, Cuff Size: Standard)   Pulse 91   Ht 5' 4\" (1.626 m)   Wt 69.3 kg (152 lb 12.8 oz)   SpO2 99%   BMI 26.23 kg/m²     Physical Exam  Constitutional:       General: She is not in acute distress.     Appearance: She is well-developed. She is not ill-appearing, toxic-appearing or diaphoretic.   HENT:      Right Ear: External ear normal. There is no impacted cerumen.      Left Ear: External ear normal. There is no impacted cerumen.   Eyes:      Conjunctiva/sclera: Conjunctivae normal.   Cardiovascular:      Rate and Rhythm: Normal rate and regular rhythm.      Heart sounds: Normal heart sounds. No murmur heard.  Pulmonary:      Effort: Pulmonary effort is normal. No respiratory distress.      Breath sounds: Normal breath sounds. No stridor. No wheezing or rales.   Abdominal:      General: There is no distension.      Palpations: Abdomen is soft. There is no mass.      Tenderness: There is no abdominal tenderness. There is no guarding or rebound.   Musculoskeletal:      Cervical back: Neck supple.      Right knee: No swelling or crepitus. Normal range of motion. Tenderness present over the medial joint line.      Left knee: No swelling or crepitus. Normal range of motion. Tenderness present over the medial joint line.      Right lower leg: No edema.      Left " lower leg: No edema.   Neurological:      Mental Status: She is alert and oriented to person, place, and time.   Psychiatric:         Mood and Affect: Mood normal.         Behavior: Behavior normal.         Thought Content: Thought content normal.         Judgment: Judgment normal.

## 2024-08-14 NOTE — ASSESSMENT & PLAN NOTE
She would like to go back to BID omeprazole since once daily is not enough  New Rx sent  Continue ulcer free diet, avoid NSAIDs

## 2024-08-22 ENCOUNTER — HOSPITAL ENCOUNTER (OUTPATIENT)
Dept: RADIOLOGY | Facility: HOSPITAL | Age: 59
Discharge: HOME | End: 2024-08-22
Payer: COMMERCIAL

## 2024-08-22 DIAGNOSIS — M25.562 CHRONIC PAIN OF BOTH KNEES: ICD-10-CM

## 2024-08-22 DIAGNOSIS — M25.561 CHRONIC PAIN OF BOTH KNEES: ICD-10-CM

## 2024-08-22 DIAGNOSIS — G89.29 CHRONIC PAIN OF BOTH KNEES: ICD-10-CM

## 2024-08-22 PROCEDURE — 73562 X-RAY EXAM OF KNEE 3: CPT

## 2024-08-25 DIAGNOSIS — M25.562 CHRONIC PAIN OF BOTH KNEES: Primary | ICD-10-CM

## 2024-08-25 DIAGNOSIS — M25.561 CHRONIC PAIN OF BOTH KNEES: Primary | ICD-10-CM

## 2024-08-25 DIAGNOSIS — G89.29 CHRONIC PAIN OF BOTH KNEES: Primary | ICD-10-CM

## 2024-08-25 DIAGNOSIS — M17.0 BILATERAL PRIMARY OSTEOARTHRITIS OF KNEE: ICD-10-CM

## 2024-09-04 ENCOUNTER — HOSPITAL ENCOUNTER (OUTPATIENT)
Dept: MAMMOGRAPHY | Facility: HOSPITAL | Age: 59
Discharge: HOME | End: 2024-09-04
Attending: OBSTETRICS & GYNECOLOGY
Payer: COMMERCIAL

## 2024-09-04 VITALS — HEIGHT: 64 IN | WEIGHT: 152 LBS | BODY MASS INDEX: 25.95 KG/M2

## 2024-09-04 DIAGNOSIS — Z12.31 SCREENING MAMMOGRAM, ENCOUNTER FOR: ICD-10-CM

## 2024-09-04 PROCEDURE — 77063 BREAST TOMOSYNTHESIS BI: CPT

## 2024-09-04 PROCEDURE — 77067 SCR MAMMO BI INCL CAD: CPT

## 2024-09-12 ENCOUNTER — OFFICE VISIT (OUTPATIENT)
Dept: OBGYN CLINIC | Facility: OTHER | Age: 59
End: 2024-09-12
Payer: COMMERCIAL

## 2024-09-12 VITALS — BODY MASS INDEX: 25.95 KG/M2 | HEIGHT: 64 IN | WEIGHT: 152 LBS

## 2024-09-12 DIAGNOSIS — G89.29 CHRONIC PAIN OF BOTH KNEES: ICD-10-CM

## 2024-09-12 DIAGNOSIS — M17.0 BILATERAL PRIMARY OSTEOARTHRITIS OF KNEE: Primary | ICD-10-CM

## 2024-09-12 DIAGNOSIS — M25.561 CHRONIC PAIN OF BOTH KNEES: ICD-10-CM

## 2024-09-12 DIAGNOSIS — M25.562 CHRONIC PAIN OF BOTH KNEES: ICD-10-CM

## 2024-09-12 PROCEDURE — 20610 DRAIN/INJ JOINT/BURSA W/O US: CPT | Performed by: FAMILY MEDICINE

## 2024-09-12 PROCEDURE — 99204 OFFICE O/P NEW MOD 45 MIN: CPT | Performed by: FAMILY MEDICINE

## 2024-09-12 RX ORDER — LIDOCAINE HYDROCHLORIDE 10 MG/ML
4 INJECTION, SOLUTION INFILTRATION; PERINEURAL
Status: COMPLETED | OUTPATIENT
Start: 2024-09-12 | End: 2024-09-12

## 2024-09-12 RX ORDER — TRIAMCINOLONE ACETONIDE 40 MG/ML
40 INJECTION, SUSPENSION INTRA-ARTICULAR; INTRAMUSCULAR
Status: COMPLETED | OUTPATIENT
Start: 2024-09-12 | End: 2024-09-12

## 2024-09-12 RX ADMIN — LIDOCAINE HYDROCHLORIDE 4 ML: 10 INJECTION, SOLUTION INFILTRATION; PERINEURAL at 13:30

## 2024-09-12 RX ADMIN — TRIAMCINOLONE ACETONIDE 40 MG: 40 INJECTION, SUSPENSION INTRA-ARTICULAR; INTRAMUSCULAR at 13:30

## 2024-09-12 NOTE — PATIENT INSTRUCTIONS
"Treatment for knee osteoarthritis depends on severity of cartilage wear and pain levels. First line for mild arthritis is exercise to strengthen the knee and allow better joint movement, 5-10% weight loss if overweight, and topical anti-inflammatories such as diclofenac gel or topical analgesic pain relief creams such as capsaicin. Symptoms at this stage usually improve in 3 months.    Moderate to severe arthritis or arthritis not responding to first line treatments may require oral medicine such as anti-inflammatories (NSAIDs) such as ibuprofen/motrin, and if failing treatment with oral NSAIDs, then may consider depression medicines such as duloxetine (cymbalta) which also have been shown to work on pain receptors and help to control pain. Other analgesics such as tylenol (acetaminophen) may be used but do not appear to offer significant pain relief.     Supplements such as glucosamine and chondroitin supplements. Some studies do show benefit from taking glucosamine sulfate (1500 mg/day) and chondroitin (800 mg/day) but evidence is controversial. I recommend against a type of glucosamine known as \"glucosamine hydrochloride\" which appears not as effective as glucosamine sulfate.    If no improvement with oral medicines, then patients may consider steroid injection into the knee joint which provides pain relief. Steroid injections can be given every 3 months. Any sooner than that can  result in possible deterioration or cartilage in your joint.     Other injections are available such as as viscosupplementation or gel shots into the knee which provide nutrients for the cartilage and can result in pain reduction.  These viscosupplementation injections are generally considered every 6 months but are controversial.  The American Orthopedic Society recommends against viscosupplementation injection; however, the [employer] recommends for these injections.     Beyond these injections there " are other controversial treatments including stem cell as well as platelet rich plasma injection which are out of pocket usually $175 for PRP here at [facility] and up to a $1,000 dollars or more for stem cells per injection. Currently, we do not offer stem cell injections for knee arthritis at [facility].     Lastly, if you fail conservative measures and have persistent pain, then we may consider referral to surgeon for knee replacement.  (JEAN CLAUDE Friedman 2018).

## 2024-09-12 NOTE — PROGRESS NOTES
"1. Bilateral primary osteoarthritis of knee  Ambulatory Referral to Orthopedic Surgery    Large joint arthrocentesis: bilateral knee      2. Chronic pain of both knees  Ambulatory Referral to Orthopedic Surgery        Orders Placed This Encounter   Procedures    Large joint arthrocentesis: bilateral knee        IMAGING STUDIES: (I personally reviewed images in PACS and report):  xray bilateral knees 8/22/24:  Mild bilateral medial knee oa        PAST REPORTS:        ASSESSMENT/PLAN:  Chronic Mild bilateral knee osteoarthritis  PMH osteoporosis, fibromyalgia, rheumatoid arthritis    Repeat X-ray next visit: None    Return if symptoms worsen or fail to improve.    Patient instructions below verbally summarized in person during encounter:  Patient Instructions   Treatment for knee osteoarthritis depends on severity of cartilage wear and pain levels. First line for mild arthritis is exercise to strengthen the knee and allow better joint movement, 5-10% weight loss if overweight, and topical anti-inflammatories such as diclofenac gel or topical analgesic pain relief creams such as capsaicin. Symptoms at this stage usually improve in 3 months.    Moderate to severe arthritis or arthritis not responding to first line treatments may require oral medicine such as anti-inflammatories (NSAIDs) such as ibuprofen/motrin, and if failing treatment with oral NSAIDs, then may consider depression medicines such as duloxetine (cymbalta) which also have been shown to work on pain receptors and help to control pain. Other analgesics such as tylenol (acetaminophen) may be used but do not appear to offer significant pain relief.     Supplements such as glucosamine and chondroitin supplements. Some studies do show benefit from taking glucosamine sulfate (1500 mg/day) and chondroitin (800 mg/day) but evidence is controversial. I recommend against a type of glucosamine known as \"glucosamine hydrochloride\" which appears not as effective as " glucosamine sulfate.    If no improvement with oral medicines, then patients may consider steroid injection into the knee joint which provides pain relief. Steroid injections can be given every 3 months. Any sooner than that can  result in possible deterioration or cartilage in your joint.     Other injections are available such as as viscosupplementation or gel shots into the knee which provide nutrients for the cartilage and can result in pain reduction.  These viscosupplementation injections are generally considered every 6 months but are controversial.  The American Orth[employer] Society recommends against viscosupplementation injection; however, the [employer] recommends for these injections.     Beyond these injections there are other controversial treatments including stem cell as well as platelet rich plasma injection which are out of pocket usually $175 for PRP here at [facility] and up to a $1,000 dollars or more for stem cells per injection. Currently, we do not offer stem cell injections for knee arthritis at [facility].     Lastly, if you fail conservative measures and have persistent pain, then we may consider referral to surgeon for knee replacement.  (JEAN CLAUDE Friedman 2018).        __________________________________________________________________________    HISTORY OF PRESENT ILLNESS:  PMH osteoporosis, fibromyalgia, rheumatoid arthritis    Atraumatic 4 months Chronic bilateral knee pain. Referral for osteoarthritis.   Performs sarah chi. Pain worse at end of day night. Medial joint line bilateral.       Patient presents with knee pain. History, examination, and x-rays are consistent with diagnosis of Osteoarthritis.     Nonpharmacologic treatment: home exercise program  Pain Score:   10/10 bilateral intermittent  Pain from condition does interfere with activities of daily living  Previous Visco relief: never done  Previous CSI relief: never done          Review of  "Systems      Following history reviewed and update:    Past Medical History:   Diagnosis Date    Acid reflux     Acid reflux disease 08/11/2015    Anemia     last assessed 03/23/2016    Arthritis     rheumatoid    Benign paroxysmal positional vertigo 03/18/2022    Cervicalgia 11/21/2019    Depression     Depression 01/24/2018    Fibromyalgia     Headache 01/24/2018    Other long term (current) drug therapy 01/24/2018    Pain in right wrist 08/28/2018    Wrist weakness 08/28/2018     Past Surgical History:   Procedure Laterality Date    HYSTERECTOMY      age 46    CA COLONOSCOPY FLX DX W/COLLJ SPEC WHEN PFRMD N/A 5/5/2016    Procedure: COLONOSCOPY;  Surgeon: Gabbi Greer DO;  Location: [facility];  Service: Gastroenterology    CA OPTX DSTL RADL X-ARTIC FX/EPIPHYSL SEP Right 5/12/2016    Procedure: OPEN REDUCTION INTERNAL FIXATION RIGHT DISTAL RADIUS (EXTRA-ARTICULAR);  Surgeon: Randy Acuna MD;  [facility];  Service: Orthopedics    TONSILLECTOMY       Social History   Social History     Substance and Sexual Activity   Alcohol Use No     Social History     Substance and Sexual Activity   Drug Use No     Social History     Tobacco Use   Smoking Status Never   Smokeless Tobacco Never     Family History   Problem Relation Age of Onset    No Known Problems Mother     No Known Problems Father     No Known Problems Sister     No Known Problems Sister     No Known Problems Sister     No Known Problems Daughter     No Known Problems Maternal Grandmother     No Known Problems Maternal Grandfather     No Known Problems Paternal Grandmother     No Known Problems Paternal Grandfather     No Known Problems Son     No Known Problems Maternal Aunt     No Known Problems Maternal Aunt     No Known Problems Maternal Aunt     No Known Problems Maternal Aunt     No Known Problems Maternal Aunt     Breast cancer Cousin      Allergies   Allergen Reactions    Other      seasonal          Physical Exam  Ht 5' 4\" (1.626 m)   Wt " "68.9 kg (152 lb)   BMI 26.09 kg/m²         Ortho Exam  LEFT KNEE:  Erythema: no  Swelling: no  Increased Warmth: no  Tenderness: +mjl  Flexion: intact  Extension: intact  Patellar Displacement:  Patellar Tilt:  Patellar Apprehension: negative  Patellar Grind Feliciano's: +mild  Lachman's: negative  Drawer: negative  Varus laxity: negative  Valgus laxity: negative  [facility]: negative       RIGHT KNEE:  Erythema: no  Swelling: no  Increased Warmth: no  Tenderness: +mjl  Flexion: intact  Extension: intact  Patellar Displacement:  Patellar Tilt:  Patellar Apprehension: negative  Patellar Grind Feliciano's:+mild  Lachman's: negative  Drawer: negative  Varus laxity: negative  Valgus laxity: negative  [facility]: negative       __________________________________________________________________________  Large joint arthrocentesis: bilateral knee  Universal Protocol:  Procedure performed by: (Joe Ashraf DO)  Consent: Verbal consent obtained.  Risks and benefits: risks, benefits and alternatives were discussed  Consent given by: patient  Time out: Immediately prior to procedure a \"time out\" was called to verify the correct patient, procedure, equipment, support staff and site/side marked as required.  Patient understanding: patient states understanding of the procedure being performed  Site marked: the operative site was marked  Required items: required blood products, implants, devices, and special equipment available  Patient identity confirmed: verbally with patient  Supporting Documentation  Indications: pain and diagnostic evaluation   Procedure Details  Location: knee - bilateral knee  Preparation: Patient was prepped and draped in the usual sterile fashion  Needle size: 22 G  Ultrasound guidance: no  Approach: anterolateral    Medications (Right): 4 mL lidocaine 1 %; 40 mg triamcinolone acetonide 40 mg/mLMedications (Left): 4 mL lidocaine 1 %; 40 mg triamcinolone acetonide 40 mg/mL   Patient tolerance: patient tolerated " the procedure well with no immediate complications  Dressing:  Sterile dressing applied

## 2024-12-26 DIAGNOSIS — M79.7 FIBROMYALGIA: ICD-10-CM

## 2024-12-26 RX ORDER — GABAPENTIN 300 MG/1
CAPSULE ORAL
Qty: 720 CAPSULE | Refills: 1 | Status: SHIPPED | OUTPATIENT
Start: 2024-12-26

## 2024-12-26 NOTE — TELEPHONE ENCOUNTER
Reason for call:   [x] Refill   [] Prior Auth  [] Other:     Office:   [x] PCP/Provider - [facility]    Medication: gabapentin    Dose/Frequency: 300mg (3 caps qam + 2 caps in afternoon + 3 caps qhs)    Quantity: 720    Pharmacy: [id number] - [place], [place] - [address]     Does the patient have enough for 3 days?   [] Yes   [x] No - Send as HP to POD

## 2025-01-02 ENCOUNTER — OFFICE VISIT (OUTPATIENT)
Dept: OBGYN CLINIC | Facility: OTHER | Age: 60
End: 2025-01-02
Payer: COMMERCIAL

## 2025-01-02 ENCOUNTER — APPOINTMENT (OUTPATIENT)
Dept: RADIOLOGY | Facility: OTHER | Age: 60
End: 2025-01-02
Payer: COMMERCIAL

## 2025-01-02 DIAGNOSIS — R29.898 WEAKNESS OF BOTH LOWER EXTREMITIES: ICD-10-CM

## 2025-01-02 DIAGNOSIS — M79.605 PAIN IN BOTH LOWER EXTREMITIES: ICD-10-CM

## 2025-01-02 DIAGNOSIS — M79.604 PAIN IN BOTH LOWER EXTREMITIES: ICD-10-CM

## 2025-01-02 DIAGNOSIS — M25.511 ACUTE PAIN OF RIGHT SHOULDER: ICD-10-CM

## 2025-01-02 DIAGNOSIS — I00 RHEUMATIC ARTERITIS: ICD-10-CM

## 2025-01-02 DIAGNOSIS — M79.604 BILATERAL LEG PAIN: ICD-10-CM

## 2025-01-02 DIAGNOSIS — M54.50 LUMBAR PAIN: Primary | ICD-10-CM

## 2025-01-02 DIAGNOSIS — M54.50 LUMBAR PAIN: ICD-10-CM

## 2025-01-02 DIAGNOSIS — M79.605 BILATERAL LEG PAIN: ICD-10-CM

## 2025-01-02 PROCEDURE — 72100 X-RAY EXAM L-S SPINE 2/3 VWS: CPT

## 2025-01-02 PROCEDURE — 99214 OFFICE O/P EST MOD 30 MIN: CPT | Performed by: FAMILY MEDICINE

## 2025-01-02 PROCEDURE — 73502 X-RAY EXAM HIP UNI 2-3 VIEWS: CPT

## 2025-01-02 PROCEDURE — 73030 X-RAY EXAM OF SHOULDER: CPT

## 2025-01-02 RX ORDER — METHYLPREDNISOLONE 4 MG/1
TABLET ORAL
Qty: 21 EACH | Refills: 0 | Status: SHIPPED | OUTPATIENT
Start: 2025-01-02

## 2025-01-02 NOTE — PROGRESS NOTES
1. Lumbar pain  methylPREDNISolone 4 MG tablet therapy pack    Sedimentation rate, automated    Ambulatory Referral to Physical Therapy    XR hip/pelv 2-3 vws left if performed    Ambulatory Referral to Rheumatology      2. Acute pain of right shoulder  XR shoulder 2+ vw right      3. Pain in both lower extremities  XR spine lumbar 2 or 3 views injury    methylPREDNISolone 4 MG tablet therapy pack    Sedimentation rate, automated    Ambulatory Referral to Physical Therapy    XR hip/pelv 2-3 vws left if performed    Ambulatory Referral to Rheumatology      4. Bilateral leg pain  XR spine lumbar 2 or 3 views injury    methylPREDNISolone 4 MG tablet therapy pack    Sedimentation rate, automated    Ambulatory Referral to Physical Therapy    XR hip/pelv 2-3 vws left if performed    Ambulatory Referral to Rheumatology      5. Rheumatic arteritis  Cyclic citrul peptide antibody, IgG          Orders Placed This Encounter   Procedures    XR shoulder 2+ vw right    XR spine lumbar 2 or 3 views injury    XR hip/pelv 2-3 vws left if performed    Sedimentation rate, automated    Cyclic citrul peptide antibody, IgG    Ambulatory Referral to Physical Therapy    Ambulatory Referral to Rheumatology        IMAGING STUDIES: (I personally reviewed images in PACS and report):  Xray Lumbar spine 01/02/2025: No acute abnormal findings.   Xray left hip ap pelvis 1/2/25: KIKA cam pincer R>L. Mild to mod OA    PAST REPORTS:  xray bilateral knees 8/22/24:  Mild bilateral medial knee oa      ASSESSMENT/PLAN:  Bilateral leg pain and hyperalgesia  History negative for claudication symptoms  Exam negative for proximal muscle weakness  Full strength bilateral lower extremity on exam  PMH: Fibromyalgia, rheumatoid arthritis    Differential diagnosis:  Polymyalgia rheumatica  Lumbar radiculopathy  Rheumatoid flare  Fibromyalgia      Repeat X-ray next visit: None    Return in about 6 weeks (around 2/13/2025).    Patient instructions below verbally  summarized in person during encounter:  Patient Instructions   Patient advised that her symptoms may be due to her back in origin and we advised patient to get labwork to rule out PMR and rheumathoid arthritis. After her blood work she may take her medrol dose pack and begin physical therapy. If patient does not improve upon follow up, our next step would be to get an MRI. Patient may follow up in 6 weeks.      __________________________________________________________________________    HISTORY OF PRESENT ILLNESS:  Today's visit:  Patient states she continues to have bilateral knee pain that now has associated hip flexion weakness and knee flexion weakness. She feels she has to lift her leg up with her hand in order to get into the car and out of the car. Patient's previous injection only helped for a mildly helped for a few days and then the pain worsened quickly. Her pain is worsened with standing out of a chair and getting in and out of bed. She states her pain is from the middle of her quad bilaterally down to her feet.    PREVIOUS NOTE:  PMH osteoporosis, fibromyalgia, rheumatoid arthritis    Atraumatic 4 months Chronic bilateral knee pain. Referral for osteoarthritis.   Performs sarah chi. Pain worse at end of day night. Medial joint line bilateral.     Patient presents with knee pain. History, examination, and x-rays are consistent with diagnosis of Osteoarthritis.     Nonpharmacologic treatment: home exercise program  Pain Score:   10/10 bilateral intermittent  Pain from condition does interfere with activities of daily living  Previous Visco relief: never done  Previous CSI relief: never done    Review of Systems      Following history reviewed and update:    Past Medical History:   Diagnosis Date    Acid reflux     Acid reflux disease 08/11/2015    Anemia     last assessed 03/23/2016    Arthritis     rheumatoid    Benign paroxysmal positional vertigo 03/18/2022    Cervicalgia 11/21/2019    Depression      Depression 01/24/2018    Fibromyalgia     Headache 01/24/2018    Other long term (current) drug therapy 01/24/2018    Pain in right wrist 08/28/2018    Wrist weakness 08/28/2018     Past Surgical History:   Procedure Laterality Date    HYSTERECTOMY      age 46    OR COLONOSCOPY FLX DX W/COLLJ SPEC WHEN PFRMD N/A 5/5/2016    Procedure: COLONOSCOPY;  Surgeon: Gabbi Greer DO;  Location: [facility];  Service: Gastroenterology    OR OPTX DSTL RADL X-ARTIC FX/EPIPHYSL SEP Right 5/12/2016    Procedure: OPEN REDUCTION INTERNAL FIXATION RIGHT DISTAL RADIUS (EXTRA-ARTICULAR);  Surgeon: Randy Acuna MD;  [facility];  Service: Orthopedics    TONSILLECTOMY       Social History   Social History     Substance and Sexual Activity   Alcohol Use No     Social History     Substance and Sexual Activity   Drug Use No     Social History     Tobacco Use   Smoking Status Never   Smokeless Tobacco Never     Family History   Problem Relation Age of Onset    No Known Problems Mother     No Known Problems Father     No Known Problems Sister     No Known Problems Sister     No Known Problems Sister     No Known Problems Daughter     No Known Problems Maternal Grandmother     No Known Problems Maternal Grandfather     No Known Problems Paternal Grandmother     No Known Problems Paternal Grandfather     No Known Problems Son     No Known Problems Maternal Aunt     No Known Problems Maternal Aunt     No Known Problems Maternal Aunt     No Known Problems Maternal Aunt     No Known Problems Maternal Aunt     Breast cancer Cousin      Allergies   Allergen Reactions    Other      seasonal          Physical Exam  There were no vitals taken for this visit.        Ortho Exam  LEFT KNEE:  Erythema: no  Swelling: no  Increased Warmth: no  Tenderness: Hyperalgesia (pain to the touch from the middle of her quad down to her ankle)  Extension: intact  Lachman's: negative  Drawer: negative  Varus laxity: negative  Valgus laxity:  negative  [facility]: negative       RIGHT KNEE:  Erythema: no  Swelling: no  Increased Warmth: no  Tenderness: Hyperalgesia (pain to the touch from the middle of her quad down to her ankle)  Extension: intact  Lachman's: negative  Drawer: negative  Varus laxity: negative  Valgus laxity: negative  [facility]: negative     BACK EXAM:  Gait: normal    BACK TENDERNESS:  Spinous Processes: no  Paraspinal Muscles: no    DERMATOMAL SENSATION:  L1-S1: Hyperalgesia     STRENGTH (bilateral):  Knee Extension: 5/5  Foot Dorsiflexion: 5/5  Great Toe Extension: 5/5  Foot Plantarflexion: 5/5  Hip Flexion: 5/5  Hip Abduction: 5/5    BACK:   SUPINE STRAIGHT LEG: POSITIVE      RIGHT HIP:  LOG ROLL: negative  BERNADINE: negative  FADIR: negative    LEFT HIP:  LOG ROLL: negative      Get up and go test normal-able to stand from seated position without using hands with little difficulty    __________________________________________________________________________

## 2025-01-03 ENCOUNTER — APPOINTMENT (OUTPATIENT)
Dept: LAB | Facility: CLINIC | Age: 60
End: 2025-01-03
Payer: COMMERCIAL

## 2025-01-03 DIAGNOSIS — M54.50 LUMBAR PAIN: ICD-10-CM

## 2025-01-03 DIAGNOSIS — M79.604 PAIN IN BOTH LOWER EXTREMITIES: ICD-10-CM

## 2025-01-03 DIAGNOSIS — R29.898 WEAKNESS OF BOTH LOWER EXTREMITIES: ICD-10-CM

## 2025-01-03 DIAGNOSIS — M79.605 PAIN IN BOTH LOWER EXTREMITIES: ICD-10-CM

## 2025-01-03 DIAGNOSIS — I00 RHEUMATIC ARTERITIS: ICD-10-CM

## 2025-01-03 LAB — ERYTHROCYTE [SEDIMENTATION RATE] IN BLOOD: 20 MM/HOUR (ref 0–29)

## 2025-01-03 PROCEDURE — 86200 CCP ANTIBODY: CPT

## 2025-01-03 PROCEDURE — 85652 RBC SED RATE AUTOMATED: CPT

## 2025-01-03 PROCEDURE — 36415 COLL VENOUS BLD VENIPUNCTURE: CPT

## 2025-01-03 NOTE — PATIENT INSTRUCTIONS
Patient advised that her symptoms may be due to her back in origin and we advised patient to get labwork to rule out PMR and rheumathoid arthritis. After her blood work she may take her medrol dose pack and begin physical therapy. If patient does not improve upon follow up, our next step would be to get an MRI. Patient may follow up in 6 weeks.

## 2025-01-07 LAB — CCP AB SER IA-ACNC: 0.9 (ref ?–10)

## 2025-01-08 ENCOUNTER — EVALUATION (OUTPATIENT)
Dept: PHYSICAL THERAPY | Facility: REHABILITATION | Age: 60
End: 2025-01-08
Payer: COMMERCIAL

## 2025-01-08 DIAGNOSIS — M79.605 BILATERAL LEG PAIN: ICD-10-CM

## 2025-01-08 DIAGNOSIS — M54.50 LUMBAR PAIN: ICD-10-CM

## 2025-01-08 DIAGNOSIS — M79.604 BILATERAL LEG PAIN: ICD-10-CM

## 2025-01-08 DIAGNOSIS — M79.605 PAIN IN BOTH LOWER EXTREMITIES: Primary | ICD-10-CM

## 2025-01-08 DIAGNOSIS — M79.604 PAIN IN BOTH LOWER EXTREMITIES: Primary | ICD-10-CM

## 2025-01-08 PROCEDURE — 97112 NEUROMUSCULAR REEDUCATION: CPT

## 2025-01-08 PROCEDURE — 97110 THERAPEUTIC EXERCISES: CPT

## 2025-01-08 PROCEDURE — 97162 PT EVAL MOD COMPLEX 30 MIN: CPT

## 2025-01-08 NOTE — PROGRESS NOTES
PT Evaluation     Today's date: 2025  Patient name: Mary Beth Lyons  : 1965  MRN: [id number]  Referring provider: Joe Ashraf DO  Dx:   Encounter Diagnosis     ICD-10-CM    1. Pain in both lower extremities  M79.604 Ambulatory Referral to Rheumatology    M79.605       2. Bilateral leg pain  M79.604 Ambulatory Referral to Rheumatology    M79.605       3. Lumbar pain  M54.50 Ambulatory Referral to Rheumatology          Start Time: 1215  Stop Time: 1315  Total time in clinic (min): 60 minutes    Assessment  Impairments: abnormal gait, abnormal or restricted ROM, abnormal movement, activity intolerance, impaired physical strength, lacks appropriate home exercise program, pain with function, weight-bearing intolerance, poor posture  and endurance    Assessment details: Mary Beth Lyons is a pleasant 59 y.o. female who presents with low back and B knee pain. Upon eval today, she has activity intolerance, augustin l/s AROM, augustin L knee ROM and poor B LE strength resulting in worry over not knowing what's wrong and fear of not being able to keep active.  No further referral appears necessary at this time based upon examination results.  I expect she will improve within 12 weeks of skilled PT aimed at gradually progressing tolerance to LE activity.  Positive prognostic indicators include positive attitude toward recovery.  Negative prognostic indicators include chronicity of symptoms, depression, hypertension, high symptom irritability, lack of centralization with movement, minimal changes in pain with movement, fibromyalgia, multiple concurrent orthopedic problems, multiple prior failed treatments, and obesity.      Comparable signs:  1) Amb  2) MMTs  3) LE PROM    Problem List:  1) Activity intolerance  2) Augustin l/s AROM  3) Augustin L knee ROM  4) Poor B LE strength  Understanding of Dx/Px/POC: excellent     Prognosis: poor    Goals  Impairment based goals  Patient will improve l/s ROM by 25% within 3 weeks in order to  improve ease and stability with functional mobility.  Patient will improve l/s ROM to WFL by time of d/c in order to improve ease and stability with functional mobility.  Patient will improve L knee ROM by 30* within 3 weeks in order to improve ease and stability with functional mobility.  Patient will improve L knee ROM to WFL by time of d/c in order to improve ease and stability with functional mobility.  Patient will improve B LE strength to 4+/5 in all planes within 3 weeks in order to improve ease and stability with functional mobility.  Patient will improve B LE strength to 5/5 in all planes by time of d/c in order to improve ease and stability with functional mobility.  Patient will consistently demonstrate normal squatting mechanics during a tx session centered around improving LE strength and power.    Functional based goals to be completed by time of d/c  Patient will tolerate a 45 min tx session centered around improving LE strength and mobility without reports of sx exceeding >/=3/10.  Patient will improve FOTO to greater than predicted value.  Patient will be independent with home exercise program.   Patient will be able to manage symptoms independently.     Plan    Planned therapy interventions: abdominal trunk stabilization, activity modification, ADL training, balance/weight bearing training, gait training, home exercise program, therapeutic exercise, therapeutic activities, stretching, strengthening, patient education, neuromuscular re-education, postural training, therapeutic training, joint mobilization, IASTM, kinesiology taping, manual therapy, massage, [place] taping, motor coordination training, muscle pump exercises, nerve gliding, self care, work reintegration, fine motor coordination training, flexibility, functional ROM exercises, graded activity, graded exercise, graded motor, IADL retraining, balance, behavior modification, body mechanics training, breathing training, transfer training  "and coordination    Frequency: 2x week  Duration in weeks: 12  Treatment plan discussed with: patient        Subjective  HPI: 1/8/2025: Mary Beth Lyons is a pleasant 59 y.o. female presenting to PT for low back and B LE pain. Per ortho visit 1/2/25:    \"Bilateral leg pain and hyperalgesia  History negative for claudication symptoms  Exam negative for proximal muscle weakness  Full strength bilateral lower extremity on exam  PMH: Fibromyalgia, rheumatoid arthritis     Differential diagnosis:  Polymyalgia rheumatica  Lumbar radiculopathy  Rheumatoid flare  Fibromyalgia\"    Pain: low back, B knees  Current: 8  Best: 6  Worst: 10   Aggravating: sitting, about to lay down, lying down, getting into and out of car, climbing, bending   Alleviating: methylprednisolone  Employment: \"I cook\"  Goals: get rid of pain    Objective  Postural Findings:     Head Position x Protracted   Neutral   Retracted   Scapular Position  Protracted x  Neutral   Retracted   Thoracic Spine x  Inc Kyphosis  Neutral       Lumbar Spine   Inc Lordosis x  Neutral  Dec Lordosis   Pelvis   Anterior Tilt x Neutral   Posterior Tilt   Iliac Crest   L elevated x Neutral   R elevated   Feet   Pronated x Neutral   Supinated   Lateral Shift   Right   Left x None     Sensation:  Nerve root RIGHT  LEFT   L2 intact intact   L3 intact intact   L4  intact intact   L5 intact intact   S1 intact intact   S2 intact intact     Reflexes:   Joint / Motion  Right  Left    Patellar (L3-L4) 2+ 2+    Achilles (L5-S1) 2+ 2+        Strength and ROM evaluated B from a regional biomechanical perspective and values relevant to this episode recorded in tables below.     ROM:   Joint / Motion  Right  Left    Lumbar Flexion  50% P!!   Lumbar Extension   25% P!!   Lumbar Sidebending      Lumbar rotation      (+) dong's     Strength: MMT revealed the following findings. P!!! With all  Joint Motion Right Left   Hip Flexion 4/5 4/5   Hip Abduction 4/5 4/5   Hip Adduction 4/5 4/5   Hip " Extension 4/5 4/5   Hip ER 4/5 4/5   Hip IR 4/5 4/5   Knee Extension 4/5 4/5   Knee Flexion 4/5 4/5   Ankle Plantarflexion 4/5 4/5   Ankle Dorsiflexion 4/5 4/5          Additional Assessments:  Passive intervertebral motion: dnt   Squatting: requires UE support with STS 2/to P!, B valgus,  Amb: antalgic L LE, decreased L step and stride length        Special Tests:  Lumbar Specific and Neural Tension  SLR Positive   X-SLR Negative   Slump Positive   Prone instability Positive   Mark    90/90    Prone knee flx      SI Joint Screen:      Treatment Based Classification: Primary movement desensitization, secondary stability       Flowsheet Rows      Flowsheet Row Most Recent Value   PT/OT G-Codes    Current Score 36   Projected Score 55               Precautions:   Past Medical History:   Diagnosis Date    Acid reflux     Acid reflux disease 08/11/2015    Anemia     last assessed 03/23/2016    Arthritis     rheumatoid    Benign paroxysmal positional vertigo 03/18/2022    Cervicalgia 11/21/2019    Depression     Depression 01/24/2018    Fibromyalgia     Headache 01/24/2018    Other long term (current) drug therapy 01/24/2018    Pain in right wrist 08/28/2018    Wrist weakness 08/28/2018       Allergies   Allergen Reactions    Other      seasonal       POC expires Unit limit Auth Expiration date PT/OT + Visit Limit?   12 weeks - 4/2/2025 bomn After 24v bomn         Visit/Unit Tracking  AUTH Status:  Date 1/8        Auth after 24v Used 1         Remaining             Pertinent Findings:      POC End Date: 4/2/2025                                                                                    Test / Measure     FOTO knee(Predicted 55) 35   FOTO back(Predicted 43) 33   Activity intolerance    L knee flx 45* P!   MMTs 4/5   L/s AROM Augustin both with P!!!         Access Code: [email or web address]  URL: [email or web address]  Date: 01/08/2025  Prepared by: Joey Arciniega    Exercises  - Supine Hamstring Stretch with Strap  -  "1 x daily - 7 x weekly - 5 sets - 10-15 seconds hold  - Supine Quad Set  - 1 x daily - 7 x weekly - 2 sets - 10 reps - 3 seconds hold  - Seated Heel Slide  - 1 x daily - 7 x weekly - 1 sets - 15 reps - 5 seconds hold    Manuals 1/8            L knee PROM                                                    Neuro Re-Ed             HEP review/pt education 30'            LTR             Hook abd             Hook add             TRA             STS UE support                                       Ther Ex             NS             Heel slide 10x L sitting HEP            QS 10x B 3\" HEP            HSS 5x10\" B            SAQ             Standing ham curl             Lat stepping                                                    Ther Activity                                       Gait Training                                       Modalities                                            "

## 2025-01-09 NOTE — PROGRESS NOTES
Rheumatology Initial Outpatient Visit  Name: Mary Beth Lyons      : 1965      MRN: [id number]  Encounter Provider: Twila Lucas MD  Encounter Date: 2025   Encounter department: [facility] [address]  :  Assessment & Plan  Primary generalized (osteo)arthritis  59-year-old female who presents for further evaluation of polyarthralgia.  Patient has longstanding history of fibromyalgia and although there is a remote history of rheumatoid arthritis, there does not seem to be any evidence of active inflammatory arthritis at this time nor has there been on her last rheumatology evaluation.  Her pain at this time is most consistent with a combination of osteoarthritis and fibromyalgia.  Discussed general treatment principles for osteoarthritis.  Recommend trial of topical Voltaren in addition to Tylenol arthritis.  Patient is already on gabapentin and Savella for management of her fibromyalgia which is managed by PCP.  PCP could consider rotation to Cymbalta and/or Lyrica.  Patient may follow-up in rheumatology as needed.       Fibromyalgia             History of Present Illness   HPI  Mary Beth Lyons is a 59 y.o. female who presents for evaluation of joint pain.  Patient has previously seen rheumatology, from [id number].  Patient self reported a prior history of rheumatoid arthritis which was treated with hydroxychloroquine.  Lab workup was negative for RF and CCP and an ultrasound of the bilateral hands was obtained which did not show any significant findings consistent with inflammatory arthritis.  Her symptoms were felt to be related to fibromyalgia and not related to an underlying inflammatory arthritis.    Patient reports pain in the bilateral knees, bilateral hips, and low back.  Reports pain is severe and present all of the time.  However, it is exacerbated by activity such as rising from sitting to standing or going up the stairs.  Patient  also reports difficulty sleeping due to  "her pain.  Patient denies any prolonged morning stiffness.  Patient reports some pain in her right shoulder to whenever she goes to raise her arm.  Patient reports that she has been taking Savella  and gabapentin for fibromyalgia which she has found helpful.  She also takes Tylenol arthritis.  Not using any topical agents.    Review of Systems  Complete ROS conducted as per HPI. In addition, denies:  Fever  Photosensitive rash  Sicca symptoms  Recurrent oral ulcers  Chest pain  SOB  Pleurisy  Gross hematuria  Foamy urine  Raynaud's  Joint issues other than noted above      Objective   /72   Pulse 96   Temp 98.2 °F (36.8 °C)   Ht 5' 4\" (1.626 m)   Wt 68.5 kg (151 lb)   SpO2 100%   BMI 25.92 kg/m²      Physical Exam  Physical Exam  Constitutional: well appearing, no acute distress  HEENT: normocephalic, sclera clear, no visible oral or nasal ulcers  Neck: supple, no palpable cervical adenopathy  CV: regular rate and rhythm, no murmur  Pulm: normal respiratory effort, lungs clear to auscultation b/l  Skin: no rashes, no skin thickening  Extremities: warm and well perfused, no edema  MSK:  - Observation: OA changes bilateral hands  - Palpation: Generally tender to palpation over the bilateral knees, bilateral hips, and low back which is out of proportion to degree of pressure  - Synovitis: Absent  - Effusion: Trace cool left knee effusion    Labs and Imaging  I have personally reviewed pertinent labs and imaging.   "

## 2025-01-11 DIAGNOSIS — M79.7 FIBROMYALGIA: ICD-10-CM

## 2025-01-13 RX ORDER — MILNACIPRAN HYDROCHLORIDE 50 MG/1
TABLET, FILM COATED ORAL 2 TIMES DAILY
Qty: 180 TABLET | Refills: 1 | Status: SHIPPED | OUTPATIENT
Start: 2025-01-13

## 2025-01-14 ENCOUNTER — CONSULT (OUTPATIENT)
Age: 60
End: 2025-01-14

## 2025-01-14 VITALS
WEIGHT: 151 LBS | OXYGEN SATURATION: 100 % | SYSTOLIC BLOOD PRESSURE: 122 MMHG | DIASTOLIC BLOOD PRESSURE: 72 MMHG | BODY MASS INDEX: 25.78 KG/M2 | TEMPERATURE: 98.2 F | HEIGHT: 64 IN | HEART RATE: 96 BPM

## 2025-01-14 DIAGNOSIS — M79.7 FIBROMYALGIA: ICD-10-CM

## 2025-01-14 DIAGNOSIS — M15.0 PRIMARY GENERALIZED (OSTEO)ARTHRITIS: Primary | ICD-10-CM

## 2025-01-14 NOTE — PATIENT INSTRUCTIONS
Topical Voltaren - get at pharmacy, no prescription needed      Your joint pain is caused by osteoarthritis (OA). This is wear-and-tear, degenerative arthritis that happens over time to most people.       What are the risk factors for OA?    Risk factors for OA include increased age, genetic predisposition, being a female over age 50, previous injury or trauma to the joint, abnormal joint mechanics (ex. flat feet, femoroacetabular impingement), smoking (for degenerative disc disease) and certain occupations. Occupations that use vibratory or pneumatic devices, (, mechanics, masons, heavy , etc) or occupations that predispose to repetitive movements (hairdressers, seamstress, painters, farmers, truck drivers, etc). For the joints that are weight bearing (low back, hips, knees, feet), being overweight can cause you develop osteoarthritis in these joints more quickly.     What is the progression of OA?    There is no way to reverse the damage of osteoarthritis once it has occurred and for some people, the arthritis will continue to progress. In the weight bearing joints, losing weight CAN help prevent progression of the arthritis. Treatment of osteoarthritis is focused on symptom management.     What are non-medication treatment options?  Weight loss: every 1 pound lost results in 4 fold reduction in load per step. If you lose 5% of your body weight, this can increase the function of your joint by 25%.  Low impact exercise to strengthen muscles and improve flexibility (swimming, biking, waking, elliptical, etc). I recommend avoiding any high impact activities such as running or jumping as this can worsen pain.   Bracing or splinting   Shoes with shock absorbing insoles  Orthotics, particularly for flat feet  Canes, walkers, or other mobility aids    What are medication treatment options?  Topical options include lidocaine, capsaicin, Voltaren (diclofenac), Blue Emu cream, cream containing  hemp, CBD cream  Tylenol: 650 mg (tylenol arthritis) three times per day or 500 mg (extra strength) two tablets three times per day. Do not take more than 3,000 mg of tylenol in 24 hours.   NSAIDs: ibuprofen, naproxen, diclofenac, meloxicam, celecoxib are useful for pain as well. You cannot take more than one NSAID medication at a time as it may harm your kidneys. Some of these may upset your stomach, so take them with food. Please make sure you discuss with you PCP if you are a candidate for these medications based on your other health conditions and other medications.   Duloxetine (Cymbalta) is an option if you do not get relief from the other options  Viscosupplementation: glucosamine sulfate 1500mg daily (caution in those on warfarin or with shellfish allergy) or chondroitin sulfate 1200mg daily; relief should be noted within 2 months of it will be effective     What are other treatment options?  Injectable options: Steroid; for knees other options include Gel (Euflexxa, Synvisc) or PRP  Joint replacement   Genicular nerve block  Acupuncture

## 2025-01-15 ENCOUNTER — OFFICE VISIT (OUTPATIENT)
Dept: PHYSICAL THERAPY | Facility: REHABILITATION | Age: 60
End: 2025-01-15
Payer: COMMERCIAL

## 2025-01-15 DIAGNOSIS — M79.605 BILATERAL LEG PAIN: ICD-10-CM

## 2025-01-15 DIAGNOSIS — M79.604 BILATERAL LEG PAIN: ICD-10-CM

## 2025-01-15 DIAGNOSIS — M79.605 PAIN IN BOTH LOWER EXTREMITIES: Primary | ICD-10-CM

## 2025-01-15 DIAGNOSIS — M79.604 PAIN IN BOTH LOWER EXTREMITIES: Primary | ICD-10-CM

## 2025-01-15 DIAGNOSIS — M54.50 LUMBAR PAIN: ICD-10-CM

## 2025-01-15 PROCEDURE — 97110 THERAPEUTIC EXERCISES: CPT

## 2025-01-15 PROCEDURE — 97112 NEUROMUSCULAR REEDUCATION: CPT

## 2025-01-15 NOTE — PROGRESS NOTES
Daily Note     Today's date: 1/15/2025  Patient name: Mary Beth Lyons  : 1965  MRN: [id number]  Referring provider: Joe Ashraf DO  Dx:   Encounter Diagnosis     ICD-10-CM    1. Pain in both lower extremities  M79.604     M79.605       2. Bilateral leg pain  M79.604     M79.605       3. Lumbar pain  M54.50           Start Time: 1253  Stop Time: 1331  Total time in clinic (min): 38 minutes    Subjective: Patient reports severe knee pain upon arrival, L>R.      Objective: See treatment diary below      Assessment: Tolerated treatment poor. High sx present with attempted PROM with empty end feels, improved tolerance to self PROM/AAROM. Will progress as tolerated. Patient would benefit from continued PT      Plan: Continue per plan of care.      Precautions:   Past Medical History:   Diagnosis Date    Acid reflux     Acid reflux disease 2015    Anemia     last assessed 2016    Arthritis     rheumatoid    Benign paroxysmal positional vertigo 2022    Cervicalgia 2019    Depression     Depression 2018    Fibromyalgia     Headache 2018    Other long term (current) drug therapy 2018    Pain in right wrist 2018    Wrist weakness 2018       Allergies   Allergen Reactions    Other      seasonal       POC expires Unit limit Auth Expiration date PT/OT + Visit Limit?   12 weeks - 2025 bomn After 24v bomn         Visit/Unit Tracking  AUTH Status:  Date 1/8 1/15       Auth after 24v Used 1 2        Remaining             Pertinent Findings:      POC End Date: 2025                                                                                    Test / Measure     FOTO knee(Predicted 55) 35   FOTO back(Predicted 43) 33   Activity intolerance    L knee flx 45* P!   MMTs 4/5   L/s AROM Augustin both with P!!!         Access Code: [email or web address]  URL: [email or web address]  Date: 2025  Prepared by: Joey Arciniega    Exercises  - Supine Hamstring Stretch with  "Strap  - 1 x daily - 7 x weekly - 5 sets - 10-15 seconds hold  - Supine Quad Set  - 1 x daily - 7 x weekly - 2 sets - 10 reps - 3 seconds hold  - Seated Heel Slide  - 1 x daily - 7 x weekly - 1 sets - 15 reps - 5 seconds hold    Manuals 1/8 1/15           L knee PROM  Unable to tolerate                                                  Neuro Re-Ed             HEP review/pt education 30'            LTR             Sup add squeeze  2x20 3\" ball at heels           Sup glute set knee ext  2x20 3\"           TRA             STS UE support                                       Ther Ex             NS  5'           Heel slide 10x L sitting HEP 10x B 5\" supine           QS 10x B 3\" HEP 20x 3\" B           HSS 5x10\" B 5x10\" B           Calf stretching  3x30\" strap B           SAQ             Standing ham curl             Lat stepping                                                    Ther Activity                                       Gait Training                                       Modalities                                            "

## 2025-01-17 ENCOUNTER — APPOINTMENT (OUTPATIENT)
Dept: PHYSICAL THERAPY | Facility: REHABILITATION | Age: 60
End: 2025-01-17
Payer: COMMERCIAL

## 2025-01-20 ENCOUNTER — OFFICE VISIT (OUTPATIENT)
Dept: PHYSICAL THERAPY | Facility: REHABILITATION | Age: 60
End: 2025-01-20
Payer: COMMERCIAL

## 2025-01-20 DIAGNOSIS — M54.50 LUMBAR PAIN: ICD-10-CM

## 2025-01-20 DIAGNOSIS — M79.604 PAIN IN BOTH LOWER EXTREMITIES: Primary | ICD-10-CM

## 2025-01-20 DIAGNOSIS — M79.605 PAIN IN BOTH LOWER EXTREMITIES: Primary | ICD-10-CM

## 2025-01-20 DIAGNOSIS — M79.604 BILATERAL LEG PAIN: ICD-10-CM

## 2025-01-20 DIAGNOSIS — M79.605 BILATERAL LEG PAIN: ICD-10-CM

## 2025-01-20 PROCEDURE — 97112 NEUROMUSCULAR REEDUCATION: CPT

## 2025-01-20 PROCEDURE — 97110 THERAPEUTIC EXERCISES: CPT

## 2025-01-20 NOTE — PROGRESS NOTES
Daily Note     Today's date: 2025  Patient name: Mary Beth Lyons  : 1965  MRN: [id number]  Referring provider: Joe Ashraf DO  Dx:   Encounter Diagnosis     ICD-10-CM    1. Pain in both lower extremities  M79.604     M79.605       2. Bilateral leg pain  M79.604     M79.605       3. Lumbar pain  M54.50             Start Time: 1530  Stop Time: 1608  Total time in clinic (min): 38 minutes    Subjective: Patient reports improvement in symptoms after last session. Patient continues to have difficulty moving leg.       Objective: See treatment diary below      Assessment: Tolerated treatment poor. Per observations, she demonstrated quite difficulty lifting both extremities for transfers (L > R), limited secondary to pain and weakness. Patient was progressed to gentle weight bearing exercises, however, had increase irritability with duration. Patient was educated on graded exposure and not pushing through pain but rather meeting it. Patient was given updated HEP due to fair tolerance, educated on modifications. Will progress as tolerated. Patient would benefit from continued PT      Plan: Continue per plan of care.      Precautions:   Past Medical History:   Diagnosis Date    Acid reflux     Acid reflux disease 2015    Anemia     last assessed 2016    Arthritis     rheumatoid    Benign paroxysmal positional vertigo 2022    Cervicalgia 2019    Depression     Depression 2018    Fibromyalgia     Headache 2018    Other long term (current) drug therapy 2018    Pain in right wrist 2018    Wrist weakness 2018       Allergies   Allergen Reactions    Other      seasonal       POC expires Unit limit Auth Expiration date PT/OT + Visit Limit?   12 weeks - 2025 bomn After 24v bomn         Visit/Unit Tracking  AUTH Status:  Date 1/8 1/15 1/20      Auth after 24v Used 1 2 3       Remaining             Pertinent Findings:      POC End Date: 2025                   "                                                                  Test / Measure     FOTO knee(Predicted 55) 35   FOTO back(Predicted 43) 33   Activity intolerance    L knee flx 45* P!   MMTs 4/5   L/s AROM Augustin both with P!!!         Access Code: [email or web address]  URL: [email or web address]  Date: 01/08/2025  Prepared by: Joey Arciniega    Exercises  - Supine Hamstring Stretch with Strap  - 1 x daily - 7 x weekly - 5 sets - 10-15 seconds hold  - Supine Quad Set  - 1 x daily - 7 x weekly - 2 sets - 10 reps - 3 seconds hold  - Seated Heel Slide  - 1 x daily - 7 x weekly - 1 sets - 15 reps - 5 seconds hold    Access Code: [email or web address]  URL: [email or web address]  Date: 01/20/2025  Prepared by: Lyric Melgar    Exercises  - Standing Hip Abduction with Counter Support  - 1 x daily - 2 x weekly - 2-3 sets - 10 reps  - Mini Squat with Counter Support  - 1 x daily - 2 x weekly - 3 sets - 5 reps  - Seated Heel Toe Raises  - 1 x daily - 7 x weekly - 2-3 sets - 10 reps  - Seated Hip Abduction with Resistance  - 1 x daily - 2-3 x weekly - 2-3 sets - 10 reps    Manuals 1/8 1/15 1/20          L knee PROM  Unable to tolerate                                                  Neuro Re-Ed             HEP review/pt education 30'            LTR             Sup add squeeze  2x20 3\" ball at heels Hookyling  5\"x20  In knees          Sup glute set knee ext  2x20 3\"           STS UE support   3x5          Hookyling Hamstring Isometrics   5\"x10  B/L          Ther Ex             NS  5' 6' L4          Heel slide 10x L sitting HEP 10x B 5\" supine           QS 10x B 3\" HEP 20x 3\" B           HSS 5x10\" B 5x10\" B           Calf stretching  3x30\" strap B           Standing Hip Abduction    2x10          SAQ             Standing ham curl   10x L  20x R          Seated Heel Raises/Toe Raise   Dome  20x          Seated Hip Abduction   GTB  20x          Lat stepping                                                    Ther Activity              "                          Gait Training                                       Modalities

## 2025-01-22 ENCOUNTER — OFFICE VISIT (OUTPATIENT)
Dept: PHYSICAL THERAPY | Facility: REHABILITATION | Age: 60
End: 2025-01-22
Payer: COMMERCIAL

## 2025-01-22 DIAGNOSIS — M79.605 PAIN IN BOTH LOWER EXTREMITIES: Primary | ICD-10-CM

## 2025-01-22 DIAGNOSIS — M79.605 BILATERAL LEG PAIN: ICD-10-CM

## 2025-01-22 DIAGNOSIS — M79.604 PAIN IN BOTH LOWER EXTREMITIES: Primary | ICD-10-CM

## 2025-01-22 DIAGNOSIS — M54.50 LUMBAR PAIN: ICD-10-CM

## 2025-01-22 DIAGNOSIS — M79.604 BILATERAL LEG PAIN: ICD-10-CM

## 2025-01-22 PROCEDURE — 97110 THERAPEUTIC EXERCISES: CPT

## 2025-01-22 PROCEDURE — 97112 NEUROMUSCULAR REEDUCATION: CPT

## 2025-01-22 NOTE — PROGRESS NOTES
Daily Note     Today's date: 2025  Patient name: Mary Beth Lyons  : 1965  MRN: [id number]  Referring provider: Joe Ashraf DO  Dx:   Encounter Diagnosis     ICD-10-CM    1. Pain in both lower extremities  M79.604     M79.605       2. Bilateral leg pain  M79.604     M79.605       3. Lumbar pain  M54.50               Start Time: 1254  Stop Time: 1332  Total time in clinic (min): 38 minutes    Subjective: Patient reports improvement in symptoms again. Still some ongoing knee soreness, L>R.      Objective: See treatment diary below      Assessment: Tolerated treatment fair. Improved tolerance to all strengthening interventions today with improved tolerance to WB. Taught PPT with good performance. Will cont to progress as tolerated. Patient would benefit from continued PT      Plan: Continue per plan of care.      Precautions:   Past Medical History:   Diagnosis Date    Acid reflux     Acid reflux disease 2015    Anemia     last assessed 2016    Arthritis     rheumatoid    Benign paroxysmal positional vertigo 2022    Cervicalgia 2019    Depression     Depression 2018    Fibromyalgia     Headache 2018    Other long term (current) drug therapy 2018    Pain in right wrist 2018    Wrist weakness 2018       Allergies   Allergen Reactions    Other      seasonal       POC expires Unit limit Auth Expiration date PT/OT + Visit Limit?   12 weeks - 2025 bomn After 24v bomn         Visit/Unit Tracking  AUTH Status:  Date 1/8 1/15 1/20 1/22     Auth after 24v Used 1 2 3 4      Remaining             Pertinent Findings:      POC End Date: 2025                                                                                    Test / Measure     FOTO knee(Predicted 55) 35   FOTO back(Predicted 43) 33   Activity intolerance    L knee flx 45* P!   MMTs 4/5   L/s AROM Augustin both with P!!!         Access Code: [email or web address]  URL: [email or web address]  Date:  "01/08/2025  Prepared by: Joey Arciniega    Exercises  - Supine Hamstring Stretch with Strap  - 1 x daily - 7 x weekly - 5 sets - 10-15 seconds hold  - Supine Quad Set  - 1 x daily - 7 x weekly - 2 sets - 10 reps - 3 seconds hold  - Seated Heel Slide  - 1 x daily - 7 x weekly - 1 sets - 15 reps - 5 seconds hold    Access Code: [email or web address]  URL: [email or web address]  Date: 01/20/2025  Prepared by: Lyric Melgar    Exercises  - Standing Hip Abduction with Counter Support  - 1 x daily - 2 x weekly - 2-3 sets - 10 reps  - Mini Squat with Counter Support  - 1 x daily - 2 x weekly - 3 sets - 5 reps  - Seated Heel Toe Raises  - 1 x daily - 7 x weekly - 2-3 sets - 10 reps  - Seated Hip Abduction with Resistance  - 1 x daily - 2-3 x weekly - 2-3 sets - 10 reps    Manuals 1/8 1/15 1/20 1/22         L knee PROM  Unable to tolerate                                                  Neuro Re-Ed             HEP review/pt education 30'            LTR    15x B 5\"         TRA    20x 3\"          Sup add squeeze  2x20 3\" ball at heels Hookyling  5\"x20  In knees 5\"x20 sitting         Sup glute set knee ext  2x20 3\"           STS UE support   3x5 3x5 no UE         Hookyling Hamstring Isometrics   5\"x10  B/L 2x10 3\"         Ther Ex             NS  5' 6' L4 5' L5         Heel slide 10x L sitting HEP 10x B 5\" supine           QS 10x B 3\" HEP 20x 3\" B           HSS 5x10\" B 5x10\" B           Calf stretching  3x30\" strap B           Standing Hip Abduction    2x10 2x10         SAQ    20x 3\" B         Standing ham curl   10x L  20x R 20x B         Seated Heel Raises/Toe Raise   Dome  20x Dome 20x         Seated Hip Abduction   GTB  20x GTB 20x         Lat stepping    3 laps otb at knees                                                Ther Activity                                       Gait Training                                       Modalities                                            "

## 2025-01-24 ENCOUNTER — APPOINTMENT (OUTPATIENT)
Dept: PHYSICAL THERAPY | Facility: REHABILITATION | Age: 60
End: 2025-01-24
Payer: COMMERCIAL

## 2025-01-27 ENCOUNTER — OFFICE VISIT (OUTPATIENT)
Dept: PHYSICAL THERAPY | Facility: REHABILITATION | Age: 60
End: 2025-01-27
Payer: COMMERCIAL

## 2025-01-27 DIAGNOSIS — M54.50 LUMBAR PAIN: ICD-10-CM

## 2025-01-27 DIAGNOSIS — M79.604 PAIN IN BOTH LOWER EXTREMITIES: Primary | ICD-10-CM

## 2025-01-27 DIAGNOSIS — M79.605 BILATERAL LEG PAIN: ICD-10-CM

## 2025-01-27 DIAGNOSIS — M79.604 BILATERAL LEG PAIN: ICD-10-CM

## 2025-01-27 DIAGNOSIS — M79.605 PAIN IN BOTH LOWER EXTREMITIES: Primary | ICD-10-CM

## 2025-01-27 PROCEDURE — 97110 THERAPEUTIC EXERCISES: CPT

## 2025-01-27 PROCEDURE — 97112 NEUROMUSCULAR REEDUCATION: CPT

## 2025-01-27 NOTE — PROGRESS NOTES
Daily Note     Today's date: 2025  Patient name: Mary Beth Lyons  : 1965  MRN: [id number]  Referring provider: Joe Ashraf DO  Dx:   Encounter Diagnosis     ICD-10-CM    1. Pain in both lower extremities  M79.604     M79.605       2. Bilateral leg pain  M79.604     M79.605       3. Lumbar pain  M54.50                 Start Time: 1131  Stop Time: 1209  Total time in clinic (min): 38 minutes    Subjective: Patient reports ongoing sx improvement.      Objective: See treatment diary below      Assessment: Tolerated treatment well. Tolerant of progressions of existing interventions today with min reports of sx throughout. Will cont to progress as tolerated. Patient would benefit from continued PT      Plan: Continue per plan of care.      Precautions:   Past Medical History:   Diagnosis Date    Acid reflux     Acid reflux disease 2015    Anemia     last assessed 2016    Arthritis     rheumatoid    Benign paroxysmal positional vertigo 2022    Cervicalgia 2019    Depression     Depression 2018    Fibromyalgia     Headache 2018    Other long term (current) drug therapy 2018    Pain in right wrist 2018    Wrist weakness 2018       Allergies   Allergen Reactions    Other      seasonal       POC expires Unit limit Auth Expiration date PT/OT + Visit Limit?   12 weeks - 2025 bomn After 24v bomn         Visit/Unit Tracking  AUTH Status:  Date 1/8 1/15 1/20 1/22 1/27    Auth after 24v Used 1 2 3 4 5     Remaining             Pertinent Findings:      POC End Date: 2025                                                                                    Test / Measure     FOTO knee(Predicted 55) 35   FOTO back(Predicted 43) 33   Activity intolerance    L knee flx 45* P!   MMTs 4/5   L/s AROM Augustin both with P!!!         Access Code: [email or web address]  URL: [email or web address]  Date: 2025  Prepared by: Joey Arciniega    Exercises  - Supine Hamstring  "Stretch with Strap  - 1 x daily - 7 x weekly - 5 sets - 10-15 seconds hold  - Supine Quad Set  - 1 x daily - 7 x weekly - 2 sets - 10 reps - 3 seconds hold  - Seated Heel Slide  - 1 x daily - 7 x weekly - 1 sets - 15 reps - 5 seconds hold    Access Code: [email or web address]  URL: [email or web address]  Date: 01/20/2025  Prepared by: Lyric Melgar    Exercises  - Standing Hip Abduction with Counter Support  - 1 x daily - 2 x weekly - 2-3 sets - 10 reps  - Mini Squat with Counter Support  - 1 x daily - 2 x weekly - 3 sets - 5 reps  - Seated Heel Toe Raises  - 1 x daily - 7 x weekly - 2-3 sets - 10 reps  - Seated Hip Abduction with Resistance  - 1 x daily - 2-3 x weekly - 2-3 sets - 10 reps    Manuals 1/8 1/15 1/20 1/22 1/27        L knee PROM  Unable to tolerate                                                  Neuro Re-Ed             HEP review/pt education 30'            LTR    15x B 5\" 15x B 5\"        TRA    20x 3\"  20x 3\"        Sup add squeeze  2x20 3\" ball at heels Hookyling  5\"x20  In knees 5\"x20 sitting 5\"x20 sitting        Sup glute set knee ext  2x20 3\"           STS UE support   3x5 3x5 no UE 3x5 no UE        Hookyling Hamstring Isometrics   5\"x10  B/L 2x10 3\" 2x10 3\"        Ther Ex             NS  5' 6' L4 5' L5 5' L5        Heel slide 10x L sitting HEP 10x B 5\" supine           QS 10x B 3\" HEP 20x 3\" B           HSS 5x10\" B 5x10\" B           Calf stretching  3x30\" strap B           Standing Hip Abduction    2x10 2x10 2x10        SAQ    20x 3\" B 20x 3\" B        Standing ham curl   10x L  20x R 20x B 20x B        Seated Heel Raises/Toe Raise   Dome  20x Dome 20x Dome 20x        Seated Hip Abduction   GTB  20x GTB 20x GTB 20x        Lat stepping    3 laps otb at knees 3 laps otb at knees                                               Ther Activity                                       Gait Training                                       Modalities                                            "

## 2025-01-29 ENCOUNTER — OFFICE VISIT (OUTPATIENT)
Dept: PHYSICAL THERAPY | Facility: REHABILITATION | Age: 60
End: 2025-01-29
Payer: COMMERCIAL

## 2025-01-29 DIAGNOSIS — M79.605 PAIN IN BOTH LOWER EXTREMITIES: Primary | ICD-10-CM

## 2025-01-29 DIAGNOSIS — M79.604 BILATERAL LEG PAIN: ICD-10-CM

## 2025-01-29 DIAGNOSIS — M79.604 PAIN IN BOTH LOWER EXTREMITIES: Primary | ICD-10-CM

## 2025-01-29 DIAGNOSIS — M79.605 BILATERAL LEG PAIN: ICD-10-CM

## 2025-01-29 DIAGNOSIS — M54.50 LUMBAR PAIN: ICD-10-CM

## 2025-01-29 PROCEDURE — 97110 THERAPEUTIC EXERCISES: CPT

## 2025-01-29 PROCEDURE — 97112 NEUROMUSCULAR REEDUCATION: CPT

## 2025-01-29 NOTE — PROGRESS NOTES
Daily Note     Today's date: 2025  Patient name: Mary Beth Lyons  : 1965  MRN: [id number]  Referring provider: Joe Ashraf DO  Dx:   Encounter Diagnosis     ICD-10-CM    1. Pain in both lower extremities  M79.604     M79.605       2. Bilateral leg pain  M79.604     M79.605       3. Lumbar pain  M54.50                   Start Time: 1215  Stop Time: 1255  Total time in clinic (min): 40 minutes    Subjective: It's better. I still have problems bending down.      Objective: See treatment diary below      Assessment: Tolerated treatment well. Progress WB interventions with improved tolerance from previous session. Focused on depth with the addition of UE supported squats. Will progress as tolerated with a focus on LE strength, power and SL stability. Patient would benefit from continued PT      Plan: Continue per plan of care.      Precautions:   Past Medical History:   Diagnosis Date    Acid reflux     Acid reflux disease 2015    Anemia     last assessed 2016    Arthritis     rheumatoid    Benign paroxysmal positional vertigo 2022    Cervicalgia 2019    Depression     Depression 2018    Fibromyalgia     Headache 2018    Other long term (current) drug therapy 2018    Pain in right wrist 2018    Wrist weakness 2018       Allergies   Allergen Reactions    Other      seasonal       POC expires Unit limit Auth Expiration date PT/OT + Visit Limit?   12 weeks - 2025 bomn After 24v bomn         Visit/Unit Tracking  AUTH Status:  Date 1/8 1/15 1/20 1/22 1/27 1/29   Auth after 24v Used 1 2 3 4 5 6    Remaining             Pertinent Findings:      POC End Date: 2025                                                                                    Test / Measure     FOTO knee(Predicted 55) 35   FOTO back(Predicted 43) 33   Activity intolerance    L knee flx 45* P!   MMTs 4/5   L/s AROM Augustin both with P!!!         Access Code: [email or web address]  URL:  "[email or web address]  Date: 01/08/2025  Prepared by: Joey Arciniega    Exercises  - Supine Hamstring Stretch with Strap  - 1 x daily - 7 x weekly - 5 sets - 10-15 seconds hold  - Supine Quad Set  - 1 x daily - 7 x weekly - 2 sets - 10 reps - 3 seconds hold  - Seated Heel Slide  - 1 x daily - 7 x weekly - 1 sets - 15 reps - 5 seconds hold    Access Code: [email or web address]  URL: [email or web address]  Date: 01/20/2025  Prepared by: Lyric Melgar    Exercises  - Standing Hip Abduction with Counter Support  - 1 x daily - 2 x weekly - 2-3 sets - 10 reps  - Mini Squat with Counter Support  - 1 x daily - 2 x weekly - 3 sets - 5 reps  - Seated Heel Toe Raises  - 1 x daily - 7 x weekly - 2-3 sets - 10 reps  - Seated Hip Abduction with Resistance  - 1 x daily - 2-3 x weekly - 2-3 sets - 10 reps    Manuals 1/8 1/15 1/20 1/22 1/27 1/29       L knee PROM  Unable to tolerate                                                  Neuro Re-Ed             HEP review/pt education 30'            LTR    15x B 5\" 15x B 5\"        TRA    20x 3\"  20x 3\"        Sup add squeeze  2x20 3\" ball at heels Hookyling  5\"x20  In knees 5\"x20 sitting 5\"x20 sitting        Sup glute set knee ext  2x20 3\"           STS UE support   3x5 3x5 no UE 3x5 no UE 10x5 with UE support, 2x8 no UE       SLR      15x B, PT assist L LE       Bridge       2x10                    Ther Ex             NS  5' 6' L4 5' L5 5' L5 8' L5       Heel slide 10x L sitting HEP 10x B 5\" supine           QS 10x B 3\" HEP 20x 3\" B           HSS 5x10\" B 5x10\" B           Calf stretching  3x30\" strap B           Standing Hip Abduction    2x10 2x10 2x10 2x10       SAQ    20x 3\" B 20x 3\" B        LAQ      2x10       Standing ham curl   10x L  20x R 20x B 20x B 20x B       Seated Heel Raises/Toe Raise   Dome  20x Dome 20x Dome 20x Dome 20x       Lat stepping    3 laps otb at knees 3 laps otb at knees 3 laps otb at knees       Leg press      2x10 40#                               "   Ther Activity                                       Gait Training                                       Modalities

## 2025-01-31 ENCOUNTER — APPOINTMENT (OUTPATIENT)
Dept: PHYSICAL THERAPY | Facility: REHABILITATION | Age: 60
End: 2025-01-31
Payer: COMMERCIAL

## 2025-02-03 ENCOUNTER — OFFICE VISIT (OUTPATIENT)
Dept: PHYSICAL THERAPY | Facility: REHABILITATION | Age: 60
End: 2025-02-03
Payer: COMMERCIAL

## 2025-02-03 DIAGNOSIS — M54.50 LUMBAR PAIN: ICD-10-CM

## 2025-02-03 DIAGNOSIS — M79.604 BILATERAL LEG PAIN: Primary | ICD-10-CM

## 2025-02-03 DIAGNOSIS — M79.605 PAIN IN BOTH LOWER EXTREMITIES: ICD-10-CM

## 2025-02-03 DIAGNOSIS — M79.605 BILATERAL LEG PAIN: Primary | ICD-10-CM

## 2025-02-03 DIAGNOSIS — M79.604 PAIN IN BOTH LOWER EXTREMITIES: ICD-10-CM

## 2025-02-03 PROCEDURE — 97112 NEUROMUSCULAR REEDUCATION: CPT

## 2025-02-03 PROCEDURE — 97110 THERAPEUTIC EXERCISES: CPT

## 2025-02-03 NOTE — PROGRESS NOTES
Daily Note     Today's date: 2/3/2025  Patient name: Mary Beth Lyons  : 1965  MRN: [id number]  Referring provider: Joe Ashraf DO  Dx:   Encounter Diagnosis     ICD-10-CM    1. Bilateral leg pain  M79.604     M79.605       2. Pain in both lower extremities  M79.604     M79.605       3. Lumbar pain  M54.50                     Start Time: 1128  Stop Time: 1222  Total time in clinic (min): 54 minutes    Subjective: Mary Beth reports cont sx improvement.      Objective: See treatment diary below      Assessment: Tolerated treatment well. Mary Beth cont to progress very well with improved tolerance to all strengthening interventions. Spent more time in WB with great tolerance. Patient would benefit from continued PT      Plan: Continue per plan of care.      Precautions:   Past Medical History:   Diagnosis Date    Acid reflux     Acid reflux disease 2015    Anemia     last assessed 2016    Arthritis     rheumatoid    Benign paroxysmal positional vertigo 2022    Cervicalgia 2019    Depression     Depression 2018    Fibromyalgia     Headache 2018    Other long term (current) drug therapy 2018    Pain in right wrist 2018    Wrist weakness 2018       Allergies   Allergen Reactions    Other      seasonal       POC expires Unit limit Auth Expiration date PT/OT + Visit Limit?   12 weeks - 2025 bomn After 24v bomn         Visit/Unit Tracking  AUTH Status:  Date 1/8 1/15 1/20 1/22 1/27 1/29 2/3   Auth after 24v Used 1 2 3 4 5 6 7    Remaining              Pertinent Findings:      POC End Date: 2025                                                                                    Test / Measure     FOTO knee(Predicted 55) 35   FOTO back(Predicted 43) 33   Activity intolerance    L knee flx 45* P!   MMTs 4/5   L/s AROM Augustin both with P!!!         Access Code: [email or web address]  URL: [email or web address]  Date: 2025  Prepared by: Joey  "Demian    Exercises  - Supine Hamstring Stretch with Strap  - 1 x daily - 7 x weekly - 5 sets - 10-15 seconds hold  - Supine Quad Set  - 1 x daily - 7 x weekly - 2 sets - 10 reps - 3 seconds hold  - Seated Heel Slide  - 1 x daily - 7 x weekly - 1 sets - 15 reps - 5 seconds hold    Access Code: [email or web address]  URL: [email or web address]  Date: 01/20/2025  Prepared by: Lyric Melgar    Exercises  - Standing Hip Abduction with Counter Support  - 1 x daily - 2 x weekly - 2-3 sets - 10 reps  - Mini Squat with Counter Support  - 1 x daily - 2 x weekly - 3 sets - 5 reps  - Seated Heel Toe Raises  - 1 x daily - 7 x weekly - 2-3 sets - 10 reps  - Seated Hip Abduction with Resistance  - 1 x daily - 2-3 x weekly - 2-3 sets - 10 reps    Manuals 1/8 1/15 1/20 1/22 1/27 1/29 2/3      L knee PROM  Unable to tolerate                                                  Neuro Re-Ed             HEP review/pt education 30'            LTR    15x B 5\" 15x B 5\"  15x B 5\"      TRA    20x 3\"  20x 3\"  20x 3\"      TRA + march       20x B      Sup add squeeze  2x20 3\" ball at heels Hookyling  5\"x20  In knees 5\"x20 sitting 5\"x20 sitting        Sup glute set knee ext  2x20 3\"           STS UE support   3x5 3x5 no UE 3x5 no UE 10x5 with UE support, 2x8 no UE 2x10 no UE      SLR      15x B, PT assist L LE 2x8 B no assist      Bridge       2x10 2x15      FSU to SLS       2x12 B green pad      Clams        2x10 B otb                                Ther Ex             NS  5' 6' L4 5' L5 5' L5 8' L5 8' L5      Heel slide 10x L sitting HEP 10x B 5\" supine           QS 10x B 3\" HEP 20x 3\" B           HSS 5x10\" B 5x10\" B           Calf stretching  3x30\" strap B           Standing Hip Abduction    2x10 2x10 2x10 2x10 2x10 2#      SAQ    20x 3\" B 20x 3\" B        LAQ      2x10 2x10      Standing ham curl   10x L  20x R 20x B 20x B 20x B       Seated Heel Raises/Toe Raise   Dome  20x Dome 20x Dome 20x Dome 20x Dome 20x standing      Lat stepping    3 laps " otb at knees 3 laps otb at knees 3 laps otb at knees 2x3 laps otb at knees       Leg press      2x10 40# 2x10 40#      Mini lunge       10x B fwd                                Ther Activity                                       Gait Training                                       Modalities

## 2025-02-05 ENCOUNTER — OFFICE VISIT (OUTPATIENT)
Dept: PHYSICAL THERAPY | Facility: REHABILITATION | Age: 60
End: 2025-02-05
Payer: COMMERCIAL

## 2025-02-05 DIAGNOSIS — M79.605 BILATERAL LEG PAIN: Primary | ICD-10-CM

## 2025-02-05 DIAGNOSIS — M79.604 BILATERAL LEG PAIN: Primary | ICD-10-CM

## 2025-02-05 DIAGNOSIS — M79.604 PAIN IN BOTH LOWER EXTREMITIES: ICD-10-CM

## 2025-02-05 DIAGNOSIS — M79.605 PAIN IN BOTH LOWER EXTREMITIES: ICD-10-CM

## 2025-02-05 DIAGNOSIS — M54.50 LUMBAR PAIN: ICD-10-CM

## 2025-02-05 PROCEDURE — 97112 NEUROMUSCULAR REEDUCATION: CPT

## 2025-02-05 PROCEDURE — 97110 THERAPEUTIC EXERCISES: CPT

## 2025-02-05 NOTE — PROGRESS NOTES
Daily Note     Today's date: 2025  Patient name: Mary Beth Lyons  : 1965  MRN: [id number]  Referring provider: Joe Ashraf DO  Dx:   Encounter Diagnosis     ICD-10-CM    1. Bilateral leg pain  M79.604     M79.605       2. Pain in both lower extremities  M79.604     M79.605       3. Lumbar pain  M54.50                     Start Time: 1252  Stop Time: 1346  Total time in clinic (min): 54 minutes    Subjective: Mary Beth reports cont sx improvement.      Objective: See treatment diary below      Assessment: Tolerated treatment well. Patient cont to do well with progressions of resistances of existing interventions with min c/o soreness throughout. Patient would benefit from continued PT      Plan: Continue per plan of care.      Precautions:   Past Medical History:   Diagnosis Date    Acid reflux     Acid reflux disease 2015    Anemia     last assessed 2016    Arthritis     rheumatoid    Benign paroxysmal positional vertigo 2022    Cervicalgia 2019    Depression     Depression 2018    Fibromyalgia     Headache 2018    Other long term (current) drug therapy 2018    Pain in right wrist 2018    Wrist weakness 2018       Allergies   Allergen Reactions    Other      seasonal       POC expires Unit limit Auth Expiration date PT/OT + Visit Limit?   12 weeks - 2025 bomn After 24v bomn         Visit/Unit Tracking  AUTH Status:  Date 1/8 1/15 1/20 1/22 1/27 1/29 2/3 2/5   Auth after 24v Used 1 2 3 4 5 6 7 8    Remaining               Pertinent Findings:      POC End Date: 2025                                                                                    Test / Measure     FOTO knee(Predicted 55) 35   FOTO back(Predicted 43) 33   Activity intolerance    L knee flx 45* P!   MMTs 4/5   L/s AROM Augustin both with P!!!         Access Code: [email or web address]  URL: [email or web address]  Date: 2025  Prepared by: Joey Arciniega    Exercises  - Supine  "Hamstring Stretch with Strap  - 1 x daily - 7 x weekly - 5 sets - 10-15 seconds hold  - Supine Quad Set  - 1 x daily - 7 x weekly - 2 sets - 10 reps - 3 seconds hold  - Seated Heel Slide  - 1 x daily - 7 x weekly - 1 sets - 15 reps - 5 seconds hold    Access Code: [email or web address]  URL: [email or web address]  Date: 01/20/2025  Prepared by: Lyric Melgar    Exercises  - Standing Hip Abduction with Counter Support  - 1 x daily - 2 x weekly - 2-3 sets - 10 reps  - Mini Squat with Counter Support  - 1 x daily - 2 x weekly - 3 sets - 5 reps  - Seated Heel Toe Raises  - 1 x daily - 7 x weekly - 2-3 sets - 10 reps  - Seated Hip Abduction with Resistance  - 1 x daily - 2-3 x weekly - 2-3 sets - 10 reps    Manuals 1/27 1/29 2/3 2/5     L knee PROM                                    Neuro Re-Ed         HEP review/pt education         LTR 15x B 5\"  15x B 5\" 15x B 5\"     TRA 20x 3\"  20x 3\"      TRA + march   20x B 20x B     STS UE support 3x5 no UE 10x5 with UE support, 2x8 no UE 2x10 no UE 2x10 5#     SLR  15x B, PT assist L LE 2x8 B no assist 2x8 B 1#     Bridge   2x10 2x15 2x15     FSU to SLS   2x12 B green pad 2x12 B green pad     Clams    2x10 B otb 2x10 B otb                       Ther Ex         NS 5' L5 8' L5 8' L5 8' L5     Standing Hip Abduction  2x10 2x10 2x10 2# 2x10 2#     Standing hip ext    2x10 0#     LAQ  2x10 2x10 15x B 2#     Standing ham curl 20x B 20x B       Seated Heel Raises/Toe Raise Dome 20x Dome 20x Dome 20x standing Dome 20x standing     Lat stepping 3 laps otb at knees 3 laps otb at knees 2x3 laps otb at knees  2x3 laps otb at knees     Leg press  2x10 40# 2x10 40# 2x10 40#     Mini lunge   10x B fwd 10x B fwd                       Ther Activity                           Gait Training                           Modalities                                "

## 2025-02-07 ENCOUNTER — APPOINTMENT (OUTPATIENT)
Dept: PHYSICAL THERAPY | Facility: REHABILITATION | Age: 60
End: 2025-02-07
Payer: COMMERCIAL

## 2025-02-10 ENCOUNTER — OFFICE VISIT (OUTPATIENT)
Dept: PHYSICAL THERAPY | Facility: REHABILITATION | Age: 60
End: 2025-02-10
Payer: COMMERCIAL

## 2025-02-10 DIAGNOSIS — M79.605 BILATERAL LEG PAIN: Primary | ICD-10-CM

## 2025-02-10 DIAGNOSIS — M79.605 PAIN IN BOTH LOWER EXTREMITIES: ICD-10-CM

## 2025-02-10 DIAGNOSIS — M79.604 PAIN IN BOTH LOWER EXTREMITIES: ICD-10-CM

## 2025-02-10 DIAGNOSIS — M25.511 RIGHT SHOULDER PAIN, UNSPECIFIED CHRONICITY: Primary | ICD-10-CM

## 2025-02-10 DIAGNOSIS — M79.604 BILATERAL LEG PAIN: Primary | ICD-10-CM

## 2025-02-10 DIAGNOSIS — M54.50 LUMBAR PAIN: ICD-10-CM

## 2025-02-10 PROCEDURE — 97110 THERAPEUTIC EXERCISES: CPT

## 2025-02-10 PROCEDURE — 97112 NEUROMUSCULAR REEDUCATION: CPT

## 2025-02-10 NOTE — PROGRESS NOTES
Daily Note     Today's date: 2/10/2025  Patient name: Mary Beth Lyons  : 1965  MRN: [id number]  Referring provider: Joe Ashraf DO  Dx:   Encounter Diagnosis     ICD-10-CM    1. Bilateral leg pain  M79.604     M79.605       2. Pain in both lower extremities  M79.604     M79.605       3. Lumbar pain  M54.50                       Start Time: 1137  Stop Time: 1200  Total time in clinic (min): 23 minutes    Subjective: Mary Beth reports cont sx improvement.      Objective: See treatment diary below      Assessment: Tolerated treatment well. Patient cont to be appropriately challenged at the current therapeutic volume and intensity. Patient would benefit from continued PT      Plan: Continue per plan of care.      Precautions:   Past Medical History:   Diagnosis Date    Acid reflux     Acid reflux disease 2015    Anemia     last assessed 2016    Arthritis     rheumatoid    Benign paroxysmal positional vertigo 2022    Cervicalgia 2019    Depression     Depression 2018    Fibromyalgia     Headache 2018    Other long term (current) drug therapy 2018    Pain in right wrist 2018    Wrist weakness 2018       Allergies   Allergen Reactions    Other      seasonal       POC expires Unit limit Auth Expiration date PT/OT + Visit Limit?   12 weeks - 2025 bomn After 24v bomn         Visit/Unit Tracking  AUTH Status:  Date 1/8 1/15 1/20 1/22 1/27 1/29 2/3 2/5 2/10   Auth after 24v Used 1 2 3 4 5 6 7 8 9    Remaining                Pertinent Findings:      POC End Date: 2025                                                                                    Test / Measure     FOTO knee(Predicted 55) 35   FOTO back(Predicted 43) 33   Activity intolerance    L knee flx 45* P!   MMTs 4/5   L/s AROM Augustin both with P!!!         Access Code: [email or web address]  URL: [email or web address]  Date: 2025  Prepared by: Joey Arciniega    Exercises  - Supine Hamstring  "Stretch with Strap  - 1 x daily - 7 x weekly - 5 sets - 10-15 seconds hold  - Supine Quad Set  - 1 x daily - 7 x weekly - 2 sets - 10 reps - 3 seconds hold  - Seated Heel Slide  - 1 x daily - 7 x weekly - 1 sets - 15 reps - 5 seconds hold    Access Code: [email or web address]  URL: [email or web address]  Date: 01/20/2025  Prepared by: Lyric Melgar    Exercises  - Standing Hip Abduction with Counter Support  - 1 x daily - 2 x weekly - 2-3 sets - 10 reps  - Mini Squat with Counter Support  - 1 x daily - 2 x weekly - 3 sets - 5 reps  - Seated Heel Toe Raises  - 1 x daily - 7 x weekly - 2-3 sets - 10 reps  - Seated Hip Abduction with Resistance  - 1 x daily - 2-3 x weekly - 2-3 sets - 10 reps    Manuals 1/27 1/29 2/3 2/5 2/10    L knee PROM                                    Neuro Re-Ed         HEP review/pt education         LTR 15x B 5\"  15x B 5\" 15x B 5\" HEP    TRA 20x 3\"  20x 3\"      TRA + march   20x B 20x B 20x B    STS UE support 3x5 no UE 10x5 with UE support, 2x8 no UE 2x10 no UE 2x10 5# 2x10 5#    SLR  15x B, PT assist L LE 2x8 B no assist 2x8 B 1# 2x12 B 1#    Bridge   2x10 2x15 2x15 2x15    FSU to SLS   2x12 B green pad 2x12 B green pad 15x B 4\"    Clams    2x10 B otb 2x10 B otb                       Ther Ex         NS 5' L5 8' L5 8' L5 8' L5 8' L5    Standing Hip Abduction  2x10 2x10 2x10 2# 2x10 2# 2x10 2#    Standing hip ext    2x10 0# 2x10 2#    LAQ  2x10 2x10 15x B 2# 2x15 B 2#    Standing ham curl 20x B 20x B       Seated Heel Raises/Toe Raise Dome 20x Dome 20x Dome 20x standing Dome 20x standing     Lat stepping 3 laps otb at knees 3 laps otb at knees 2x3 laps otb at knees  2x3 laps otb at knees 5 laps gtb at knees    Leg press  2x10 40# 2x10 40# 2x10 40# 2x10 40#    Mini lunge   10x B fwd 10x B fwd 10x B fwd                      Ther Activity                           Gait Training                           Modalities                                "

## 2025-02-12 ENCOUNTER — OFFICE VISIT (OUTPATIENT)
Dept: PHYSICAL THERAPY | Facility: REHABILITATION | Age: 60
End: 2025-02-12
Payer: COMMERCIAL

## 2025-02-12 DIAGNOSIS — M54.50 LUMBAR PAIN: ICD-10-CM

## 2025-02-12 DIAGNOSIS — M79.605 PAIN IN BOTH LOWER EXTREMITIES: ICD-10-CM

## 2025-02-12 DIAGNOSIS — M25.511 RIGHT SHOULDER PAIN, UNSPECIFIED CHRONICITY: Primary | ICD-10-CM

## 2025-02-12 DIAGNOSIS — M79.605 BILATERAL LEG PAIN: ICD-10-CM

## 2025-02-12 DIAGNOSIS — M79.604 PAIN IN BOTH LOWER EXTREMITIES: ICD-10-CM

## 2025-02-12 DIAGNOSIS — M79.604 BILATERAL LEG PAIN: ICD-10-CM

## 2025-02-12 PROCEDURE — 97112 NEUROMUSCULAR REEDUCATION: CPT

## 2025-02-12 PROCEDURE — 97110 THERAPEUTIC EXERCISES: CPT

## 2025-02-12 NOTE — PROGRESS NOTES
1. Pain in both lower extremities        2. Lumbar pain              No orders of the defined types were placed in this encounter.       IMAGING STUDIES: (I personally reviewed images in PACS and report):  Xray Lumbar spine 01/02/2025: No acute abnormal findings.   Xray left hip ap pelvis 1/2/25: KIKA cam pincer R>L. Mild to mod OA    PAST REPORTS:  xray bilateral knees 8/22/24:  Mild bilateral medial knee oa      ASSESSMENT/PLAN:  Bilateral leg pain and hyperalgesia    PMH: Fibromyalgia, rheumatoid arthritis    Differential diagnosis:  Lumbar radiculopathy  Fibromyalgia      Repeat X-ray next visit: None    Return if symptoms worsen or fail to improve.    Patient instructions below verbally summarized in person during encounter:  Patient Instructions   Patient advised that since her exam and her symptoms have improved the patient may follow up as needed for her pain. If her pain comes back patient was advised to return. If her pain returns we will get an MRI of her lumbar spine. Patient advised to continue to do physical therapy and sarah chi.       __________________________________________________________________________    HISTORY OF PRESENT ILLNESS:  Today's visit 2/13/2025:  Patient states her pain has improved and is no longer having pain in her lower extremities. She has been doing sarah chi and physical therapy. She completed her medrol dose pack and felt improvement in her pain. She is no longer having hyperalgesia in her lower extremities. She denies any new complaints today. She previously felt she had to pick her leg up with her hands to move them while sitting. She is now able to move her legs on her own.      Previous note visit on 01/02/2025:  Patient states she continues to have bilateral knee pain that now has associated hip flexion weakness and knee flexion weakness. She feels she has to lift her leg up with her hand in order to get into the car and out of the car. Patient's previous injection only  helped for a mildly helped for a few days and then the pain worsened quickly. Her pain is worsened with standing out of a chair and getting in and out of bed. She states her pain is from the middle of her quad bilaterally down to her feet.    PREVIOUS NOTE on 09/12/2024:  PMH osteoporosis, fibromyalgia, rheumatoid arthritis    Atraumatic 4 months Chronic bilateral knee pain. Referral for osteoarthritis.   Performs sarah chi. Pain worse at end of day night. Medial joint line bilateral.     Patient presents with knee pain. History, examination, and x-rays are consistent with diagnosis of Osteoarthritis.     Nonpharmacologic treatment: home exercise program  Pain Score:   10/10 bilateral intermittent  Pain from condition does interfere with activities of daily living  Previous Visco relief: never done  Previous CSI relief: never done    Review of Systems      Following history reviewed and update:    Past Medical History:   Diagnosis Date    Acid reflux     Acid reflux disease 08/11/2015    Anemia     last assessed 03/23/2016    Arthritis     rheumatoid    Benign paroxysmal positional vertigo 03/18/2022    Cervicalgia 11/21/2019    Depression     Depression 01/24/2018    Fibromyalgia     Headache 01/24/2018    Other long term (current) drug therapy 01/24/2018    Pain in right wrist 08/28/2018    Wrist weakness 08/28/2018     Past Surgical History:   Procedure Laterality Date    HYSTERECTOMY      age 46    RI COLONOSCOPY FLX DX W/COLLJ SPEC WHEN PFRMD N/A 5/5/2016    Procedure: COLONOSCOPY;  Surgeon: Gabbi Greer DO;  Location: [facility];  Service: Gastroenterology    RI OPTX DSTL RDL X-ARTIC FX/EPIPHYSL SEPARATION Right 5/12/2016    Procedure: OPEN REDUCTION INTERNAL FIXATION RIGHT DISTAL RADIUS (EXTRA-ARTICULAR);  Surgeon: Randy Acuna MD;  Location: [facility];  Service: Orthopedics    TONSILLECTOMY       Social History   Social History     Substance and Sexual Activity   Alcohol Use No     Social History  "    Substance and Sexual Activity   Drug Use No     Social History     Tobacco Use   Smoking Status Never   Smokeless Tobacco Never     Family History   Problem Relation Age of Onset    No Known Problems Mother     No Known Problems Father     No Known Problems Sister     No Known Problems Sister     No Known Problems Sister     No Known Problems Daughter     No Known Problems Maternal Grandmother     No Known Problems Maternal Grandfather     No Known Problems Paternal Grandmother     No Known Problems Paternal Grandfather     No Known Problems Son     No Known Problems Maternal Aunt     No Known Problems Maternal Aunt     No Known Problems Maternal Aunt     No Known Problems Maternal Aunt     No Known Problems Maternal Aunt     Breast cancer Cousin      Allergies   Allergen Reactions    Other      seasonal          Physical Exam  Ht 5' 4\" (1.626 m)   Wt 68.5 kg (151 lb)   BMI 25.92 kg/m²         Ortho Exam  LEFT KNEE:  Erythema: no  Swelling: no  Increased Warmth: no  Tenderness: none  Extension: intact  Lachman's: negative  Drawer: negative  Varus laxity: negative  Valgus laxity: negative  [facility]: negative       RIGHT KNEE:  Erythema: no  Swelling: no  Increased Warmth: no  Tenderness: None  Extension: intact  Lachman's: negative  Drawer: negative  Varus laxity: negative  Valgus laxity: negative  Bev: negative     BACK EXAM:  Gait: normal    BACK TENDERNESS:  Spinous Processes: no  Paraspinal Muscles: no    DERMATOMAL SENSATION:  L1-S1: No hyperalgesia     STRENGTH (bilateral):  Knee Extension: 5/5  Foot Dorsiflexion: 5/5  Great Toe Extension: 5/5  Foot Plantarflexion: 5/5  Hip Flexion: 5/5  Hip Abduction: 5/5    BACK:   SUPINE STRAIGHT LEG: NEGATIVE      RIGHT HIP:  LOG ROLL: negative  BERNADINE: negative  FADIR: negative    LEFT HIP:  LOG ROLL: negative  __________________________________________________________________________                    "

## 2025-02-12 NOTE — PROGRESS NOTES
PT Evaluation     Today's date: 2025  Patient name: Mary Beth Lyons  : 1965  MRN: [id number]  Referring provider: Joe Ashraf DO  Dx:   Encounter Diagnosis     ICD-10-CM    1. Right shoulder pain, unspecified chronicity  M25.511 Ambulatory Referral to Physical Therapy      2. Bilateral leg pain  M79.604     M79.605       3. Pain in both lower extremities  M79.604     M79.605       4. Lumbar pain  M54.50           Start Time: 1145  Stop Time: 1225  Total time in clinic (min): 40 minutes    Assessment  Impairments: abnormal gait, abnormal or restricted ROM, abnormal movement, activity intolerance, impaired physical strength, lacks appropriate home exercise program, pain with function, weight-bearing intolerance, poor posture  and endurance    Assessment details: Mary Beth Lyons is a pleasant 59 y.o. female who presents with low back and B knee pain. Upon eval today, she has activity intolerance, augustin l/s AROM, augustin L knee ROM and poor B LE strength resulting in worry over not knowing what's wrong and fear of not being able to keep active.  No further referral appears necessary at this time based upon examination results.  I expect she will improve within 12 weeks of skilled PT aimed at gradually progressing tolerance to LE activity.  Positive prognostic indicators include positive attitude toward recovery.  Negative prognostic indicators include chronicity of symptoms, depression, hypertension, high symptom irritability, lack of centralization with movement, minimal changes in pain with movement, fibromyalgia, multiple concurrent orthopedic problems, multiple prior failed treatments, and obesity.      Comparable signs:  1) Amb  2) MMTs  3) LE PROM    Problem List:  1) Activity intolerance  2) Augustin l/s AROM  3) Augustin L knee ROM  4) Poor B LE strength    25: Upon re-evaluation today, Mary Beth has demonstrated improvements in B LE strength and ROM, functional mobility and activity tolerance, as well as  subjective reports of signif reductions in low back and knee symptoms. Mary Beth still presents with deficits of WB intolerance and decreased LE strength, resulting in pain with ADLs. In regards to her shoulder, she demonstrates s/sx consistent with impingement/RTC pathology, demonstrating (+) special testing, poor periscapular strength and activity intolerance. Time was spent for eduction surrounding review of findings of the examination, shoulder anatomy and biomechanics, tissue end feels, the benefits of skilled PT. Prescribed and reviewed HEP to address the aforementioned deficits. Patient verbalized understanding of the information provided. All patient questions were answered. We will continue to progress as tolerated.    Understanding of Dx/Px/POC: excellent     Prognosis: good    Goals  Impairment based goals  Patient will improve l/s ROM by 25% within 3 weeks in order to improve ease and stability with functional mobility. - met  Patient will improve l/s ROM to WFL by time of d/c in order to improve ease and stability with functional mobility. - part met  Patient will improve L knee ROM by 30* within 3 weeks in order to improve ease and stability with functional mobility. - met  Patient will improve L knee ROM to WFL by time of d/c in order to improve ease and stability with functional mobility. - met  Patient will improve B LE strength to 4+/5 in all planes within 3 weeks in order to improve ease and stability with functional mobility. - met  Patient will improve B LE strength to 5/5 in all planes by time of d/c in order to improve ease and stability with functional mobility. - not met  Patient will consistently demonstrate normal squatting mechanics during a tx session centered around improving LE strength and power. - part met    ADDED  Patient will improve B periscap strength to 4+/5 in all planes within 3 weeks in order to improve ease and stability with functional mobility.  Patient will improve B  "periscap strength to 5/5 in all planes by time of d/c in order to improve ease and stability with functional mobility.  Patient will consistently demonstrate proper upright seated and standing posture during treatment sessions centered around improving deficits of strength and mobility.    Functional based goals to be completed by time of d/c  Patient will tolerate a 45 min tx session centered around improving LE strength and mobility without reports of sx exceeding >/=3/10. - met  Patient will improve FOTO to greater than predicted value. - met  Patient will be independent with home exercise program. - not met  Patient will be able to manage symptoms independently. - not met    Plan    Planned therapy interventions: abdominal trunk stabilization, activity modification, ADL training, balance/weight bearing training, gait training, home exercise program, therapeutic exercise, therapeutic activities, stretching, strengthening, patient education, neuromuscular re-education, postural training, therapeutic training, joint mobilization, IASTM, kinesiology taping, manual therapy, massage, Orourke taping, motor coordination training, muscle pump exercises, nerve gliding, self care, work reintegration, fine motor coordination training, flexibility, functional ROM exercises, graded activity, graded exercise, graded motor, IADL retraining, balance, behavior modification, body mechanics training, breathing training, transfer training and coordination    Frequency: 2x week  Duration in weeks: 12  Treatment plan discussed with: patient        Subjective  HPI: 2/12/2025: Mary Beth Lyons is a pleasant 59 y.o. female presenting to PT for low back and B LE pain. Per ortho visit 1/2/25:    \"Bilateral leg pain and hyperalgesia  History negative for claudication symptoms  Exam negative for proximal muscle weakness  Full strength bilateral lower extremity on exam  PMH: Fibromyalgia, rheumatoid arthritis    2/12/25: Mary Beth presents " "for RE of B knee pain, as well as evaluation of ongoing R shoulder pain. She reports signif improvements in her knee pain throughout PT thus far.     Differential diagnosis:  Polymyalgia rheumatica  Lumbar radiculopathy  Rheumatoid flare  Fibromyalgia\"    Pain: low back, B knees  Current: 1/10 knees, 8/10 R shoulder  Best: 0/10  Worst: 10/10   Aggravating: sitting, about to lay down, lying down, getting into and out of car, climbing, bending, OH, lifting   Alleviating: methylprednisolone,   Employment: \"I cook\"  Goals: get rid of pain      Objective  Postural Findings:     Head Position x Protracted   Neutral   Retracted   Scapular Position  Protracted x  Neutral   Retracted   Thoracic Spine x  Inc Kyphosis  Neutral       Lumbar Spine   Inc Lordosis x  Neutral  Dec Lordosis   Pelvis   Anterior Tilt x Neutral   Posterior Tilt   Iliac Crest   L elevated x Neutral   R elevated   Feet   Pronated x Neutral   Supinated   Lateral Shift   Right   Left x None     Sensation:  Nerve root RIGHT  LEFT   L2 intact intact   L3 intact intact   L4  intact intact   L5 intact intact   S1 intact intact   S2 intact intact     Reflexes:   Joint / Motion  Right  Left    Patellar (L3-L4) 2+ 2+    Achilles (L5-S1) 2+ 2+        Strength and ROM evaluated B from a regional biomechanical perspective and values relevant to this episode recorded in tables below.     ROM:   Joint / Motion  Right  Left    Lumbar Flexion  90%   Lumbar Extension  75%   Lumbar Sidebending      Lumbar rotation          Strength: MMT revealed the following findings. P!!! With all  Joint Motion Right Left   Hip Flexion 4+ 4+   Hip Abduction 4+ 4+   Hip Adduction 4+ 4+   Hip Extension 4+ 4+   Hip ER 4+ 4+   Hip IR 4+ 4+   Knee Extension 4+ 4+   Knee Flexion 4+ 4+   Ankle Plantarflexion 4+ 4+   Ankle Dorsiflexion 4+ 4+          Additional Assessments:  Passive intervertebral motion: dnt   Squatting: mild B valgus  Amb: antalgic L LE, decreased L step and stride length   "      Special Tests:  Lumbar Specific and Neural Tension  SLR Positive   X-SLR Negative   Slump Positive   Prone instability Positive   Mark    90/90    Prone knee flx      SI Joint Screen:      Treatment Based Classification: Primary movement desensitization, secondary stability         CERVICAL EXAM  Red flags: None  Myers: negative   Babinski: negative   Alar Ligament: negative   Sharp Kayy: negative   Vertebral Artery Test: negative     SENSATION   LEFT RIGHT   C2-C3 Intact Intact   C4 Intact Intact   C5 Intact Intact   C6 Intact Intact   C7 Intact Intact   C8 Intact Intact   T1 Intact Intact   T2 Intact Intact     REFLEXES   LEFT RIGHT   TRICEPS 2+ 2+   BICEPS 2+ 2+   BR 2+ 2+     POSTURAL FINDINGS  Fwd head    CERVICAL   AROM - WFL    RIGHT LEFT   FLX    EXT    ROT         THORACIC   AROM     RIGHT LEFT   FLX 75%   EXT 50%   ROT 75% 75%       SHOULDER   AROM PROM - all WFL STRENGTH    RIGHT LEFT RIGHT LEFT RIGHT LEFT   FLX wfl    4/5 4/5   ABD wfl    4/5 4/5   EXT     4/5 4/5   ER occiput    4/5 4/5   IR L1    4/5 4/5   U. TRAP     4/5 4/5   M. TRAP     4/5 4/5   L. TRAP     4/5 4/5   SERRATUS     4/5 4/5       ELBOW   AROM PROM STRENGTH    RIGHT LEFT RIGHT LEFT RIGHT LEFT   FLX     5/5 5/5   EXT     5/5 5/5                  SPECIAL   LEFT RIGHT   RTC/IMPINGEMENT     Sanchez  pos   Infraspinatus  pos   Painful arc  pos   Drop arm     ER lag sign     Lift off sign     Neer     Empty can     BICEPS TENDINOPATHY     Speed's     Jean-Paul's     Patel's     ACJ     Active compression     Crossover          SCAPULA     Scapular assistance     Scapular dyskinesis     DAVID Orourke     ULTT     ULTT 1     ULTT 2     ULTT 3     ULTT 4       COMMENTS:             Precautions:   Past Medical History:   Diagnosis Date    Acid reflux     Acid reflux disease 08/11/2015    Anemia     last assessed 03/23/2016    Arthritis     rheumatoid    Benign paroxysmal positional vertigo  03/18/2022    Cervicalgia 11/21/2019    Depression     Depression 01/24/2018    Fibromyalgia     Headache 01/24/2018    Other long term (current) drug therapy 01/24/2018    Pain in right wrist 08/28/2018    Wrist weakness 08/28/2018       Allergies   Allergen Reactions    Other      seasonal        POC expires Unit limit Auth Expiration date PT/OT + Visit Limit?   12 weeks - 4/2/2025 bomn After 24v bomn             Visit/Unit Tracking  AUTH Status:  Date 1/8 1/15 1/20 1/22 1/27 1/29 2/3 2/5 2/10 2/12  (RE)   Auth after 24v Used 1 2 3 4 5 6 7 8 9 10     Remaining                          Pertinent Findings:      POC End Date: 4/2/2025                                                                                    Test / Measure      FOTO knee(Predicted 55) 35   FOTO back(Predicted 43) 33   Activity intolerance     L knee flx 45* P!   MMTs 4/5   L/s AROM Augustin both with P!!!            Access Code: [email or web address]  URL: [email or web address]  Date: 01/08/2025  Prepared by: Joey Arciniega     Exercises  - Supine Hamstring Stretch with Strap  - 1 x daily - 7 x weekly - 5 sets - 10-15 seconds hold  - Supine Quad Set  - 1 x daily - 7 x weekly - 2 sets - 10 reps - 3 seconds hold  - Seated Heel Slide  - 1 x daily - 7 x weekly - 1 sets - 15 reps - 5 seconds hold     Access Code: [email or web address]  URL: [email or web address]  Date: 01/20/2025  Prepared by: Lyric Melgar     Exercises  - Standing Hip Abduction with Counter Support  - 1 x daily - 2 x weekly - 2-3 sets - 10 reps  - Mini Squat with Counter Support  - 1 x daily - 2 x weekly - 3 sets - 5 reps  - Seated Heel Toe Raises  - 1 x daily - 7 x weekly - 2-3 sets - 10 reps  - Seated Hip Abduction with Resistance  - 1 x daily - 2-3 x weekly - 2-3 sets - 10 reps     Access Code: [email or web address]  URL: [email or web address]  Date: 02/12/2025  Prepared by: Joey Arciniega    Exercises  - Standing Shoulder Row with Anchored Resistance  - 1 x daily - 7 x weekly - 2 sets  "- 10 reps  - Shoulder extension with resistance - Neutral  - 1 x daily - 7 x weekly - 2 sets - 10 reps  - Shoulder External Rotation with Anchored Resistance  - 1 x daily - 7 x weekly - 2 sets - 10 reps  - Shoulder Internal Rotation with Resistance  - 1 x daily - 7 x weekly - 2 sets - 10 reps    Manuals 1/27 1/29 2/3 2/5 2/10  2/12   L knee PROM                                                               Neuro Re-Ed               HEP review/pt education            30'   LTR 15x B 5\"   15x B 5\" 15x B 5\" HEP     TRA 20x 3\"   20x 3\"         TRA + march     20x B 20x B 20x B     STS UE support 3x5 no UE 10x5 with UE support, 2x8 no UE 2x10 no UE 2x10 5# 2x10 5#     SLR   15x B, PT assist L LE 2x8 B no assist 2x8 B 1# 2x12 B 1#     Bridge    2x10 2x15 2x15 2x15     FSU to SLS     2x12 B green pad 2x12 B green pad 15x B 4\"     Clams      2x10 B otb 2x10 B otb                Row       10x otb   LPD      10x otb   ER/IR            10x otb ea   SL flx/abd/ER         Sup alt iso                                           Ther Ex               NS 5' L5 8' L5 8' L5 8' L5 8' L5     Standing Hip Abduction  2x10 2x10 2x10 2# 2x10 2# 2x10 2#     Standing hip ext       2x10 0# 2x10 2#     LAQ   2x10 2x10 15x B 2# 2x15 B 2#     Standing ham curl 20x B 20x B           Seated Heel Raises/Toe Raise Dome 20x Dome 20x Dome 20x standing Dome 20x standing       Lat stepping 3 laps otb at knees 3 laps otb at knees 2x3 laps otb at knees  2x3 laps otb at knees 5 laps gtb at knees     Leg press   2x10 40# 2x10 40# 2x10 40# 2x10 40#     Mini lunge     10x B fwd 10x B fwd 10x B fwd                     Sup flx         Wall slides         Face pull                                  Ther Activity                                               Gait Training                                               Modalities                                                       "

## 2025-02-13 ENCOUNTER — OFFICE VISIT (OUTPATIENT)
Dept: OBGYN CLINIC | Facility: OTHER | Age: 60
End: 2025-02-13
Payer: COMMERCIAL

## 2025-02-13 VITALS — BODY MASS INDEX: 25.78 KG/M2 | HEIGHT: 64 IN | WEIGHT: 151 LBS

## 2025-02-13 DIAGNOSIS — M79.604 PAIN IN BOTH LOWER EXTREMITIES: Primary | ICD-10-CM

## 2025-02-13 DIAGNOSIS — M54.50 LUMBAR PAIN: ICD-10-CM

## 2025-02-13 DIAGNOSIS — M79.605 PAIN IN BOTH LOWER EXTREMITIES: Primary | ICD-10-CM

## 2025-02-13 PROCEDURE — 99213 OFFICE O/P EST LOW 20 MIN: CPT | Performed by: FAMILY MEDICINE

## 2025-02-13 NOTE — PATIENT INSTRUCTIONS
Patient advised that since her exam and her symptoms have improved the patient may follow up as needed for her pain. If her pain comes back patient was advised to return. If her pain returns we will get an MRI of her lumbar spine. Patient advised to continue to do physical therapy and sarah chi.

## 2025-02-14 ENCOUNTER — APPOINTMENT (OUTPATIENT)
Dept: PHYSICAL THERAPY | Facility: REHABILITATION | Age: 60
End: 2025-02-14
Payer: COMMERCIAL

## 2025-02-16 DIAGNOSIS — F41.9 ANXIETY: ICD-10-CM

## 2025-02-16 DIAGNOSIS — K21.9 GASTROESOPHAGEAL REFLUX DISEASE: ICD-10-CM

## 2025-02-17 ENCOUNTER — OFFICE VISIT (OUTPATIENT)
Dept: INTERNAL MEDICINE CLINIC | Facility: CLINIC | Age: 60
End: 2025-02-17
Payer: COMMERCIAL

## 2025-02-17 ENCOUNTER — OFFICE VISIT (OUTPATIENT)
Dept: PHYSICAL THERAPY | Facility: REHABILITATION | Age: 60
End: 2025-02-17
Payer: COMMERCIAL

## 2025-02-17 VITALS
OXYGEN SATURATION: 98 % | DIASTOLIC BLOOD PRESSURE: 70 MMHG | TEMPERATURE: 97.3 F | BODY MASS INDEX: 26.26 KG/M2 | HEIGHT: 64 IN | SYSTOLIC BLOOD PRESSURE: 120 MMHG | HEART RATE: 100 BPM | WEIGHT: 153.8 LBS

## 2025-02-17 DIAGNOSIS — Z11.59 NEED FOR HEPATITIS C SCREENING TEST: ICD-10-CM

## 2025-02-17 DIAGNOSIS — R41.3 POOR MEMORY: Primary | ICD-10-CM

## 2025-02-17 DIAGNOSIS — M79.604 BILATERAL LEG PAIN: ICD-10-CM

## 2025-02-17 DIAGNOSIS — M25.511 RIGHT SHOULDER PAIN, UNSPECIFIED CHRONICITY: Primary | ICD-10-CM

## 2025-02-17 DIAGNOSIS — L29.9 SCALP ITCH: ICD-10-CM

## 2025-02-17 DIAGNOSIS — M79.605 BILATERAL LEG PAIN: ICD-10-CM

## 2025-02-17 DIAGNOSIS — Z23 ENCOUNTER FOR IMMUNIZATION: ICD-10-CM

## 2025-02-17 DIAGNOSIS — M79.604 PAIN IN BOTH LOWER EXTREMITIES: ICD-10-CM

## 2025-02-17 DIAGNOSIS — M54.50 LUMBAR PAIN: ICD-10-CM

## 2025-02-17 DIAGNOSIS — M79.605 PAIN IN BOTH LOWER EXTREMITIES: ICD-10-CM

## 2025-02-17 DIAGNOSIS — F41.9 ANXIETY: ICD-10-CM

## 2025-02-17 PROCEDURE — 99214 OFFICE O/P EST MOD 30 MIN: CPT | Performed by: INTERNAL MEDICINE

## 2025-02-17 PROCEDURE — 90471 IMMUNIZATION ADMIN: CPT

## 2025-02-17 PROCEDURE — 97110 THERAPEUTIC EXERCISES: CPT

## 2025-02-17 PROCEDURE — 90677 PCV20 VACCINE IM: CPT

## 2025-02-17 PROCEDURE — 97112 NEUROMUSCULAR REEDUCATION: CPT

## 2025-02-17 NOTE — PROGRESS NOTES
Name: Mary Beth Lyons      : 1965      MRN: [id number]  Encounter Provider: Lea Reyes, MD  Encounter Date: 2025   Encounter department: [facility]    Assessment & Plan  Poor memory  Obtain previously ordered labs with TSH and B12  ?  Anxiety driven  Orders:  •  TSH, 3rd generation with Free T4 reflex  •  Vitamin B12    Anxiety  Seems to be a little worse  For now continue buspirone  Fibromyalgia controlled on Savella, gabapentin so declines going back on duloxetine       Encounter for immunization    Orders:  •  Pneumococcal Conjugate Vaccine 20-valent (Pcv20)    Need for hepatitis C screening test    Orders:  •  Hepatitis C antibody    Scalp itch  No rash, dandruff  No improvement with head and shoulders, Nizoral over-the-counter  Provided referral to dermatology  Orders:  •  Ambulatory Referral to Dermatology; Future         History of Present Illness      used  C/o worsening memory, forgetting a lot-misplacing things, missed exit driving to the office today, forgot she did not turn off engine when she got out of a car one time, difficulty studying for citizenship exam; poor concentration  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   HENT:  Negative for hearing loss.    Respiratory:  Negative for cough, shortness of breath and wheezing.    Cardiovascular:  Negative for chest pain, palpitations and leg swelling.   Gastrointestinal:  Negative for abdominal pain, constipation, diarrhea, nausea and vomiting.   Musculoskeletal:  Positive for arthralgias, back pain and myalgias.   Neurological:  Negative for dizziness and headaches.   Psychiatric/Behavioral:  The patient is nervous/anxious.      Past Medical History:   Diagnosis Date   • Acid reflux    • Acid  reflux disease 08/11/2015   • Anemia     last assessed 03/23/2016   • Arthritis     rheumatoid   • Benign paroxysmal positional vertigo 03/18/2022   • Cervicalgia 11/21/2019   • Depression    • Depression 01/24/2018   • Fibromyalgia    • Headache 01/24/2018   • Other long term (current) drug therapy 01/24/2018   • Pain in right wrist 08/28/2018   • Wrist weakness 08/28/2018     Past Surgical History:   Procedure Laterality Date   • HYSTERECTOMY      age 46   • SC COLONOSCOPY FLX DX W/COLLJ SPEC WHEN PFRMD N/A 5/5/2016    Procedure: COLONOSCOPY;  Surgeon: Gabbi Greer DO;  Location: [facility];  Service: Gastroenterology   • SC OPTX DSTL RDL X-ARTIC FX/EPIPHYSL SEPARATION Right 5/12/2016    Procedure: OPEN REDUCTION INTERNAL FIXATION RIGHT DISTAL RADIUS (EXTRA-ARTICULAR);  Surgeon: Randy Acuna MD;  Location: [facility];  Service: Orthopedics   • TONSILLECTOMY       Family History   Problem Relation Age of Onset   • No Known Problems Mother    • No Known Problems Father    • No Known Problems Sister    • No Known Problems Sister    • No Known Problems Sister    • No Known Problems Daughter    • No Known Problems Maternal Grandmother    • No Known Problems Maternal Grandfather    • No Known Problems Paternal Grandmother    • No Known Problems Paternal Grandfather    • No Known Problems Son    • No Known Problems Maternal Aunt    • No Known Problems Maternal Aunt    • No Known Problems Maternal Aunt    • No Known Problems Maternal Aunt    • No Known Problems Maternal Aunt    • Breast cancer Cousin      Social History     Tobacco Use   • Smoking status: Never   • Smokeless tobacco: Never   Vaping Use   • Vaping status: Never Used   Substance and Sexual Activity   • Alcohol use: No   • Drug use: No   • Sexual activity: Not Currently     Current Outpatient Medications on File Prior to Visit   Medication Sig   • alendronate (FOSAMAX) 70 mg tablet TAKE 1 TABLET BY MOUTH EVERY 7 DAYS WITH A GLASS OF WATER, STAY UPRIGHT  AND DO NOT LIE DOWN FOR AT LEAST 30 MINUTES AFTER   • busPIRone (BUSPAR) 15 mg tablet Take 1 tablet (15 mg total) by mouth 3 (three) times a day   • clotrimazole (LOTRIMIN) 1 % cream Apply topically 2 (two) times a day   • fluticasone (FLONASE) 50 mcg/act nasal spray SHAKE LIQUID AND USE 2 SPRAYS IN EACH NOSTRIL DAILY   • gabapentin (NEURONTIN) 300 mg capsule TAKE 3 CAPSULES BY MOUTH EVERY MORNING, 2 CAPSULES IN THE AFTERNOON, AND 3 CAPSULES AT BEDTIME   • latanoprost (XALATAN) 0.005 % ophthalmic solution INSTILL 1 DROP IN RIGHT EYE EVERY NIGHT AT BEDTIME   • Magnesium 400 MG CAPS Take 1 capsule (400 mg total) by mouth in the morning   • Milnacipran HCl (Savella) 50 MG TABS TAKE 1 TABLET BY MOUTH TWICE DAILY   • omeprazole (PriLOSEC) 20 mg delayed release capsule Take 1 capsule (20 mg total) by mouth 2 (two) times a day   • clobetasol (TEMOVATE) 0.05 % ointment Apply topically in the morning X12 weeks (Patient not taking: Reported on 2/17/2025)   • [DISCONTINUED] methylPREDNISolone 4 MG tablet therapy pack Use as directed on package (Patient not taking: Reported on 2/17/2025)     Allergies   Allergen Reactions   • Other      seasonal     Immunization History   Administered Date(s) Administered   • COVID-19 PFIZER VACCINE 0.3 ML IM 07/03/2021   • COVID-19 Pfizer Vac BIVALENT Alex-sucrose 12 Yr+ IM 08/07/2023   • COVID-19 Pfizer mRNA vacc PF alex-sucrose 12 yr and older (Comirnaty) 10/16/2024   • COVID-19 Pfizer vac (Alex-sucrose, gray cap) 12 yr+ IM 02/22/2022   • INFLUENZA 10/28/2020, 12/06/2021   • Influenza Quadrivalent Preservative Free 3 years and older IM 10/29/2015, 09/23/2016, 10/16/2017   • Influenza, injectable, quadrivalent, preservative free 0.5 mL 10/16/2023   • Influenza, recombinant, quadrivalent,injectable, preservative free 10/19/2018, 11/21/2022   • Influenza, seasonal, injectable, preservative free 10/16/2024   • Pneumococcal Conjugate Vaccine 20-valent (Pcv20), Polysace 02/17/2025   • Pneumococcal  "Polysaccharide PPV23 06/08/2015   • Respiratory Syncytial Virus Vaccine (Recombinant) 01/18/2024   • Tdap 06/08/2015   • Zoster Vaccine Recombinant 10/28/2020, 12/27/2020     Objective   /70 (BP Location: Left arm, Patient Position: Sitting, Cuff Size: Standard)   Pulse 100   Temp (!) 97.3 °F (36.3 °C) (Tympanic)   Ht 5' 4\" (1.626 m)   Wt 69.8 kg (153 lb 12.8 oz)   SpO2 98%   BMI 26.40 kg/m²     Physical Exam  Constitutional:       General: She is not in acute distress.     Appearance: She is well-developed. She is not ill-appearing, toxic-appearing or diaphoretic.   Eyes:      Conjunctiva/sclera: Conjunctivae normal.   Cardiovascular:      Rate and Rhythm: Normal rate and regular rhythm.      Heart sounds: Normal heart sounds. No murmur heard.  Pulmonary:      Effort: Pulmonary effort is normal. No respiratory distress.      Breath sounds: Normal breath sounds. No stridor. No wheezing or rales.   Abdominal:      General: There is no distension.      Palpations: Abdomen is soft. There is no mass.      Tenderness: There is no abdominal tenderness. There is no guarding or rebound.   Musculoskeletal:         General: Tenderness (Muscular) present.      Cervical back: Neck supple.      Right lower leg: No edema.      Left lower leg: No edema.   Neurological:      Mental Status: She is alert and oriented to person, place, and time.   Psychiatric:         Mood and Affect: Mood is depressed.         Behavior: Behavior normal.         Thought Content: Thought content normal.         Judgment: Judgment normal.         "

## 2025-02-17 NOTE — ASSESSMENT & PLAN NOTE
Seems to be a little worse  For now continue buspirone  Fibromyalgia controlled on Savella, gabapentin so declines going back on duloxetine

## 2025-02-17 NOTE — PROGRESS NOTES
Daily Note     Today's date: 2025  Patient name: Mary Beth Lyons  : 1965  MRN: [id number]  Referring provider: Joe Ashraf DO  Dx:   Encounter Diagnosis     ICD-10-CM    1. Right shoulder pain, unspecified chronicity  M25.511       2. Bilateral leg pain  M79.604     M79.605       3. Lumbar pain  M54.50       4. Pain in both lower extremities  M79.604     M79.605           Start Time: 1400  Stop Time: 1454  Total time in clinic (min): 54 minutes    Subjective: Knee feeling better, shoulder feels about the same.      Objective: See treatment diary below      Assessment: Tolerated treatment well. Great tolerance to all strengthening interventions without c/o sx increase throughout. Difficulties present with motor control with SL interventions. Will progress as tolerated. Patient would benefit from continued PT      Plan: Continue per plan of care.      Precautions:   Past Medical History:   Diagnosis Date    Acid reflux     Acid reflux disease 2015    Anemia     last assessed 2016    Arthritis     rheumatoid    Benign paroxysmal positional vertigo 2022    Cervicalgia 2019    Depression     Depression 2018    Fibromyalgia     Headache 2018    Other long term (current) drug therapy 2018    Pain in right wrist 2018    Wrist weakness 2018       Allergies   Allergen Reactions    Other      seasonal        POC expires Unit limit Auth Expiration date PT/OT + Visit Limit?   12 weeks - 2025 bomn After 24v bomn             Visit/Unit Tracking  AUTH Status:  Date 1/8 1/15 1/20 1/22 1/27 1/29 2/3 2/5 2/10 2/12  (RE)    Auth after 24v Used 1 2 3 4 5 6 7 8 9 10 11     Remaining                           Pertinent Findings:      POC End Date: 2025                                                                                    Test / Measure      FOTO knee(Predicted 55) 35   FOTO back(Predicted 43) 33   Activity intolerance     L knee flx 45* P!  "  MMTs 4/5   L/s AROM Augustin both with P!!!            Access Code: [email or web address]  URL: [email or web address]  Date: 01/08/2025  Prepared by: Joey Arciniega     Exercises  - Supine Hamstring Stretch with Strap  - 1 x daily - 7 x weekly - 5 sets - 10-15 seconds hold  - Supine Quad Set  - 1 x daily - 7 x weekly - 2 sets - 10 reps - 3 seconds hold  - Seated Heel Slide  - 1 x daily - 7 x weekly - 1 sets - 15 reps - 5 seconds hold     Access Code: [email or web address]  URL: [email or web address]  Date: 01/20/2025  Prepared by: Lyric Melgar     Exercises  - Standing Hip Abduction with Counter Support  - 1 x daily - 2 x weekly - 2-3 sets - 10 reps  - Mini Squat with Counter Support  - 1 x daily - 2 x weekly - 3 sets - 5 reps  - Seated Heel Toe Raises  - 1 x daily - 7 x weekly - 2-3 sets - 10 reps  - Seated Hip Abduction with Resistance  - 1 x daily - 2-3 x weekly - 2-3 sets - 10 reps     Access Code: [email or web address]  URL: [email or web address]  Date: 02/12/2025  Prepared by: Joey Arciniega    Exercises  - Standing Shoulder Row with Anchored Resistance  - 1 x daily - 7 x weekly - 2 sets - 10 reps  - Shoulder extension with resistance - Neutral  - 1 x daily - 7 x weekly - 2 sets - 10 reps  - Shoulder External Rotation with Anchored Resistance  - 1 x daily - 7 x weekly - 2 sets - 10 reps  - Shoulder Internal Rotation with Resistance  - 1 x daily - 7 x weekly - 2 sets - 10 reps    Manuals 1/27 1/29 2/3 2/5 2/10  2/12 2/17   L knee PROM                                                                   Neuro Re-Ed                HEP review/pt education            30'    LTR 15x B 5\"   15x B 5\" 15x B 5\" HEP      TRA 20x 3\"   20x 3\"          TRA + march     20x B 20x B 20x B      STS UE support 3x5 no UE 10x5 with UE support, 2x8 no UE 2x10 no UE 2x10 5# 2x10 5#   2x10 5#   SLR   15x B, PT assist L LE 2x8 B no assist 2x8 B 1# 2x12 B 1#   2x10 2# B   Bridge    2x10 2x15 2x15 2x15   2x15   FSU to SLS     2x12 B green pad " "2x12 B green pad 15x B 4\"   15x B 4\"   Clams      2x10 B otb 2x10 B otb     2x12 B gtb             Row       10x otb 2x10 10#   LPD      10x otb 2x10 7#   ER/IR            10x otb ea 2x10 ea 1#   SL flx/abd/ER       2x10 ea   Sup alt iso                                               Ther Ex                NS 5' L5 8' L5 8' L5 8' L5 8' L5   8' L5   Standing Hip Abduction  2x10 2x10 2x10 2# 2x10 2# 2x10 2#   2x10 2#   Standing hip ext       2x10 0# 2x10 2#   2x10 2#   LAQ   2x10 2x10 15x B 2# 2x15 B 2#   2x10 B 2#   Standing ham curl 20x B 20x B            Seated Heel Raises/Toe Raise Dome 20x Dome 20x Dome 20x standing Dome 20x standing        Lat stepping 3 laps otb at knees 3 laps otb at knees 2x3 laps otb at knees  2x3 laps otb at knees 5 laps gtb at knees   5 laps gtb at knees   Leg press   2x10 40# 2x10 40# 2x10 40# 2x10 40#   2x10 40#   Mini lunge     10x B fwd 10x B fwd 10x B fwd   10x B fwd                    Sup flx          Wall slides          Face pull                                     Ther Activity                                                  Gait Training                                                  Modalities                                                          "

## 2025-02-18 RX ORDER — BUSPIRONE HYDROCHLORIDE 15 MG/1
TABLET ORAL
Qty: 270 TABLET | Refills: 3 | Status: SHIPPED | OUTPATIENT
Start: 2025-02-18 | End: 2025-02-19 | Stop reason: SDUPTHER

## 2025-02-19 ENCOUNTER — OFFICE VISIT (OUTPATIENT)
Dept: PHYSICAL THERAPY | Facility: REHABILITATION | Age: 60
End: 2025-02-19
Payer: COMMERCIAL

## 2025-02-19 DIAGNOSIS — M79.604 PAIN IN BOTH LOWER EXTREMITIES: ICD-10-CM

## 2025-02-19 DIAGNOSIS — M79.605 PAIN IN BOTH LOWER EXTREMITIES: ICD-10-CM

## 2025-02-19 DIAGNOSIS — K21.9 GASTROESOPHAGEAL REFLUX DISEASE: ICD-10-CM

## 2025-02-19 DIAGNOSIS — M25.511 RIGHT SHOULDER PAIN, UNSPECIFIED CHRONICITY: Primary | ICD-10-CM

## 2025-02-19 DIAGNOSIS — M79.604 BILATERAL LEG PAIN: ICD-10-CM

## 2025-02-19 DIAGNOSIS — M79.605 BILATERAL LEG PAIN: ICD-10-CM

## 2025-02-19 DIAGNOSIS — F41.9 ANXIETY: ICD-10-CM

## 2025-02-19 DIAGNOSIS — M54.50 LUMBAR PAIN: ICD-10-CM

## 2025-02-19 PROCEDURE — 97110 THERAPEUTIC EXERCISES: CPT

## 2025-02-19 PROCEDURE — 97112 NEUROMUSCULAR REEDUCATION: CPT

## 2025-02-19 RX ORDER — BUSPIRONE HYDROCHLORIDE 15 MG/1
15 TABLET ORAL 3 TIMES DAILY
Qty: 270 TABLET | Refills: 3 | Status: SHIPPED | OUTPATIENT
Start: 2025-02-19

## 2025-02-19 NOTE — PROGRESS NOTES
Daily Note     Today's date: 2025  Patient name: Mary Beth Lyons  : 1965  MRN: [id number]  Referring provider: Joe Ashraf DO  Dx:   Encounter Diagnosis     ICD-10-CM    1. Right shoulder pain, unspecified chronicity  M25.511       2. Bilateral leg pain  M79.604     M79.605       3. Lumbar pain  M54.50       4. Pain in both lower extremities  M79.604     M79.605             Start Time: 1254  Stop Time: 1332  Total time in clinic (min): 38 minutes    Subjective: Shoulder and knee are feeling a little better.      Objective: See treatment diary below      Assessment: Tolerated treatment well. Mary Beth cont to demonstrate great tolerance to progressions of existing interventions without reports of sx throughout.. Patient would benefit from continued PT      Plan: Continue per plan of care.      Precautions:   Past Medical History:   Diagnosis Date    Acid reflux     Acid reflux disease 2015    Anemia     last assessed 2016    Arthritis     rheumatoid    Benign paroxysmal positional vertigo 2022    Cervicalgia 2019    Depression     Depression 2018    Fibromyalgia     Headache 2018    Other long term (current) drug therapy 2018    Pain in right wrist 2018    Wrist weakness 2018       Allergies   Allergen Reactions    Other      seasonal        POC expires Unit limit Auth Expiration date PT/OT + Visit Limit?   12 weeks - 2025 bomn After 24v bomn             Visit/Unit Tracking  AUTH Status:  Date 1/8 1/15 1/20 1/22 1/27 1/29 2/3 2/5 2/10 2/12  (RE)    Auth after 24v Used 1 2 3 4 5 6 7 8 9 10 11 12     Remaining                            Pertinent Findings:      POC End Date: 2025                                                                                    Test / Measure      FOTO knee(Predicted 55) 35   FOTO back(Predicted 43) 33   Activity intolerance     L knee flx 45* P!   MMTs 4/5   L/s AROM Augustin both with P!!!           "  Access Code: [email or web address]  URL: [email or web address]  Date: 01/08/2025  Prepared by: Joey Arciniega     Exercises  - Supine Hamstring Stretch with Strap  - 1 x daily - 7 x weekly - 5 sets - 10-15 seconds hold  - Supine Quad Set  - 1 x daily - 7 x weekly - 2 sets - 10 reps - 3 seconds hold  - Seated Heel Slide  - 1 x daily - 7 x weekly - 1 sets - 15 reps - 5 seconds hold     Access Code: [email or web address]  URL: [email or web address]  Date: 01/20/2025  Prepared by: Lyric Melgar     Exercises  - Standing Hip Abduction with Counter Support  - 1 x daily - 2 x weekly - 2-3 sets - 10 reps  - Mini Squat with Counter Support  - 1 x daily - 2 x weekly - 3 sets - 5 reps  - Seated Heel Toe Raises  - 1 x daily - 7 x weekly - 2-3 sets - 10 reps  - Seated Hip Abduction with Resistance  - 1 x daily - 2-3 x weekly - 2-3 sets - 10 reps     Access Code: [email or web address]  URL: [email or web address]  Date: 02/12/2025  Prepared by: Joey Arciniega    Exercises  - Standing Shoulder Row with Anchored Resistance  - 1 x daily - 7 x weekly - 2 sets - 10 reps  - Shoulder extension with resistance - Neutral  - 1 x daily - 7 x weekly - 2 sets - 10 reps  - Shoulder External Rotation with Anchored Resistance  - 1 x daily - 7 x weekly - 2 sets - 10 reps  - Shoulder Internal Rotation with Resistance  - 1 x daily - 7 x weekly - 2 sets - 10 reps    Manuals 1/27 1/29 2/3 2/5 2/10  2/12 2/17 2/19   L knee PROM                                                                       Neuro Re-Ed                 HEP review/pt education            30'     LTR 15x B 5\"   15x B 5\" 15x B 5\" HEP       TRA 20x 3\"   20x 3\"           TRA + march     20x B 20x B 20x B       STS UE support 3x5 no UE 10x5 with UE support, 2x8 no UE 2x10 no UE 2x10 5# 2x10 5#   2x10 5# 2x10 8#   SLR   15x B, PT assist L LE 2x8 B no assist 2x8 B 1# 2x12 B 1#   2x10 2# B 2x10 2# B   Bridge    2x10 2x15 2x15 2x15   2x15 2x15   FSU to \[facility] [place] [facility]\""     2x12 B green pad 2x12 B green pad " "15x B 4\"   15x B 4\" 15x B 4\"   Clams      2x10 B otb 2x10 B otb     2x12 B gtb 2x12 B gtb              Row       10x otb 2x10 10# 2x10 10#   LPD      10x otb 2x10 7# 2x10 7#   ER/IR            10x otb ea 2x10 ea 1# 2x10 ea 1#   SL flx/abd/ER       2x10 ea 2x10 ea   Sup alt iso                                                   Ther Ex                 NS 5' L5 8' L5 8' L5 8' L5 8' L5   8' L5 8' L5   Standing Hip Abduction  2x10 2x10 2x10 2# 2x10 2# 2x10 2#   2x10 2# 2x10 2#   Standing hip ext       2x10 0# 2x10 2#   2x10 2# 2x10 2#   LAQ   2x10 2x10 15x B 2# 2x15 B 2#   2x10 B 2# 2x10 2#   Standing ham curl 20x B 20x B             Seated Heel Raises/Toe Raise Dome 20x Dome 20x Dome 20x standing Dome 20x standing         Lat stepping 3 laps otb at knees 3 laps otb at knees 2x3 laps otb at knees  2x3 laps otb at knees 5 laps gtb at knees   5 laps gtb at knees 5 laps gtb at knees   Leg press   2x10 40# 2x10 40# 2x10 40# 2x10 40#   2x10 40# 2x10 40#   Mini lunge     10x B fwd 10x B fwd 10x B fwd   10x B fwd 10x B fwd                     Sup flx           Wall slides           Face pull                                        Ther Activity                                                     Gait Training                                                     Modalities                                                             "

## 2025-02-20 ENCOUNTER — TELEPHONE (OUTPATIENT)
Age: 60
End: 2025-02-20

## 2025-02-20 DIAGNOSIS — R68.89 FLU-LIKE SYMPTOMS: Primary | ICD-10-CM

## 2025-02-20 RX ORDER — OSELTAMIVIR PHOSPHATE 75 MG/1
75 CAPSULE ORAL EVERY 12 HOURS SCHEDULED
Qty: 10 CAPSULE | Refills: 0 | Status: SHIPPED | OUTPATIENT
Start: 2025-02-20 | End: 2025-02-25

## 2025-02-20 NOTE — TELEPHONE ENCOUNTER
Advise the patient that most likely this is the flu especially with the fever.  I will send Tamiflu

## 2025-02-20 NOTE — TELEPHONE ENCOUNTER
Pt stated fever ( not sure of temp), cough, sneezing, sore throat, mucus started yesterday. Pt  declined an appt.       Patient requested meds to be sent to pharmacy.     Please contact pt and  let her know what PCP decided as she is not well.       Thank you for your kind assistance. It is greatly appreciated.

## 2025-02-21 ENCOUNTER — APPOINTMENT (OUTPATIENT)
Dept: PHYSICAL THERAPY | Facility: REHABILITATION | Age: 60
End: 2025-02-21
Payer: COMMERCIAL

## 2025-02-24 ENCOUNTER — OFFICE VISIT (OUTPATIENT)
Dept: PHYSICAL THERAPY | Facility: REHABILITATION | Age: 60
End: 2025-02-24
Payer: COMMERCIAL

## 2025-02-24 DIAGNOSIS — M79.605 BILATERAL LEG PAIN: ICD-10-CM

## 2025-02-24 DIAGNOSIS — M79.604 BILATERAL LEG PAIN: ICD-10-CM

## 2025-02-24 DIAGNOSIS — M79.605 PAIN IN BOTH LOWER EXTREMITIES: ICD-10-CM

## 2025-02-24 DIAGNOSIS — M54.50 LUMBAR PAIN: ICD-10-CM

## 2025-02-24 DIAGNOSIS — M79.604 PAIN IN BOTH LOWER EXTREMITIES: ICD-10-CM

## 2025-02-24 DIAGNOSIS — M25.511 RIGHT SHOULDER PAIN, UNSPECIFIED CHRONICITY: Primary | ICD-10-CM

## 2025-02-24 PROCEDURE — 97110 THERAPEUTIC EXERCISES: CPT

## 2025-02-24 PROCEDURE — 97112 NEUROMUSCULAR REEDUCATION: CPT

## 2025-02-24 NOTE — PROGRESS NOTES
Daily Note     Today's date: 2025  Patient name: Mary Beth Lyons  : 1965  MRN: [id number]  Referring provider: Joe Ashraf DO  Dx:   Encounter Diagnosis     ICD-10-CM    1. Right shoulder pain, unspecified chronicity  M25.511       2. Bilateral leg pain  M79.604     M79.605       3. Lumbar pain  M54.50       4. Pain in both lower extremities  M79.604     M79.605                      Subjective: Pt reports she is feeling improvement upon arrival      Objective: See treatment diary below      Assessment: Tolerated treatment well. Patient would benefit from continued PT.  Pt. able to complete all exercises with no increase in pain during or after session.  Pt. 1:1 with PTA for entirety.        Plan: Continue per plan of care.      Precautions:   Past Medical History:   Diagnosis Date    Acid reflux     Acid reflux disease 2015    Anemia     last assessed 2016    Arthritis     rheumatoid    Benign paroxysmal positional vertigo 2022    Cervicalgia 2019    Depression     Depression 2018    Fibromyalgia     Headache 2018    Other long term (current) drug therapy 2018    Pain in right wrist 2018    Wrist weakness 2018       Allergies   Allergen Reactions    Other      seasonal        POC expires Unit limit Auth Expiration date PT/OT + Visit Limit?   12 weeks - 2025 bomn After 24v bomn             Visit/Unit Tracking  AUTH Status:  Date 1/8 1/15 1/20 1/22 1/27 1/29 2/3 2/5 2/10 2/12  (RE)    Auth after 24v Used 1 2 3 4 5 6 7 8 9 10 11 12 13     Remaining                             Pertinent Findings:      POC End Date: 2025                                                                                    Test / Measure      FOTO knee(Predicted 55) 35   FOTO back(Predicted 43) 33   Activity intolerance     L knee flx 45* P!   MMTs 4/5   L/s AROM Augustin both with P!!!            Access Code: [email or web address]  URL:  [email or web address]  Date: 01/08/2025  Prepared by: Joey Arciniega     Exercises  - Supine Hamstring Stretch with Strap  - 1 x daily - 7 x weekly - 5 sets - 10-15 seconds hold  - Supine Quad Set  - 1 x daily - 7 x weekly - 2 sets - 10 reps - 3 seconds hold  - Seated Heel Slide  - 1 x daily - 7 x weekly - 1 sets - 15 reps - 5 seconds hold     Access Code: [email or web address]  URL: [email or web address]  Date: 01/20/2025  Prepared by: Lyric Melgar     Exercises  - Standing Hip Abduction with Counter Support  - 1 x daily - 2 x weekly - 2-3 sets - 10 reps  - Mini Squat with Counter Support  - 1 x daily - 2 x weekly - 3 sets - 5 reps  - Seated Heel Toe Raises  - 1 x daily - 7 x weekly - 2-3 sets - 10 reps  - Seated Hip Abduction with Resistance  - 1 x daily - 2-3 x weekly - 2-3 sets - 10 reps     Access Code: [email or web address]  URL: [email or web address]  Date: 02/12/2025  Prepared by: Joey Arciniega    Exercises  - Standing Shoulder Row with Anchored Resistance  - 1 x daily - 7 x weekly - 2 sets - 10 reps  - Shoulder extension with resistance - Neutral  - 1 x daily - 7 x weekly - 2 sets - 10 reps  - Shoulder External Rotation with Anchored Resistance  - 1 x daily - 7 x weekly - 2 sets - 10 reps  - Shoulder Internal Rotation with Resistance  - 1 x daily - 7 x weekly - 2 sets - 10 reps    Manuals 1/27 1/29 2/3 2/5 2/10  2/12 2/17 2/19 2/24   L knee PROM                                                                           Neuro Re-Ed                  HEP review/pt education            30'      LTR 15x B 5\"   15x B 5\" 15x B 5\" HEP        TRA 20x 3\"   20x 3\"            TRA + march     20x B 20x B 20x B        STS UE support 3x5 no UE 10x5 with UE support, 2x8 no UE 2x10 no UE 2x10 5# 2x10 5#   2x10 5# 2x10 8# 2x10 8#   SLR   15x B, PT assist L LE 2x8 B no assist 2x8 B 1# 2x12 B 1#   2x10 2# B 2x10 2# B 2x10 b/l 2#   Bridge    2x10 2x15 2x15 2x15   2x15 2x15 2x15   FSU to SLS     2x12 B green pad 2x12 B " "green pad 15x B 4\"   15x B 4\" 15x B 4\" 15x b/l 4\"   Clams      2x10 B otb 2x10 B otb     2x12 B gtb 2x12 B gtb 2x12 b/l gtb               Row       10x otb 2x10 10# 2x10 10# 2x10 10#   LPD      10x otb 2x10 7# 2x10 7# 2x10 7#   ER/IR            10x otb ea 2x10 ea 1# 2x10 ea 1# 2x10 ea 1#   SL flx/abd/ER       2x10 ea 2x10 ea 2x10 ea   Sup alt iso                                                       Ther Ex                  NS 5' L5 8' L5 8' L5 8' L5 8' L5   8' L5 8' L5 8' L5   Standing Hip Abduction  2x10 2x10 2x10 2# 2x10 2# 2x10 2#   2x10 2# 2x10 2# 2x10 b/l 2#   Standing hip ext       2x10 0# 2x10 2#   2x10 2# 2x10 2# 2x10 b/l 2#   LAQ   2x10 2x10 15x B 2# 2x15 B 2#   2x10 B 2# 2x10 2# 2x10 b/l 2#   Standing ham curl 20x B 20x B              Seated Heel Raises/Toe Raise Dome 20x Dome 20x Dome 20x standing Dome 20x standing          Lat stepping 3 laps otb at knees 3 laps otb at knees 2x3 laps otb at knees  2x3 laps otb at knees 5 laps gtb at knees   5 laps gtb at knees 5 laps gtb at knees 5 laps window gtb knees   Leg press   2x10 40# 2x10 40# 2x10 40# 2x10 40#   2x10 40# 2x10 40# 2x10 40#   Mini lunge     10x B fwd 10x B fwd 10x B fwd   10x B fwd 10x B fwd np                      Sup flx            Wall slides            Face pull                                           Ther Activity                                                        Gait Training                                                        Modalities                                                                  "

## 2025-02-26 ENCOUNTER — OFFICE VISIT (OUTPATIENT)
Dept: PHYSICAL THERAPY | Facility: REHABILITATION | Age: 60
End: 2025-02-26
Payer: COMMERCIAL

## 2025-02-26 DIAGNOSIS — M25.511 RIGHT SHOULDER PAIN, UNSPECIFIED CHRONICITY: Primary | ICD-10-CM

## 2025-02-26 DIAGNOSIS — M79.605 BILATERAL LEG PAIN: ICD-10-CM

## 2025-02-26 DIAGNOSIS — M54.50 LUMBAR PAIN: ICD-10-CM

## 2025-02-26 DIAGNOSIS — M79.605 PAIN IN BOTH LOWER EXTREMITIES: ICD-10-CM

## 2025-02-26 DIAGNOSIS — M79.604 PAIN IN BOTH LOWER EXTREMITIES: ICD-10-CM

## 2025-02-26 DIAGNOSIS — M79.604 BILATERAL LEG PAIN: ICD-10-CM

## 2025-02-26 PROCEDURE — 97112 NEUROMUSCULAR REEDUCATION: CPT

## 2025-02-26 PROCEDURE — 97110 THERAPEUTIC EXERCISES: CPT

## 2025-02-26 NOTE — PROGRESS NOTES
Daily Note     Today's date: 2025  Patient name: Mary Beth Lyons  : 1965  MRN: [id number]  Referring provider: Joe Ashraf DO  Dx:   Encounter Diagnosis     ICD-10-CM    1. Right shoulder pain, unspecified chronicity  M25.511       2. Bilateral leg pain  M79.604     M79.605       3. Lumbar pain  M54.50       4. Pain in both lower extremities  M79.604     M79.605           Start Time: 1000  Stop Time: 1054  Total time in clinic (min): 54 minutes    Subjective: Pt reports cont shoulder and knee sx improvement. Reports posterior knee soreness upon arrival.      Objective: See treatment diary below      Assessment: Tolerated treatment well. Patient would benefit from continued PT.  Asymptomatic with all exercise performance today.      Plan: Continue per plan of care.      Precautions:   Past Medical History:   Diagnosis Date    Acid reflux     Acid reflux disease 2015    Anemia     last assessed 2016    Arthritis     rheumatoid    Benign paroxysmal positional vertigo 2022    Cervicalgia 2019    Depression     Depression 2018    Fibromyalgia     Headache 2018    Other long term (current) drug therapy 2018    Pain in right wrist 2018    Wrist weakness 2018       Allergies   Allergen Reactions    Other      seasonal        POC expires Unit limit Auth Expiration date PT/OT + Visit Limit?   12 weeks - 2025 bomn After 24v bomn             Visit/Unit Tracking  AUTH Status:  Date 1/8 1/15 1/20 1/22 1/27 1/29 2/3 2/5 2/10 2/12  (RE)    Auth after 24v Used 1 2 3 4 5 6 7 8 9 10 11 12 13 14     Remaining                              Pertinent Findings:      POC End Date: 2025                                                                                    Test / Measure      FOTO knee(Predicted 55) 35   FOTO back(Predicted 43) 33   Activity intolerance     L knee flx 45* P!   MMTs 4/5   L/s AROM Augustin both with P!!!            Access  "Code: [email or web address]  URL: [email or web address]  Date: 01/08/2025  Prepared by: Joey Arciniega     Exercises  - Supine Hamstring Stretch with Strap  - 1 x daily - 7 x weekly - 5 sets - 10-15 seconds hold  - Supine Quad Set  - 1 x daily - 7 x weekly - 2 sets - 10 reps - 3 seconds hold  - Seated Heel Slide  - 1 x daily - 7 x weekly - 1 sets - 15 reps - 5 seconds hold     Access Code: [email or web address]  URL: [email or web address]  Date: 01/20/2025  Prepared by: Lyric Melgar     Exercises  - Standing Hip Abduction with Counter Support  - 1 x daily - 2 x weekly - 2-3 sets - 10 reps  - Mini Squat with Counter Support  - 1 x daily - 2 x weekly - 3 sets - 5 reps  - Seated Heel Toe Raises  - 1 x daily - 7 x weekly - 2-3 sets - 10 reps  - Seated Hip Abduction with Resistance  - 1 x daily - 2-3 x weekly - 2-3 sets - 10 reps     Access Code: [email or web address]  URL: [email or web address]  Date: 02/12/2025  Prepared by: Joey Arciniega    Exercises  - Standing Shoulder Row with Anchored Resistance  - 1 x daily - 7 x weekly - 2 sets - 10 reps  - Shoulder extension with resistance - Neutral  - 1 x daily - 7 x weekly - 2 sets - 10 reps  - Shoulder External Rotation with Anchored Resistance  - 1 x daily - 7 x weekly - 2 sets - 10 reps  - Shoulder Internal Rotation with Resistance  - 1 x daily - 7 x weekly - 2 sets - 10 reps    Manuals  2/12 2/17 2/19 2/24 2/26   L knee PROM      CM 4'                                 Neuro Re-Ed         HEP review/pt education  30'       LTR         TRA         TRA + march         STS UE support   2x10 5# 2x10 8# 2x10 8# 2x10 10#   SLR   2x10 2# B 2x10 2# B 2x10 b/l 2# 2x10 B 3#   Bridge    2x15 2x15 2x15 2x15 5#   FSU to SLS   15x B 4\" 15x B 4\" 15x b/l 4\" 10x B 6\"   Clams    2x12 B gtb 2x12 B gtb 2x12 b/l gtb            Row  10x otb 2x10 10# 2x10 10# 2x10 10# 2x15 10#   LPD 10x otb 2x10 7# 2x10 7# 2x10 7# 2x15 7#   ER/IR  10x otb ea 2x10 ea 1# 2x10 ea 1# 2x10 ea 1# 2x10 ea 1#   SL " flx/abd/ER  2x10 ea 2x10 ea 2x10 ea 2x10 ea   Sup alt iso                                  Ther Ex         NS   8' L5 8' L5 8' L5 8' L5   Standing Hip Abduction    2x10 2# 2x10 2# 2x10 b/l 2# 2x10 B 3#   Standing hip ext   2x10 2# 2x10 2# 2x10 b/l 2# 2x10 B 3#   LAQ   2x10 B 2# 2x10 2# 2x10 b/l 2# 2x10 B 3#   Standing ham curl         Seated Heel Raises/Toe Raise         Lat stepping   5 laps gtb at knees 5 laps gtb at knees 5 laps window gtb knees 5 laps window gtb at knees   Leg press   2x10 40# 2x10 40# 2x10 40# 3x10 55#   Mini lunge   10x B fwd 10x B fwd np              Sup flx        Wall slides        Face pull                          Ther Activity                             Gait Training                             Modalities

## 2025-03-10 ENCOUNTER — APPOINTMENT (OUTPATIENT)
Dept: LAB | Facility: CLINIC | Age: 60
End: 2025-03-10
Payer: COMMERCIAL

## 2025-03-10 ENCOUNTER — OFFICE VISIT (OUTPATIENT)
Dept: PHYSICAL THERAPY | Facility: REHABILITATION | Age: 60
End: 2025-03-10
Payer: COMMERCIAL

## 2025-03-10 DIAGNOSIS — M54.50 LUMBAR PAIN: ICD-10-CM

## 2025-03-10 DIAGNOSIS — M81.0 OSTEOPOROSIS WITHOUT CURRENT PATHOLOGICAL FRACTURE, UNSPECIFIED OSTEOPOROSIS TYPE: ICD-10-CM

## 2025-03-10 DIAGNOSIS — M79.605 BILATERAL LEG PAIN: ICD-10-CM

## 2025-03-10 DIAGNOSIS — M79.604 PAIN IN BOTH LOWER EXTREMITIES: ICD-10-CM

## 2025-03-10 DIAGNOSIS — E55.9 VITAMIN D DEFICIENCY: ICD-10-CM

## 2025-03-10 DIAGNOSIS — E78.2 MIXED HYPERLIPIDEMIA: ICD-10-CM

## 2025-03-10 DIAGNOSIS — M79.604 BILATERAL LEG PAIN: ICD-10-CM

## 2025-03-10 DIAGNOSIS — M79.605 PAIN IN BOTH LOWER EXTREMITIES: ICD-10-CM

## 2025-03-10 DIAGNOSIS — M25.511 RIGHT SHOULDER PAIN, UNSPECIFIED CHRONICITY: Primary | ICD-10-CM

## 2025-03-10 LAB
25(OH)D3 SERPL-MCNC: 72.3 NG/ML (ref 30–100)
ALBUMIN SERPL BCG-MCNC: 3.9 G/DL (ref 3.5–5)
ALP SERPL-CCNC: 43 U/L (ref 34–104)
ALT SERPL W P-5'-P-CCNC: 16 U/L (ref 7–52)
ANION GAP SERPL CALCULATED.3IONS-SCNC: 4 MMOL/L (ref 4–13)
AST SERPL W P-5'-P-CCNC: 18 U/L (ref 13–39)
BASOPHILS # BLD AUTO: 0.05 THOUSANDS/ÂΜL (ref 0–0.1)
BASOPHILS NFR BLD AUTO: 1 % (ref 0–1)
BILIRUB SERPL-MCNC: 0.5 MG/DL (ref 0.2–1)
BUN SERPL-MCNC: 10 MG/DL (ref 5–25)
CALCIUM SERPL-MCNC: 9 MG/DL (ref 8.4–10.2)
CHLORIDE SERPL-SCNC: 105 MMOL/L (ref 96–108)
CHOLEST SERPL-MCNC: 281 MG/DL (ref ?–200)
CO2 SERPL-SCNC: 30 MMOL/L (ref 21–32)
CREAT SERPL-MCNC: 0.56 MG/DL (ref 0.6–1.3)
EOSINOPHIL # BLD AUTO: 0.12 THOUSAND/ÂΜL (ref 0–0.61)
EOSINOPHIL NFR BLD AUTO: 2 % (ref 0–6)
ERYTHROCYTE [DISTWIDTH] IN BLOOD BY AUTOMATED COUNT: 15 % (ref 11.6–15.1)
GFR SERPL CREATININE-BSD FRML MDRD: 100 ML/MIN/1.73SQ M
GLUCOSE P FAST SERPL-MCNC: 97 MG/DL (ref 65–99)
HCT VFR BLD AUTO: 36.2 % (ref 34.8–46.1)
HDLC SERPL-MCNC: 87 MG/DL
HGB BLD-MCNC: 10.9 G/DL (ref 11.5–15.4)
IMM GRANULOCYTES # BLD AUTO: 0.01 THOUSAND/UL (ref 0–0.2)
IMM GRANULOCYTES NFR BLD AUTO: 0 % (ref 0–2)
LDLC SERPL CALC-MCNC: 172 MG/DL (ref 0–100)
LYMPHOCYTES # BLD AUTO: 2.05 THOUSANDS/ÂΜL (ref 0.6–4.47)
LYMPHOCYTES NFR BLD AUTO: 39 % (ref 14–44)
MCH RBC QN AUTO: 23.6 PG (ref 26.8–34.3)
MCHC RBC AUTO-ENTMCNC: 30.1 G/DL (ref 31.4–37.4)
MCV RBC AUTO: 78 FL (ref 82–98)
MONOCYTES # BLD AUTO: 0.5 THOUSAND/ÂΜL (ref 0.17–1.22)
MONOCYTES NFR BLD AUTO: 10 % (ref 4–12)
NEUTROPHILS # BLD AUTO: 2.48 THOUSANDS/ÂΜL (ref 1.85–7.62)
NEUTS SEG NFR BLD AUTO: 48 % (ref 43–75)
NONHDLC SERPL-MCNC: 194 MG/DL
NRBC BLD AUTO-RTO: 0 /100 WBCS
PLATELET # BLD AUTO: 417 THOUSANDS/UL (ref 149–390)
PMV BLD AUTO: 9.2 FL (ref 8.9–12.7)
POTASSIUM SERPL-SCNC: 4.5 MMOL/L (ref 3.5–5.3)
PROT SERPL-MCNC: 6.8 G/DL (ref 6.4–8.4)
RBC # BLD AUTO: 4.62 MILLION/UL (ref 3.81–5.12)
SODIUM SERPL-SCNC: 139 MMOL/L (ref 135–147)
TRIGL SERPL-MCNC: 109 MG/DL (ref ?–150)
TSH SERPL DL<=0.05 MIU/L-ACNC: 0.74 UIU/ML (ref 0.45–4.5)
VIT B12 SERPL-MCNC: 576 PG/ML (ref 180–914)
WBC # BLD AUTO: 5.21 THOUSAND/UL (ref 4.31–10.16)

## 2025-03-10 PROCEDURE — 82306 VITAMIN D 25 HYDROXY: CPT

## 2025-03-10 PROCEDURE — 97110 THERAPEUTIC EXERCISES: CPT

## 2025-03-10 PROCEDURE — 80061 LIPID PANEL: CPT

## 2025-03-10 PROCEDURE — 36415 COLL VENOUS BLD VENIPUNCTURE: CPT

## 2025-03-10 PROCEDURE — 97112 NEUROMUSCULAR REEDUCATION: CPT

## 2025-03-10 PROCEDURE — 80053 COMPREHEN METABOLIC PANEL: CPT

## 2025-03-10 PROCEDURE — 85025 COMPLETE CBC W/AUTO DIFF WBC: CPT

## 2025-03-11 LAB — HCV AB SER QL: NORMAL

## 2025-03-12 ENCOUNTER — OFFICE VISIT (OUTPATIENT)
Dept: PHYSICAL THERAPY | Facility: REHABILITATION | Age: 60
End: 2025-03-12
Payer: COMMERCIAL

## 2025-03-12 DIAGNOSIS — M25.511 RIGHT SHOULDER PAIN, UNSPECIFIED CHRONICITY: Primary | ICD-10-CM

## 2025-03-12 DIAGNOSIS — M79.604 PAIN IN BOTH LOWER EXTREMITIES: ICD-10-CM

## 2025-03-12 DIAGNOSIS — M79.605 PAIN IN BOTH LOWER EXTREMITIES: ICD-10-CM

## 2025-03-12 DIAGNOSIS — M79.605 BILATERAL LEG PAIN: ICD-10-CM

## 2025-03-12 DIAGNOSIS — M54.50 LUMBAR PAIN: ICD-10-CM

## 2025-03-12 DIAGNOSIS — M79.604 BILATERAL LEG PAIN: ICD-10-CM

## 2025-03-12 PROCEDURE — 97110 THERAPEUTIC EXERCISES: CPT

## 2025-03-12 PROCEDURE — 97112 NEUROMUSCULAR REEDUCATION: CPT

## 2025-03-12 NOTE — PROGRESS NOTES
Daily Note     Today's date: 3/12/2025  Patient name: Mary Beth Lyons  : 1965  MRN: [id number]  Referring provider: Joe Ashraf DO  Dx:   Encounter Diagnosis     ICD-10-CM    1. Right shoulder pain, unspecified chronicity  M25.511       2. Lumbar pain  M54.50       3. Bilateral leg pain  M79.604     M79.605       4. Pain in both lower extremities  M79.604     M79.605             Start Time: 1045  Stop Time: 1130  Total time in clinic (min): 45 minutes    Subjective: Pt reports cont improvements in sx, still having some pain down the L leg but it's a little better.      Objective: See treatment diary below      Assessment: Tolerated treatment well. Patient would benefit from continued PT.  Minor L LE radicular sx are still present, centralization is present following gliding and with progression of skilled strengthening. Will cont to progress as tolerated.      Plan: Continue per plan of care.      Precautions:   Past Medical History:   Diagnosis Date    Acid reflux     Acid reflux disease 2015    Anemia     last assessed 2016    Arthritis     rheumatoid    Benign paroxysmal positional vertigo 2022    Cervicalgia 2019    Depression     Depression 2018    Fibromyalgia     Headache 2018    Other long term (current) drug therapy 2018    Pain in right wrist 2018    Wrist weakness 2018       Allergies   Allergen Reactions    Other      seasonal        POC expires Unit limit Auth Expiration date PT/OT + Visit Limit?   12 weeks - 2025 bomn After 24v bomn             Visit/Unit Tracking  AUTH Status:  Date 1/8 1/15 1/20 1/22 1/27 1/29 2/3 2/5 2/10 2/12  (RE) 2/17 2/19 2/24 2/26 3/10 3/12   Auth after 24v Used 1 2 3 4 5 6 7 8 9 10 11 12 13 14 15 16     Remaining                                Pertinent Findings:      POC End Date: 2025                                                                                    Test / Measure      FOTO  "knee(Predicted 55) 35   FOTO back(Predicted 43) 33   Activity intolerance     L knee flx 45* P!   MMTs 4/5   L/s AROM Augustin both with P!!!            Access Code: [email or web address]  URL: [email or web address]  Date: 01/08/2025  Prepared by: Joey Arciniega     Exercises  - Supine Hamstring Stretch with Strap  - 1 x daily - 7 x weekly - 5 sets - 10-15 seconds hold  - Supine Quad Set  - 1 x daily - 7 x weekly - 2 sets - 10 reps - 3 seconds hold  - Seated Heel Slide  - 1 x daily - 7 x weekly - 1 sets - 15 reps - 5 seconds hold     Access Code: [email or web address]  URL: [email or web address]  Date: 01/20/2025  Prepared by: Lyric Melgar     Exercises  - Standing Hip Abduction with Counter Support  - 1 x daily - 2 x weekly - 2-3 sets - 10 reps  - Mini Squat with Counter Support  - 1 x daily - 2 x weekly - 3 sets - 5 reps  - Seated Heel Toe Raises  - 1 x daily - 7 x weekly - 2-3 sets - 10 reps  - Seated Hip Abduction with Resistance  - 1 x daily - 2-3 x weekly - 2-3 sets - 10 reps     Access Code: [email or web address]  URL: [email or web address]  Date: 02/12/2025  Prepared by: Joey Arciniega    Exercises  - Standing Shoulder Row with Anchored Resistance  - 1 x daily - 7 x weekly - 2 sets - 10 reps  - Shoulder extension with resistance - Neutral  - 1 x daily - 7 x weekly - 2 sets - 10 reps  - Shoulder External Rotation with Anchored Resistance  - 1 x daily - 7 x weekly - 2 sets - 10 reps  - Shoulder Internal Rotation with Resistance  - 1 x daily - 7 x weekly - 2 sets - 10 reps    Manuals  2/12 2/17 2/19 2/24 2/26 3/10 3/12   L knee PROM      CM 4'                                         Neuro Re-Ed           HEP review/pt education  30'         LTR           TRA           TRA + march           STS UE support   2x10 5# 2x10 8# 2x10 8# 2x10 10# NV 2x10 10#   SLR   2x10 2# B 2x10 2# B 2x10 b/l 2# 2x10 B 3# 2x10 B 3# 2x10 B 3#   Bridge    2x15 2x15 2x15 2x15 5# 2x15 5# 2x15 5#   FSU to SLS   15x B 4\" 15x B 4\" 15x b/l 4\" 10x B " "6\"     Clams    2x12 B gtb 2x12 B gtb 2x12 b/l gtb                Row  10x otb 2x10 10# 2x10 10# 2x10 10# 2x15 10# 2x15 10# 2x15 10#   LPD 10x otb 2x10 7# 2x10 7# 2x10 7# 2x15 7# 2x15 10# 2x15 10#   ER/IR  10x otb ea 2x10 ea 1# 2x10 ea 1# 2x10 ea 1# 2x10 ea 1# NV 2x10 ea 2#   SL flx/abd/ER  2x10 ea 2x10 ea 2x10 ea 2x10 ea 2x10 ea 2x10 ea 1#   Sup alt iso          Sciatic N tensioner      10x10\" 10x10\"                                   Ther Ex           NS   8' L5 8' L5 8' L5 8' L5 8' L5 8' L5   Standing Hip Abduction    2x10 2# 2x10 2# 2x10 b/l 2# 2x10 B 3# 2x10 B 3# 2x10 B 4#   Standing hip ext   2x10 2# 2x10 2# 2x10 b/l 2# 2x10 B 3# 2x10 B 3# 2x10 B 4#   LAQ   2x10 B 2# 2x10 2# 2x10 b/l 2# 2x10 B 3# 2x10 B 3# 2x10 B 4#   Standing ham curl           Seated Heel Raises/Toe Raise           Lat stepping   5 laps gtb at knees 5 laps gtb at knees 5 laps window gtb knees 5 laps window gtb at knees NV    Leg press   2x10 40# 2x10 40# 2x10 40# 3x10 55# NV    Mini lunge   10x B fwd 10x B fwd np      Figure 4      3x30\"                Sup flx          Wall slides          Face pull                                Ther Activity                                   Gait Training                                   Modalities                                             "

## 2025-03-16 ENCOUNTER — RESULTS FOLLOW-UP (OUTPATIENT)
Dept: INTERNAL MEDICINE CLINIC | Facility: CLINIC | Age: 60
End: 2025-03-16

## 2025-03-16 DIAGNOSIS — D64.9 ANEMIA, UNSPECIFIED TYPE: Primary | ICD-10-CM

## 2025-03-17 ENCOUNTER — OFFICE VISIT (OUTPATIENT)
Dept: PHYSICAL THERAPY | Facility: REHABILITATION | Age: 60
End: 2025-03-17
Payer: COMMERCIAL

## 2025-03-17 DIAGNOSIS — M79.604 BILATERAL LEG PAIN: ICD-10-CM

## 2025-03-17 DIAGNOSIS — M79.604 PAIN IN BOTH LOWER EXTREMITIES: ICD-10-CM

## 2025-03-17 DIAGNOSIS — M79.605 BILATERAL LEG PAIN: ICD-10-CM

## 2025-03-17 DIAGNOSIS — M79.605 PAIN IN BOTH LOWER EXTREMITIES: ICD-10-CM

## 2025-03-17 DIAGNOSIS — M54.50 LUMBAR PAIN: ICD-10-CM

## 2025-03-17 DIAGNOSIS — M25.511 RIGHT SHOULDER PAIN, UNSPECIFIED CHRONICITY: Primary | ICD-10-CM

## 2025-03-17 PROCEDURE — 97112 NEUROMUSCULAR REEDUCATION: CPT

## 2025-03-17 PROCEDURE — 97110 THERAPEUTIC EXERCISES: CPT

## 2025-03-17 NOTE — PROGRESS NOTES
Daily Note     Today's date: 3/17/2025  Patient name: Mary Beth Lyons  : 1965  MRN: [id number]  Referring provider: Joe Ashraf DO  Dx:   Encounter Diagnosis     ICD-10-CM    1. Right shoulder pain, unspecified chronicity  M25.511       2. Lumbar pain  M54.50       3. Bilateral leg pain  M79.604     M79.605       4. Pain in both lower extremities  M79.604     M79.605               Start Time: 1125  Stop Time: 1219  Total time in clinic (min): 54 minutes    Subjective: Pt reports cont improvements in sx. Mild posterior L knee pain upon arrival today.      Objective: See treatment diary below      Assessment: Tolerated treatment well. Patient would benefit from continued PT.  Mary Beth cont to progress very well with strengthening interventions with min c/o soreness throughout. Will cont to progress as tolerated.      Plan: Continue per plan of care.      Precautions:   Past Medical History:   Diagnosis Date    Acid reflux     Acid reflux disease 2015    Anemia     last assessed 2016    Arthritis     rheumatoid    Benign paroxysmal positional vertigo 2022    Cervicalgia 2019    Depression     Depression 2018    Fibromyalgia     Headache 2018    Other long term (current) drug therapy 2018    Pain in right wrist 2018    Wrist weakness 2018       Allergies   Allergen Reactions    Other      seasonal        POC expires Unit limit Auth Expiration date PT/OT + Visit Limit?   12 weeks - 2025 bomn After 24v bomn             Visit/Unit Tracking  AUTH Status:  Date 1/8 1/15 1/20 1/22 1/27 1/29 2/3 2/5 2/10 2/12  (RE) 2/17 2/19 2/24 2/26 3/10 3/12 3/17   Auth after 24v Used 1 2 3 4 5 6 7 8 9 10 11 12 13 14 15 16 17     Remaining                                 Pertinent Findings:      POC End Date: 2025                                                                                    Test / Measure      FOTO knee(Predicted 55) 35   FOTO back(Predicted 43)  "33   Activity intolerance     L knee flx 45* P!   MMTs 4/5   L/s AROM Augustin both with P!!!            Access Code: [email or web address]  URL: [email or web address]  Date: 01/08/2025  Prepared by: Joey Arciniega     Exercises  - Supine Hamstring Stretch with Strap  - 1 x daily - 7 x weekly - 5 sets - 10-15 seconds hold  - Supine Quad Set  - 1 x daily - 7 x weekly - 2 sets - 10 reps - 3 seconds hold  - Seated Heel Slide  - 1 x daily - 7 x weekly - 1 sets - 15 reps - 5 seconds hold     Access Code: [email or web address]  URL: [email or web address]  Date: 01/20/2025  Prepared by: Lyric Melgar     Exercises  - Standing Hip Abduction with Counter Support  - 1 x daily - 2 x weekly - 2-3 sets - 10 reps  - Mini Squat with Counter Support  - 1 x daily - 2 x weekly - 3 sets - 5 reps  - Seated Heel Toe Raises  - 1 x daily - 7 x weekly - 2-3 sets - 10 reps  - Seated Hip Abduction with Resistance  - 1 x daily - 2-3 x weekly - 2-3 sets - 10 reps     Access Code: [email or web address]  URL: [email or web address]  Date: 02/12/2025  Prepared by: Joey Arciniega    Exercises  - Standing Shoulder Row with Anchored Resistance  - 1 x daily - 7 x weekly - 2 sets - 10 reps  - Shoulder extension with resistance - Neutral  - 1 x daily - 7 x weekly - 2 sets - 10 reps  - Shoulder External Rotation with Anchored Resistance  - 1 x daily - 7 x weekly - 2 sets - 10 reps  - Shoulder Internal Rotation with Resistance  - 1 x daily - 7 x weekly - 2 sets - 10 reps    Manuals  2/12 2/17 2/19 2/24 2/26 3/10 3/12 3/17   L knee PROM      CM 4'   CM 4'                                          Neuro Re-Ed            HEP review/pt education  30'          LTR            TRA            TRA + march            STS UE support   2x10 5# 2x10 8# 2x10 8# 2x10 10# NV 2x10 10# 2x10 10#   SLR   2x10 2# B 2x10 2# B 2x10 b/l 2# 2x10 B 3# 2x10 B 3# 2x10 B 3# 2x10 B 3#   Bridge    2x15 2x15 2x15 2x15 5# 2x15 5# 2x15 5# 2x15 5#   FSU to SLS   15x B 4\" 15x B 4\" 15x b/l 4\" 10x B 6\"  " " 10x B 5# DBs   Lat step over and back        10x B 5# DBs   Clams    2x12 B gtb 2x12 B gtb 2x12 b/l gtb                  Row  10x otb 2x10 10# 2x10 10# 2x10 10# 2x15 10# 2x15 10# 2x15 10# 2x15 10#   LPD 10x otb 2x10 7# 2x10 7# 2x10 7# 2x15 7# 2x15 10# 2x15 10# 2x15 10#   ER/IR  10x otb ea 2x10 ea 1# 2x10 ea 1# 2x10 ea 1# 2x10 ea 1# NV 2x10 ea 2# 2x10 ea 2#   SL flx/abd/ER  2x10 ea 2x10 ea 2x10 ea 2x10 ea 2x10 ea 2x10 ea 1# 2x15 ea 1# R only   Sup alt iso           Sciatic N tensioner      10x10\" 10x10\" 10x10\"                                      Ther Ex            NS   8' L5 8' L5 8' L5 8' L5 8' L5 8' L5 8' L5   Standing Hip Abduction    2x10 2# 2x10 2# 2x10 b/l 2# 2x10 B 3# 2x10 B 3# 2x10 B 4# 2x10 B 4#   Standing hip ext   2x10 2# 2x10 2# 2x10 b/l 2# 2x10 B 3# 2x10 B 3# 2x10 B 4# 2x10 B 4#   LAQ   2x10 B 2# 2x10 2# 2x10 b/l 2# 2x10 B 3# 2x10 B 3# 2x10 B 4# 2x10 B 5#   Standing ham curl            Seated Heel Raises/Toe Raise            Lat stepping   5 laps gtb at knees 5 laps gtb at knees 5 laps window gtb knees 5 laps window gtb at knees NV  5 laps mirror btb at knees   Leg press   2x10 40# 2x10 40# 2x10 40# 3x10 55# NV  3x10 55#   Mini lunge   10x B fwd 10x B fwd np       Figure 4      3x30\"                  Sup flx           Wall slides           Face pull                                   Ther Activity                                      Gait Training                                      Modalities                                                "

## 2025-03-19 ENCOUNTER — OFFICE VISIT (OUTPATIENT)
Dept: PHYSICAL THERAPY | Facility: REHABILITATION | Age: 60
End: 2025-03-19
Payer: COMMERCIAL

## 2025-03-19 ENCOUNTER — CONSULT (OUTPATIENT)
Dept: DERMATOLOGY | Facility: CLINIC | Age: 60
End: 2025-03-19
Payer: COMMERCIAL

## 2025-03-19 VITALS — BODY MASS INDEX: 26.26 KG/M2 | TEMPERATURE: 97.9 F | WEIGHT: 153 LBS

## 2025-03-19 DIAGNOSIS — M79.604 PAIN IN BOTH LOWER EXTREMITIES: ICD-10-CM

## 2025-03-19 DIAGNOSIS — M79.604 BILATERAL LEG PAIN: ICD-10-CM

## 2025-03-19 DIAGNOSIS — L29.9 SCALP ITCH: ICD-10-CM

## 2025-03-19 DIAGNOSIS — M79.605 BILATERAL LEG PAIN: ICD-10-CM

## 2025-03-19 DIAGNOSIS — M54.50 LUMBAR PAIN: ICD-10-CM

## 2025-03-19 DIAGNOSIS — M25.511 RIGHT SHOULDER PAIN, UNSPECIFIED CHRONICITY: Primary | ICD-10-CM

## 2025-03-19 DIAGNOSIS — M79.605 PAIN IN BOTH LOWER EXTREMITIES: ICD-10-CM

## 2025-03-19 DIAGNOSIS — R20.2 NOTALGIA PARESTHETICA: Primary | ICD-10-CM

## 2025-03-19 PROCEDURE — 97112 NEUROMUSCULAR REEDUCATION: CPT

## 2025-03-19 PROCEDURE — 97110 THERAPEUTIC EXERCISES: CPT

## 2025-03-19 PROCEDURE — 99244 OFF/OP CNSLTJ NEW/EST MOD 40: CPT | Performed by: DERMATOLOGY

## 2025-03-19 RX ORDER — KETOCONAZOLE 20 MG/ML
SHAMPOO, SUSPENSION TOPICAL
Qty: 120 ML | Refills: 11 | Status: SHIPPED | OUTPATIENT
Start: 2025-03-19

## 2025-03-19 RX ORDER — FLUOCINONIDE TOPICAL SOLUTION USP, 0.05% 0.5 MG/ML
SOLUTION TOPICAL
Qty: 20 ML | Refills: 3 | Status: SHIPPED | OUTPATIENT
Start: 2025-03-19

## 2025-03-19 RX ORDER — TRIAMCINOLONE ACETONIDE 1 MG/G
CREAM TOPICAL
Qty: 45 G | Refills: 2 | Status: SHIPPED | OUTPATIENT
Start: 2025-03-19

## 2025-03-19 NOTE — PROGRESS NOTES
"[facility] Note     Patient Name: Mary Beth Lyons  Encounter Date: 3/19/2025     Have you been cared for by a [facility] Dermatologist in the last 3 years and, if so, which description applies to you?    NO.   I am considered a \"new\" patient and must complete all patient intake questions. I am FEMALE/of child-bearing potential.    REVIEW OF SYSTEMS:  Have you recently had or currently have any of the following? Recent fever or chills? No  Any non-healing wound? No  Are you pregnant or planning to become pregnant? No  Are you currently or planning to be nursing or breast feeding? No   PAST MEDICAL HISTORY:  Have you personally ever had or currently have any of the following?  If \"YES,\" then please provide more detail. Skin cancer (such as Melanoma, Basal Cell Carcinoma, Squamous Cell Carcinoma?  No  Tuberculosis, HIV/AIDS, Hepatitis B or C: No  Radiation Treatment No   HISTORY OF IMMUNOSUPPRESSION:   Do you have a history of any of the following:  Systemic Immunosuppression such as Diabetes, Biologic or Immunotherapy, Chemotherapy, Organ Transplantation, Bone Marrow Transplantation or Prednsione?  No    Answering \"YES\" requires the addition of the dotphrase \"IMMUNOSUPPRESSED\" as the first diagnosis of the patient's visit.   FAMILY HISTORY:  Any \"first degree relatives\" (parent, brother, sister, or child) with the following?    Skin Cancer, Pancreatic or Other Cancer? No   PATIENT EXPERIENCE:    Do you want the Dermatologist to perform a COMPLETE skin exam today including a clinical examination under the \"bra and underwear\" areas?  NO  If necessary, do we have your permission to call and leave a detailed message on your Preferred Phone number that includes your specific medical information?  Yes      Allergies   Allergen Reactions    Other      seasonal      Current Outpatient Medications:     alendronate (FOSAMAX) 70 mg tablet, TAKE 1 TABLET BY MOUTH EVERY 7 DAYS WITH A GLASS OF WATER, STAY " UPRIGHT AND DO NOT LIE DOWN FOR AT LEAST 30 MINUTES AFTER, Disp: 12 tablet, Rfl: 3    busPIRone (BUSPAR) 15 mg tablet, Take 1 tablet (15 mg total) by mouth 3 (three) times a day, Disp: 270 tablet, Rfl: 3    clobetasol (TEMOVATE) 0.05 % ointment, Apply topically in the morning X12 weeks (Patient not taking: Reported on 2/17/2025), Disp: 30 g, Rfl: 1    clotrimazole (LOTRIMIN) 1 % cream, Apply topically 2 (two) times a day, Disp: 14 g, Rfl: 0    fluticasone (FLONASE) 50 mcg/act nasal spray, SHAKE LIQUID AND USE 2 SPRAYS IN EACH NOSTRIL DAILY, Disp: 48 g, Rfl: 1    gabapentin (NEURONTIN) 300 mg capsule, TAKE 3 CAPSULES BY MOUTH EVERY MORNING, 2 CAPSULES IN THE AFTERNOON, AND 3 CAPSULES AT BEDTIME, Disp: 720 capsule, Rfl: 1    latanoprost (XALATAN) 0.005 % ophthalmic solution, INSTILL 1 DROP IN RIGHT EYE EVERY NIGHT AT BEDTIME, Disp: , Rfl:     Magnesium 400 MG CAPS, Take 1 capsule (400 mg total) by mouth in the morning, Disp: , Rfl:     Milnacipran HCl (Savella) 50 MG TABS, TAKE 1 TABLET BY MOUTH TWICE DAILY, Disp: 180 tablet, Rfl: 1    omeprazole (PriLOSEC) 20 mg delayed release capsule, Take 1 capsule (20 mg total) by mouth daily, Disp: 90 capsule, Rfl: 3          Whom besides the patient is providing clinical information about today's encounter?   NO ADDITIONAL HISTORIAN (patient alone provided history)    Physical Exam and Assessment/Plan by Diagnosis:    PRURITUS, scalp    Physical Exam:  Anatomic Location Affected:   scalp  Morphological Description:  no flaking, erythema or signs of inflammation      Additional History of Present Condition:  Patient reports itching on her scalp and her back along the bra line. She has tried head and shoulders shampoo, and Nizoral shampoo.     Assessment and Plan:  Based on a thorough discussion of this condition and the management approach to it (including a comprehensive discussion of the known risks, side effects and potential benefits of treatment), the patient (family)  agrees to implement the following specific plan:  Start Ketoconazole 2% shampoo- Use as shampoo at least 2 times per week to scalp. Lather into scalp and let sit for 5 minutes before rinsing.  Start Fluocinonide 0.05% external solution- Apply to scalp every day for 14 days, then once daily.      Notalgia paresthetica  Physical Exam:  Anatomic Location Affected:  right mid back next to scapula  Morphological Description:  no primary lesions  Pertinent Positives:  Pertinent Negatives:    Additional History of Present Condition:  itchy on and off for years; +history fo back pain    Assessment and Plan:  Based on a thorough discussion of this condition and the management approach to it (including a comprehensive discussion of the known risks, side effects and potential benefits of treatment), the patient (family) agrees to implement the following specific plan:  Triacinolone BID PRN flares  Discussed etiology, course of condition, expectations, treatment options. Patient expressed understanding and ok with plan.   Reviewed gentle skin care in detail (no hot showers, limited soap usage to armpits, private area and feet, no washcloth, gentle pat dry with towel following shower, moisturizing with creams, ointments- not lotions)        Scribe Attestation      I,:  Mary Zambrano MA am acting as a scribe while in the presence of the attending physician.:       I,:  Lisbeth Torres MD personally performed the services described in this documentation    as scribed in my presence.:

## 2025-03-19 NOTE — PROGRESS NOTES
Daily Note     Today's date: 3/19/2025  Patient name: Mary Beth Lyons  : 1965  MRN: [id number]  Referring provider: Joe Ashraf DO  Dx:   Encounter Diagnosis     ICD-10-CM    1. Right shoulder pain, unspecified chronicity  M25.511       2. Lumbar pain  M54.50       3. Bilateral leg pain  M79.604     M79.605       4. Pain in both lower extremities  M79.604     M79.605                      Subjective: Pt reported feeling a little better today since LV. Pt feels like she can continue with exercises at home.      Objective: See treatment diary below      Assessment: Tolerated treatment well. Patient demonstrated fatigue post treatment and exhibited good technique with therapeutic exercises. VC's needed for correct technique throughout session. No reports of increased pain throughout session. Monitor NV.       Plan:  Pt DC to HEP   1 on 1 with PTA and PT for entire session.     Precautions:   Past Medical History:   Diagnosis Date    Acid reflux     Acid reflux disease 2015    Anemia     last assessed 2016    Arthritis     rheumatoid    Benign paroxysmal positional vertigo 2022    Cervicalgia 2019    Depression     Depression 2018    Fibromyalgia     Headache 2018    Other long term (current) drug therapy 2018    Pain in right wrist 2018    Wrist weakness 2018       Allergies   Allergen Reactions    Other      seasonal        POC expires Unit limit Auth Expiration date PT/OT + Visit Limit?   12 weeks - 2025 bomn After 24v bomn             Visit/Unit Tracking  AUTH Status:  Date 1/8 1/15 1/20 1/22 1/27 1/29 2/3 2/5 2/10 2/12  (RE) 2/17 2/19 2/24 2/26 3/10 3/12 3/17 3/19   Auth after 24v Used 1 2 3 4 5 6 7 8 9 10 11 12 13 14 15 16 17 18     Remaining                                  Pertinent Findings:      POC End Date: 2025                                                                                    Test / Measure      FOTO knee(Predicted 55)  35   FOTO back(Predicted 43) 33   Activity intolerance     L knee flx 45* P!   MMTs 4/5   L/s AROM Augustin both with P!!!            Access Code: [email or web address]  URL: [email or web address]  Date: 01/08/2025  Prepared by: Joey Arciniega     Exercises  - Supine Hamstring Stretch with Strap  - 1 x daily - 7 x weekly - 5 sets - 10-15 seconds hold  - Supine Quad Set  - 1 x daily - 7 x weekly - 2 sets - 10 reps - 3 seconds hold  - Seated Heel Slide  - 1 x daily - 7 x weekly - 1 sets - 15 reps - 5 seconds hold     Access Code: [email or web address]  URL: [email or web address]  Date: 01/20/2025  Prepared by: Lyric Melgar     Exercises  - Standing Hip Abduction with Counter Support  - 1 x daily - 2 x weekly - 2-3 sets - 10 reps  - Mini Squat with Counter Support  - 1 x daily - 2 x weekly - 3 sets - 5 reps  - Seated Heel Toe Raises  - 1 x daily - 7 x weekly - 2-3 sets - 10 reps  - Seated Hip Abduction with Resistance  - 1 x daily - 2-3 x weekly - 2-3 sets - 10 reps     Access Code: [email or web address]  URL: [email or web address]  Date: 02/12/2025  Prepared by: Joey Arciniega    Exercises  - Standing Shoulder Row with Anchored Resistance  - 1 x daily - 7 x weekly - 2 sets - 10 reps  - Shoulder extension with resistance - Neutral  - 1 x daily - 7 x weekly - 2 sets - 10 reps  - Shoulder External Rotation with Anchored Resistance  - 1 x daily - 7 x weekly - 2 sets - 10 reps  - Shoulder Internal Rotation with Resistance  - 1 x daily - 7 x weekly - 2 sets - 10 reps    Manuals  2/12 2/17 2/19 2/24 2/26 3/10 3/12 3/17 3/19   L knee PROM      CM 4'   CM 4'                                              Neuro Re-Ed             HEP review/pt education  30'           LTR             TRA             TRA + march             STS UE support   2x10 5# 2x10 8# 2x10 8# 2x10 10# NV 2x10 10# 2x10 10# 2x10 10#   SLR   2x10 2# B 2x10 2# B 2x10 b/l 2# 2x10 B 3# 2x10 B 3# 2x10 B 3# 2x10 B 3# 2x10 B 3#   Bridge    2x15 2x15 2x15 2x15 5# 2x15 5# 2x15 5#  "2x15 5# 2x15 5#   FSU to SLS   15x B 4\" 15x B 4\" 15x b/l 4\" 10x B 6\"   10x B 5# DBs 10x B 5# DBs   Lat step over and back        10x B 5# DBs 10x B 5# DBs   Clams    2x12 B gtb 2x12 B gtb 2x12 b/l gtb                    Row  10x otb 2x10 10# 2x10 10# 2x10 10# 2x15 10# 2x15 10# 2x15 10# 2x15 10# 2x15 10#   LPD 10x otb 2x10 7# 2x10 7# 2x10 7# 2x15 7# 2x15 10# 2x15 10# 2x15 10# 2x15 10#   ER/IR  10x otb ea 2x10 ea 1# 2x10 ea 1# 2x10 ea 1# 2x10 ea 1# NV 2x10 ea 2# 2x10 ea 2# 2x10 ea. 2#   SL flx/abd/ER  2x10 ea 2x10 ea 2x10 ea 2x10 ea 2x10 ea 2x10 ea 1# 2x15 ea 1# R only 2x15 ea. 1# R only   Sup alt iso            Sciatic N tensioner      10x10\" 10x10\" 10x10\" 10\"x10                                         Ther Ex             NS   8' L5 8' L5 8' L5 8' L5 8' L5 8' L5 8' L5 8' L5   Standing Hip Abduction    2x10 2# 2x10 2# 2x10 b/l 2# 2x10 B 3# 2x10 B 3# 2x10 B 4# 2x10 B 4# 2x10 B 4#   Standing hip ext   2x10 2# 2x10 2# 2x10 b/l 2# 2x10 B 3# 2x10 B 3# 2x10 B 4# 2x10 B 4# 2x10 B 4#   LAQ   2x10 B 2# 2x10 2# 2x10 b/l 2# 2x10 B 3# 2x10 B 3# 2x10 B 4# 2x10 B 5# 2x10 B 5#   Standing ham curl             Seated Heel Raises/Toe Raise             Lat stepping   5 laps gtb at knees 5 laps gtb at knees 5 laps window gtb knees 5 laps window gtb at knees NV  5 laps mirror btb at knees 5 laps window btb at knees   Leg press   2x10 40# 2x10 40# 2x10 40# 3x10 55# NV  3x10 55# 3x10 55#   Mini lunge   10x B fwd 10x B fwd np        Figure 4      3x30\"                    Sup flx            Wall slides            Face pull                                      Ther Activity                                         Gait Training                                         Modalities                                                     "

## 2025-03-19 NOTE — PATIENT INSTRUCTIONS
PRURITUS        Assessment and Plan:  Based on a thorough discussion of this condition and the management approach to it (including a comprehensive discussion of the known risks, side effects and potential benefits of treatment), the patient (family) agrees to implement the following specific plan:  Start Ketoconazole 2% shampoo- Use as shampoo at least 2 times per week to scalp. Lather into scalp and let sit for 5 minutes before rinsing.  Start Fluocinonide 0.05% external solution- Apply to scalp every day for 14 days, then once daily.  Start triamcinolone 1% cream- Apply 2 times a day to back for up to 2 weeks to affected skin on neck down prn flares then take one week break and can repeat regimen prn flares. Do not apply to groin, skin folds, face.    What is pruritus?  Pruritus is the medical term for itch. Itch is an unpleasant sensation on the skin that provokes the desire to rub or scratch the area to obtain relief. Itch can cause discomfort and frustration; in severe cases it can lead to disturbed sleep, anxiety and depression. Constant scratching to obtain relief can damage the skin (excoriation, lichenification) and reduce its effectiveness as a major protective barrier.     Pruritus is often a symptom of an underlying disease process such as a skin problem, a systemic disease, or abnormal nerve impulses.    What are skin signs of pruritus?   There are no specific skin signs associated with pruritus, apart from scratch marks (excoriations) and signs of the underlying condition.   Persistent scratching over a period of time may lead to:  Lichenification (thickened skin, lichen simplex)   Prurigo papules and nodules.    Who gets pruritus?  The epidemiology of pruritus depends on its underlying cause or causes. However, in general, the incidence of chronic pruritus increases with age, it is more common in women, and in those of  background.     Mechanisms underlying pruritus  Itch, like pain, can  originate anywhere along the neural itch pathway, from the central nervous system (brain and spinal cord) to the peripheral nervous system and the skin.Mechanisms underlying pruritus are complex.  The itch signal is transmitted mainly through small, itch-selective C-fibers in the skin in addition to histamine-triggered and non-histaminergic neurons.   These connect with secondary neurons which cross the opposite side of the spinothalamic tract and ascend to parts of the brain involved in sensation, emotion, reward and memory. These areas overlap with those activated by pain.   Patients with chronic pruritus usually have both peripheral and central hypersensitization (heightened reaction) which means they tend to overreact to noxious stimuli which normally inhibit itch (such as heat and scratching) and also misinterpret non-noxious stimuli as an itch (eg, light touch)    The way scratching stops itching has been explained by an interaction with pain pathways within the dorsal horn of the spinal cord.    Localized pruritus  Localized pruritus is pruritus that is confined to a certain part of the body. It can occur in association with a primary rash (eg dermatitis) or may occur because of hypersensitive nerves in the skin (neuropathic pruritus). Neuropathic pruritus is due to compression or degeneration of nerves in the skin, on route to the spine or in the spine itself. Neuropathic itch is sometimes associated with reduced or absent sweating in the affected area of skin.  Typical causes of localized itchy rashes  Scalp: seborrhoeic dermatitis, head lice   Back: Gaurav disease   Hands: pompholyx, irritant and/or allergic contact dermatitis   Genitals: vulvovaginal Candida albicans infection, lichen sclerosus   Legs: venous eczema   Feet: tinea pedis    Neuropathic causes of localized pruritus without primary rash  Face: trigeminal trophic syndrome   Hand: cheiralgia paraesthetica   Arm: brachioradial pruritus   Back:  notalgia paraesthetica/topics/brachioradial-pruritus/   Genital: pruritus vulvae, pruritus ani   Dermatomal: herpes zoster (shingles) during recovery phase  Scratching a localised itch may lead to lichen simplex, prurigo or prurigo nodularis.    Systemic causes of pruritus  Sytemic diseases may cause generalised pruritus. This is sometimes called metabolic itch. There is nothing wrong with the skin itself, at least until it's been scratched.  Metabolic disorders include chronic renal failure (dialysis) and liver disease (with or without cholestasis).  Uraemic pruritus arises in patients undergoing dialysis is due to a combination of xerosis (dry skin), secondary hyperparathyroidism, peripheral neuropathy (nerve changes) and inflammation.   Secondary hyperparathyroidism which also occurs in dialysis patients leads to microprecipitation (deposition) of calcium and magnesium salts in the skin, triggering mast cell degeneration, releasing serotonin and histamine.   Once chronic pruritus has occurred, there may be secondary changes in the nerves in the skin and central nervous system which heighten the sensation of itch.    Hepatogenic pruritus is more common in intrahepatic than extrahepatic cholestasis. Examples of intrahepatic cholestasis is associated with chronic viral hepatitis, primary biliary cirrhosis, pregnancy-related cholestasis. Extra-hepatic cholestasis is associated with pressure on the bile ducts, eg from pancreatic tumours or pseudocysts.   Cholestasis is thought to release toxic substances from the liver, which stimulates neural itch fibres in the skin.   Characteristically, cholestatic pruritus is most severe at night; it tends to affect the hands, feet and areas where clothes are rubbing on the skin.    Haematological disorders include iron deficiency anaemia and polycythaemia vera.  Generalized pruritus along with glossitis (tongue inflammation) and angular cheilitis (inflammation of mouth corners)  are seen in iron deficiency anaemia.   In polycythaemia vera, itch is usually precipitated by contact with water (aquagenic pruritus), eg after a shower. This is thought to be mediated by the effect of platelets, serotonin and prostaglandins.    Endocrine disorders include thyroid disease and diabetes mellitus.  In Graves' disease (thyrotoxicosis), increased blood flow, skin temperature and decreased itch threshold mediated by the increase in thyroid hormones, lead to the itch. Pruritus associated with myxoedema and hypothyroidism is rare, and if present, is more likely the result of xerosis (dry skin).   In diabetes mellitus, localized itch tends to occur in the perianal/genital region usually due to Candida albicans or dermatophyte infections. It is unclear if metabolic abnormalities such as renal impairment, autonomic failure or diabetic neuropathy contribute to this.    Paraneoplastic itch is associated with lymphoma, especially Hodgkin lymphoma, leukemia or a solid organ tumor (eg lung, colon, brain).  In Hodgkin lymphoma, pruritus is thought to be caused by histamine release, which may be related to eosinophilia.    Infections causing itch include human immunodeficiency virus infection (HIV) and hepatitis C virus.  Patients with HIV commonly complain of itch. This may be associated with skin infections/infestations, dry skin, drug reactions, hyperoesinophilia (increased eosinophil levels) and cutaneous T cell lymphoma. There is a possible correlation between intractable pruritus and increased HIV viral load.   In chronic hepatitis C infection, the mechanisms responsible for itch remain unclear. In the absence of cholestasis, pruritus may be related to antiviral therapy; it has been noted to occur in patients treated with combination therapy (interferon franko and ribavirin).    Pruritic skin diseases  Pruritus is often a symptom of many skin diseases. Some of these are included in the following list.  Allergic  contact dermatitis   Dry skin   Urticaria   Psoriasis   Atopic dermatitis   Folliculitis   Dermatitis herpetiformis   Lichen simplex   Lichen planus   Bullous pemphigoid   Lice   Scabies   Miliaria   Sunburn   Pityriasis rosea   Mycosis fungoides    Exposure-related pruritus  Pruritus may arise as a result of exposure to certain external factors.  Allergens or irritants   Cold, which can cause 'winter itch'   A physical urticaria, such as dermographism   Aquagenic pruritus (itch on exposure to water)   Insects and infestations, eg scabies   Medications (topical or systemic) eg opioids, aspirin    Hormonal reasons for pruritus  About 2% of pregnant women have pruritus without any obvious dermatological cause. In some cases the itch is due to cholestasis (pooling of bile in the gall bladder and liver). It usually occurs in the 3rd trimester and is relieved after giving birth.  Generalized itch is also a common symptom of menopause.    How is pruritus diagnosed?  The first steps of evaluation of an itchy patient are medical history and examination.  A thorough history can identify constitutional symptoms that may point towards an underlying systemic disease. Drug triggers such as opioids may be identified, especially if the commencement of the drug relates to the itch.    A careful examination can identify dermatological causes for the itch (eg scabies, lichen simplex, pemphigoid) or evidence of chronic skin changes related to the itch. In dermatological causes of pruritus, primary skin lesions will usually suggest the diagnosis. Patients without primary skin lesions and little evidence of chronic scratching should be investigated for systemic, neuropathic and psychogenic causes.  The panel of investigations could include:  Full/complete blood count   Creatinine and renal function tests   Liver function tests   Thyroid function tests   Erythrocyte sedimentation rate   Chest radiography   HIV serology    What treatment  is available for itch?  The management of pruritus relies on establishing the cause and then either removing or treating the cause to prevent further itching. In many cases, tests are necessary to determine the cause; while these are in progress, treatment to provide symptomatic relief of pruritus may be given.    Topical treatments  In addition to specific therapy for any underlying skin or internal disease, topical treatment may include:  Wet dressings or tepid shower to cool the skin   Calamine lotion (contains phenol, which cools the skin): avoid on dry skin and limit use to a few days   Menthol/camphor lotion: gives a chilling sensation   Local anesthetics, such as pramoxine (also called pramocaine), applied to small itchy spots such as insect bites   Regular use of emollients, especially if skin is dry   Mild topical corticosteroids for short periods   Topical calcineurin inhibitors are also used to reduce itch associated with inflammatory skin conditions   Topical doxepin, a tricyclic antidepressant and antihistamine, is an antipruritic used in eczema.    Other measures that can be useful in preventing pruritus include avoiding precipitating factors such as rough clothing or fabrics, overheating, and vasodilators if they provoke itching (eg, caffeine, alcohol, spices). Fingernails should be kept short and clean. If the urge to scratch is irresistible then rub the area with your palm.  Topical antihistamines should not be used for chronic itch, as they may sensitize the skin and result in allergic contact dermatitis.    Systemic therapy  If pruritus is severe and sleep is disturbed, then treatment with oral medication may be necessary. Some drugs may help to relieve the itch whilst others are given solely for their sedative effects.  Antihistamines are most useful in urticaria, in which histamine is released. Use for other pruritic conditions is not supported by randomized control trials. Sedating  antihistamines may be used for their sedative effects.   Doxepin and amitriptyline are tricyclic antidepressants have antipruritic action and act on the central and peripheral nervous systems.   Tetracyclic antidepressants such as mirtazepine and selective serotonin reuptake inhibitors (paroxetine, sertraline, fluoxetine) may also help some patients with severe itch including when it is caused by cholestasis, T-cell lymphoma, malignancy or a neuropathic condition.   Anti-epileptic drugs such as sodium valproate, gabapentin and pregabalin may also be of benefit to some patients, e.g., those with itch associated with renal failure or neuropathic itch. The mechanism of action is uncertain.   Opioid antagonists such as butorphanol intranasal spray, naltrexone tablets, and naloxone have been effective in patients suffering from intractable pruritus in association with liver disease, atopic eczema and chronic urticaria. Nalfurafine, which is a kappa-opioid agonist has also been studied and shown to reduce itch associated with chronic renal impairment, however it is not widely available.   Aspirin is sometimes effective if pruritus is mediated by kinins or prostaglandins and is noted to be effective in patients with pruritus due to polycythaemia vera. Note: aspirin may cause or aggravate itch in some patients.   Thalidomide has been successful in treating nodular prurigo and chronic pruritus of various kinds but is rarely used because of serious adverse effects and expense.   Rifampicin is effective for patients with pruritus associated with cholestasis (some forms of liver disease).     Isolated case reports in severe itch associated with malignancy have reported success with the NKR1 antagonist, aprepitant (normally used short-term for postoperative or chemotherapy-induced nausea). This is under investigation for neuropathic itch and nodular prurigo.    Phototherapy  Broadband ultraviolet B or narrow-band UVB  phototherapy alone, or in conjunction with UVA, has been shown to be helpful for pruritus associated with chronic kidney disease, psoriasis, atopic eczema and cutaneous T-cell lymphoma.    Behavioral therapy  Behavioral therapy may be used in conjunction with pharmacotherapy to modify behaviours such as coping mechanisms and stress reduction, which help interrupt the itch-scratch cycle. One randomized controlled trial showed short-term benefits in reduction in itch frequency and scratching as well as improvement in coping mechanisms.    What is the outcome for pruritus?  The management of chronic severe itch is difficult and often requires the use of combination therapy over a long period of time. Identification and treatment of underlying conditions causing pruritus may help in this process. The symptom may quickly disappear or persist for long periods of time.

## 2025-03-22 DIAGNOSIS — M81.0 OSTEOPOROSIS WITHOUT CURRENT PATHOLOGICAL FRACTURE, UNSPECIFIED OSTEOPOROSIS TYPE: ICD-10-CM

## 2025-03-23 RX ORDER — ALENDRONATE SODIUM 70 MG/1
TABLET ORAL
Qty: 12 TABLET | Refills: 1 | Status: SHIPPED | OUTPATIENT
Start: 2025-03-23

## 2025-04-02 ENCOUNTER — APPOINTMENT (OUTPATIENT)
Dept: LAB | Facility: CLINIC | Age: 60
End: 2025-04-02
Payer: COMMERCIAL

## 2025-04-02 DIAGNOSIS — D64.9 ANEMIA, UNSPECIFIED TYPE: ICD-10-CM

## 2025-04-02 LAB
HCT VFR BLD AUTO: 38.1 % (ref 34.8–46.1)
HGB BLD-MCNC: 11.5 G/DL (ref 11.5–15.4)

## 2025-04-02 PROCEDURE — 85014 HEMATOCRIT: CPT

## 2025-04-02 PROCEDURE — 85018 HEMOGLOBIN: CPT

## 2025-04-02 PROCEDURE — 36415 COLL VENOUS BLD VENIPUNCTURE: CPT

## 2025-04-06 ENCOUNTER — RESULTS FOLLOW-UP (OUTPATIENT)
Dept: INTERNAL MEDICINE CLINIC | Facility: CLINIC | Age: 60
End: 2025-04-06

## 2025-04-23 ENCOUNTER — OFFICE VISIT (OUTPATIENT)
Dept: INTERNAL MEDICINE CLINIC | Facility: CLINIC | Age: 60
End: 2025-04-23
Payer: COMMERCIAL

## 2025-04-23 VITALS
BODY MASS INDEX: 26.67 KG/M2 | SYSTOLIC BLOOD PRESSURE: 118 MMHG | DIASTOLIC BLOOD PRESSURE: 70 MMHG | WEIGHT: 156.2 LBS | HEART RATE: 105 BPM | HEIGHT: 64 IN | TEMPERATURE: 97.1 F | OXYGEN SATURATION: 99 %

## 2025-04-23 DIAGNOSIS — M54.16 LUMBAR RADICULOPATHY: ICD-10-CM

## 2025-04-23 DIAGNOSIS — E78.2 MIXED HYPERLIPIDEMIA: Primary | ICD-10-CM

## 2025-04-23 DIAGNOSIS — F41.9 ANXIETY: ICD-10-CM

## 2025-04-23 DIAGNOSIS — M79.7 FIBROMYALGIA: ICD-10-CM

## 2025-04-23 PROCEDURE — 99214 OFFICE O/P EST MOD 30 MIN: CPT | Performed by: INTERNAL MEDICINE

## 2025-04-23 RX ORDER — GABAPENTIN 300 MG/1
CAPSULE ORAL
Qty: 720 CAPSULE | Refills: 3 | Status: SHIPPED | OUTPATIENT
Start: 2025-04-23

## 2025-04-23 RX ORDER — MILNACIPRAN HYDROCHLORIDE 50 MG/1
50 TABLET, FILM COATED ORAL 2 TIMES DAILY
Qty: 180 TABLET | Refills: 3 | Status: SHIPPED | OUTPATIENT
Start: 2025-04-23

## 2025-04-23 RX ORDER — ROSUVASTATIN CALCIUM 5 MG/1
5 TABLET, COATED ORAL DAILY
Qty: 90 TABLET | Refills: 3 | Status: SHIPPED | OUTPATIENT
Start: 2025-04-23

## 2025-04-23 NOTE — LETTER
2025    Patient Name Mary Beth Lyons   1965    Mary Beth Lyons is a her and suffers from generalized anxiety disorder.  She is on treatment for this condition.  Her symptoms are nervousness, panic attacks, and poor memory.    Lea Reyes MD

## 2025-04-23 NOTE — ASSESSMENT & PLAN NOTE
Continue gabapentin and Savella  Continue staying active regularexercise  Orders:  •  gabapentin (NEURONTIN) 300 mg capsule; TAKE 3 CAPSULES BY MOUTH EVERY MORNING, 2 CAPSULES IN THE AFTERNOON, AND 3 CAPSULES AT BEDTIME  •  Milnacipran HCl (Savella) 50 MG TABS; Take 1 tablet by mouth 2 (two) times a day

## 2025-04-23 NOTE — PROGRESS NOTES
Name: Mary Beth Lyons      : 1965      MRN: [id number]  Encounter Provider: Lea Reyes, MD  Encounter Date: 2025   [facility]    Assessment & Plan  Fibromyalgia  Continue gabapentin and Savella  Continue staying active regularexercise  Orders:  •  gabapentin (NEURONTIN) 300 mg capsule; TAKE 3 CAPSULES BY MOUTH EVERY MORNING, 2 CAPSULES IN THE AFTERNOON, AND 3 CAPSULES AT BEDTIME  •  Milnacipran HCl (Savella) 50 MG TABS; Take 1 tablet by mouth 2 (two) times a day    Mixed hyperlipidemia  Griesser starting rosuvastatin 5 mg daily  Orders:  •  CBC and differential; Future  •  Comprehensive metabolic panel; Future  •  Lipid panel; Future  •  rosuvastatin (CRESTOR) 5 mg tablet; Take 1 tablet (5 mg total) by mouth daily    Anxiety  Increased anxiety  Continue current medications BuSpar 15 mg 3 times daily       Lumbar radiculopathy  Pain in her knees but also in her lumbar spine with some radiation down the legs  No improvement with knee injections  Continue home exercises from physical therapy, gabapentin  Suspect her pain is mainly related to her fibromyalgia so will not send for MRI or to  pain management at this time     Reminded to schedule DEXA scan and mammogram       History of Present Illness     Here for a follow-up  Generalized aches and pains, bilateral knee pain, low back pain  Anxiety partly related to concerns over immigration status  She is requesting a letter with her diagnosis to present to her       Review of Systems   Constitutional:  Negative for fever.   Respiratory:  Negative for shortness of breath.    Cardiovascular:  Negative for chest pain and palpitations.   Gastrointestinal:  Negative for abdominal pain.   Musculoskeletal:  Positive for arthralgias, back pain and myalgias.   Psychiatric/Behavioral:  The patient is nervous/anxious.      Past Medical History:   Diagnosis Date   • Acid reflux    • Acid reflux disease  08/11/2015   • Anemia     last assessed 03/23/2016   • Arthritis     rheumatoid   • Benign paroxysmal positional vertigo 03/18/2022   • Cervicalgia 11/21/2019   • Depression    • Depression 01/24/2018   • Fibromyalgia    • Headache 01/24/2018   • Other long term (current) drug therapy 01/24/2018   • Pain in right wrist 08/28/2018   • Wrist weakness 08/28/2018     Past Surgical History:   Procedure Laterality Date   • HYSTERECTOMY      age 46   • ID COLONOSCOPY FLX DX W/COLLJ SPEC WHEN PFRMD N/A 5/5/2016    Procedure: COLONOSCOPY;  Surgeon: Gabbi Greer DO;  Location: [facility];  Service: Gastroenterology   • ID OPTX DSTL RDL X-ARTIC FX/EPIPHYSL SEPARATION Right 5/12/2016    Procedure: OPEN REDUCTION INTERNAL FIXATION RIGHT DISTAL RADIUS (EXTRA-ARTICULAR);  Surgeon: Randy Acuna MD;  Location: [facility];  Service: Orthopedics   • TONSILLECTOMY       Family History   Problem Relation Age of Onset   • No Known Problems Mother    • No Known Problems Father    • No Known Problems Sister    • No Known Problems Sister    • No Known Problems Sister    • No Known Problems Daughter    • No Known Problems Maternal Grandmother    • No Known Problems Maternal Grandfather    • No Known Problems Paternal Grandmother    • No Known Problems Paternal Grandfather    • No Known Problems Son    • No Known Problems Maternal Aunt    • No Known Problems Maternal Aunt    • No Known Problems Maternal Aunt    • No Known Problems Maternal Aunt    • No Known Problems Maternal Aunt    • Breast cancer Cousin      Social History     Tobacco Use   • Smoking status: Never   • Smokeless tobacco: Never   Vaping Use   • Vaping status: Never Used   Substance and Sexual Activity   • Alcohol use: No   • Drug use: No   • Sexual activity: Not Currently     Current Outpatient Medications on File Prior to Visit   Medication Sig   • alendronate (FOSAMAX) 70 mg tablet TAKE 1 TABLET BY MOUTH EVERY 7 DAYS WITH A GLASS OF WATER, STAY UPRIGHT AND DO NOT LIE  DOWN FOR AT LEAST 30 MINUTES AFTER   • busPIRone (BUSPAR) 15 mg tablet Take 1 tablet (15 mg total) by mouth 3 (three) times a day   • clotrimazole (LOTRIMIN) 1 % cream Apply topically 2 (two) times a day   • fluocinonide (LIDEX) 0.05 % external solution Apply to scalp every day for 14 days, then AS NEEDED   • fluticasone (FLONASE) 50 mcg/act nasal spray SHAKE LIQUID AND USE 2 SPRAYS IN EACH NOSTRIL DAILY   • ketoconazole (NIZORAL) 2 % shampoo Use as shampoo at least 2 times per week to scalp. Lather into scalp and let sit for 5 minutes before rinsing.   • latanoprost (XALATAN) 0.005 % ophthalmic solution INSTILL 1 DROP IN RIGHT EYE EVERY NIGHT AT BEDTIME   • Magnesium 400 MG CAPS Take 1 capsule (400 mg total) by mouth in the morning   • omeprazole (PriLOSEC) 20 mg delayed release capsule Take 1 capsule (20 mg total) by mouth daily   • triamcinolone (KENALOG) 0.1 % cream Apply 2 times a day to itchy area as needed   • [DISCONTINUED] gabapentin (NEURONTIN) 300 mg capsule TAKE 3 CAPSULES BY MOUTH EVERY MORNING, 2 CAPSULES IN THE AFTERNOON, AND 3 CAPSULES AT BEDTIME   • [DISCONTINUED] Milnacipran HCl (Savella) 50 MG TABS TAKE 1 TABLET BY MOUTH TWICE DAILY   • clobetasol (TEMOVATE) 0.05 % ointment Apply topically in the morning X12 weeks (Patient not taking: Reported on 1/14/2025)     Allergies   Allergen Reactions   • Other      seasonal     Immunization History   Administered Date(s) Administered   • COVID-19 PFIZER VACCINE 0.3 ML IM 07/03/2021   • COVID-19 Pfizer Vac BIVALENT Alex-sucrose 12 Yr+ IM 08/07/2023   • COVID-19 Pfizer mRNA vacc PF alex-sucrose 12 yr and older (Comirnaty) 10/16/2024   • COVID-19 Pfizer vac (Alex-sucrose, gray cap) 12 yr+ IM 02/22/2022   • INFLUENZA 10/28/2020, 12/06/2021   • Influenza Quadrivalent Preservative Free 3 years and older IM 10/29/2015, 09/23/2016, 10/16/2017   • Influenza, injectable, quadrivalent, preservative free 0.5 mL 10/16/2023   • Influenza, recombinant,  "quadrivalent,injectable, preservative free 10/19/2018, 11/21/2022   • Influenza, seasonal, injectable, preservative free 10/16/2024   • Pneumococcal Conjugate Vaccine 20-valent (Pcv20), Polysace 02/17/2025   • Pneumococcal Polysaccharide PPV23 06/08/2015   • Respiratory Syncytial Virus Vaccine (Recombinant) 01/18/2024   • Tdap 06/08/2015   • Zoster Vaccine Recombinant 10/28/2020, 12/27/2020     Objective   /70 (BP Location: Left arm, Patient Position: Sitting, Cuff Size: Standard)   Pulse 105   Temp (!) 97.1 °F (36.2 °C) (Tympanic)   Ht 5' 4\" (1.626 m)   Wt 70.9 kg (156 lb 3.2 oz)   SpO2 99%   BMI 26.81 kg/m²     Physical Exam  Constitutional:       General: She is not in acute distress.     Appearance: She is well-developed. She is not ill-appearing, toxic-appearing or diaphoretic.   Eyes:      Conjunctiva/sclera: Conjunctivae normal.   Cardiovascular:      Rate and Rhythm: Normal rate and regular rhythm.      Heart sounds: Normal heart sounds. No murmur heard.  Pulmonary:      Effort: Pulmonary effort is normal. No respiratory distress.      Breath sounds: Normal breath sounds. No stridor. No wheezing or rales.   Abdominal:      General: There is no distension.      Palpations: Abdomen is soft. There is no mass.      Tenderness: There is no abdominal tenderness. There is no guarding or rebound.   Musculoskeletal:      Cervical back: Neck supple.      Lumbar back: No bony tenderness. Positive right straight leg raise test and positive left straight leg raise test.      Right knee: Normal range of motion.      Left knee: Normal range of motion.      Right lower leg: No edema.      Left lower leg: No edema.   Neurological:      Mental Status: She is alert and oriented to person, place, and time.   Psychiatric:         Mood and Affect: Mood normal.         Behavior: Behavior normal.         Thought Content: Thought content normal.         Judgment: Judgment normal.         "

## 2025-04-23 NOTE — ASSESSMENT & PLAN NOTE
Francois starting rosuvastatin 5 mg daily  Orders:  •  CBC and differential; Future  •  Comprehensive metabolic panel; Future  •  Lipid panel; Future  •  rosuvastatin (CRESTOR) 5 mg tablet; Take 1 tablet (5 mg total) by mouth daily

## 2025-05-28 ENCOUNTER — HOSPITAL ENCOUNTER (EMERGENCY)
Facility: HOSPITAL | Age: 60
Discharge: HOME | End: 2025-05-28
Attending: EMERGENCY MEDICINE | Admitting: EMERGENCY MEDICINE
Payer: COMMERCIAL

## 2025-05-28 ENCOUNTER — APPOINTMENT (EMERGENCY)
Dept: RADIOLOGY | Facility: HOSPITAL | Age: 60
End: 2025-05-28
Payer: COMMERCIAL

## 2025-05-28 VITALS
OXYGEN SATURATION: 99 % | RESPIRATION RATE: 18 BRPM | SYSTOLIC BLOOD PRESSURE: 113 MMHG | HEART RATE: 84 BPM | TEMPERATURE: 98.1 F | DIASTOLIC BLOOD PRESSURE: 59 MMHG

## 2025-05-28 DIAGNOSIS — M25.562 ACUTE PAIN OF BOTH KNEES: Primary | ICD-10-CM

## 2025-05-28 DIAGNOSIS — M25.561 ACUTE PAIN OF BOTH KNEES: Primary | ICD-10-CM

## 2025-05-28 LAB
APPEARANCE FLD: CLEAR
B BURGDOR IGG+IGM SER QL IA: NEGATIVE
COLOR FLD: YELLOW
CRYSTALS SNV QL MICRO: NORMAL
GRAM STN SPEC: NORMAL
LYMPHOCYTES # SNV MANUAL: 13 %
MONOCYTES NFR SNV MANUAL: 86 %
NEUTROPHILS NFR SNV MANUAL: 1 %
SITE: ABNORMAL
TOTAL CELLS COUNTED SPEC: 100
WBC # FLD MANUAL: 376 /UL (ref 0–200)

## 2025-05-28 PROCEDURE — 86618 LYME DISEASE ANTIBODY: CPT | Performed by: STUDENT IN AN ORGANIZED HEALTH CARE EDUCATION/TRAINING PROGRAM

## 2025-05-28 PROCEDURE — 87205 SMEAR GRAM STAIN: CPT | Performed by: EMERGENCY MEDICINE

## 2025-05-28 PROCEDURE — 36415 COLL VENOUS BLD VENIPUNCTURE: CPT | Performed by: STUDENT IN AN ORGANIZED HEALTH CARE EDUCATION/TRAINING PROGRAM

## 2025-05-28 PROCEDURE — 99284 EMERGENCY DEPT VISIT MOD MDM: CPT | Performed by: EMERGENCY MEDICINE

## 2025-05-28 PROCEDURE — 89051 BODY FLUID CELL COUNT: CPT | Performed by: EMERGENCY MEDICINE

## 2025-05-28 PROCEDURE — 89060 EXAM SYNOVIAL FLUID CRYSTALS: CPT | Performed by: EMERGENCY MEDICINE

## 2025-05-28 PROCEDURE — 87070 CULTURE OTHR SPECIMN AEROBIC: CPT | Performed by: EMERGENCY MEDICINE

## 2025-05-28 PROCEDURE — 99283 EMERGENCY DEPT VISIT LOW MDM: CPT

## 2025-05-28 PROCEDURE — 73564 X-RAY EXAM KNEE 4 OR MORE: CPT

## 2025-05-28 RX ORDER — ROPIVACAINE HYDROCHLORIDE 5 MG/ML
30 INJECTION, SOLUTION EPIDURAL; INFILTRATION; PERINEURAL ONCE
Status: COMPLETED | OUTPATIENT
Start: 2025-05-28 | End: 2025-05-28

## 2025-05-28 RX ORDER — NAPROXEN 500 MG/1
500 TABLET ORAL 2 TIMES DAILY WITH MEALS
Qty: 30 TABLET | Refills: 0 | Status: SHIPPED | OUTPATIENT
Start: 2025-05-28

## 2025-05-28 RX ORDER — ACETAMINOPHEN 325 MG/1
975 TABLET ORAL ONCE
Status: COMPLETED | OUTPATIENT
Start: 2025-05-28 | End: 2025-05-28

## 2025-05-28 RX ORDER — IBUPROFEN 600 MG/1
600 TABLET, FILM COATED ORAL ONCE
Status: COMPLETED | OUTPATIENT
Start: 2025-05-28 | End: 2025-05-28

## 2025-05-28 RX ADMIN — ACETAMINOPHEN 975 MG: 325 TABLET ORAL at 14:00

## 2025-05-28 RX ADMIN — IBUPROFEN 600 MG: 600 TABLET, FILM COATED ORAL at 14:00

## 2025-05-28 RX ADMIN — ROPIVACAINE HYDROCHLORIDE 30 ML: 5 INJECTION EPIDURAL; INFILTRATION; PERINEURAL at 14:01

## 2025-05-28 NOTE — DISCHARGE INSTRUCTIONS
You were seen in the emergency department had labs drawn for Lyme disease and cultures drawn from your knee effusion.  Need to investigate gout versus pseudogout as a potential etiology of your knee pain.  Please follow-up with your primary care physician for results.  You can also check MyChart.  You will also be called with results from the emergency department if abnormal.

## 2025-05-28 NOTE — Clinical Note
Mary Beth Lyons was seen and treated in our emergency department on 5/28/2025.                Diagnosis:     Mary Beth  may return to work on return date.    She may return on this date: 05/29/2025         If you have any questions or concerns, please don't hesitate to call.      Dre Johnson MD    ______________________________           _______________          _______________  Hospital Representative                              Date                                Time

## 2025-05-28 NOTE — ED PROVIDER NOTES
Time reflects when diagnosis was documented in both MDM as applicable and the Disposition within this note       Time User Action Codes Description Comment    5/28/2025  2:37 PM Dre Johnson Add [M25.561,  M25.562] Acute pain of both knees           ED Disposition       ED Disposition   Discharge    Condition   Stable    Date/Time   Wed May 28, 2025  2:37 PM    Comment   Mary Beth Lyons discharge to home/self care.                   Assessment & Plan       Medical Decision Making  59-year-old female presents to the emergency department today for evaluation of left greater than right knee pain.  She is hemodynamically stable with reassuring vitals.  She is laying in the bed and uncomfortable with any movement or palpation of the left knee.  Examination reveals an edematous left knee with no overlying erythema or warmth to the touch.  Does not appear to be septic joint, as exam is not consistent with this presentation.  More likely secondary to gout versus pseudogout versus idiopathic etiology.  We discussed utility of arthrocentesis to which patient agrees.  The area was prepped and draped in sterile fashion.  Procedure was performed under ultrasound guidance.  About 20 cc of straw-colored clear fluid was extracted from the joint and 7 cc of ropivacaine were injected into the space.  Patient tolerated procedure very well.  Treat with anti-inflammatories here in the department and instructed the patient that she will be called with results of her cultures and stomach fluid study.  We did discuss worrisome symptoms which would require to seek emergency medical care at which she verbalized understanding.  Patient was given crutches and is able to ambulate safely.  She is amenable to discharge at this time.  She remained hemodynamically stable while under my care and is appropriate for discharge home with outpatient follow-up instructions and strict emergency department return precautions.    Amount and/or Complexity of  Data Reviewed  Labs: ordered.  Radiology: ordered.    Risk  OTC drugs.  Prescription drug management.             Medications   ibuprofen (MOTRIN) tablet 600 mg (600 mg Oral Given 5/28/25 1400)   acetaminophen (TYLENOL) tablet 975 mg (975 mg Oral Given 5/28/25 1400)   ropivacaine (NAROPIN) 0.5 % injection 30 mL (30 mL Intra-articular Given by Other 5/28/25 1401)       ED Risk Strat Scores                    No data recorded        SBIRT 22yo+      Flowsheet Row Most Recent Value   Initial Alcohol Screen: US AUDIT-C     1. How often do you have a drink containing alcohol? 0 Filed at: 05/28/2025 1889   2. How many drinks containing alcohol do you have on a typical day you are drinking?  0 Filed at: 05/28/2025 3559   3b. FEMALE Any Age, or MALE 65+: How often do you have 4 or more drinks on one occassion? 0 Filed at: 05/28/2025 2214   Audit-C Score 0 Filed at: 05/28/2025 5691   TREVOR: How many times in the past year have you...    Used an illegal drug or used a prescription medication for non-medical reasons? Never Filed at: 05/28/2025 3517                            History of Present Illness       Chief Complaint   Patient presents with    Knee Pain     Pt states on Sunday started w/ L knee pain. Pt states it just started out of nowhere denies trauma. Pt states is now unable to bear weight on leg.        Past Medical History[1]   Past Surgical History[2]   Family History[3]   Social History[4]   E-Cigarette/Vaping    E-Cigarette Use Never User       E-Cigarette/Vaping Substances    Nicotine No     THC No     CBD No     Flavoring No       I have reviewed and agree with the history as documented.     59-year-old female presents to the emergency department today for evaluation of left greater than right knee pain.  Pain has been ongoing for 3 days.  No history of trauma.  Pain has been progressively worsening.  She has noted significant swelling of the left knee.  Is very painful to walk or bend the knee and she has been  reluctant to do so.  She tells me that she limped to her car this morning and drove here and had someone wheeled her to the emergency department.  No fevers or chills.  She has not felt ill recently.  Does not believe the knee looks more red than usual but tells me has been significantly more swollen.  She has never had this happen in the past.        Review of Systems   Constitutional:  Negative for activity change, chills, fatigue and fever.   Respiratory:  Negative for chest tightness and shortness of breath.    Cardiovascular:  Negative for chest pain and palpitations.   Gastrointestinal:  Negative for abdominal pain, nausea and vomiting.   Musculoskeletal:  Positive for arthralgias, gait problem and joint swelling. Negative for back pain, myalgias and neck pain.   All other systems reviewed and are negative.          Objective       ED Triage Vitals [05/28/25 1152]   Temperature Pulse Blood Pressure Respirations SpO2 Patient Position - Orthostatic VS   98.1 °F (36.7 °C) (!) 118 160/63 18 100 % Sitting      Temp Source Heart Rate Source BP Location FiO2 (%) Pain Score    Tympanic Monitor Left arm -- 10 - Worst Possible Pain      Vitals      Date and Time Temp Pulse SpO2 Resp BP Pain Score FACES Pain Rating User   05/28/25 1443 -- 84 99 % 18 113/59 -- -- AM   05/28/25 1401 -- -- -- -- -- 10 - Worst Possible Pain -- AM   05/28/25 1400 -- -- -- -- -- 10 - Worst Possible Pain -- AM   05/28/25 1153 -- -- 100 % -- -- -- -- AS   05/28/25 1152 98.1 °F (36.7 °C) 118 100 % 18 160/63 10 - Worst Possible Pain -- AS            Physical Exam  Vitals reviewed.   Constitutional:       General: She is in acute distress.      Appearance: She is not ill-appearing.   HENT:      Head: Normocephalic and atraumatic.     Cardiovascular:      Rate and Rhythm: Normal rate and regular rhythm.   Pulmonary:      Effort: Pulmonary effort is normal. No respiratory distress.      Breath sounds: Normal breath sounds.     Musculoskeletal:          General: Swelling and tenderness present. No deformity or signs of injury.      Comments: Left greater than right knee tenderness with associated edema.  There is significant edema of the left knee with effusion confirmed by ultrasound examination.  The knees are not erythematous or warm to the touch.  No overlying signs of cellulitis.  Range of motion limited secondary to pain.  Limbs are otherwise neurovascularly intact.     Skin:     General: Skin is warm and dry.      Findings: No bruising, erythema, lesion or rash.     Neurological:      Mental Status: She is alert and oriented to person, place, and time.         Results Reviewed       Procedure Component Value Units Date/Time    Body fluid culture and Gram stain [[id number]] Collected: 05/28/25 1432    Lab Status: Preliminary result Specimen: Body Fluid from Joint, Left Knee Updated: 05/30/25 0939     Body Fluid Culture, Sterile No growth     Gram Stain Result 1+ Polys      No bacteria seen    STAT Gram Stain [[id number]] Collected: 05/28/25 1432    Lab Status: Final result Specimen: Other from Joint, Left Knee Updated: 05/28/25 2222     Gram Stain Result No Polys or Bacteria seen    Narrative:      Gram stain slide reviewed, and result finalized.    LYME TOTAL AB W REFLEX TO IGM/IGG [[id number]]  (Normal) Collected: 05/28/25 1406    Lab Status: Final result Specimen: Blood from Arm, Right Updated: 05/28/25 2007    Narrative:      The following orders were created for panel order LYME TOTAL AB W REFLEX TO IGM/IGG.  Procedure                               Abnormality         Status                     ---------                               -----------         ------                     Lyme Total AB W Reflex t...[[id number]]  Normal              Final result                 Please view results for these tests on the individual orders.    Lyme Total AB W Reflex to IGM/IGG [[id number]]  (Normal) Collected: 05/28/25 1406    Lab Status: Final result Specimen: Blood  from Arm, Right Updated: 05/28/25 2007     Lyme Total Antibodies Negative    Synovial fluid white cell count w/ diff [[id number]]  (Abnormal) Collected: 05/28/25 1432    Lab Status: Final result Specimen: Synovial Fluid from Joint, Left Knee Updated: 05/28/25 1707     Site Left Knee     Color, Fluid Yellow     Clarity, Fluid Clear     WBC, Fluid 376 /ul     Synovial Fluid Diff [[id number]] Collected: 05/28/25 1432    Lab Status: Final result Specimen: Synovial Fluid from Joint, Left Knee Updated: 05/28/25 1707     Total Counted 100     Neutrophil % Synovial 1 %      Lymph % Synovial 13 %      Monocyte % Synovial 86 %     Synovial fluid, crystal [[id number]] Collected: 05/28/25 1432    Lab Status: Final result Specimen: Synovial Fluid from Joint, Left Knee Updated: 05/28/25 1707     Crystals, Synovial Fluid No Crystals Seen            XR knee 4+ vw right injury   Final Interpretation by Mikey Degroot MD (05/28 1623)      No acute osseous abnormality.         Computerized Assisted Algorithm (CAA) may have been used to analyze all applicable images.         Workstation performed: [id number]         XR knee 4+ vw left injury   Final Interpretation by Mikey Degroot MD (05/28 1622)      No acute osseous abnormality.         Computerized Assisted Algorithm (CAA) may have been used to analyze all applicable images.         Workstation performed: [id number]             Procedures    ED Medication and Procedure Management   Prior to Admission Medications   Prescriptions Last Dose Informant Patient Reported? Taking?   Magnesium 400 MG CAPS   No No   Sig: Take 1 capsule (400 mg total) by mouth in the morning   Milnacipran HCl (Savella) 50 MG TABS   No No   Sig: Take 1 tablet by mouth 2 (two) times a day   alendronate (FOSAMAX) 70 mg tablet   No No   Sig: TAKE 1 TABLET BY MOUTH EVERY 7 DAYS WITH A GLASS OF WATER, STAY UPRIGHT AND DO NOT LIE DOWN FOR AT LEAST 30 MINUTES AFTER   busPIRone (BUSPAR) 15 mg tablet    No No   Sig: Take 1 tablet (15 mg total) by mouth 3 (three) times a day   clobetasol (TEMOVATE) 0.05 % ointment   No No   Sig: Apply topically in the morning X12 weeks   Patient not taking: Reported on 1/14/2025   clotrimazole (LOTRIMIN) 1 % cream   No No   Sig: Apply topically 2 (two) times a day   fluocinonide (LIDEX) 0.05 % external solution   No No   Sig: Apply to scalp every day for 14 days, then AS NEEDED   fluticasone (FLONASE) 50 mcg/act nasal spray   No No   Sig: SHAKE LIQUID AND USE 2 SPRAYS IN EACH NOSTRIL DAILY   gabapentin (NEURONTIN) 300 mg capsule   No No   Sig: TAKE 3 CAPSULES BY MOUTH EVERY MORNING, 2 CAPSULES IN THE AFTERNOON, AND 3 CAPSULES AT BEDTIME   ketoconazole (NIZORAL) 2 % shampoo   No No   Sig: Use as shampoo at least 2 times per week to scalp. Lather into scalp and let sit for 5 minutes before rinsing.   latanoprost (XALATAN) 0.005 % ophthalmic solution  Self Yes No   Sig: INSTILL 1 DROP IN RIGHT EYE EVERY NIGHT AT BEDTIME   omeprazole (PriLOSEC) 20 mg delayed release capsule   No No   Sig: Take 1 capsule (20 mg total) by mouth daily   rosuvastatin (CRESTOR) 5 mg tablet   No No   Sig: Take 1 tablet (5 mg total) by mouth daily   triamcinolone (KENALOG) 0.1 % cream   No No   Sig: Apply 2 times a day to itchy area as needed      Facility-Administered Medications: None     Discharge Medication List as of 5/28/2025  2:39 PM        START taking these medications    Details   naproxen (Naprosyn) 500 mg tablet Take 1 tablet (500 mg total) by mouth 2 (two) times a day with meals, Starting Wed 5/28/2025, Normal           CONTINUE these medications which have NOT CHANGED    Details   alendronate (FOSAMAX) 70 mg tablet TAKE 1 TABLET BY MOUTH EVERY 7 DAYS WITH A GLASS OF WATER, STAY UPRIGHT AND DO NOT LIE DOWN FOR AT LEAST 30 MINUTES AFTER, Normal      busPIRone (BUSPAR) 15 mg tablet Take 1 tablet (15 mg total) by mouth 3 (three) times a day, Starting Wed 2/19/2025, Normal      clobetasol (TEMOVATE)  0.05 % ointment Apply topically in the morning X12 weeks, Starting Tue 8/13/2024, Normal      clotrimazole (LOTRIMIN) 1 % cream Apply topically 2 (two) times a day, Starting Mon 10/16/2023, Normal      fluocinonide (LIDEX) 0.05 % external solution Apply to scalp every day for 14 days, then AS NEEDED, Normal      fluticasone (FLONASE) 50 mcg/act nasal spray SHAKE LIQUID AND USE 2 SPRAYS IN EACH NOSTRIL DAILY, Starting Thu 6/27/2024, Normal      gabapentin (NEURONTIN) 300 mg capsule TAKE 3 CAPSULES BY MOUTH EVERY MORNING, 2 CAPSULES IN THE AFTERNOON, AND 3 CAPSULES AT BEDTIME, Normal      ketoconazole (NIZORAL) 2 % shampoo Use as shampoo at least 2 times per week to scalp. Lather into scalp and let sit for 5 minutes before rinsing., Normal      latanoprost (XALATAN) 0.005 % ophthalmic solution INSTILL 1 DROP IN RIGHT EYE EVERY NIGHT AT BEDTIME, Historical Med      Magnesium 400 MG CAPS Take 1 capsule (400 mg total) by mouth in the morning, Starting Wed 8/14/2024, No Print      Milnacipran HCl (Savella) 50 MG TABS Take 1 tablet by mouth 2 (two) times a day, Starting Wed 4/23/2025, Normal      omeprazole (PriLOSEC) 20 mg delayed release capsule Take 1 capsule (20 mg total) by mouth daily, Starting Wed 2/19/2025, Normal      rosuvastatin (CRESTOR) 5 mg tablet Take 1 tablet (5 mg total) by mouth daily, Starting Wed 4/23/2025, Normal      triamcinolone (KENALOG) 0.1 % cream Apply 2 times a day to itchy area as needed, Normal           No discharge procedures on file.  ED SEPSIS DOCUMENTATION   Time reflects when diagnosis was documented in both MDM as applicable and the Disposition within this note       Time User Action Codes Description Comment    5/28/2025  2:37 PM Dre Johnson [M25.561,  M25.562] Acute pain of both knees                      [1]   Past Medical History:  Diagnosis Date    Acid reflux     Acid reflux disease 08/11/2015    Anemia     last assessed 03/23/2016    Arthritis     rheumatoid    Benign  paroxysmal positional vertigo 03/18/2022    Cervicalgia 11/21/2019    Depression     Depression 01/24/2018    Fibromyalgia     Headache 01/24/2018    Other long term (current) drug therapy 01/24/2018    Pain in right wrist 08/28/2018    Wrist weakness 08/28/2018   [2]   Past Surgical History:  Procedure Laterality Date    HYSTERECTOMY      age 46    [place] COLONOSCOPY FLX DX W/COLLJ SPEC WHEN PFRMD N/A 5/5/2016    Procedure: COLONOSCOPY;  Surgeon: Gabbi Greer DO;  Location: [facility];  Service: Gastroenterology    [place] OPTX DSTL RDL X-ARTIC FX/EPIPHYSL SEPARATION Right 5/12/2016    Procedure: OPEN REDUCTION INTERNAL FIXATION RIGHT DISTAL RADIUS (EXTRA-ARTICULAR);  Surgeon: Randy Acuna MD;  Location: [facility];  Service: Orthopedics    TONSILLECTOMY     [3]   Family History  Problem Relation Name Age of Onset    No Known Problems Mother      No Known Problems Father      No Known Problems Sister      No Known Problems Sister      No Known Problems Sister      No Known Problems Daughter      No Known Problems Maternal Grandmother      No Known Problems Maternal Grandfather      No Known Problems Paternal Grandmother      No Known Problems Paternal Grandfather      No Known Problems Son      No Known Problems Maternal Aunt      No Known Problems Maternal Aunt      No Known Problems Maternal Aunt      No Known Problems Maternal Aunt      No Known Problems Maternal Aunt      Breast cancer Cousin     [4]   Social History  Tobacco Use    Smoking status: Never    Smokeless tobacco: Never   Vaping Use    Vaping status: Never Used   Substance Use Topics    Alcohol use: No    Drug use: No        Dre Johnson MD  05/30/25 1300

## 2025-05-28 NOTE — ED ATTENDING ATTESTATION
5/28/2025  I, Earl Jean Baptiste DO, saw and evaluated the patient. I have discussed the patient with the resident/non-physician practitioner and agree with the resident's/non-physician practitioner's findings, Plan of Care, and MDM as documented in the resident's/non-physician practitioner's note, except where noted. All available labs and Radiology studies were reviewed.  I was present for key portions of any procedure(s) performed by the resident/non-physician practitioner and I was immediately available to provide assistance.       At this point I agree with the current assessment done in the Emergency Department.  I have conducted an independent evaluation of this patient a history and physical is as follows:    59-year-old woman with history of fibromyalgia complains of bilateral knee pain, atraumatic, left greater than right difficulty bearing weight due to pain.  Denies fever, focal weakness, numbness, tingling.  Patient had similar episode in January 2025 she was treated with a medication she does not read with the name of as well as physical therapy.  Symptoms abated after that.    On examination moderate left knee effusion no skin changes.  EHL/FHL 5/5 B  SILT sap/flora/tib/dpn/spn B  2+ PT B    Imp: B knee pain likely inflammatory vs crystal arthropathy. plan: knee xray, dx and tx arthrocentesis, MMA.      ED Course         Critical Care Time  Procedures

## 2025-05-31 LAB
BACTERIA SPEC BFLD CULT: NO GROWTH
GRAM STN SPEC: NORMAL
GRAM STN SPEC: NORMAL

## 2025-06-05 ENCOUNTER — OFFICE VISIT (OUTPATIENT)
Dept: OBGYN CLINIC | Facility: OTHER | Age: 60
End: 2025-06-05
Payer: COMMERCIAL

## 2025-06-05 ENCOUNTER — APPOINTMENT (OUTPATIENT)
Dept: LAB | Facility: HOSPITAL | Age: 60
End: 2025-06-05
Attending: STUDENT IN AN ORGANIZED HEALTH CARE EDUCATION/TRAINING PROGRAM
Payer: COMMERCIAL

## 2025-06-05 DIAGNOSIS — M25.462 PAIN AND SWELLING OF LEFT KNEE: ICD-10-CM

## 2025-06-05 DIAGNOSIS — M25.562 PAIN AND SWELLING OF LEFT KNEE: ICD-10-CM

## 2025-06-05 DIAGNOSIS — G89.29 CHRONIC PAIN OF LEFT KNEE: Primary | ICD-10-CM

## 2025-06-05 DIAGNOSIS — M25.562 CHRONIC PAIN OF LEFT KNEE: Primary | ICD-10-CM

## 2025-06-05 LAB
APPEARANCE FLD: ABNORMAL
COLOR FLD: YELLOW
CRYSTALS SNV QL MICRO: NORMAL
HISTIOCYTES NFR SNV MANUAL: 86 %
LYMPHOCYTES # SNV MANUAL: 12 %
NEUTROPHILS NFR SNV MANUAL: 1 %
RBC # SNV MANUAL: 2000 /UL (ref 0–10)
SITE: ABNORMAL
SYNOVIOCYTES NFR SNV: 1 %
TOTAL CELLS COUNTED SPEC: 100
WBC # FLD MANUAL: 366 /UL

## 2025-06-05 PROCEDURE — 87476 LYME DIS DNA AMP PROBE: CPT

## 2025-06-05 PROCEDURE — 87070 CULTURE OTHR SPECIMN AEROBIC: CPT

## 2025-06-05 PROCEDURE — 87205 SMEAR GRAM STAIN: CPT

## 2025-06-05 PROCEDURE — 89060 EXAM SYNOVIAL FLUID CRYSTALS: CPT

## 2025-06-05 PROCEDURE — 20611 DRAIN/INJ JOINT/BURSA W/US: CPT | Performed by: FAMILY MEDICINE

## 2025-06-05 PROCEDURE — 89050 BODY FLUID CELL COUNT: CPT

## 2025-06-05 PROCEDURE — 99213 OFFICE O/P EST LOW 20 MIN: CPT | Performed by: FAMILY MEDICINE

## 2025-06-05 PROCEDURE — 89051 BODY FLUID CELL COUNT: CPT

## 2025-06-05 RX ORDER — LIDOCAINE HYDROCHLORIDE 10 MG/ML
5 INJECTION, SOLUTION INFILTRATION; PERINEURAL
Status: COMPLETED | OUTPATIENT
Start: 2025-06-05 | End: 2025-06-05

## 2025-06-05 RX ORDER — TRIAMCINOLONE ACETONIDE 40 MG/ML
40 INJECTION, SUSPENSION INTRA-ARTICULAR; INTRAMUSCULAR
Status: COMPLETED | OUTPATIENT
Start: 2025-06-05 | End: 2025-06-05

## 2025-06-05 RX ORDER — LIDOCAINE HYDROCHLORIDE 10 MG/ML
4 INJECTION, SOLUTION INFILTRATION; PERINEURAL
Status: COMPLETED | OUTPATIENT
Start: 2025-06-05 | End: 2025-06-05

## 2025-06-05 RX ADMIN — LIDOCAINE HYDROCHLORIDE 5 ML: 10 INJECTION, SOLUTION INFILTRATION; PERINEURAL at 13:15

## 2025-06-05 RX ADMIN — LIDOCAINE HYDROCHLORIDE 4 ML: 10 INJECTION, SOLUTION INFILTRATION; PERINEURAL at 13:15

## 2025-06-05 RX ADMIN — TRIAMCINOLONE ACETONIDE 40 MG: 40 INJECTION, SUSPENSION INTRA-ARTICULAR; INTRAMUSCULAR at 13:15

## 2025-06-05 NOTE — LETTER
June 5, 2025     Patient: Mary Beth Lyons  YOB: 1965  Date of Visit: 6/5/2025      To Whom it May Concern:    Mary Beth Lyons is under my professional care. Mary Beth was seen in my office on 6/5/2025. Please excuse her absence from 5/28/2025-6/9/2025 due to a medical concern.    If you have any questions or concerns, please don't hesitate to call.          Sincerely,          Joe Ashraf DO        CC: No Recipients

## 2025-06-05 NOTE — PROGRESS NOTES
Name: Mary Beth Lyons      : 1965      MRN: [id number]  Encounter Provider: Maldonado Rachel III, DO  Encounter Date: 2025   Encounter department: [facility]  :  Assessment & Plan  Chronic pain of left knee  Patient advised that her pain may be due to either worsened OA, meniscal tear, vs inflammatory process. We recommend MRI to evaluate for any  meniscal tear and to fully evaluate extent of possible occult arthritis in setting of minimal OA on knee xray. If mri does not reveal any OA or meniscal tear as source of knee effusion and lyme PCR is negative then will refer back to rheumatology for repeat evaluation.     Orders:  •  MRI knee left  wo contrast; Future  •  Large joint arthrocentesis: L knee  •  lidocaine (XYLOCAINE) 1 % injection 4 mL  •  triamcinolone acetonide (Kenalog-40) 40 mg/mL injection 40 mg  •  lidocaine (XYLOCAINE) 1 % injection 5 mL    Pain and swelling of left knee    Orders:  •  MRI knee left  wo contrast; Future  •  Large joint arthrocentesis: L knee  •  RBC count,Synovial Fluid; Future  •  Synovial fluid white cell count w/ diff; Future  •  Synovial fluid, crystal; Future  •  Body fluid culture and Gram stain; Future  •  Lyme disease, PCR; Future  •  lidocaine (XYLOCAINE) 1 % injection 4 mL  •  triamcinolone acetonide (Kenalog-40) 40 mg/mL injection 40 mg  •  lidocaine (XYLOCAINE) 1 % injection 5 mL        History of Present Illness   HPI  Mary Beth Lyons is a 59 y.o. female who presents for left knee pain and swelling  PREVIOUS NOTE on 2024:  PMH osteoporosis, fibromyalgia, rheumatoid arthritis     Atraumatic 4 months Chronic bilateral knee pain. Referral for osteoarthritis.   Performs sarah chi. Pain worse at end of day night. Medial joint line bilateral.      Patient presents with knee pain. History, examination, and x-rays are consistent with diagnosis of Osteoarthritis.      Nonpharmacologic treatment: home exercise  program  Pain Score:   10/10 bilateral intermittent  Pain from condition does interfere with activities of daily living  Previous Visco relief: never done  Previous CSI relief: never done    Previous note visit on 01/02/2025:  Patient states she continues to have bilateral knee pain that now has associated hip flexion weakness and knee flexion weakness. She feels she has to lift her leg up with her hand in order to get into the car and out of the car. Patient's previous injection only helped for a mildly helped for a few days and then the pain worsened quickly. Her pain is worsened with standing out of a chair and getting in and out of bed. She states her pain is from the middle of her quad bilaterally down to her feet.    Previous visit 2/13/2025:  Patient states her pain has improved and is no longer having pain in her lower extremities. She has been doing sarah chi and physical therapy. She completed her medrol dose pack and felt improvement in her pain. She is no longer having hyperalgesia in her lower extremities. She denies any new complaints today. She previously felt she had to pick her leg up with her hands to move them while sitting. She is now able to move her legs on her own.    06/04/2025  Patient's left knee started to hurt on 05/25/2025. Her knee started to swell up at that time but she denies any trauma. She went to the emergency room on the 28th because she could no longer tolerate the pain. Patient previously had hip pain but has since completely improved since she began physical therapy.       Review of Systems       Objective   There were no vitals taken for this visit.     Physical Exam    LEFT KNEE:  Erythema: no  Swelling: no  Increased Warmth: no  Tenderness: Medial joint line  Flexion: intact  Extension: intact  Patellar Apprehension: negative  Patellar Grind Feliciano's: negative  Lachman's: negative  Drawer: negative  Varus laxity: negative  Valgus laxity: negative  Bev: negative             ____________________________________________________________________    Return for Follow-up after MRI is completed for review.  ____________________________________________________________________    IMAGING STUDIES: (I personally reviewed images in PACS, and if available-report):   Xray knee left 5/28/2025: No acute abnormal findings. Mild medial knee OA    PAST REPORTS:      Xray Lumbar spine 01/02/2025: No acute abnormal findings.   Xray left hip ap pelvis 1/2/25: KIKA cam pincer R>L. Mild to mod OA     xray bilateral knees 8/22/24:  Mild bilateral medial knee oa  ____________________________________________________________________    Large joint arthrocentesis: L knee    Performed by: Maldonado Rachel III, DO  Authorized by: Maldonado Rachel III, DO    Universal Protocol:  Procedure performed by: (Performed by Dr. Joe Ashraf)  Consent: Verbal consent obtained  Risks and benefits: risks, benefits and alternatives were discussed  Consent given by: patient  Patient understanding: patient states understanding of the procedure being performed  Site marked: the operative site was marked  Radiology Images displayed and confirmed. If images not available, report reviewed: imaging studies available  Required items: required blood products, implants, devices, and special equipment available  Patient identity confirmed: verbally with patient  Supporting Documentation  Indications: pain     Is this a Visco injection? NoProcedure Details  Location: knee - L knee  Preparation: Patient was prepped and draped in the usual sterile fashion  Needle size: 18 G  Ultrasound guidance: yes  Approach: Superolateral.  Medications administered: 40 mg triamcinolone acetonide 40 mg/mL; 4 mL lidocaine 1 %; 5 mL lidocaine 1 %    Aspirate: clear and yellow  Patient tolerance: patient tolerated the procedure well with no immediate complications  Dressing:  Sterile dressing applied    Approximately 10mls synovial  fluid        ____________________________________________________________________    Past Medical History[1]  Past Surgical History[2]  Social History   Social History     Substance and Sexual Activity   Alcohol Use No     Social History     Substance and Sexual Activity   Drug Use No     Tobacco Use History[3]  Family History[4]  Allergies[5]  ____________________________________________________________________    Patient instructions below verbally summarized in person during encounter:  There are no Patient Instructions on file for this visit.                  [1]  Past Medical History:  Diagnosis Date   • Acid reflux    • Acid reflux disease 08/11/2015   • Anemia     last assessed 03/23/2016   • Arthritis     rheumatoid   • Benign paroxysmal positional vertigo 03/18/2022   • Cervicalgia 11/21/2019   • Depression    • Depression 01/24/2018   • Fibromyalgia    • Headache 01/24/2018   • Other long term (current) drug therapy 01/24/2018   • Pain in right wrist 08/28/2018   • Wrist weakness 08/28/2018   [2]  Past Surgical History:  Procedure Laterality Date   • HYSTERECTOMY      age 46   • MI COLONOSCOPY FLX DX W/COLLJ SPEC WHEN PFRMD N/A 5/5/2016    Procedure: COLONOSCOPY;  Surgeon: Gabbi Greer DO;  Location: [facility];  Service: Gastroenterology   • MI OPTX DSTL RDL X-ARTIC FX/EPIPHYSL SEPARATION Right 5/12/2016    Procedure: OPEN REDUCTION INTERNAL FIXATION RIGHT DISTAL RADIUS (EXTRA-ARTICULAR);  Surgeon: Randy Acuna MD;  [facility];  Service: Orthopedics   • TONSILLECTOMY     [3]  Social History  Tobacco Use   Smoking Status Never   Smokeless Tobacco Never   [4]  Family History  Problem Relation Name Age of Onset   • No Known Problems Mother     • No Known Problems Father     • No Known Problems Sister     • No Known Problems Sister     • No Known Problems Sister     • No Known Problems Daughter     • No Known Problems Maternal Grandmother     • No Known Problems Maternal Grandfather     • No  Known Problems Paternal Grandmother     • No Known Problems Paternal Grandfather     • No Known Problems Son     • No Known Problems Maternal Aunt     • No Known Problems Maternal Aunt     • No Known Problems Maternal Aunt     • No Known Problems Maternal Aunt     • No Known Problems Maternal Aunt     • Breast cancer Cousin     [5]  Allergies  Allergen Reactions   • Other      seasonal

## 2025-06-08 LAB
BACTERIA SPEC BFLD CULT: NO GROWTH
GRAM STN SPEC: NORMAL
GRAM STN SPEC: NORMAL

## 2025-06-09 DIAGNOSIS — R20.2 NOTALGIA PARESTHETICA: ICD-10-CM

## 2025-06-09 NOTE — TELEPHONE ENCOUNTER
Pt was seen 03/19/25    Start Ketoconazole 2% shampoo- Use as shampoo at least 2 times per week to scalp. Lather into scalp and let sit for 5 minutes before rinsing.  Start Fluocinonide 0.05% external solution- Apply to scalp every day for 14 days, then once daily

## 2025-06-10 LAB — B BURGDOR DNA SPEC QL NAA+PROBE: NEGATIVE

## 2025-06-11 RX ORDER — FLUOCINONIDE TOPICAL SOLUTION USP, 0.05% 0.5 MG/ML
SOLUTION TOPICAL
Qty: 20 ML | Refills: 3 | Status: SHIPPED | OUTPATIENT
Start: 2025-06-11

## 2025-06-25 ENCOUNTER — HOSPITAL ENCOUNTER (OUTPATIENT)
Dept: RADIOLOGY | Age: 60
Discharge: HOME | End: 2025-06-25
Attending: STUDENT IN AN ORGANIZED HEALTH CARE EDUCATION/TRAINING PROGRAM
Payer: COMMERCIAL

## 2025-06-25 DIAGNOSIS — M25.462 PAIN AND SWELLING OF LEFT KNEE: ICD-10-CM

## 2025-06-25 DIAGNOSIS — G89.29 CHRONIC PAIN OF LEFT KNEE: ICD-10-CM

## 2025-06-25 DIAGNOSIS — M25.562 CHRONIC PAIN OF LEFT KNEE: ICD-10-CM

## 2025-06-25 DIAGNOSIS — M25.562 PAIN AND SWELLING OF LEFT KNEE: ICD-10-CM

## 2025-06-25 PROCEDURE — 73721 MRI JNT OF LWR EXTRE W/O DYE: CPT

## 2025-07-08 ENCOUNTER — OFFICE VISIT (OUTPATIENT)
Dept: OBGYN CLINIC | Facility: OTHER | Age: 60
End: 2025-07-08
Payer: COMMERCIAL

## 2025-07-08 VITALS — HEIGHT: 64 IN | WEIGHT: 156 LBS | BODY MASS INDEX: 26.63 KG/M2

## 2025-07-08 DIAGNOSIS — S83.242A OTHER TEAR OF MEDIAL MENISCUS, CURRENT INJURY, LEFT KNEE, INITIAL ENCOUNTER: Primary | ICD-10-CM

## 2025-07-08 PROCEDURE — 99214 OFFICE O/P EST MOD 30 MIN: CPT | Performed by: FAMILY MEDICINE

## 2025-07-08 NOTE — ASSESSMENT & PLAN NOTE
Medial knee  for subjective instability  Referral to ortho surgery since no persistent relief with corticosteroid injection    Orders:  •  Ambulatory Referral to Orthopedic Surgery; Future  •  Brace

## 2025-07-08 NOTE — PROGRESS NOTES
"Name: Mary Beth Lyons      : 1965      MRN: [id number]  Encounter Provider: Maldonado Rachel III, DO  Encounter Date: 2025   Encounter department: [facility]  :  Assessment & Plan  Other tear of medial meniscus, current injury, left knee, initial encounter  Medial knee  for subjective instability  Referral to ortho surgery since no persistent relief with corticosteroid injection    Orders:  •  Ambulatory Referral to Orthopedic Surgery; Future  •  Brace        History of Present Illness   HPI  Mary Beth Lyons is a 59 y.o. female who presents      Mary Beth presents for follow up off L knee pain and to review MRI imaging. We discussed that her MRI revealed medial meniscal tear.  Patient is agreeable with JESIKA dorsey for this visit. She reports that her pain is a \"little better\" but still hurting. She currently endorses pain with walking and when she rises from seated position. She does endorse some locking, clicking some days. She has been involved with sarah chi but stopped 3 weeks ago due to pain. She currently takes tylenol as needed. She states that swelling of her knee has also improved from previous.     Pharmacologic treatment: Tylenol   Nonpharmacologic treatment: home exercise program  Pain Score:   4 or 5  Pain from condition does interfere with activities of daily living  Previous Visco relief: never done  Previous CSI relief:  Mild pain relief, pain level about the same     Previous note visit on 2025:  Patient states she continues to have bilateral knee pain that now has associated hip flexion weakness and knee flexion weakness. She feels she has to lift her leg up with her hand in order to get into the car and out of the car. Patient's previous injection only helped for a mildly helped for a few days and then the pain worsened quickly. Her pain is worsened with standing out of a chair and getting in and out of bed. She states her " "pain is from the middle of her quad bilaterally down to her feet.     Previous visit 2/13/2025:  Patient states her pain has improved and is no longer having pain in her lower extremities. She has been doing sarah chi and physical therapy. She completed her medrol dose pack and felt improvement in her pain. She is no longer having hyperalgesia in her lower extremities. She denies any new complaints today. She previously felt she had to pick her leg up with her hands to move them while sitting. She is now able to move her legs on her own.     06/04/2025  Patient's left knee started to hurt on 05/25/2025. Her knee started to swell up at that time but she denies any trauma. She went to the emergency room on the 28th because she could no longer tolerate the pain. Patient previously had hip pain but has since completely improved since she began physical therapy.     History obtained from: patient    Review of Systems       Objective   Ht 5' 4\" (1.626 m)   Wt 70.8 kg (156 lb)   BMI 26.78 kg/m²      Physical Exam  LEFT KNEE:  Erythema: no  Swelling: no  Increased Warmth: no  Tenderness: +mjl  Flexion: seated at 90  Extension:  pain elicited with L knee extension  Drawer: negative  Varus laxity: negative  Valgus laxity: negative  Bev: +pain no crepitus  ____________________________________________________________________    Return for Follow-up with Surgeon.  ____________________________________________________________________    IMAGING STUDIES: (I personally reviewed images in PACS, and if available-report):  Mri left knee 6/25/25:  Medial meniscus tear evidenced by diminutive body and adjacent posterior horn (series 4 images 20-22.)  Mild tricompartmental osteoarthritis.         PAST REPORTS:    ____________________________________________________________________    Procedures  ____________________________________________________________________    Past Medical History[1]  Past Surgical History[2]  Social History "   Social History     Substance and Sexual Activity   Alcohol Use No     Social History     Substance and Sexual Activity   Drug Use No     Tobacco Use History[3]  Family History[4]  Allergies[5]  ____________________________________________________________________    Patient instructions below verbally summarized in person during encounter:  There are no Patient Instructions on file for this visit.           [1]  Past Medical History:  Diagnosis Date   • Acid reflux    • Acid reflux disease 08/11/2015   • Anemia     last assessed 03/23/2016   • Arthritis     rheumatoid   • Benign paroxysmal positional vertigo 03/18/2022   • Cervicalgia 11/21/2019   • Depression    • Depression 01/24/2018   • Fibromyalgia    • Headache 01/24/2018   • Other long term (current) drug therapy 01/24/2018   • Pain in right wrist 08/28/2018   • Wrist weakness 08/28/2018   [2]  Past Surgical History:  Procedure Laterality Date   • HYSTERECTOMY      age 46   • NE COLONOSCOPY FLX DX W/COLLJ SPEC WHEN PFRMD N/A 5/5/2016    Procedure: COLONOSCOPY;  Surgeon: Gabbi Greer DO;  Location: [facility];  Service: Gastroenterology   • NE OPTX DSTL RDL X-ARTIC FX/EPIPHYSL SEPARATION Right 5/12/2016    Procedure: OPEN REDUCTION INTERNAL FIXATION RIGHT DISTAL RADIUS (EXTRA-ARTICULAR);  Surgeon: Randy Acuna MD;  [facility];  Service: Orthopedics   • TONSILLECTOMY     [3]  Social History  Tobacco Use   Smoking Status Never   Smokeless Tobacco Never   [4]  Family History  Problem Relation Name Age of Onset   • No Known Problems Mother     • No Known Problems Father     • No Known Problems Sister     • No Known Problems Sister     • No Known Problems Sister     • No Known Problems Daughter     • No Known Problems Maternal Grandmother     • No Known Problems Maternal Grandfather     • No Known Problems Paternal Grandmother     • No Known Problems Paternal Grandfather     • No Known Problems Son     • No Known Problems Maternal Aunt     • No Known  Problems Maternal Aunt     • No Known Problems Maternal Aunt     • No Known Problems Maternal Aunt     • No Known Problems Maternal Aunt     • Breast cancer Cousin     [5]  Allergies  Allergen Reactions   • Other      seasonal

## 2025-07-21 ENCOUNTER — APPOINTMENT (OUTPATIENT)
Dept: LAB | Facility: CLINIC | Age: 60
End: 2025-07-21
Payer: COMMERCIAL

## 2025-07-21 DIAGNOSIS — E78.2 MIXED HYPERLIPIDEMIA: ICD-10-CM

## 2025-07-21 LAB
ALBUMIN SERPL BCG-MCNC: 4 G/DL (ref 3.5–5)
ALP SERPL-CCNC: 42 U/L (ref 34–104)
ALT SERPL W P-5'-P-CCNC: 16 U/L (ref 7–52)
ANION GAP SERPL CALCULATED.3IONS-SCNC: 9 MMOL/L (ref 4–13)
AST SERPL W P-5'-P-CCNC: 23 U/L (ref 13–39)
BASOPHILS # BLD AUTO: 0.08 THOUSANDS/ÂΜL (ref 0–0.1)
BASOPHILS NFR BLD AUTO: 1 % (ref 0–1)
BILIRUB SERPL-MCNC: 0.46 MG/DL (ref 0.2–1)
BUN SERPL-MCNC: 11 MG/DL (ref 5–25)
CALCIUM SERPL-MCNC: 9.1 MG/DL (ref 8.4–10.2)
CHLORIDE SERPL-SCNC: 103 MMOL/L (ref 96–108)
CHOLEST SERPL-MCNC: 200 MG/DL (ref ?–200)
CO2 SERPL-SCNC: 29 MMOL/L (ref 21–32)
CREAT SERPL-MCNC: 0.63 MG/DL (ref 0.6–1.3)
EOSINOPHIL # BLD AUTO: 0.18 THOUSAND/ÂΜL (ref 0–0.61)
EOSINOPHIL NFR BLD AUTO: 3 % (ref 0–6)
ERYTHROCYTE [DISTWIDTH] IN BLOOD BY AUTOMATED COUNT: 15.2 % (ref 11.6–15.1)
GFR SERPL CREATININE-BSD FRML MDRD: 97 ML/MIN/1.73SQ M
GLUCOSE P FAST SERPL-MCNC: 98 MG/DL (ref 65–99)
HCT VFR BLD AUTO: 38.3 % (ref 34.8–46.1)
HDLC SERPL-MCNC: 87 MG/DL
HGB BLD-MCNC: 12 G/DL (ref 11.5–15.4)
IMM GRANULOCYTES # BLD AUTO: 0.02 THOUSAND/UL (ref 0–0.2)
IMM GRANULOCYTES NFR BLD AUTO: 0 % (ref 0–2)
LDLC SERPL CALC-MCNC: 94 MG/DL (ref 0–100)
LYMPHOCYTES # BLD AUTO: 2.63 THOUSANDS/ÂΜL (ref 0.6–4.47)
LYMPHOCYTES NFR BLD AUTO: 46 % (ref 14–44)
MCH RBC QN AUTO: 24.7 PG (ref 26.8–34.3)
MCHC RBC AUTO-ENTMCNC: 31.3 G/DL (ref 31.4–37.4)
MCV RBC AUTO: 79 FL (ref 82–98)
MONOCYTES # BLD AUTO: 0.59 THOUSAND/ÂΜL (ref 0.17–1.22)
MONOCYTES NFR BLD AUTO: 10 % (ref 4–12)
NEUTROPHILS # BLD AUTO: 2.3 THOUSANDS/ÂΜL (ref 1.85–7.62)
NEUTS SEG NFR BLD AUTO: 40 % (ref 43–75)
NONHDLC SERPL-MCNC: 113 MG/DL
NRBC BLD AUTO-RTO: 0 /100 WBCS
PLATELET # BLD AUTO: 315 THOUSANDS/UL (ref 149–390)
PMV BLD AUTO: 9.7 FL (ref 8.9–12.7)
POTASSIUM SERPL-SCNC: 4.7 MMOL/L (ref 3.5–5.3)
PROT SERPL-MCNC: 6.6 G/DL (ref 6.4–8.4)
RBC # BLD AUTO: 4.86 MILLION/UL (ref 3.81–5.12)
SODIUM SERPL-SCNC: 141 MMOL/L (ref 135–147)
TRIGL SERPL-MCNC: 95 MG/DL (ref ?–150)
WBC # BLD AUTO: 5.8 THOUSAND/UL (ref 4.31–10.16)

## 2025-07-21 PROCEDURE — 36415 COLL VENOUS BLD VENIPUNCTURE: CPT

## 2025-07-21 PROCEDURE — 85025 COMPLETE CBC W/AUTO DIFF WBC: CPT

## 2025-07-21 PROCEDURE — 80061 LIPID PANEL: CPT

## 2025-07-21 PROCEDURE — 80053 COMPREHEN METABOLIC PANEL: CPT

## 2025-08-06 ENCOUNTER — OFFICE VISIT (OUTPATIENT)
Dept: INTERNAL MEDICINE CLINIC | Facility: CLINIC | Age: 60
End: 2025-08-06
Payer: COMMERCIAL

## 2025-08-06 VITALS
HEIGHT: 64 IN | SYSTOLIC BLOOD PRESSURE: 110 MMHG | OXYGEN SATURATION: 94 % | DIASTOLIC BLOOD PRESSURE: 60 MMHG | WEIGHT: 157 LBS | BODY MASS INDEX: 26.8 KG/M2 | HEART RATE: 100 BPM

## 2025-08-06 DIAGNOSIS — K21.9 GASTROESOPHAGEAL REFLUX DISEASE: ICD-10-CM

## 2025-08-06 DIAGNOSIS — F41.9 ANXIETY: ICD-10-CM

## 2025-08-06 DIAGNOSIS — Z23 NEED FOR VACCINATION: ICD-10-CM

## 2025-08-06 DIAGNOSIS — E78.2 MIXED HYPERLIPIDEMIA: ICD-10-CM

## 2025-08-06 DIAGNOSIS — M81.0 OSTEOPOROSIS WITHOUT CURRENT PATHOLOGICAL FRACTURE, UNSPECIFIED OSTEOPOROSIS TYPE: ICD-10-CM

## 2025-08-06 DIAGNOSIS — S83.242A ACUTE MEDIAL MENISCUS TEAR, LEFT, INITIAL ENCOUNTER: Primary | ICD-10-CM

## 2025-08-06 PROCEDURE — 90715 TDAP VACCINE 7 YRS/> IM: CPT

## 2025-08-06 PROCEDURE — 90471 IMMUNIZATION ADMIN: CPT

## 2025-08-06 PROCEDURE — 99214 OFFICE O/P EST MOD 30 MIN: CPT | Performed by: INTERNAL MEDICINE

## 2025-08-06 RX ORDER — ALENDRONATE SODIUM 70 MG/1
70 TABLET ORAL
Qty: 12 TABLET | Refills: 3 | Status: SHIPPED | OUTPATIENT
Start: 2025-08-06

## 2025-08-06 RX ORDER — NAPROXEN 500 MG/1
500 TABLET ORAL 2 TIMES DAILY WITH MEALS
Qty: 30 TABLET | Refills: 0 | Status: SHIPPED | OUTPATIENT
Start: 2025-08-06

## 2025-08-07 RX ORDER — BUSPIRONE HYDROCHLORIDE 15 MG/1
15 TABLET ORAL 3 TIMES DAILY
Qty: 270 TABLET | Refills: 1 | Status: SHIPPED | OUTPATIENT
Start: 2025-08-07

## 2025-08-07 RX ORDER — OMEPRAZOLE 20 MG/1
20 CAPSULE, DELAYED RELEASE ORAL 2 TIMES DAILY
Qty: 180 CAPSULE | Refills: 1 | Status: SHIPPED | OUTPATIENT
Start: 2025-08-07

## 2025-08-13 ENCOUNTER — OFFICE VISIT (OUTPATIENT)
Dept: OBGYN CLINIC | Facility: OTHER | Age: 60
End: 2025-08-13
Payer: COMMERCIAL

## 2025-08-13 ENCOUNTER — ANESTHESIA EVENT (OUTPATIENT)
Age: 60
End: 2025-08-13
Payer: COMMERCIAL

## 2025-08-13 ENCOUNTER — APPOINTMENT (OUTPATIENT)
Dept: RADIOLOGY | Facility: OTHER | Age: 60
End: 2025-08-13
Attending: ORTHOPAEDIC SURGERY
Payer: COMMERCIAL

## 2025-08-13 DIAGNOSIS — Z01.89 ENCOUNTER FOR LOWER EXTREMITY COMPARISON IMAGING STUDY: ICD-10-CM

## 2025-08-13 DIAGNOSIS — M25.562 LEFT KNEE PAIN, UNSPECIFIED CHRONICITY: ICD-10-CM

## 2025-08-13 PROCEDURE — 73562 X-RAY EXAM OF KNEE 3: CPT

## 2025-08-13 PROCEDURE — 73564 X-RAY EXAM KNEE 4 OR MORE: CPT

## 2025-08-15 ENCOUNTER — PRE-ADMISSION TESTING (OUTPATIENT)
Dept: PREADMISSION TESTING | Facility: HOSPITAL | Age: 60
End: 2025-08-15
Payer: COMMERCIAL

## 2025-08-19 ENCOUNTER — HOSPITAL ENCOUNTER (OUTPATIENT)
Age: 60
Setting detail: OUTPATIENT SURGERY
Discharge: HOME | End: 2025-08-19
Attending: ORTHOPAEDIC SURGERY | Admitting: ORTHOPAEDIC SURGERY
Payer: COMMERCIAL

## 2025-08-19 ENCOUNTER — ANESTHESIA (OUTPATIENT)
Age: 60
End: 2025-08-19
Payer: COMMERCIAL

## 2025-08-19 VITALS
OXYGEN SATURATION: 96 % | HEIGHT: 64 IN | SYSTOLIC BLOOD PRESSURE: 128 MMHG | HEART RATE: 96 BPM | WEIGHT: 152.8 LBS | TEMPERATURE: 97.6 F | RESPIRATION RATE: 18 BRPM | DIASTOLIC BLOOD PRESSURE: 59 MMHG | BODY MASS INDEX: 26.09 KG/M2

## 2025-08-19 DIAGNOSIS — S83.242A OTHER TEAR OF MEDIAL MENISCUS, CURRENT INJURY, LEFT KNEE, INITIAL ENCOUNTER: Primary | ICD-10-CM

## 2025-08-19 PROCEDURE — 29881 ARTHRS KNE SRG MNISECTMY M/L: CPT | Performed by: ORTHOPAEDIC SURGERY

## 2025-08-19 PROCEDURE — 29881 ARTHRS KNE SRG MNISECTMY M/L: CPT | Performed by: PHYSICIAN ASSISTANT

## 2025-08-19 RX ORDER — NAPROXEN 500 MG/1
500 TABLET ORAL 2 TIMES DAILY WITH MEALS
Qty: 60 TABLET | Refills: 0 | Status: SHIPPED | OUTPATIENT
Start: 2025-08-19

## 2025-08-19 RX ORDER — PROPOFOL 10 MG/ML
INJECTION, EMULSION INTRAVENOUS AS NEEDED
Status: DISCONTINUED | OUTPATIENT
Start: 2025-08-19 | End: 2025-08-19

## 2025-08-19 RX ORDER — ONDANSETRON 4 MG/1
4 TABLET, ORALLY DISINTEGRATING ORAL EVERY 8 HOURS PRN
Qty: 15 TABLET | Refills: 0 | Status: SHIPPED | OUTPATIENT
Start: 2025-08-19

## 2025-08-19 RX ORDER — HYDROMORPHONE HCL/PF 1 MG/ML
0.5 SYRINGE (ML) INJECTION
Status: DISCONTINUED | OUTPATIENT
Start: 2025-08-19 | End: 2025-08-19 | Stop reason: HOSPADM

## 2025-08-19 RX ORDER — OXYCODONE HYDROCHLORIDE 5 MG/1
5 TABLET ORAL EVERY 4 HOURS PRN
Qty: 10 TABLET | Refills: 0 | Status: SHIPPED | OUTPATIENT
Start: 2025-08-19 | End: 2025-08-29

## 2025-08-19 RX ORDER — OXYCODONE HYDROCHLORIDE 5 MG/1
5 TABLET ORAL EVERY 4 HOURS PRN
Refills: 0 | Status: DISCONTINUED | OUTPATIENT
Start: 2025-08-19 | End: 2025-08-19 | Stop reason: HOSPADM

## 2025-08-19 RX ORDER — KETOROLAC TROMETHAMINE 30 MG/ML
INJECTION, SOLUTION INTRAMUSCULAR; INTRAVENOUS AS NEEDED
Status: DISCONTINUED | OUTPATIENT
Start: 2025-08-19 | End: 2025-08-19

## 2025-08-19 RX ORDER — ONDANSETRON 2 MG/ML
INJECTION INTRAMUSCULAR; INTRAVENOUS AS NEEDED
Status: DISCONTINUED | OUTPATIENT
Start: 2025-08-19 | End: 2025-08-19

## 2025-08-19 RX ORDER — DIPHENHYDRAMINE HYDROCHLORIDE 50 MG/ML
12.5 INJECTION, SOLUTION INTRAMUSCULAR; INTRAVENOUS ONCE AS NEEDED
Status: DISCONTINUED | OUTPATIENT
Start: 2025-08-19 | End: 2025-08-19 | Stop reason: HOSPADM

## 2025-08-19 RX ORDER — DEXAMETHASONE SODIUM PHOSPHATE 10 MG/ML
INJECTION, SOLUTION INTRAMUSCULAR; INTRAVENOUS AS NEEDED
Status: DISCONTINUED | OUTPATIENT
Start: 2025-08-19 | End: 2025-08-19

## 2025-08-19 RX ORDER — SODIUM CHLORIDE, SODIUM LACTATE, POTASSIUM CHLORIDE, CALCIUM CHLORIDE 600; 310; 30; 20 MG/100ML; MG/100ML; MG/100ML; MG/100ML
20 INJECTION, SOLUTION INTRAVENOUS CONTINUOUS
Status: DISCONTINUED | OUTPATIENT
Start: 2025-08-19 | End: 2025-08-19 | Stop reason: HOSPADM

## 2025-08-19 RX ORDER — MIDAZOLAM HYDROCHLORIDE 2 MG/2ML
INJECTION, SOLUTION INTRAMUSCULAR; INTRAVENOUS AS NEEDED
Status: DISCONTINUED | OUTPATIENT
Start: 2025-08-19 | End: 2025-08-19

## 2025-08-19 RX ORDER — ACETAMINOPHEN 10 MG/ML
1000 INJECTION, SOLUTION INTRAVENOUS ONCE
Status: COMPLETED | OUTPATIENT
Start: 2025-08-19 | End: 2025-08-19

## 2025-08-19 RX ORDER — CHLORHEXIDINE GLUCONATE ORAL RINSE 1.2 MG/ML
15 SOLUTION DENTAL ONCE
Status: COMPLETED | OUTPATIENT
Start: 2025-08-19 | End: 2025-08-19

## 2025-08-19 RX ORDER — FENTANYL CITRATE/PF 50 MCG/ML
50 SYRINGE (ML) INJECTION
Status: DISCONTINUED | OUTPATIENT
Start: 2025-08-19 | End: 2025-08-19 | Stop reason: HOSPADM

## 2025-08-19 RX ORDER — METOCLOPRAMIDE HYDROCHLORIDE 5 MG/ML
10 INJECTION INTRAMUSCULAR; INTRAVENOUS ONCE AS NEEDED
Status: DISCONTINUED | OUTPATIENT
Start: 2025-08-19 | End: 2025-08-19 | Stop reason: HOSPADM

## 2025-08-19 RX ORDER — ALBUTEROL SULFATE 0.83 MG/ML
2.5 SOLUTION RESPIRATORY (INHALATION) ONCE AS NEEDED
Status: DISCONTINUED | OUTPATIENT
Start: 2025-08-19 | End: 2025-08-19 | Stop reason: HOSPADM

## 2025-08-19 RX ORDER — FENTANYL CITRATE 50 UG/ML
INJECTION, SOLUTION INTRAMUSCULAR; INTRAVENOUS AS NEEDED
Status: DISCONTINUED | OUTPATIENT
Start: 2025-08-19 | End: 2025-08-19

## 2025-08-19 RX ORDER — SENNOSIDES 8.6 MG
650 CAPSULE ORAL EVERY 6 HOURS PRN
Qty: 90 TABLET | Refills: 0 | Status: SHIPPED | OUTPATIENT
Start: 2025-08-19

## 2025-08-19 RX ORDER — TRANEXAMIC ACID 10 MG/ML
1000 INJECTION, SOLUTION INTRAVENOUS ONCE
Status: COMPLETED | OUTPATIENT
Start: 2025-08-19 | End: 2025-08-19

## 2025-08-19 RX ORDER — OXYCODONE HYDROCHLORIDE 10 MG/1
10 TABLET ORAL EVERY 4 HOURS PRN
Refills: 0 | Status: DISCONTINUED | OUTPATIENT
Start: 2025-08-19 | End: 2025-08-19 | Stop reason: HOSPADM

## 2025-08-19 RX ORDER — CEFAZOLIN SODIUM 2 G/50ML
2000 SOLUTION INTRAVENOUS ONCE
Status: COMPLETED | OUTPATIENT
Start: 2025-08-19 | End: 2025-08-19

## 2025-08-19 RX ORDER — ASPIRIN 81 MG/1
81 TABLET ORAL 2 TIMES DAILY
Qty: 28 TABLET | Refills: 0 | Status: SHIPPED | OUTPATIENT
Start: 2025-08-19 | End: 2025-09-02

## 2025-08-19 RX ORDER — LIDOCAINE HYDROCHLORIDE 10 MG/ML
INJECTION, SOLUTION EPIDURAL; INFILTRATION; INTRACAUDAL; PERINEURAL AS NEEDED
Status: DISCONTINUED | OUTPATIENT
Start: 2025-08-19 | End: 2025-08-19

## 2025-08-19 RX ADMIN — FENTANYL CITRATE 25 MCG: 50 INJECTION INTRAMUSCULAR; INTRAVENOUS at 13:18

## 2025-08-19 RX ADMIN — ONDANSETRON 4 MG: 2 INJECTION INTRAMUSCULAR; INTRAVENOUS at 13:07

## 2025-08-19 RX ADMIN — KETOROLAC TROMETHAMINE 15 MG: 30 INJECTION, SOLUTION INTRAMUSCULAR; INTRAVENOUS at 12:56

## 2025-08-19 RX ADMIN — DEXAMETHASONE SODIUM PHOSPHATE 10 MG: 10 INJECTION, SOLUTION INTRAMUSCULAR; INTRAVENOUS at 13:07

## 2025-08-19 RX ADMIN — ACETAMINOPHEN 1000 MG: 10 INJECTION INTRAVENOUS at 13:57

## 2025-08-19 RX ADMIN — FENTANYL CITRATE 25 MCG: 50 INJECTION INTRAMUSCULAR; INTRAVENOUS at 13:12

## 2025-08-19 RX ADMIN — FENTANYL CITRATE 25 MCG: 50 INJECTION INTRAMUSCULAR; INTRAVENOUS at 13:08

## 2025-08-19 RX ADMIN — HYDROMORPHONE HYDROCHLORIDE 0.5 MG: 1 INJECTION, SOLUTION INTRAMUSCULAR; INTRAVENOUS; SUBCUTANEOUS at 13:56

## 2025-08-19 RX ADMIN — TRANEXAMIC ACID 1000 MG: 10 INJECTION, SOLUTION INTRAVENOUS at 13:10

## 2025-08-19 RX ADMIN — CHLORHEXIDINE GLUCONATE 15 ML: 1.2 SOLUTION ORAL at 10:39

## 2025-08-19 RX ADMIN — OXYCODONE HYDROCHLORIDE 5 MG: 5 TABLET ORAL at 14:35

## 2025-08-19 RX ADMIN — PROPOFOL 200 MG: 10 INJECTION, EMULSION INTRAVENOUS at 13:07

## 2025-08-19 RX ADMIN — CEFAZOLIN SODIUM 2000 MG: 2 SOLUTION INTRAVENOUS at 13:04

## 2025-08-19 RX ADMIN — FENTANYL CITRATE 50 MCG: 50 INJECTION INTRAMUSCULAR; INTRAVENOUS at 13:44

## 2025-08-19 RX ADMIN — LIDOCAINE HYDROCHLORIDE 50 MG: 10 INJECTION, SOLUTION EPIDURAL; INFILTRATION; INTRACAUDAL; PERINEURAL at 13:07

## 2025-08-19 RX ADMIN — MIDAZOLAM 2 MG: 1 INJECTION INTRAMUSCULAR; INTRAVENOUS at 13:03

## 2025-08-19 RX ADMIN — FENTANYL CITRATE 50 MCG: 50 INJECTION INTRAMUSCULAR; INTRAVENOUS at 13:52

## 2025-08-19 RX ADMIN — SODIUM CHLORIDE, SODIUM LACTATE, POTASSIUM CHLORIDE, AND CALCIUM CHLORIDE 20 ML/HR: .6; .31; .03; .02 INJECTION, SOLUTION INTRAVENOUS at 10:39

## 2025-08-19 RX ADMIN — FENTANYL CITRATE 25 MCG: 50 INJECTION INTRAMUSCULAR; INTRAVENOUS at 13:15

## (undated) DEVICE — Device

## (undated) DEVICE — PACK PBDS UNIVERSAL ARTHROSCOPY RF

## (undated) DEVICE — DRAPE IOBAN 2-23 X 17 LGE